# Patient Record
Sex: FEMALE | Race: BLACK OR AFRICAN AMERICAN | NOT HISPANIC OR LATINO | Employment: OTHER | URBAN - METROPOLITAN AREA
[De-identification: names, ages, dates, MRNs, and addresses within clinical notes are randomized per-mention and may not be internally consistent; named-entity substitution may affect disease eponyms.]

---

## 2017-01-25 ENCOUNTER — COMMUNICATION - HEALTHEAST (OUTPATIENT)
Dept: FAMILY MEDICINE | Facility: CLINIC | Age: 67
End: 2017-01-25

## 2017-01-25 DIAGNOSIS — I10 ESSENTIAL HYPERTENSION, MALIGNANT: ICD-10-CM

## 2017-02-03 ENCOUNTER — AMBULATORY - HEALTHEAST (OUTPATIENT)
Dept: LAB | Facility: CLINIC | Age: 67
End: 2017-02-03

## 2017-02-03 DIAGNOSIS — R79.0 LOW MAGNESIUM LEVELS: ICD-10-CM

## 2017-02-03 DIAGNOSIS — B19.20 HCV (HEPATITIS C VIRUS): ICD-10-CM

## 2017-02-06 ENCOUNTER — COMMUNICATION - HEALTHEAST (OUTPATIENT)
Dept: FAMILY MEDICINE | Facility: CLINIC | Age: 67
End: 2017-02-06

## 2017-03-15 ENCOUNTER — COMMUNICATION - HEALTHEAST (OUTPATIENT)
Dept: FAMILY MEDICINE | Facility: CLINIC | Age: 67
End: 2017-03-15

## 2017-04-06 ENCOUNTER — OFFICE VISIT - HEALTHEAST (OUTPATIENT)
Dept: FAMILY MEDICINE | Facility: CLINIC | Age: 67
End: 2017-04-06

## 2017-04-06 DIAGNOSIS — E11.9 DIABETES (H): ICD-10-CM

## 2017-04-06 DIAGNOSIS — M85.80 OSTEOPENIA: ICD-10-CM

## 2017-04-06 DIAGNOSIS — Z00.00 ENCOUNTER FOR WELLNESS EXAMINATION: ICD-10-CM

## 2017-04-06 DIAGNOSIS — E55.9 VITAMIN D DEFICIENCY: ICD-10-CM

## 2017-04-06 ASSESSMENT — MIFFLIN-ST. JEOR: SCORE: 1182.24

## 2017-04-17 ENCOUNTER — COMMUNICATION - HEALTHEAST (OUTPATIENT)
Dept: FAMILY MEDICINE | Facility: CLINIC | Age: 67
End: 2017-04-17

## 2017-04-20 ENCOUNTER — HOSPITAL ENCOUNTER (OUTPATIENT)
Dept: MAMMOGRAPHY | Facility: HOSPITAL | Age: 67
Discharge: HOME | End: 2017-04-20
Attending: FAMILY MEDICINE

## 2017-04-20 DIAGNOSIS — Z12.31 VISIT FOR SCREENING MAMMOGRAM: ICD-10-CM

## 2017-04-25 ENCOUNTER — RECORDS - HEALTHEAST (OUTPATIENT)
Dept: ADMINISTRATIVE | Facility: OTHER | Age: 67
End: 2017-04-25

## 2017-04-25 ENCOUNTER — RECORDS - HEALTHEAST (OUTPATIENT)
Dept: BONE DENSITY | Facility: CLINIC | Age: 67
End: 2017-04-25

## 2017-04-25 DIAGNOSIS — Z00.00 ENCOUNTER FOR GENERAL ADULT MEDICAL EXAMINATION WITHOUT ABNORMAL FINDINGS: ICD-10-CM

## 2017-04-25 DIAGNOSIS — M85.80 OTHER SPECIFIED DISORDERS OF BONE DENSITY AND STRUCTURE, UNSPECIFIED SITE: ICD-10-CM

## 2017-05-19 ENCOUNTER — COMMUNICATION - HEALTHEAST (OUTPATIENT)
Dept: FAMILY MEDICINE | Facility: CLINIC | Age: 67
End: 2017-05-19

## 2017-06-05 ENCOUNTER — OFFICE VISIT - HEALTHEAST (OUTPATIENT)
Dept: FAMILY MEDICINE | Facility: CLINIC | Age: 67
End: 2017-06-05

## 2017-06-05 DIAGNOSIS — I10 HTN (HYPERTENSION): ICD-10-CM

## 2017-06-05 DIAGNOSIS — I10 ESSENTIAL HYPERTENSION: ICD-10-CM

## 2017-06-05 DIAGNOSIS — K74.60 CIRRHOSIS OF LIVER WITHOUT MENTION OF ALCOHOL: ICD-10-CM

## 2017-06-05 DIAGNOSIS — E11.9 DIABETES (H): ICD-10-CM

## 2017-06-05 DIAGNOSIS — E78.5 HYPERLIPEMIA: ICD-10-CM

## 2017-06-05 DIAGNOSIS — Z01.818 PREOP EXAMINATION: ICD-10-CM

## 2017-06-05 ASSESSMENT — MIFFLIN-ST. JEOR: SCORE: 1139.16

## 2017-06-06 LAB
ATRIAL RATE - MUSE: 65 BPM
DIASTOLIC BLOOD PRESSURE - MUSE: NORMAL MMHG
INTERPRETATION ECG - MUSE: NORMAL
P AXIS - MUSE: 71 DEGREES
PR INTERVAL - MUSE: 230 MS
QRS DURATION - MUSE: 68 MS
QT - MUSE: 406 MS
QTC - MUSE: 422 MS
R AXIS - MUSE: 34 DEGREES
SYSTOLIC BLOOD PRESSURE - MUSE: NORMAL MMHG
T AXIS - MUSE: 58 DEGREES
VENTRICULAR RATE- MUSE: 65 BPM

## 2017-08-03 ENCOUNTER — COMMUNICATION - HEALTHEAST (OUTPATIENT)
Dept: FAMILY MEDICINE | Facility: CLINIC | Age: 67
End: 2017-08-03

## 2017-08-22 ENCOUNTER — COMMUNICATION - HEALTHEAST (OUTPATIENT)
Dept: FAMILY MEDICINE | Facility: CLINIC | Age: 67
End: 2017-08-22

## 2017-08-22 DIAGNOSIS — I10 ESSENTIAL HYPERTENSION: ICD-10-CM

## 2017-08-25 ENCOUNTER — RECORDS - HEALTHEAST (OUTPATIENT)
Dept: ADMINISTRATIVE | Facility: OTHER | Age: 67
End: 2017-08-25

## 2017-09-02 ENCOUNTER — COMMUNICATION - HEALTHEAST (OUTPATIENT)
Dept: FAMILY MEDICINE | Facility: CLINIC | Age: 67
End: 2017-09-02

## 2017-09-02 DIAGNOSIS — I10 ESSENTIAL HYPERTENSION: ICD-10-CM

## 2017-09-08 ENCOUNTER — COMMUNICATION - HEALTHEAST (OUTPATIENT)
Dept: FAMILY MEDICINE | Facility: CLINIC | Age: 67
End: 2017-09-08

## 2017-09-08 DIAGNOSIS — E03.9 HYPOTHYROIDISM: ICD-10-CM

## 2017-09-21 ENCOUNTER — OFFICE VISIT - HEALTHEAST (OUTPATIENT)
Dept: FAMILY MEDICINE | Facility: CLINIC | Age: 67
End: 2017-09-21

## 2017-09-21 DIAGNOSIS — E78.5 HYPERLIPEMIA: ICD-10-CM

## 2017-09-21 DIAGNOSIS — R79.89 ELEVATED SERUM CREATININE: ICD-10-CM

## 2017-09-21 DIAGNOSIS — E11.9 DIABETES MELLITUS (H): ICD-10-CM

## 2017-09-21 DIAGNOSIS — E03.9 HYPOTHYROIDISM: ICD-10-CM

## 2017-09-21 DIAGNOSIS — M81.0 OSTEOPOROSIS: ICD-10-CM

## 2017-09-21 DIAGNOSIS — R93.7 LIPID ACCUMULATION IN MUSCLE DETERMINED BY MAGNETIC RESONANCE IMAGING: ICD-10-CM

## 2017-09-21 DIAGNOSIS — I10 HTN (HYPERTENSION): ICD-10-CM

## 2017-09-21 DIAGNOSIS — E55.9 VITAMIN D DEFICIENCY DISEASE: ICD-10-CM

## 2017-09-21 LAB — HBA1C MFR BLD: 5.2 % (ref 3.5–6)

## 2017-09-21 ASSESSMENT — MIFFLIN-ST. JEOR: SCORE: 1107.41

## 2017-09-22 LAB
CHOLEST SERPL-MCNC: 186 MG/DL
FASTING STATUS PATIENT QL REPORTED: NO
HDLC SERPL-MCNC: 37 MG/DL
LDLC SERPL CALC-MCNC: 128 MG/DL
TRIGL SERPL-MCNC: 107 MG/DL

## 2017-10-04 ENCOUNTER — COMMUNICATION - HEALTHEAST (OUTPATIENT)
Dept: FAMILY MEDICINE | Facility: CLINIC | Age: 67
End: 2017-10-04

## 2017-10-04 DIAGNOSIS — I10 HTN (HYPERTENSION): ICD-10-CM

## 2017-10-10 ENCOUNTER — AMBULATORY - HEALTHEAST (OUTPATIENT)
Dept: FAMILY MEDICINE | Facility: CLINIC | Age: 67
End: 2017-10-10

## 2017-10-10 ENCOUNTER — COMMUNICATION - HEALTHEAST (OUTPATIENT)
Dept: FAMILY MEDICINE | Facility: CLINIC | Age: 67
End: 2017-10-10

## 2017-10-10 DIAGNOSIS — E03.9 HYPOTHYROIDISM: ICD-10-CM

## 2017-10-11 ENCOUNTER — COMMUNICATION - HEALTHEAST (OUTPATIENT)
Dept: FAMILY MEDICINE | Facility: CLINIC | Age: 67
End: 2017-10-11

## 2017-10-23 ENCOUNTER — COMMUNICATION - HEALTHEAST (OUTPATIENT)
Dept: FAMILY MEDICINE | Facility: CLINIC | Age: 67
End: 2017-10-23

## 2017-10-23 DIAGNOSIS — E11.9 DIABETES (H): ICD-10-CM

## 2017-10-23 DIAGNOSIS — I10 HTN (HYPERTENSION): ICD-10-CM

## 2017-10-30 ENCOUNTER — RECORDS - HEALTHEAST (OUTPATIENT)
Dept: ADMINISTRATIVE | Facility: OTHER | Age: 67
End: 2017-10-30

## 2017-10-30 ENCOUNTER — COMMUNICATION - HEALTHEAST (OUTPATIENT)
Dept: FAMILY MEDICINE | Facility: CLINIC | Age: 67
End: 2017-10-30

## 2017-12-07 ENCOUNTER — RECORDS - HEALTHEAST (OUTPATIENT)
Dept: ADMINISTRATIVE | Facility: OTHER | Age: 67
End: 2017-12-07

## 2018-01-30 ENCOUNTER — COMMUNICATION - HEALTHEAST (OUTPATIENT)
Dept: FAMILY MEDICINE | Facility: CLINIC | Age: 68
End: 2018-01-30

## 2018-01-30 DIAGNOSIS — I10 HTN (HYPERTENSION): ICD-10-CM

## 2018-02-11 ENCOUNTER — COMMUNICATION - HEALTHEAST (OUTPATIENT)
Dept: FAMILY MEDICINE | Facility: CLINIC | Age: 68
End: 2018-02-11

## 2018-04-11 ENCOUNTER — COMMUNICATION - HEALTHEAST (OUTPATIENT)
Dept: FAMILY MEDICINE | Facility: CLINIC | Age: 68
End: 2018-04-11

## 2018-04-11 DIAGNOSIS — E03.9 HYPOTHYROIDISM: ICD-10-CM

## 2018-04-29 ENCOUNTER — COMMUNICATION - HEALTHEAST (OUTPATIENT)
Dept: FAMILY MEDICINE | Facility: CLINIC | Age: 68
End: 2018-04-29

## 2018-04-29 DIAGNOSIS — I10 HTN (HYPERTENSION): ICD-10-CM

## 2018-05-08 ENCOUNTER — COMMUNICATION - HEALTHEAST (OUTPATIENT)
Dept: FAMILY MEDICINE | Facility: CLINIC | Age: 68
End: 2018-05-08

## 2018-05-10 ENCOUNTER — HOSPITAL ENCOUNTER (OUTPATIENT)
Dept: MAMMOGRAPHY | Facility: CLINIC | Age: 68
Discharge: HOME | End: 2018-05-10
Attending: FAMILY MEDICINE

## 2018-05-10 DIAGNOSIS — Z12.31 VISIT FOR SCREENING MAMMOGRAM: ICD-10-CM

## 2018-05-12 ENCOUNTER — COMMUNICATION - HEALTHEAST (OUTPATIENT)
Dept: FAMILY MEDICINE | Facility: CLINIC | Age: 68
End: 2018-05-12

## 2018-05-12 DIAGNOSIS — E11.9 DIABETES (H): ICD-10-CM

## 2018-05-12 DIAGNOSIS — I10 ESSENTIAL HYPERTENSION: ICD-10-CM

## 2018-06-19 ENCOUNTER — OFFICE VISIT - HEALTHEAST (OUTPATIENT)
Dept: FAMILY MEDICINE | Facility: CLINIC | Age: 68
End: 2018-06-19

## 2018-06-19 DIAGNOSIS — Z00.00 ROUTINE GENERAL MEDICAL EXAMINATION AT A HEALTH CARE FACILITY: ICD-10-CM

## 2018-06-19 ASSESSMENT — MIFFLIN-ST. JEOR: SCORE: 1143.29

## 2018-07-02 ENCOUNTER — OFFICE VISIT - HEALTHEAST (OUTPATIENT)
Dept: FAMILY MEDICINE | Facility: CLINIC | Age: 68
End: 2018-07-02

## 2018-07-02 DIAGNOSIS — N18.30 CKD (CHRONIC KIDNEY DISEASE) STAGE 3, GFR 30-59 ML/MIN (H): ICD-10-CM

## 2018-07-02 DIAGNOSIS — B19.20 HCV (HEPATITIS C VIRUS): ICD-10-CM

## 2018-07-02 DIAGNOSIS — E03.9 HYPOTHYROIDISM: ICD-10-CM

## 2018-07-02 DIAGNOSIS — E11.21 TYPE 2 DIABETES MELLITUS WITH DIABETIC NEPHROPATHY, WITHOUT LONG-TERM CURRENT USE OF INSULIN (H): ICD-10-CM

## 2018-07-02 DIAGNOSIS — E55.9 VITAMIN D DEFICIENCY: ICD-10-CM

## 2018-07-02 DIAGNOSIS — M81.0 OSTEOPOROSIS: ICD-10-CM

## 2018-07-02 DIAGNOSIS — I10 HTN (HYPERTENSION): ICD-10-CM

## 2018-07-02 LAB
ANION GAP SERPL CALCULATED.3IONS-SCNC: 12 MMOL/L (ref 5–18)
BUN SERPL-MCNC: 35 MG/DL (ref 8–22)
CALCIUM SERPL-MCNC: 9.7 MG/DL (ref 8.5–10.5)
CHLORIDE BLD-SCNC: 107 MMOL/L (ref 98–107)
CO2 SERPL-SCNC: 22 MMOL/L (ref 22–31)
CREAT SERPL-MCNC: 1.39 MG/DL (ref 0.6–1.1)
GFR SERPL CREATININE-BSD FRML MDRD: 38 ML/MIN/1.73M2
GLUCOSE BLD-MCNC: 80 MG/DL (ref 70–125)
HBA1C MFR BLD: 5.5 % (ref 3.5–6)
POTASSIUM BLD-SCNC: 4.5 MMOL/L (ref 3.5–5)
SODIUM SERPL-SCNC: 141 MMOL/L (ref 136–145)
TSH SERPL DL<=0.005 MIU/L-ACNC: 2.19 UIU/ML (ref 0.3–5)

## 2018-07-02 ASSESSMENT — MIFFLIN-ST. JEOR: SCORE: 1121.98

## 2018-07-03 LAB — 25(OH)D3 SERPL-MCNC: 54.7 NG/ML (ref 30–80)

## 2018-07-05 ENCOUNTER — COMMUNICATION - HEALTHEAST (OUTPATIENT)
Dept: FAMILY MEDICINE | Facility: CLINIC | Age: 68
End: 2018-07-05

## 2018-07-09 ENCOUNTER — COMMUNICATION - HEALTHEAST (OUTPATIENT)
Dept: FAMILY MEDICINE | Facility: CLINIC | Age: 68
End: 2018-07-09

## 2018-07-09 DIAGNOSIS — E03.9 HYPOTHYROIDISM: ICD-10-CM

## 2018-07-20 ENCOUNTER — RECORDS - HEALTHEAST (OUTPATIENT)
Dept: ADMINISTRATIVE | Facility: OTHER | Age: 68
End: 2018-07-20

## 2018-07-28 ENCOUNTER — COMMUNICATION - HEALTHEAST (OUTPATIENT)
Dept: FAMILY MEDICINE | Facility: CLINIC | Age: 68
End: 2018-07-28

## 2018-08-06 ENCOUNTER — RECORDS - HEALTHEAST (OUTPATIENT)
Dept: ADMINISTRATIVE | Facility: OTHER | Age: 68
End: 2018-08-06

## 2018-08-27 ENCOUNTER — RECORDS - HEALTHEAST (OUTPATIENT)
Dept: ADMINISTRATIVE | Facility: OTHER | Age: 68
End: 2018-08-27

## 2018-08-30 ENCOUNTER — COMMUNICATION - HEALTHEAST (OUTPATIENT)
Dept: FAMILY MEDICINE | Facility: CLINIC | Age: 68
End: 2018-08-30

## 2018-08-30 DIAGNOSIS — I10 ESSENTIAL HYPERTENSION: ICD-10-CM

## 2018-09-17 ENCOUNTER — OFFICE VISIT - HEALTHEAST (OUTPATIENT)
Dept: FAMILY MEDICINE | Facility: CLINIC | Age: 68
End: 2018-09-17

## 2018-09-17 DIAGNOSIS — Z23 NEED FOR IMMUNIZATION AGAINST INFLUENZA: ICD-10-CM

## 2018-09-17 DIAGNOSIS — Z23 NEED FOR VACCINATION: ICD-10-CM

## 2018-09-17 DIAGNOSIS — B19.20 HCV (HEPATITIS C VIRUS): ICD-10-CM

## 2018-09-17 DIAGNOSIS — R10.11 RUQ ABDOMINAL PAIN: ICD-10-CM

## 2018-09-17 DIAGNOSIS — R10.9 RIGHT FLANK PAIN: ICD-10-CM

## 2018-09-17 DIAGNOSIS — N18.30 CKD (CHRONIC KIDNEY DISEASE) STAGE 3, GFR 30-59 ML/MIN (H): ICD-10-CM

## 2018-09-17 LAB
ALBUMIN SERPL-MCNC: 4.1 G/DL (ref 3.5–5)
ALBUMIN UR-MCNC: NEGATIVE MG/DL
ALP SERPL-CCNC: 55 U/L (ref 45–120)
ALT SERPL W P-5'-P-CCNC: 33 U/L (ref 0–45)
ANION GAP SERPL CALCULATED.3IONS-SCNC: 9 MMOL/L (ref 5–18)
APPEARANCE UR: ABNORMAL
AST SERPL W P-5'-P-CCNC: 33 U/L (ref 0–40)
BACTERIA #/AREA URNS HPF: ABNORMAL HPF
BILIRUB SERPL-MCNC: 0.4 MG/DL (ref 0–1)
BILIRUB UR QL STRIP: NEGATIVE
BUN SERPL-MCNC: 44 MG/DL (ref 8–22)
CALCIUM SERPL-MCNC: 9.5 MG/DL (ref 8.5–10.5)
CHLORIDE BLD-SCNC: 108 MMOL/L (ref 98–107)
CO2 SERPL-SCNC: 24 MMOL/L (ref 22–31)
COLOR UR AUTO: YELLOW
CREAT SERPL-MCNC: 1.39 MG/DL (ref 0.6–1.1)
GFR SERPL CREATININE-BSD FRML MDRD: 38 ML/MIN/1.73M2
GLUCOSE BLD-MCNC: 67 MG/DL (ref 70–125)
GLUCOSE UR STRIP-MCNC: NEGATIVE MG/DL
HBA1C MFR BLD: 5.3 % (ref 3.5–6)
HGB UR QL STRIP: ABNORMAL
KETONES UR STRIP-MCNC: NEGATIVE MG/DL
LEUKOCYTE ESTERASE UR QL STRIP: ABNORMAL
NITRATE UR QL: POSITIVE
PH UR STRIP: 5.5 [PH] (ref 5–8)
POTASSIUM BLD-SCNC: 4.1 MMOL/L (ref 3.5–5)
PROT SERPL-MCNC: 7.5 G/DL (ref 6–8)
RBC #/AREA URNS AUTO: ABNORMAL HPF
SODIUM SERPL-SCNC: 141 MMOL/L (ref 136–145)
SP GR UR STRIP: 1.02 (ref 1–1.03)
SQUAMOUS #/AREA URNS AUTO: ABNORMAL LPF
UROBILINOGEN UR STRIP-ACNC: ABNORMAL
WBC #/AREA URNS AUTO: ABNORMAL HPF

## 2018-09-17 ASSESSMENT — MIFFLIN-ST. JEOR: SCORE: 1137.34

## 2018-09-18 ENCOUNTER — AMBULATORY - HEALTHEAST (OUTPATIENT)
Dept: FAMILY MEDICINE | Facility: CLINIC | Age: 68
End: 2018-09-18

## 2018-09-18 DIAGNOSIS — N39.0 URINARY TRACT INFECTION: ICD-10-CM

## 2018-09-19 LAB — BACTERIA SPEC CULT: ABNORMAL

## 2018-09-21 ENCOUNTER — COMMUNICATION - HEALTHEAST (OUTPATIENT)
Dept: TELEHEALTH | Facility: CLINIC | Age: 68
End: 2018-09-21

## 2018-09-21 ENCOUNTER — HOSPITAL ENCOUNTER (OUTPATIENT)
Dept: CT IMAGING | Facility: HOSPITAL | Age: 68
Discharge: HOME | End: 2018-09-21
Attending: FAMILY MEDICINE

## 2018-09-21 DIAGNOSIS — R10.9 RIGHT FLANK PAIN: ICD-10-CM

## 2018-09-21 DIAGNOSIS — R10.11 RUQ ABDOMINAL PAIN: ICD-10-CM

## 2018-09-21 DIAGNOSIS — B19.20 HCV (HEPATITIS C VIRUS): ICD-10-CM

## 2018-09-21 DIAGNOSIS — N18.30 CKD (CHRONIC KIDNEY DISEASE) STAGE 3, GFR 30-59 ML/MIN (H): ICD-10-CM

## 2018-09-24 ENCOUNTER — COMMUNICATION - HEALTHEAST (OUTPATIENT)
Dept: FAMILY MEDICINE | Facility: CLINIC | Age: 68
End: 2018-09-24

## 2018-09-24 DIAGNOSIS — I10 ESSENTIAL HYPERTENSION: ICD-10-CM

## 2018-09-26 ENCOUNTER — AMBULATORY - HEALTHEAST (OUTPATIENT)
Dept: FAMILY MEDICINE | Facility: CLINIC | Age: 68
End: 2018-09-26

## 2018-09-26 ENCOUNTER — COMMUNICATION - HEALTHEAST (OUTPATIENT)
Dept: FAMILY MEDICINE | Facility: CLINIC | Age: 68
End: 2018-09-26

## 2018-09-27 ENCOUNTER — RECORDS - HEALTHEAST (OUTPATIENT)
Dept: ADMINISTRATIVE | Facility: OTHER | Age: 68
End: 2018-09-27

## 2018-10-01 ENCOUNTER — OFFICE VISIT - HEALTHEAST (OUTPATIENT)
Dept: FAMILY MEDICINE | Facility: CLINIC | Age: 68
End: 2018-10-01

## 2018-10-01 DIAGNOSIS — R10.9 RIGHT FLANK PAIN: ICD-10-CM

## 2018-10-01 DIAGNOSIS — N39.0 URINARY TRACT INFECTION: ICD-10-CM

## 2018-10-01 DIAGNOSIS — K44.9 DIAPHRAGMATIC HERNIA: ICD-10-CM

## 2018-10-01 DIAGNOSIS — Z80.0 FAMILY HISTORY- STOMACH CANCER: ICD-10-CM

## 2018-10-01 DIAGNOSIS — R10.11 RUQ ABDOMINAL PAIN: ICD-10-CM

## 2018-10-01 LAB
ALBUMIN UR-MCNC: NEGATIVE MG/DL
APPEARANCE UR: CLEAR
BACTERIA #/AREA URNS HPF: ABNORMAL HPF
BILIRUB UR QL STRIP: NEGATIVE
COLOR UR AUTO: YELLOW
GLUCOSE UR STRIP-MCNC: NEGATIVE MG/DL
HGB UR QL STRIP: ABNORMAL
KETONES UR STRIP-MCNC: NEGATIVE MG/DL
LEUKOCYTE ESTERASE UR QL STRIP: ABNORMAL
NITRATE UR QL: NEGATIVE
PH UR STRIP: 5.5 [PH] (ref 5–8)
RBC #/AREA URNS AUTO: ABNORMAL HPF
SP GR UR STRIP: 1.02 (ref 1–1.03)
SQUAMOUS #/AREA URNS AUTO: ABNORMAL LPF
UROBILINOGEN UR STRIP-ACNC: ABNORMAL
WBC #/AREA URNS AUTO: ABNORMAL HPF

## 2018-10-02 LAB — BACTERIA SPEC CULT: NO GROWTH

## 2018-10-06 ENCOUNTER — COMMUNICATION - HEALTHEAST (OUTPATIENT)
Dept: FAMILY MEDICINE | Facility: CLINIC | Age: 68
End: 2018-10-06

## 2018-10-06 DIAGNOSIS — I10 HTN (HYPERTENSION): ICD-10-CM

## 2018-10-22 ENCOUNTER — RECORDS - HEALTHEAST (OUTPATIENT)
Dept: ADMINISTRATIVE | Facility: OTHER | Age: 68
End: 2018-10-22

## 2018-10-23 ENCOUNTER — RECORDS - HEALTHEAST (OUTPATIENT)
Dept: ADMINISTRATIVE | Facility: OTHER | Age: 68
End: 2018-10-23

## 2018-11-03 ENCOUNTER — COMMUNICATION - HEALTHEAST (OUTPATIENT)
Dept: FAMILY MEDICINE | Facility: CLINIC | Age: 68
End: 2018-11-03

## 2018-11-03 DIAGNOSIS — I10 HTN (HYPERTENSION): ICD-10-CM

## 2019-01-03 ENCOUNTER — OFFICE VISIT - HEALTHEAST (OUTPATIENT)
Dept: FAMILY MEDICINE | Facility: CLINIC | Age: 69
End: 2019-01-03

## 2019-01-03 DIAGNOSIS — D69.6 THROMBOCYTOPENIA, UNSPECIFIED (H): ICD-10-CM

## 2019-01-03 DIAGNOSIS — E11.21 TYPE 2 DIABETES MELLITUS WITH DIABETIC NEPHROPATHY, WITHOUT LONG-TERM CURRENT USE OF INSULIN (H): ICD-10-CM

## 2019-01-03 DIAGNOSIS — I10 ESSENTIAL HYPERTENSION: ICD-10-CM

## 2019-01-03 DIAGNOSIS — E11.9 CONTROLLED TYPE 2 DIABETES MELLITUS WITHOUT COMPLICATION, WITHOUT LONG-TERM CURRENT USE OF INSULIN (H): ICD-10-CM

## 2019-01-03 DIAGNOSIS — K74.60 CIRRHOSIS OF LIVER WITHOUT ASCITES, UNSPECIFIED HEPATIC CIRRHOSIS TYPE (H): ICD-10-CM

## 2019-01-03 DIAGNOSIS — R10.11 RUQ ABDOMINAL PAIN: ICD-10-CM

## 2019-01-03 DIAGNOSIS — M41.25 OTHER IDIOPATHIC SCOLIOSIS, THORACOLUMBAR REGION: ICD-10-CM

## 2019-01-03 DIAGNOSIS — B18.2 CHRONIC HEPATITIS C WITHOUT HEPATIC COMA (H): ICD-10-CM

## 2019-01-03 LAB
CHOLEST SERPL-MCNC: 213 MG/DL
CREAT UR-MCNC: 78 MG/DL
FASTING STATUS PATIENT QL REPORTED: YES
HBA1C MFR BLD: 5.3 % (ref 3.5–6)
HDLC SERPL-MCNC: 48 MG/DL
LDLC SERPL CALC-MCNC: 139 MG/DL
MICROALBUMIN UR-MCNC: <0.5 MG/DL (ref 0–1.99)
MICROALBUMIN/CREAT UR: NORMAL MG/G
TRIGL SERPL-MCNC: 129 MG/DL

## 2019-01-03 ASSESSMENT — MIFFLIN-ST. JEOR: SCORE: 1165.47

## 2019-01-07 ENCOUNTER — COMMUNICATION - HEALTHEAST (OUTPATIENT)
Dept: FAMILY MEDICINE | Facility: CLINIC | Age: 69
End: 2019-01-07

## 2019-04-04 ENCOUNTER — OFFICE VISIT - HEALTHEAST (OUTPATIENT)
Dept: FAMILY MEDICINE | Facility: CLINIC | Age: 69
End: 2019-04-04

## 2019-04-04 DIAGNOSIS — G89.29 CHRONIC RIGHT FLANK PAIN: ICD-10-CM

## 2019-04-04 DIAGNOSIS — E11.21 TYPE 2 DIABETES MELLITUS WITH DIABETIC NEPHROPATHY, WITHOUT LONG-TERM CURRENT USE OF INSULIN (H): ICD-10-CM

## 2019-04-04 DIAGNOSIS — R10.9 CHRONIC RIGHT FLANK PAIN: ICD-10-CM

## 2019-04-04 DIAGNOSIS — G89.29 CHRONIC RIGHT-SIDED THORACIC BACK PAIN: ICD-10-CM

## 2019-04-04 DIAGNOSIS — E66.3 OVERWEIGHT (BMI 25.0-29.9): ICD-10-CM

## 2019-04-04 DIAGNOSIS — R10.11 RUQ ABDOMINAL PAIN: ICD-10-CM

## 2019-04-04 DIAGNOSIS — B18.2 CHRONIC HEPATITIS C WITHOUT HEPATIC COMA (H): ICD-10-CM

## 2019-04-04 DIAGNOSIS — M54.6 CHRONIC RIGHT-SIDED THORACIC BACK PAIN: ICD-10-CM

## 2019-04-04 DIAGNOSIS — I10 ESSENTIAL HYPERTENSION: ICD-10-CM

## 2019-04-04 ASSESSMENT — MIFFLIN-ST. JEOR: SCORE: 1167.73

## 2019-04-09 ENCOUNTER — COMMUNICATION - HEALTHEAST (OUTPATIENT)
Dept: FAMILY MEDICINE | Facility: CLINIC | Age: 69
End: 2019-04-09

## 2019-04-17 ENCOUNTER — RECORDS - HEALTHEAST (OUTPATIENT)
Dept: ADMINISTRATIVE | Facility: OTHER | Age: 69
End: 2019-04-17

## 2019-04-24 ENCOUNTER — RECORDS - HEALTHEAST (OUTPATIENT)
Dept: ADMINISTRATIVE | Facility: OTHER | Age: 69
End: 2019-04-24

## 2019-04-25 ENCOUNTER — RECORDS - HEALTHEAST (OUTPATIENT)
Dept: HEALTH INFORMATION MANAGEMENT | Facility: CLINIC | Age: 69
End: 2019-04-25

## 2019-05-21 ENCOUNTER — COMMUNICATION - HEALTHEAST (OUTPATIENT)
Dept: FAMILY MEDICINE | Facility: CLINIC | Age: 69
End: 2019-05-21

## 2019-05-21 DIAGNOSIS — I10 ESSENTIAL HYPERTENSION: ICD-10-CM

## 2019-07-08 ENCOUNTER — AMBULATORY - HEALTHEAST (OUTPATIENT)
Dept: FAMILY MEDICINE | Facility: CLINIC | Age: 69
End: 2019-07-08

## 2019-07-08 DIAGNOSIS — E11.9 CONTROLLED TYPE 2 DIABETES MELLITUS WITHOUT COMPLICATION, WITHOUT LONG-TERM CURRENT USE OF INSULIN (H): ICD-10-CM

## 2019-07-11 ENCOUNTER — OFFICE VISIT - HEALTHEAST (OUTPATIENT)
Dept: FAMILY MEDICINE | Facility: CLINIC | Age: 69
End: 2019-07-11

## 2019-07-11 DIAGNOSIS — Z23 NEED FOR VACCINATION: ICD-10-CM

## 2019-07-11 DIAGNOSIS — E11.9 CONTROLLED TYPE 2 DIABETES MELLITUS WITHOUT COMPLICATION, WITHOUT LONG-TERM CURRENT USE OF INSULIN (H): ICD-10-CM

## 2019-07-11 DIAGNOSIS — I10 ESSENTIAL HYPERTENSION: ICD-10-CM

## 2019-07-11 DIAGNOSIS — E03.9 ACQUIRED HYPOTHYROIDISM: ICD-10-CM

## 2019-07-11 DIAGNOSIS — E78.2 MIXED HYPERLIPIDEMIA: ICD-10-CM

## 2019-07-11 DIAGNOSIS — N18.30 CKD (CHRONIC KIDNEY DISEASE) STAGE 3, GFR 30-59 ML/MIN (H): ICD-10-CM

## 2019-07-11 LAB
HBA1C MFR BLD: 5.4 % (ref 3.5–6)
TSH SERPL DL<=0.005 MIU/L-ACNC: 0.21 UIU/ML (ref 0.3–5)

## 2019-07-11 ASSESSMENT — MIFFLIN-ST. JEOR: SCORE: 1161.24

## 2019-07-15 ENCOUNTER — COMMUNICATION - HEALTHEAST (OUTPATIENT)
Dept: FAMILY MEDICINE | Facility: CLINIC | Age: 69
End: 2019-07-15

## 2019-07-15 ENCOUNTER — AMBULATORY - HEALTHEAST (OUTPATIENT)
Dept: FAMILY MEDICINE | Facility: CLINIC | Age: 69
End: 2019-07-15

## 2019-07-15 DIAGNOSIS — E03.9 ACQUIRED HYPOTHYROIDISM: ICD-10-CM

## 2019-07-25 ENCOUNTER — COMMUNICATION - HEALTHEAST (OUTPATIENT)
Dept: FAMILY MEDICINE | Facility: CLINIC | Age: 69
End: 2019-07-25

## 2019-07-25 DIAGNOSIS — E03.9 HYPOTHYROIDISM: ICD-10-CM

## 2019-08-06 ENCOUNTER — RECORDS - HEALTHEAST (OUTPATIENT)
Dept: MAMMOGRAPHY | Facility: CLINIC | Age: 69
End: 2019-08-06

## 2019-08-06 DIAGNOSIS — Z12.31 ENCOUNTER FOR SCREENING MAMMOGRAM FOR MALIGNANT NEOPLASM OF BREAST: ICD-10-CM

## 2019-08-15 ENCOUNTER — COMMUNICATION - HEALTHEAST (OUTPATIENT)
Dept: FAMILY MEDICINE | Facility: CLINIC | Age: 69
End: 2019-08-15

## 2019-08-15 ENCOUNTER — AMBULATORY - HEALTHEAST (OUTPATIENT)
Dept: FAMILY MEDICINE | Facility: CLINIC | Age: 69
End: 2019-08-15

## 2019-08-15 DIAGNOSIS — I10 ESSENTIAL HYPERTENSION: ICD-10-CM

## 2019-08-15 DIAGNOSIS — Z51.81 ENCOUNTER FOR THERAPEUTIC DRUG MONITORING: ICD-10-CM

## 2019-09-09 ENCOUNTER — RECORDS - HEALTHEAST (OUTPATIENT)
Dept: ADMINISTRATIVE | Facility: OTHER | Age: 69
End: 2019-09-09

## 2019-09-11 ENCOUNTER — RECORDS - HEALTHEAST (OUTPATIENT)
Dept: HEALTH INFORMATION MANAGEMENT | Facility: CLINIC | Age: 69
End: 2019-09-11

## 2019-09-28 ENCOUNTER — COMMUNICATION - HEALTHEAST (OUTPATIENT)
Dept: FAMILY MEDICINE | Facility: CLINIC | Age: 69
End: 2019-09-28

## 2019-09-28 DIAGNOSIS — I10 ESSENTIAL HYPERTENSION: ICD-10-CM

## 2019-09-28 DIAGNOSIS — I10 HTN (HYPERTENSION): ICD-10-CM

## 2019-10-21 ENCOUNTER — COMMUNICATION - HEALTHEAST (OUTPATIENT)
Dept: FAMILY MEDICINE | Facility: CLINIC | Age: 69
End: 2019-10-21

## 2019-10-21 DIAGNOSIS — I10 HTN (HYPERTENSION): ICD-10-CM

## 2019-12-13 ENCOUNTER — COMMUNICATION - HEALTHEAST (OUTPATIENT)
Dept: FAMILY MEDICINE | Facility: CLINIC | Age: 69
End: 2019-12-13

## 2019-12-13 DIAGNOSIS — E11.9 DIABETES MELLITUS WITHOUT COMPLICATION (H): ICD-10-CM

## 2019-12-13 DIAGNOSIS — E11.9 CONTROLLED TYPE 2 DIABETES MELLITUS WITHOUT COMPLICATION, WITHOUT LONG-TERM CURRENT USE OF INSULIN (H): ICD-10-CM

## 2019-12-15 ENCOUNTER — COMMUNICATION - HEALTHEAST (OUTPATIENT)
Dept: FAMILY MEDICINE | Facility: CLINIC | Age: 69
End: 2019-12-15

## 2019-12-15 DIAGNOSIS — E11.9 CONTROLLED TYPE 2 DIABETES MELLITUS WITHOUT COMPLICATION, WITHOUT LONG-TERM CURRENT USE OF INSULIN (H): ICD-10-CM

## 2020-01-07 ENCOUNTER — COMMUNICATION - HEALTHEAST (OUTPATIENT)
Dept: FAMILY MEDICINE | Facility: CLINIC | Age: 70
End: 2020-01-07

## 2020-01-07 DIAGNOSIS — I10 HTN (HYPERTENSION): ICD-10-CM

## 2020-01-14 ENCOUNTER — AMBULATORY - HEALTHEAST (OUTPATIENT)
Dept: FAMILY MEDICINE | Facility: CLINIC | Age: 70
End: 2020-01-14

## 2020-01-14 DIAGNOSIS — I10 ESSENTIAL HYPERTENSION: ICD-10-CM

## 2020-01-15 ENCOUNTER — COMMUNICATION - HEALTHEAST (OUTPATIENT)
Dept: FAMILY MEDICINE | Facility: CLINIC | Age: 70
End: 2020-01-15

## 2020-01-21 ENCOUNTER — OFFICE VISIT - HEALTHEAST (OUTPATIENT)
Dept: FAMILY MEDICINE | Facility: CLINIC | Age: 70
End: 2020-01-21

## 2020-01-21 DIAGNOSIS — M81.0 SENILE OSTEOPOROSIS: ICD-10-CM

## 2020-01-21 DIAGNOSIS — E11.21 TYPE 2 DIABETES MELLITUS WITH DIABETIC NEPHROPATHY, WITHOUT LONG-TERM CURRENT USE OF INSULIN (H): ICD-10-CM

## 2020-01-21 DIAGNOSIS — K74.60 CIRRHOSIS OF LIVER WITHOUT ASCITES, UNSPECIFIED HEPATIC CIRRHOSIS TYPE (H): ICD-10-CM

## 2020-01-21 DIAGNOSIS — D69.6 THROMBOCYTOPENIA, UNSPECIFIED (H): ICD-10-CM

## 2020-01-21 DIAGNOSIS — E11.9 CONTROLLED TYPE 2 DIABETES MELLITUS WITHOUT COMPLICATION, WITHOUT LONG-TERM CURRENT USE OF INSULIN (H): ICD-10-CM

## 2020-01-21 DIAGNOSIS — E78.2 MIXED HYPERLIPIDEMIA: ICD-10-CM

## 2020-01-21 DIAGNOSIS — Z51.81 ENCOUNTER FOR THERAPEUTIC DRUG MONITORING: ICD-10-CM

## 2020-01-21 DIAGNOSIS — N18.30 CKD (CHRONIC KIDNEY DISEASE) STAGE 3, GFR 30-59 ML/MIN (H): ICD-10-CM

## 2020-01-21 DIAGNOSIS — E03.9 ACQUIRED HYPOTHYROIDISM: ICD-10-CM

## 2020-01-21 LAB
ALBUMIN SERPL-MCNC: 4.2 G/DL (ref 3.5–5)
ALP SERPL-CCNC: 59 U/L (ref 45–120)
ALT SERPL W P-5'-P-CCNC: 21 U/L (ref 0–45)
ANION GAP SERPL CALCULATED.3IONS-SCNC: 8 MMOL/L (ref 5–18)
AST SERPL W P-5'-P-CCNC: 27 U/L (ref 0–40)
BILIRUB SERPL-MCNC: 0.5 MG/DL (ref 0–1)
BUN SERPL-MCNC: 26 MG/DL (ref 8–22)
CALCIUM SERPL-MCNC: 9.4 MG/DL (ref 8.5–10.5)
CHLORIDE BLD-SCNC: 106 MMOL/L (ref 98–107)
CHOLEST SERPL-MCNC: 211 MG/DL
CO2 SERPL-SCNC: 27 MMOL/L (ref 22–31)
CREAT SERPL-MCNC: 1.37 MG/DL (ref 0.6–1.1)
FASTING STATUS PATIENT QL REPORTED: YES
GFR SERPL CREATININE-BSD FRML MDRD: 38 ML/MIN/1.73M2
GLUCOSE BLD-MCNC: 85 MG/DL (ref 70–125)
HBA1C MFR BLD: 5.3 % (ref 3.5–6)
HDLC SERPL-MCNC: 48 MG/DL
LDLC SERPL CALC-MCNC: 149 MG/DL
POTASSIUM BLD-SCNC: 4.5 MMOL/L (ref 3.5–5)
PROT SERPL-MCNC: 7.6 G/DL (ref 6–8)
SODIUM SERPL-SCNC: 141 MMOL/L (ref 136–145)
TRIGL SERPL-MCNC: 71 MG/DL
TSH SERPL DL<=0.005 MIU/L-ACNC: 2.37 UIU/ML (ref 0.3–5)

## 2020-01-21 ASSESSMENT — MIFFLIN-ST. JEOR: SCORE: 1184.58

## 2020-01-27 ENCOUNTER — AMBULATORY - HEALTHEAST (OUTPATIENT)
Dept: FAMILY MEDICINE | Facility: CLINIC | Age: 70
End: 2020-01-27

## 2020-01-27 ENCOUNTER — COMMUNICATION - HEALTHEAST (OUTPATIENT)
Dept: FAMILY MEDICINE | Facility: CLINIC | Age: 70
End: 2020-01-27

## 2020-01-27 DIAGNOSIS — E03.9 ACQUIRED HYPOTHYROIDISM: ICD-10-CM

## 2020-01-27 DIAGNOSIS — E78.2 MIXED HYPERLIPIDEMIA: ICD-10-CM

## 2020-01-27 DIAGNOSIS — E11.9 CONTROLLED TYPE 2 DIABETES MELLITUS WITHOUT COMPLICATION, WITHOUT LONG-TERM CURRENT USE OF INSULIN (H): ICD-10-CM

## 2020-01-30 ENCOUNTER — COMMUNICATION - HEALTHEAST (OUTPATIENT)
Dept: FAMILY MEDICINE | Facility: CLINIC | Age: 70
End: 2020-01-30

## 2020-01-30 ENCOUNTER — AMBULATORY - HEALTHEAST (OUTPATIENT)
Dept: FAMILY MEDICINE | Facility: CLINIC | Age: 70
End: 2020-01-30

## 2020-02-05 ENCOUNTER — AMBULATORY - HEALTHEAST (OUTPATIENT)
Dept: EDUCATION SERVICES | Facility: CLINIC | Age: 70
End: 2020-02-05

## 2020-02-05 DIAGNOSIS — E11.9 CONTROLLED TYPE 2 DIABETES MELLITUS WITHOUT COMPLICATION, WITHOUT LONG-TERM CURRENT USE OF INSULIN (H): ICD-10-CM

## 2020-02-06 ENCOUNTER — AMBULATORY - HEALTHEAST (OUTPATIENT)
Dept: SCHEDULING | Facility: CLINIC | Age: 70
End: 2020-02-06

## 2020-02-06 DIAGNOSIS — M81.0 SENILE OSTEOPOROSIS: ICD-10-CM

## 2020-02-20 ENCOUNTER — OFFICE VISIT - HEALTHEAST (OUTPATIENT)
Dept: FAMILY MEDICINE | Facility: CLINIC | Age: 70
End: 2020-02-20

## 2020-02-20 DIAGNOSIS — M81.0 SENILE OSTEOPOROSIS: ICD-10-CM

## 2020-02-20 DIAGNOSIS — E55.9 VITAMIN D DEFICIENCY: ICD-10-CM

## 2020-02-20 ASSESSMENT — MIFFLIN-ST. JEOR: SCORE: 1184.58

## 2020-04-05 ENCOUNTER — COMMUNICATION - HEALTHEAST (OUTPATIENT)
Dept: FAMILY MEDICINE | Facility: CLINIC | Age: 70
End: 2020-04-05

## 2020-04-05 DIAGNOSIS — E11.9 DIABETES MELLITUS WITHOUT COMPLICATION (H): ICD-10-CM

## 2020-05-18 ENCOUNTER — COMMUNICATION - HEALTHEAST (OUTPATIENT)
Dept: FAMILY MEDICINE | Facility: CLINIC | Age: 70
End: 2020-05-18

## 2020-05-18 DIAGNOSIS — E55.9 VITAMIN D DEFICIENCY: ICD-10-CM

## 2020-05-18 DIAGNOSIS — M81.0 SENILE OSTEOPOROSIS: ICD-10-CM

## 2020-06-10 ENCOUNTER — COMMUNICATION - HEALTHEAST (OUTPATIENT)
Dept: EDUCATION SERVICES | Facility: CLINIC | Age: 70
End: 2020-06-10

## 2020-06-10 ENCOUNTER — AMBULATORY - HEALTHEAST (OUTPATIENT)
Dept: EDUCATION SERVICES | Facility: CLINIC | Age: 70
End: 2020-06-10

## 2020-06-10 ENCOUNTER — COMMUNICATION - HEALTHEAST (OUTPATIENT)
Dept: FAMILY MEDICINE | Facility: CLINIC | Age: 70
End: 2020-06-10

## 2020-06-10 DIAGNOSIS — E11.9 CONTROLLED TYPE 2 DIABETES MELLITUS WITHOUT COMPLICATION, WITHOUT LONG-TERM CURRENT USE OF INSULIN (H): ICD-10-CM

## 2020-07-15 ENCOUNTER — COMMUNICATION - HEALTHEAST (OUTPATIENT)
Dept: FAMILY MEDICINE | Facility: CLINIC | Age: 70
End: 2020-07-15

## 2020-07-15 DIAGNOSIS — I10 ESSENTIAL HYPERTENSION: ICD-10-CM

## 2020-08-10 ENCOUNTER — HOSPITAL ENCOUNTER (OUTPATIENT)
Dept: MAMMOGRAPHY | Facility: CLINIC | Age: 70
Discharge: HOME | End: 2020-08-10
Attending: FAMILY MEDICINE

## 2020-08-10 ENCOUNTER — COMMUNICATION - HEALTHEAST (OUTPATIENT)
Dept: FAMILY MEDICINE | Facility: CLINIC | Age: 70
End: 2020-08-10

## 2020-08-10 DIAGNOSIS — M81.0 SENILE OSTEOPOROSIS: ICD-10-CM

## 2020-08-10 DIAGNOSIS — Z12.31 VISIT FOR SCREENING MAMMOGRAM: ICD-10-CM

## 2020-08-10 DIAGNOSIS — E55.9 VITAMIN D DEFICIENCY: ICD-10-CM

## 2020-09-09 ENCOUNTER — AMBULATORY - HEALTHEAST (OUTPATIENT)
Dept: EDUCATION SERVICES | Facility: CLINIC | Age: 70
End: 2020-09-09

## 2020-09-09 ENCOUNTER — RECORDS - HEALTHEAST (OUTPATIENT)
Dept: ADMINISTRATIVE | Facility: OTHER | Age: 70
End: 2020-09-09

## 2020-09-09 DIAGNOSIS — E11.9 CONTROLLED TYPE 2 DIABETES MELLITUS WITHOUT COMPLICATION, WITHOUT LONG-TERM CURRENT USE OF INSULIN (H): ICD-10-CM

## 2020-09-17 ENCOUNTER — COMMUNICATION - HEALTHEAST (OUTPATIENT)
Dept: FAMILY MEDICINE | Facility: CLINIC | Age: 70
End: 2020-09-17

## 2020-09-29 ENCOUNTER — COMMUNICATION - HEALTHEAST (OUTPATIENT)
Dept: TELEHEALTH | Facility: CLINIC | Age: 70
End: 2020-09-29

## 2020-09-29 ENCOUNTER — OFFICE VISIT - HEALTHEAST (OUTPATIENT)
Dept: FAMILY MEDICINE | Facility: CLINIC | Age: 70
End: 2020-09-29

## 2020-09-29 DIAGNOSIS — M81.0 AGE-RELATED OSTEOPOROSIS WITHOUT CURRENT PATHOLOGICAL FRACTURE: ICD-10-CM

## 2020-09-29 DIAGNOSIS — Z23 NEED FOR 23-POLYVALENT PNEUMOCOCCAL POLYSACCHARIDE VACCINE: ICD-10-CM

## 2020-09-29 DIAGNOSIS — E78.5 HYPERLIPIDEMIA WITH TARGET LDL LESS THAN 70: ICD-10-CM

## 2020-09-29 DIAGNOSIS — E55.9 VITAMIN D DEFICIENCY: ICD-10-CM

## 2020-09-29 DIAGNOSIS — E03.9 ACQUIRED HYPOTHYROIDISM: ICD-10-CM

## 2020-09-29 DIAGNOSIS — K74.60 CIRRHOSIS OF LIVER WITHOUT ASCITES, UNSPECIFIED HEPATIC CIRRHOSIS TYPE (H): ICD-10-CM

## 2020-09-29 DIAGNOSIS — N18.30 CKD (CHRONIC KIDNEY DISEASE) STAGE 3, GFR 30-59 ML/MIN (H): ICD-10-CM

## 2020-09-29 DIAGNOSIS — E11.9 CONTROLLED TYPE 2 DIABETES MELLITUS WITHOUT COMPLICATION, WITHOUT LONG-TERM CURRENT USE OF INSULIN (H): ICD-10-CM

## 2020-09-29 DIAGNOSIS — Z23 NEED FOR INFLUENZA VACCINATION: ICD-10-CM

## 2020-09-29 DIAGNOSIS — Z00.00 ANNUAL PHYSICAL EXAM: ICD-10-CM

## 2020-09-29 ASSESSMENT — MIFFLIN-ST. JEOR: SCORE: 1142.94

## 2020-09-30 ENCOUNTER — AMBULATORY - HEALTHEAST (OUTPATIENT)
Dept: LAB | Facility: CLINIC | Age: 70
End: 2020-09-30

## 2020-09-30 ENCOUNTER — HOSPITAL ENCOUNTER (OUTPATIENT)
Dept: ULTRASOUND IMAGING | Facility: HOSPITAL | Age: 70
Discharge: HOME | End: 2020-09-30
Attending: FAMILY MEDICINE

## 2020-09-30 DIAGNOSIS — E78.5 HYPERLIPIDEMIA WITH TARGET LDL LESS THAN 70: ICD-10-CM

## 2020-09-30 DIAGNOSIS — E03.9 ACQUIRED HYPOTHYROIDISM: ICD-10-CM

## 2020-09-30 DIAGNOSIS — K74.60 CIRRHOSIS OF LIVER WITHOUT ASCITES, UNSPECIFIED HEPATIC CIRRHOSIS TYPE (H): ICD-10-CM

## 2020-09-30 DIAGNOSIS — E55.9 VITAMIN D DEFICIENCY: ICD-10-CM

## 2020-09-30 DIAGNOSIS — N18.30 CKD (CHRONIC KIDNEY DISEASE) STAGE 3, GFR 30-59 ML/MIN (H): ICD-10-CM

## 2020-09-30 DIAGNOSIS — E11.9 CONTROLLED TYPE 2 DIABETES MELLITUS WITHOUT COMPLICATION, WITHOUT LONG-TERM CURRENT USE OF INSULIN (H): ICD-10-CM

## 2020-09-30 LAB
ALBUMIN SERPL-MCNC: 4.5 G/DL (ref 3.5–5)
ALP SERPL-CCNC: 64 U/L (ref 45–120)
ALT SERPL W P-5'-P-CCNC: 30 U/L (ref 0–45)
ANION GAP SERPL CALCULATED.3IONS-SCNC: 9 MMOL/L (ref 5–18)
AST SERPL W P-5'-P-CCNC: 28 U/L (ref 0–40)
BILIRUB SERPL-MCNC: 0.5 MG/DL (ref 0–1)
BUN SERPL-MCNC: 30 MG/DL (ref 8–22)
CALCIUM SERPL-MCNC: 9.3 MG/DL (ref 8.5–10.5)
CHLORIDE BLD-SCNC: 109 MMOL/L (ref 98–107)
CHOLEST SERPL-MCNC: 110 MG/DL
CO2 SERPL-SCNC: 25 MMOL/L (ref 22–31)
CREAT SERPL-MCNC: 1.37 MG/DL (ref 0.6–1.1)
CREAT UR-MCNC: 192.4 MG/DL
FASTING STATUS PATIENT QL REPORTED: YES
GFR SERPL CREATININE-BSD FRML MDRD: 38 ML/MIN/1.73M2
GLUCOSE BLD-MCNC: 96 MG/DL (ref 70–125)
HBA1C MFR BLD: 5.3 %
HDLC SERPL-MCNC: 38 MG/DL
LDLC SERPL CALC-MCNC: 62 MG/DL
MICROALBUMIN UR-MCNC: 1.1 MG/DL (ref 0–1.99)
MICROALBUMIN/CREAT UR: 5.7 MG/G
POTASSIUM BLD-SCNC: 4.2 MMOL/L (ref 3.5–5)
PROT SERPL-MCNC: 7.7 G/DL (ref 6–8)
SODIUM SERPL-SCNC: 143 MMOL/L (ref 136–145)
TRIGL SERPL-MCNC: 51 MG/DL
TSH SERPL DL<=0.005 MIU/L-ACNC: 3.13 UIU/ML (ref 0.3–5)

## 2020-10-01 ENCOUNTER — COMMUNICATION - HEALTHEAST (OUTPATIENT)
Dept: FAMILY MEDICINE | Facility: CLINIC | Age: 70
End: 2020-10-01

## 2020-10-01 ENCOUNTER — AMBULATORY - HEALTHEAST (OUTPATIENT)
Dept: FAMILY MEDICINE | Facility: CLINIC | Age: 70
End: 2020-10-01

## 2020-10-01 DIAGNOSIS — E55.9 VITAMIN D DEFICIENCY: ICD-10-CM

## 2020-10-01 LAB — 25(OH)D3 SERPL-MCNC: 89.4 NG/ML (ref 30–80)

## 2020-10-21 ENCOUNTER — RECORDS - HEALTHEAST (OUTPATIENT)
Dept: ADMINISTRATIVE | Facility: OTHER | Age: 70
End: 2020-10-21

## 2020-10-21 LAB — RETINOPATHY: NEGATIVE

## 2020-10-26 ENCOUNTER — RECORDS - HEALTHEAST (OUTPATIENT)
Dept: HEALTH INFORMATION MANAGEMENT | Facility: CLINIC | Age: 70
End: 2020-10-26

## 2020-11-07 ENCOUNTER — COMMUNICATION - HEALTHEAST (OUTPATIENT)
Dept: FAMILY MEDICINE | Facility: CLINIC | Age: 70
End: 2020-11-07

## 2020-11-07 DIAGNOSIS — E55.9 VITAMIN D DEFICIENCY: ICD-10-CM

## 2020-11-07 DIAGNOSIS — M81.0 SENILE OSTEOPOROSIS: ICD-10-CM

## 2020-11-14 ENCOUNTER — COMMUNICATION - HEALTHEAST (OUTPATIENT)
Dept: FAMILY MEDICINE | Facility: CLINIC | Age: 70
End: 2020-11-14

## 2020-11-14 DIAGNOSIS — E78.2 MIXED HYPERLIPIDEMIA: ICD-10-CM

## 2020-11-14 DIAGNOSIS — E11.9 CONTROLLED TYPE 2 DIABETES MELLITUS WITHOUT COMPLICATION, WITHOUT LONG-TERM CURRENT USE OF INSULIN (H): ICD-10-CM

## 2020-11-16 RX ORDER — ATORVASTATIN CALCIUM 20 MG/1
TABLET, FILM COATED ORAL
Qty: 90 TABLET | Refills: 3 | Status: SHIPPED | OUTPATIENT
Start: 2020-11-16 | End: 2021-11-01

## 2020-12-05 ENCOUNTER — COMMUNICATION - HEALTHEAST (OUTPATIENT)
Dept: FAMILY MEDICINE | Facility: CLINIC | Age: 70
End: 2020-12-05

## 2020-12-05 DIAGNOSIS — I10 HTN (HYPERTENSION): ICD-10-CM

## 2020-12-07 RX ORDER — LISINOPRIL 40 MG/1
TABLET ORAL
Qty: 90 TABLET | Refills: 2 | Status: SHIPPED | OUTPATIENT
Start: 2020-12-07 | End: 2021-08-09

## 2020-12-17 ENCOUNTER — COMMUNICATION - HEALTHEAST (OUTPATIENT)
Dept: FAMILY MEDICINE | Facility: CLINIC | Age: 70
End: 2020-12-17

## 2020-12-17 DIAGNOSIS — I10 HTN (HYPERTENSION): ICD-10-CM

## 2020-12-18 ENCOUNTER — COMMUNICATION - HEALTHEAST (OUTPATIENT)
Dept: FAMILY MEDICINE | Facility: CLINIC | Age: 70
End: 2020-12-18

## 2020-12-18 DIAGNOSIS — I10 ESSENTIAL HYPERTENSION: ICD-10-CM

## 2020-12-18 RX ORDER — HYDROCHLOROTHIAZIDE 25 MG/1
TABLET ORAL
Qty: 90 TABLET | Refills: 2 | Status: SHIPPED | OUTPATIENT
Start: 2020-12-18 | End: 2021-11-01

## 2020-12-23 ENCOUNTER — COMMUNICATION - HEALTHEAST (OUTPATIENT)
Dept: FAMILY MEDICINE | Facility: CLINIC | Age: 70
End: 2020-12-23

## 2020-12-23 DIAGNOSIS — I10 ESSENTIAL HYPERTENSION: ICD-10-CM

## 2020-12-26 RX ORDER — SPIRONOLACTONE 25 MG/1
25 TABLET ORAL DAILY
Qty: 90 TABLET | Refills: 2 | Status: SHIPPED | OUTPATIENT
Start: 2020-12-26 | End: 2022-01-04

## 2020-12-31 ENCOUNTER — COMMUNICATION - HEALTHEAST (OUTPATIENT)
Dept: FAMILY MEDICINE | Facility: CLINIC | Age: 70
End: 2020-12-31

## 2020-12-31 DIAGNOSIS — I10 ESSENTIAL HYPERTENSION: ICD-10-CM

## 2021-01-25 ENCOUNTER — COMMUNICATION - HEALTHEAST (OUTPATIENT)
Dept: FAMILY MEDICINE | Facility: CLINIC | Age: 71
End: 2021-01-25

## 2021-01-25 ENCOUNTER — COMMUNICATION - HEALTHEAST (OUTPATIENT)
Dept: SCHEDULING | Facility: CLINIC | Age: 71
End: 2021-01-25

## 2021-01-25 DIAGNOSIS — I10 ESSENTIAL HYPERTENSION: ICD-10-CM

## 2021-01-25 RX ORDER — METOPROLOL SUCCINATE 100 MG/1
TABLET, EXTENDED RELEASE ORAL
Qty: 135 TABLET | Refills: 3 | Status: SHIPPED | OUTPATIENT
Start: 2021-01-25 | End: 2022-01-04

## 2021-01-25 RX ORDER — METOPROLOL TARTRATE 100 MG
100 TABLET ORAL 2 TIMES DAILY
Qty: 60 TABLET | Refills: 11 | Status: SHIPPED | OUTPATIENT
Start: 2021-01-25 | End: 2021-08-31

## 2021-01-31 ENCOUNTER — COMMUNICATION - HEALTHEAST (OUTPATIENT)
Dept: FAMILY MEDICINE | Facility: CLINIC | Age: 71
End: 2021-01-31

## 2021-01-31 DIAGNOSIS — M81.0 SENILE OSTEOPOROSIS: ICD-10-CM

## 2021-01-31 DIAGNOSIS — E55.9 VITAMIN D DEFICIENCY: ICD-10-CM

## 2021-02-10 ENCOUNTER — COMMUNICATION - HEALTHEAST (OUTPATIENT)
Dept: FAMILY MEDICINE | Facility: CLINIC | Age: 71
End: 2021-02-10

## 2021-02-10 DIAGNOSIS — E03.9 ACQUIRED HYPOTHYROIDISM: ICD-10-CM

## 2021-02-10 RX ORDER — LEVOTHYROXINE SODIUM 75 UG/1
TABLET ORAL
Qty: 90 TABLET | Refills: 2 | Status: SHIPPED | OUTPATIENT
Start: 2021-02-10 | End: 2021-11-15

## 2021-02-11 ENCOUNTER — COMMUNICATION - HEALTHEAST (OUTPATIENT)
Dept: FAMILY MEDICINE | Facility: CLINIC | Age: 71
End: 2021-02-11

## 2021-02-11 DIAGNOSIS — E11.9 DIABETES MELLITUS WITHOUT COMPLICATION (H): ICD-10-CM

## 2021-03-08 ENCOUNTER — COMMUNICATION - HEALTHEAST (OUTPATIENT)
Dept: EDUCATION SERVICES | Facility: CLINIC | Age: 71
End: 2021-03-08

## 2021-03-16 ENCOUNTER — AMBULATORY - HEALTHEAST (OUTPATIENT)
Dept: FAMILY MEDICINE | Facility: CLINIC | Age: 71
End: 2021-03-16

## 2021-03-16 ENCOUNTER — OFFICE VISIT - HEALTHEAST (OUTPATIENT)
Dept: FAMILY MEDICINE | Facility: CLINIC | Age: 71
End: 2021-03-16

## 2021-03-16 DIAGNOSIS — E11.9 CONTROLLED TYPE 2 DIABETES MELLITUS WITHOUT COMPLICATION, WITHOUT LONG-TERM CURRENT USE OF INSULIN (H): ICD-10-CM

## 2021-03-16 DIAGNOSIS — I10 ESSENTIAL HYPERTENSION: ICD-10-CM

## 2021-03-16 DIAGNOSIS — N18.30 STAGE 3 CHRONIC KIDNEY DISEASE, UNSPECIFIED WHETHER STAGE 3A OR 3B CKD (H): ICD-10-CM

## 2021-03-16 DIAGNOSIS — E78.5 HYPERLIPIDEMIA WITH TARGET LDL LESS THAN 70: ICD-10-CM

## 2021-03-16 DIAGNOSIS — Z71.6 ENCOUNTER FOR SMOKING CESSATION COUNSELING: ICD-10-CM

## 2021-03-16 DIAGNOSIS — E11.21 TYPE 2 DIABETES MELLITUS WITH DIABETIC NEPHROPATHY, WITHOUT LONG-TERM CURRENT USE OF INSULIN (H): ICD-10-CM

## 2021-03-16 DIAGNOSIS — I83.93 ASYMPTOMATIC VARICOSE VEINS OF BOTH LOWER EXTREMITIES: ICD-10-CM

## 2021-03-16 DIAGNOSIS — K74.60 CIRRHOSIS OF LIVER WITHOUT ASCITES, UNSPECIFIED HEPATIC CIRRHOSIS TYPE (H): ICD-10-CM

## 2021-03-16 DIAGNOSIS — E55.9 VITAMIN D DEFICIENCY: ICD-10-CM

## 2021-03-16 DIAGNOSIS — Z63.79 STRESS DUE TO ILLNESS OF FAMILY MEMBER: ICD-10-CM

## 2021-03-16 LAB — HBA1C MFR BLD: 5.3 %

## 2021-03-16 ASSESSMENT — MIFFLIN-ST. JEOR: SCORE: 1138.46

## 2021-03-18 ENCOUNTER — COMMUNICATION - HEALTHEAST (OUTPATIENT)
Dept: FAMILY MEDICINE | Facility: CLINIC | Age: 71
End: 2021-03-18

## 2021-03-18 DIAGNOSIS — E11.9 DIABETES MELLITUS WITHOUT COMPLICATION (H): ICD-10-CM

## 2021-04-30 ENCOUNTER — COMMUNICATION - HEALTHEAST (OUTPATIENT)
Dept: FAMILY MEDICINE | Facility: CLINIC | Age: 71
End: 2021-04-30

## 2021-04-30 DIAGNOSIS — E55.9 VITAMIN D DEFICIENCY: ICD-10-CM

## 2021-04-30 DIAGNOSIS — M81.0 SENILE OSTEOPOROSIS: ICD-10-CM

## 2021-05-03 RX ORDER — ERGOCALCIFEROL 1.25 MG/1
50000 CAPSULE ORAL WEEKLY
Qty: 12 CAPSULE | Refills: 0 | Status: SHIPPED | OUTPATIENT
Start: 2021-05-03 | End: 2021-07-20

## 2021-05-05 ENCOUNTER — COMMUNICATION - HEALTHEAST (OUTPATIENT)
Dept: ADMINISTRATIVE | Facility: CLINIC | Age: 71
End: 2021-05-05

## 2021-05-05 ENCOUNTER — COMMUNICATION - HEALTHEAST (OUTPATIENT)
Dept: SCHEDULING | Facility: CLINIC | Age: 71
End: 2021-05-05

## 2021-05-11 ENCOUNTER — OFFICE VISIT - HEALTHEAST (OUTPATIENT)
Dept: VASCULAR SURGERY | Facility: CLINIC | Age: 71
End: 2021-05-11

## 2021-05-11 ENCOUNTER — RECORDS - HEALTHEAST (OUTPATIENT)
Dept: VASCULAR ULTRASOUND | Facility: CLINIC | Age: 71
End: 2021-05-11

## 2021-05-11 DIAGNOSIS — I83.893 VARICOSE VEINS OF BILATERAL LOWER EXTREMITIES WITH OTHER COMPLICATIONS: ICD-10-CM

## 2021-05-26 ENCOUNTER — RECORDS - HEALTHEAST (OUTPATIENT)
Dept: ADMINISTRATIVE | Facility: CLINIC | Age: 71
End: 2021-05-26

## 2021-05-27 ENCOUNTER — RECORDS - HEALTHEAST (OUTPATIENT)
Dept: ADMINISTRATIVE | Facility: CLINIC | Age: 71
End: 2021-05-27

## 2021-05-27 VITALS — TEMPERATURE: 98.8 F | HEART RATE: 56 BPM | SYSTOLIC BLOOD PRESSURE: 152 MMHG | DIASTOLIC BLOOD PRESSURE: 70 MMHG

## 2021-05-27 NOTE — TELEPHONE ENCOUNTER
reports indicate that the patient is due for diabetic follow and/or diabetic quality measures are delinquent. Chart review indicates that the patient needs a diabetic foot exam and recheck in 3 months (in July). Writer intends to contact the patient to assist in scheduling and appointing for this purpose. Per clinic policy, writer will call three times prior to closing encounter.

## 2021-05-27 NOTE — PROGRESS NOTES
HPI - 67 yo female here to f/u    Right flank/RUQ pain  - more persistent and can be sharp  Tried tylenol  The pain is worse with exercising, walking, lifting  --Given h/o HCV - s/p tx. Last seen for HCV 10/2018  --Colonoscopy normal with 1 benign polyp 7/20/18  H/o hiatal hernia   --9/21/18  chest/abdo/pelvic CT =   1.  Benign hepatic cyst.  2.  No abnormalities to explain the patient's hematuria.  --EGD 10/23/18  Gastritis w/o bleeding, normal gastric mucosa and neg H pylori  ---> scheduled for acupressure (already tried chiropractor)  She wants to see ortho      HTN - BP wnl on HCTZ and lisinopril and trorol XL      CKD stage 3 with Chronic proteinuria seen by renal doc 12/2017  Elevated CR - 1.08 on 11/25/16 -> 1.45 on 6/5/17 -> 1.44 on 9/21/17-> 1.39 on 9/17/18  Advised to avoid advil/ibuprofen      vitamin D deficiency  -   Last  43.9 on 9/17/18      Hypothyroidism -   Last TSH 0.27 on 9/21/17 and levothyroxine decreased from 100mcg -> 88mcg in Oct 2017 -> 2.19 on 7/2/18     DM T2  A1c 5.3 on 9/17/18 and 1/3/19 - on metformin  BP wnl  microalbumin wnl 9/2017 - on lisinopril  ASA   on 9/21/17  Smokes - 3  Per week when she drinks wine     Overweight -   BMI 29.1 -> 28.5  Last visit we discussed BMI 20-25 is healthiest  Discussed weight watchers and exercise    Social -   Retiring in a few months so she can not work 10hrs per day  She wants to work part time.    Current Outpatient Medications   Medication Sig Note     ferrous sulfate 325 (65 FE) MG tablet Take 325 mg by mouth 2 times a day at 6:00 am and 4:00 pm.      hydroCHLOROthiazide (HYDRODIURIL) 25 MG tablet TAKE 1 TABLET BY MOUTH EVERY DAY      levothyroxine (SYNTHROID, LEVOTHROID) 88 MCG tablet TAKE 1 TABLET (88 MCG TOTAL) BY MOUTH DAILY.      lisinopril (PRINIVIL,ZESTRIL) 40 MG tablet Take 1 tablet (40 mg total) by mouth daily.      metFORMIN (GLUCOPHAGE) 500 MG tablet TAKE HALF OF A TABLET BY MOUTH EVERY MORNING AND TAKE HALF OF A TABLET EVERY  "EVENING      metoprolol succinate (TOPROL-XL) 100 MG 24 hr tablet TAKE 1.5 TABLETS BY MOUTH DAILY      ONETOUCH ULTRA TEST strips TEST AS DIRECTED 2 TO 3 TIMES DAILY      spironolactone (ALDACTONE) 25 MG tablet TAKE 1 TABLET BY MOUTH EVERY DAY      aspirin 81 MG EC tablet Take 81 mg by mouth as needed for pain.      ergocalciferol (ERGOCALCIFEROL) 50,000 unit capsule TAKE 1 CAPSULE (50,000 UNITS TOTAL) BY MOUTH ONCE A WEEK.      nicotine polacrilex (NICORETTE) 4 MG gum APPLY 1 PIECE (4 MG TOTAL) TO THE MOUTH OR THROAT AS NEEDED FOR SMOKING CESSATION AS DIRECTED 9/21/2017: Received from: External Pharmacy     Vitals:    04/04/19 1645   BP: 134/56   Pulse: 60   Resp: 24   Temp: 97.5  F (36.4  C)   TempSrc: Oral   SpO2: 99%   Weight: 153 lb (69.4 kg)   Height: 5' 1.4\" (1.56 m)     OBJECTIVE:  Vitals listed above within normal limits  General appearance: well groomed, pleasant, well hydrated, nontoxic appearing  ENT: PERRL, throat clear  Neck: neck supple, no lymphadenopathy, no thyromegaly  Lungs: lungs clear to auscultation bilaterally, no wheezes or rhonchi  Heart: regular rate and rhythm, no murmurs, rubs or gallops  Abdomen: soft, nontender  Neuro: no focal deficits, CN II-XII grossly intact, alert and oriented  Psych:  mood stable, appears to have good insight and judgment    A/P  RUQ/right flank/right back pain -   Unclear etiology with negative work up  Possibly related to her scoliosis  Referral [facility]     HTN - BP wnl on HCTZ and lisinopril and trorol XL      CKD stage 3 with Chronic proteinuria seen by renal doc 12/2017  Elevated CR - 1.08 on 11/25/16 -> 1.45 on 6/5/17 -> 1.44 on 9/21/17-> 1.39 on 9/17/18  Advised to avoid advil/ibuprofen      vitamin D deficiency  -   Last  43.9 on 9/17/18      Hypothyroidism -   Last TSH 0.27 on 9/21/17 and levothyroxine decreased from 100mcg -> 88mcg in Oct 2017 -> 2.19 on 7/2/18     DM T2 - well controlled   A1c 5.3 on 9/17/18 and 1/3/19 - on metformin  BP " wnl  microalbumin wnl 9/2017 - on lisinopril  ASA   on 9/21/17  Smokes - 3  Per week when she drinks wine     Overweight -   BMI 29.1 -> 28.5

## 2021-05-28 ENCOUNTER — RECORDS - HEALTHEAST (OUTPATIENT)
Dept: ADMINISTRATIVE | Facility: CLINIC | Age: 71
End: 2021-05-28

## 2021-05-29 ENCOUNTER — RECORDS - HEALTHEAST (OUTPATIENT)
Dept: ADMINISTRATIVE | Facility: CLINIC | Age: 71
End: 2021-05-29

## 2021-05-29 NOTE — TELEPHONE ENCOUNTER
Refill Approved    Rx renewed per Medication Renewal Policy. Medication was last renewed on 8/30/18.    Eloina Kumar, [facility] Triage/Med Refill 5/22/2019     Requested Prescriptions   Pending Prescriptions Disp Refills     spironolactone (ALDACTONE) 25 MG tablet [Pharmacy Med Name: SPIRONOLACTONE 25 MG TABLET] 90 tablet 2     Sig: TAKE 1 TABLET BY MOUTH EVERY DAY       Diuretics/Combination Diuretics Refill Protocol  Passed - 5/21/2019  1:33 AM        Passed - Visit with PCP or prescribing provider visit in past 12 months     Last office visit with prescriber/PCP: 4/4/2019 Yamila Paniagua MD OR same dept: 4/4/2019 Yamila Paniagua MD OR same specialty: 4/4/2019 Yamila Paniagua MD  Last physical: 4/6/2017 Last MTM visit: Visit date not found   Next visit within 3 mo: Visit date not found  Next physical within 3 mo: Visit date not found  Prescriber OR PCP: Yamila Paniagua MD  Last diagnosis associated with med order: 1. Essential hypertension  - spironolactone (ALDACTONE) 25 MG tablet [Pharmacy Med Name: SPIRONOLACTONE 25 MG TABLET]; TAKE 1 TABLET BY MOUTH EVERY DAY  Dispense: 90 tablet; Refill: 2    If protocol passes may refill for 12 months if within 3 months of last provider visit (or a total of 15 months).             Passed - Serum Potassium in past 12 months      Lab Results   Component Value Date    Potassium 4.1 09/17/2018             Passed - Serum Sodium in past 12 months      Lab Results   Component Value Date    Sodium 141 09/17/2018             Passed - Blood pressure on file in past 12 months     BP Readings from Last 1 Encounters:   04/04/19 134/56             Passed - Serum Creatinine in past 12 months      Creatinine   Date Value Ref Range Status   09/17/2018 1.39 (H) 0.60 - 1.10 mg/dL Final

## 2021-05-30 ENCOUNTER — RECORDS - HEALTHEAST (OUTPATIENT)
Dept: ADMINISTRATIVE | Facility: CLINIC | Age: 71
End: 2021-05-30

## 2021-05-30 VITALS — HEIGHT: 62 IN | WEIGHT: 154.1 LBS | BODY MASS INDEX: 28.36 KG/M2

## 2021-05-30 NOTE — TELEPHONE ENCOUNTER
----- Message from Yamila Paniagua MD sent at 7/15/2019  9:32 AM CDT -----  Please let her know that her TSH - thyroid test - was abnornal. I adjusted the dose of her levothyroxine and sent a new rx to her pharmacy. This lab should be rechecked after being on the new dose for 3 months. A order for repeat future lab was put into epic.     Dr. Yamila Paniagua  7/15/2019

## 2021-05-30 NOTE — TELEPHONE ENCOUNTER
Patient Returning Call  Reason for call:  Return call.  Information relayed to patient:  Patient was informed of lab results below from Yamila Paniagua MD.  Patient has additional questions:  No  If YES, what are your questions/concerns:  n/a  Okay to leave a detailed message?: No call back needed

## 2021-05-30 NOTE — PROGRESS NOTES
"HPI - 69 yo female here for DM check.       DM T2  A1c 5.3 on 9/17/18 and 1/3/19 - and 5.4 today   on metformin  BP wnl  microalbumin wnl 1/3/19 - on lisinopril  ASA  LDL  139 on 1/3/19 - not on statin  Smokes - 3  Per week when she drinks wine  Eye 8/27/19  Foot exam due    HTN - on lisinopril 40mg, HCTZ 26yo, metoprolol XL 100mg, spironolactone 25mg  BP wnl today    Hypothyroidism -   Last TSH 0.27 on 9/21/17 and levothyroxine decreased from 100mcg -> 88mcg in Oct 2017 -> 2.19 on 7/2/18      Current Outpatient Medications   Medication Sig     aspirin 81 MG EC tablet Take 81 mg by mouth as needed for pain.     hydroCHLOROthiazide (HYDRODIURIL) 25 MG tablet TAKE 1 TABLET BY MOUTH EVERY DAY     levothyroxine (SYNTHROID, LEVOTHROID) 88 MCG tablet TAKE 1 TABLET (88 MCG TOTAL) BY MOUTH DAILY.     lisinopril (PRINIVIL,ZESTRIL) 40 MG tablet Take 1 tablet (40 mg total) by mouth daily.     metFORMIN (GLUCOPHAGE) 500 MG tablet TAKE HALF OF A TABLET BY MOUTH EVERY MORNING AND TAKE HALF OF A TABLET EVERY EVENING     metoprolol succinate (TOPROL-XL) 100 MG 24 hr tablet TAKE 1.5 TABLETS BY MOUTH DAILY     ONETOUCH ULTRA TEST strips TEST AS DIRECTED 2 TO 3 TIMES DAILY     spironolactone (ALDACTONE) 25 MG tablet TAKE 1 TABLET BY MOUTH EVERY DAY     ferrous sulfate 325 (65 FE) MG tablet Take 325 mg by mouth 2 times a day at 6:00 am and 4:00 pm.     Vitals:    07/11/19 1609   BP: 110/50   Pulse: 78   Resp: 14   Temp: 98.4  F (36.9  C)   TempSrc: Oral   SpO2: 98%   Weight: 152 lb 3.2 oz (69 kg)   Height: 5' 1.22\" (1.555 m)     PHYSICAL EXAM   General Appearance: Awake and alert, in no acute distress  HEENT: neck is supple  CV: regular rate  Resp: No respiratory distress. Breathing comfortably  Musculoskeletal: moving limbs comfortably with not deficits or deformities  Skin: no rashes noted  DM foot exam: normal pedal pulses, no deformities, good sensation to microfilament, no callouses      Reviewed with patient:   31.0%  10-year " risk of heart disease or stroke  On the basis of your age and calculated risk for heart disease or stroke over 10%, the USPSTF guidelines suggest you discuss starting aspirin with your doctor.  On the basis of your age, your calculated risk for heart disease or stroke over 7.5%, and diabetes, the ACC/AHA guidelines suggest you should be on a high intensity statin.  Based on your age, your blood pressure is well-controlled.  Demography Cholesterol Blood pressure Risk factors   Age: 68 Total: 213 Systolic: 110 Diabetes: yes   Gender: female HDL: 48 Diastolic: 50 Smoking: yes   Race: -American  On medication: yes      A/P  DM T2 well controlled  A1c  5.4 today   on metformin  BP wnl  microalbumin wnl 1/3/19 - on lisinopril  ASA  LDL  139 on 1/3/19 - discussed starting statin given AHA risk calculator and discussed diet, exercise and stress management  Smokes - 3  Per week when she drinks wine  Eye 8/27/19  Foot exam normal today    HTN - well controlled with current regimen  on lisinopril 40mg, HCTZ 24yo, metoprolol XL 100mg, spironolactone 25mg    Hypothyroidism -   Last TSH  2.19 on 7/2/18 and [id number] in Oct 2017  Recheck TSH today    Vaccines - Td given today

## 2021-05-31 ENCOUNTER — RECORDS - HEALTHEAST (OUTPATIENT)
Dept: ADMINISTRATIVE | Facility: CLINIC | Age: 71
End: 2021-05-31

## 2021-05-31 VITALS — HEIGHT: 61 IN | BODY MASS INDEX: 27.96 KG/M2 | WEIGHT: 148.1 LBS

## 2021-05-31 VITALS — BODY MASS INDEX: 26.64 KG/M2 | WEIGHT: 141.1 LBS | HEIGHT: 61 IN

## 2021-05-31 NOTE — TELEPHONE ENCOUNTER
Due for labs    Rx renewed per Medication Renewal Policy. Medication was last renewed on 5/22/19.    Eloina Kumar, [facility] Triage/Med Refill 8/15/2019     Requested Prescriptions   Pending Prescriptions Disp Refills     spironolactone (ALDACTONE) 25 MG tablet [Pharmacy Med Name: SPIRONOLACTONE 25 MG TABLET] 90 tablet 0     Sig: TAKE 1 TABLET BY MOUTH EVERY DAY       Diuretics/Combination Diuretics Refill Protocol  Passed - 8/15/2019  1:34 PM        Passed - Visit with PCP or prescribing provider visit in past 12 months     Last office visit with prescriber/PCP: 7/11/2019 Yamila Panaigua MD OR same dept: 7/11/2019 Yamila Paniagua MD OR same specialty: 7/11/2019 Yamila Paniagua MD  Last physical: 4/6/2017 Last MTM visit: Visit date not found   Next visit within 3 mo: Visit date not found  Next physical within 3 mo: Visit date not found  Prescriber OR PCP: Yamila Paniagua MD  Last diagnosis associated with med order: 1. Essential hypertension  - spironolactone (ALDACTONE) 25 MG tablet [Pharmacy Med Name: SPIRONOLACTONE 25 MG TABLET]; TAKE 1 TABLET BY MOUTH EVERY DAY  Dispense: 90 tablet; Refill: 0    If protocol passes may refill for 12 months if within 3 months of last provider visit (or a total of 15 months).             Passed - Serum Potassium in past 12 months      Lab Results   Component Value Date    Potassium 4.1 09/17/2018             Passed - Serum Sodium in past 12 months      Lab Results   Component Value Date    Sodium 141 09/17/2018             Passed - Blood pressure on file in past 12 months     BP Readings from Last 1 Encounters:   07/11/19 110/50             Passed - Serum Creatinine in past 12 months      Creatinine   Date Value Ref Range Status   09/17/2018 1.39 (H) 0.60 - 1.10 mg/dL Final

## 2021-06-01 VITALS — BODY MASS INDEX: 27.05 KG/M2 | WEIGHT: 147 LBS | HEIGHT: 62 IN

## 2021-06-01 VITALS — WEIGHT: 144.31 LBS | HEIGHT: 61 IN | BODY MASS INDEX: 27.25 KG/M2

## 2021-06-01 NOTE — TELEPHONE ENCOUNTER
RN cannot approve Refill Request: Hydrochlorothiazide    RN can NOT refill this medication PCP messaged that patient is overdue for Labs. Last office visit: 7/11/2019 Yamila Paniagua MD Last Physical: 4/6/2017 Last MTM visit: Visit date not found Last visit same specialty: 7/11/2019 Yamila Paniagua MD.  Next visit within 3 mo: Visit date not found  Next physical within 3 mo: Visit date not found      Sofía Carnes, [facility] Triage/Med Refill 9/28/2019    Requested Prescriptions   Pending Prescriptions Disp Refills     metoprolol succinate (TOPROL-XL) 100 MG 24 hr tablet [Pharmacy Med Name: METOPROLOL SUCC  MG TAB] 135 tablet 3     Sig: TAKE 1.5 TABLETS BY MOUTH DAILY       Beta-Blockers Refill Protocol Passed - 9/28/2019  8:53 AM        Passed - PCP or prescribing provider visit in past 12 months or next 3 months     Last office visit with prescriber/PCP: 7/11/2019 Yamila Paniagua MD OR same dept: 7/11/2019 Yamila Paniagua MD OR same specialty: 7/11/2019 Yamila Paniagua MD  Last physical: 4/6/2017 Last MTM visit: Visit date not found   Next visit within 3 mo: Visit date not found  Next physical within 3 mo: Visit date not found  Prescriber OR PCP: Yamila Paniagua MD  Last diagnosis associated with med order: 1. Essential hypertension  - metoprolol succinate (TOPROL-XL) 100 MG 24 hr tablet [Pharmacy Med Name: METOPROLOL SUCC  MG TAB]; TAKE 1.5 TABLETS BY MOUTH DAILY  Dispense: 135 tablet; Refill: 3    2. HTN (hypertension)  - hydroCHLOROthiazide (HYDRODIURIL) 25 MG tablet [Pharmacy Med Name: HYDROCHLOROTHIAZIDE 25 MG TAB]; TAKE 1 TABLET BY MOUTH EVERY DAY  Dispense: 90 tablet; Refill: 3    If protocol passes may refill for 12 months if within 3 months of last provider visit (or a total of 15 months).             Passed - Blood pressure filed in past 12 months     BP Readings from Last 1 Encounters:   07/11/19 110/50             hydroCHLOROthiazide (HYDRODIURIL) 25 MG  tablet [Pharmacy Med Name: HYDROCHLOROTHIAZIDE 25 MG TAB] 90 tablet 3     Sig: TAKE 1 TABLET BY MOUTH EVERY DAY       Diuretics/Combination Diuretics Refill Protocol  Failed - 9/28/2019  8:53 AM        Failed - Serum Potassium in past 12 months      No results found for: LN-POTASSIUM          Failed - Serum Sodium in past 12 months      No results found for: LN-SODIUM          Failed - Serum Creatinine in past 12 months      Creatinine   Date Value Ref Range Status   09/17/2018 1.39 (H) 0.60 - 1.10 mg/dL Final             Passed - Visit with PCP or prescribing provider visit in past 12 months     Last office visit with prescriber/PCP: 7/11/2019 Yamila Paniagua MD OR same dept: 7/11/2019 Yamila Paniagua MD OR same specialty: 7/11/2019 Yamila Paniagua MD  Last physical: 4/6/2017 Last MTM visit: Visit date not found   Next visit within 3 mo: Visit date not found  Next physical within 3 mo: Visit date not found  Prescriber OR PCP: Yamila Paniagua MD  Last diagnosis associated with med order: 1. Essential hypertension  - metoprolol succinate (TOPROL-XL) 100 MG 24 hr tablet [Pharmacy Med Name: METOPROLOL SUCC  MG TAB]; TAKE 1.5 TABLETS BY MOUTH DAILY  Dispense: 135 tablet; Refill: 3    2. HTN (hypertension)  - hydroCHLOROthiazide (HYDRODIURIL) 25 MG tablet [Pharmacy Med Name: HYDROCHLOROTHIAZIDE 25 MG TAB]; TAKE 1 TABLET BY MOUTH EVERY DAY  Dispense: 90 tablet; Refill: 3    If protocol passes may refill for 12 months if within 3 months of last provider visit (or a total of 15 months).             Passed - Blood pressure on file in past 12 months     BP Readings from Last 1 Encounters:   07/11/19 110/50             Refill Approved    Rx renewed per Medication Renewal Policy. Medication was last renewed on 9/24/2018 with 3 refills  Last office visit: 7/11/2019 with PCP Dr MARY Carnes, [facility] Triage/Med Refill 9/28/2019     Requested Prescriptions   Pending Prescriptions  Disp Refills     metoprolol succinate (TOPROL-XL) 100 MG 24 hr tablet [Pharmacy Med Name: METOPROLOL SUCC  MG TAB] 135 tablet 3     Sig: TAKE 1.5 TABLETS BY MOUTH DAILY       Beta-Blockers Refill Protocol Passed - 9/28/2019  8:53 AM        Passed - PCP or prescribing provider visit in past 12 months or next 3 months     Last office visit with prescriber/PCP: 7/11/2019 Yamila Paniagua MD OR same dept: 7/11/2019 Yamila Paniagua MD OR same specialty: 7/11/2019 Yamila Paniagua MD  Last physical: 4/6/2017 Last MTM visit: Visit date not found   Next visit within 3 mo: Visit date not found  Next physical within 3 mo: Visit date not found  Prescriber OR PCP: Yamila Paniagua MD  Last diagnosis associated with med order: 1. Essential hypertension  - metoprolol succinate (TOPROL-XL) 100 MG 24 hr tablet [Pharmacy Med Name: METOPROLOL SUCC  MG TAB]; TAKE 1.5 TABLETS BY MOUTH DAILY  Dispense: 135 tablet; Refill: 3    2. HTN (hypertension)  - hydroCHLOROthiazide (HYDRODIURIL) 25 MG tablet [Pharmacy Med Name: HYDROCHLOROTHIAZIDE 25 MG TAB]; TAKE 1 TABLET BY MOUTH EVERY DAY  Dispense: 90 tablet; Refill: 3    If protocol passes may refill for 12 months if within 3 months of last provider visit (or a total of 15 months).             Passed - Blood pressure filed in past 12 months     BP Readings from Last 1 Encounters:   07/11/19 110/50             hydroCHLOROthiazide (HYDRODIURIL) 25 MG tablet [Pharmacy Med Name: HYDROCHLOROTHIAZIDE 25 MG TAB] 90 tablet 3     Sig: TAKE 1 TABLET BY MOUTH EVERY DAY       Diuretics/Combination Diuretics Refill Protocol  Failed - 9/28/2019  8:53 AM        Failed - Serum Potassium in past 12 months      No results found for: LN-POTASSIUM          Failed - Serum Sodium in past 12 months      No results found for: LN-SODIUM          Failed - Serum Creatinine in past 12 months      Creatinine   Date Value Ref Range Status   09/17/2018 1.39 (H) 0.60 - 1.10 mg/dL Final              Passed - Visit with PCP or prescribing provider visit in past 12 months     Last office visit with prescriber/PCP: 7/11/2019 Yamila Paniagua MD OR same dept: 7/11/2019 Yamila Paniagua MD OR same specialty: 7/11/2019 Yamila Paniagua MD  Last physical: 4/6/2017 Last MTM visit: Visit date not found   Next visit within 3 mo: Visit date not found  Next physical within 3 mo: Visit date not found  Prescriber OR PCP: Yamila Paniagua MD  Last diagnosis associated with med order: 1. Essential hypertension  - metoprolol succinate (TOPROL-XL) 100 MG 24 hr tablet [Pharmacy Med Name: METOPROLOL SUCC  MG TAB]; TAKE 1.5 TABLETS BY MOUTH DAILY  Dispense: 135 tablet; Refill: 3    2. HTN (hypertension)  - hydroCHLOROthiazide (HYDRODIURIL) 25 MG tablet [Pharmacy Med Name: HYDROCHLOROTHIAZIDE 25 MG TAB]; TAKE 1 TABLET BY MOUTH EVERY DAY  Dispense: 90 tablet; Refill: 3    If protocol passes may refill for 12 months if within 3 months of last provider visit (or a total of 15 months).             Passed - Blood pressure on file in past 12 months     BP Readings from Last 1 Encounters:   07/11/19 110/50

## 2021-06-02 ENCOUNTER — RECORDS - HEALTHEAST (OUTPATIENT)
Dept: ADMINISTRATIVE | Facility: CLINIC | Age: 71
End: 2021-06-02

## 2021-06-02 VITALS — BODY MASS INDEX: 27.91 KG/M2 | HEIGHT: 61 IN

## 2021-06-02 VITALS — BODY MASS INDEX: 27.89 KG/M2 | HEIGHT: 61 IN | WEIGHT: 147.7 LBS

## 2021-06-02 VITALS — HEIGHT: 61 IN | BODY MASS INDEX: 28.89 KG/M2 | WEIGHT: 153 LBS

## 2021-06-02 VITALS — BODY MASS INDEX: 29.06 KG/M2 | WEIGHT: 153.9 LBS | HEIGHT: 61 IN

## 2021-06-02 NOTE — TELEPHONE ENCOUNTER
Patient Returning Call  Reason for call:  Appointment   Information relayed to patient:  Writer informed of the below message.  Patient has additional questions:  No. Patient transferred to scheduling.  If YES, what are your questions/concerns:  N/A  Okay to leave a detailed message?: No call back needed

## 2021-06-02 NOTE — TELEPHONE ENCOUNTER
RN cannot approve Refill Request    RN can NOT refill this medication Protocol failed and NO refill given.       Eloina Kumar, [facility] Triage/Med Refill 10/21/2019    Requested Prescriptions   Pending Prescriptions Disp Refills     lisinopril (PRINIVIL,ZESTRIL) 40 MG tablet [Pharmacy Med Name: LISINOPRIL 40 MG TABLET] 90 tablet 3     Sig: TAKE 1 TABLET BY MOUTH EVERY DAY       Ace Inhibitors Refill Protocol Failed - 10/21/2019  1:41 AM        Failed - Serum Potassium in past 12 months     No results found for: LN-POTASSIUM          Failed - Serum Creatinine in past 12 months     Creatinine   Date Value Ref Range Status   09/17/2018 1.39 (H) 0.60 - 1.10 mg/dL Final             Passed - PCP or prescribing provider visit in past 12 months       Last office visit with prescriber/PCP: 7/11/2019 Yamila Paniagua MD OR same dept: 7/11/2019 Yamila Paniagua MD OR same specialty: 7/11/2019 Yamila Paniagua MD  Last physical: 4/6/2017 Last MTM visit: Visit date not found   Next visit within 3 mo: Visit date not found  Next physical within 3 mo: Visit date not found  Prescriber OR PCP: Yamila Paniagua MD  Last diagnosis associated with med order: 1. HTN (hypertension)  - lisinopril (PRINIVIL,ZESTRIL) 40 MG tablet [Pharmacy Med Name: LISINOPRIL 40 MG TABLET]; TAKE 1 TABLET BY MOUTH EVERY DAY  Dispense: 90 tablet; Refill: 3    If protocol passes may refill for 12 months if within 3 months of last provider visit (or a total of 15 months).             Passed - Blood pressure filed in past 12 months     BP Readings from Last 1 Encounters:   07/11/19 110/50

## 2021-06-02 NOTE — TELEPHONE ENCOUNTER
Left message to call  Patient is due for a Diabetes check and Physical  with Dr Paniagua after 1/11/2020  Please help patient schedule when she calls back.

## 2021-06-03 VITALS — BODY MASS INDEX: 28.74 KG/M2 | WEIGHT: 152.2 LBS | HEIGHT: 61 IN

## 2021-06-04 VITALS
TEMPERATURE: 98.3 F | HEIGHT: 62 IN | HEART RATE: 76 BPM | OXYGEN SATURATION: 99 % | DIASTOLIC BLOOD PRESSURE: 56 MMHG | RESPIRATION RATE: 16 BRPM | WEIGHT: 155 LBS | SYSTOLIC BLOOD PRESSURE: 114 MMHG | BODY MASS INDEX: 28.52 KG/M2

## 2021-06-04 VITALS
BODY MASS INDEX: 28.52 KG/M2 | RESPIRATION RATE: 16 BRPM | HEART RATE: 78 BPM | WEIGHT: 155 LBS | OXYGEN SATURATION: 97 % | SYSTOLIC BLOOD PRESSURE: 110 MMHG | HEIGHT: 62 IN | TEMPERATURE: 98.3 F | DIASTOLIC BLOOD PRESSURE: 60 MMHG

## 2021-06-04 VITALS — WEIGHT: 155 LBS | BODY MASS INDEX: 28.45 KG/M2

## 2021-06-04 NOTE — TELEPHONE ENCOUNTER
Refill Approved    Rx renewed per Medication Renewal Policy. Medication was last renewed on   ONETOUCH ULTRA TEST strips 300 strip 0 6/10/2016     .    Jayleen Ny, [facility] Triage/Med Refill 12/13/2019     Requested Prescriptions   Pending Prescriptions Disp Refills     blood glucose test (ONETOUCH ULTRA TEST) strips 300 strip 0     Sig: TEST AS DIRECTED 2 TO 3 TIMES DAILY       There is no refill protocol information for this order          OV 7/11/19

## 2021-06-04 NOTE — TELEPHONE ENCOUNTER
RN cannot approve Refill Request    RN can NOT refill this medication Protocol failed and NO refill given.       Eloina Kumar, [facility] Triage/Med Refill 12/16/2019    Requested Prescriptions   Pending Prescriptions Disp Refills     metFORMIN (GLUCOPHAGE) 500 MG tablet [Pharmacy Med Name: METFORMIN  MG TABLET] 90 tablet 3     Sig: TAKE HALF OF A TABLET BY MOUTH EVERY MORNING AND TAKE HALF OF A TABLET EVERY EVENING       Metformin Refill Protocol Failed - 12/15/2019 10:15 AM        Failed - LFT or AST or ALT in last 12 months     Albumin   Date Value Ref Range Status   09/17/2018 4.1 3.5 - 5.0 g/dL Final     Bilirubin, Total   Date Value Ref Range Status   09/17/2018 0.4 0.0 - 1.0 mg/dL Final     Bilirubin, Direct   Date Value Ref Range Status   02/03/2017 0.1 <=0.5 mg/dL Final     Alkaline Phosphatase   Date Value Ref Range Status   09/17/2018 55 45 - 120 U/L Final     AST   Date Value Ref Range Status   09/17/2018 33 0 - 40 U/L Final     ALT   Date Value Ref Range Status   09/17/2018 33 0 - 45 U/L Final     Protein, Total   Date Value Ref Range Status   09/17/2018 7.5 6.0 - 8.0 g/dL Final                Failed - GFR or Serum Creatinine in last 6 months     GFR MDRD Non Af Amer   Date Value Ref Range Status   09/17/2018 38 (L) >60 mL/min/1.73m2 Final     GFR MDRD Af Amer   Date Value Ref Range Status   09/17/2018 46 (L) >60 mL/min/1.73m2 Final             Passed - Blood pressure in last 12 months     BP Readings from Last 1 Encounters:   07/11/19 110/50             Passed - Visit with PCP or prescribing provider visit in last 6 months or next 3 months     Last office visit with prescriber/PCP: 7/11/2019 OR same dept: 7/11/2019 Yamila Paniagua MD OR same specialty: 7/11/2019 Yamila Paniagua MD Last physical: Visit date not found Last MTM visit: Visit date not found         Next appt within 3 mo: Visit date not found  Next physical within 3 mo: Visit date not found  Prescriber OR PCP: Yamila  Dominga Paniagua MD  Last diagnosis associated with med order: There are no diagnoses linked to this encounter.   If protocol passes may refill for 12 months if within 3 months of last provider visit (or a total of 15 months).           Passed - A1C in last 6 months     Hemoglobin A1c   Date Value Ref Range Status   07/11/2019 5.4 3.5 - 6.0 % Final               Passed - Microalbumin in last year      Microalbumin, Random Urine   Date Value Ref Range Status   01/03/2019 <0.50 0.00 - 1.99 mg/dL Final

## 2021-06-04 NOTE — TELEPHONE ENCOUNTER
FYI - Status Update  Who is Calling: Patient  Update: Patient stated she is out of her One Touch ultra test strips. Writer is not able to pend the test strips. Please send a refill to [facility] in [place].  Okay to leave a detailed message?:  No

## 2021-06-05 VITALS
SYSTOLIC BLOOD PRESSURE: 130 MMHG | HEIGHT: 62 IN | TEMPERATURE: 98.1 F | WEIGHT: 145.8 LBS | BODY MASS INDEX: 26.83 KG/M2 | HEART RATE: 56 BPM | OXYGEN SATURATION: 99 % | DIASTOLIC BLOOD PRESSURE: 60 MMHG

## 2021-06-05 VITALS
HEIGHT: 62 IN | HEART RATE: 64 BPM | RESPIRATION RATE: 20 BRPM | DIASTOLIC BLOOD PRESSURE: 58 MMHG | OXYGEN SATURATION: 99 % | WEIGHT: 147.06 LBS | TEMPERATURE: 98.4 F | BODY MASS INDEX: 27.06 KG/M2 | SYSTOLIC BLOOD PRESSURE: 108 MMHG

## 2021-06-05 NOTE — PROGRESS NOTES
Assessment: pt here for diabetes f/u. Initial visit. Fam hx:mom, brother. Retired. Lives with her  who is also retired. Likes to read to keep upto date and is very well informed.      - No 1:1 prior education, but has attended group classes before.    - smokes occasionally but is ready to quit now. Has bought some patches to help with that. Doesn't like the smell of the smoke anymore and wouldn't want to subject her loved ones to it either. Congratulated pt on taking this first step and encouraged her to seek further support as needed. We discussed smoke less tips and also some helpful resources/support groups/websites available to her. Has used Chantix in the past but did not like it.     has been quit x 5 yrs now and keeps encouraging her to follow suite .     - Diabetes well controlled with metformin, 500 mg in the am, with BF. Has been taking the whole 500 mg tablet in the am Vs splitting it into two.  Reports no SE    - SMBG:  Got a new rx for a meter by Dr. Paniagua on 1/23 and has been testing 1x/d daily, BS have been at goal and range: 80s-90s.  No lows: we reviewed sx and tx of hypo and hyperglycemia: pt verbalized understanding    - Belongs to [employer] and has been doing it for 4 years now and has had success with losing wt in the past; working on losing some more. Has set a goal of: 140 lbs (+ 1-2 lbs)    - very mindful of her intake and watches her CHO intake. Likes to use protein drinks, glucerna etc  and is always trying out new things.   Knows high CHO foods. Has also been watching the high fat foods now; just bought an air fryer to still enjoy her favorites.   We discussed a high fiber, low fat diet and how it would help with high cholesterol, wt management and also with her glycemic control.     - likes to walk but has had some issues with hip pain/arthritis for some time now, uses the TM at home. Keeps active by also going up and down the stairs at her house ~ 20 times daily. Enjoys SSE[employer]  "hooping quite often. Is also part of \"[employer] sne[employer]\".    Education/Discussion: Reviewed diabetes basics, AIC and BS goals, sx and tx of hypo and hyperglycemia, general diet guidelines and CHO foods, concept of budgeting and balancing, complications of uncontrolled diabetes, planning ahead for healthy meals, examples of quick and healthy meals/snacks, smoke less tips: pt verbalized understanding.      Plan:   - continue with current therapy  - test 3-5x/wk, at different times   - aim for at least 30 mins of exercise/physical activity, as able. You can spread it out throughout the day  - monitor PO intake and limit CHO   - eat 3 small meals, do not skip meals  - adequate hydration  - take meds as prescribed  - call with any questions/concerns: phone number provided   - bring meter/BS log to all f/u appts  - f/u in ~ 3 months (or sooner if concerns): phone number provided      Subjective and Objective:      Liz Pollard is referred by Dr. Paniagua  for Diabetes Education.     Lab Results   Component Value Date    HGBA1C 5.3 01/21/2020     Goals:   Test 2-3x/wk  Continue to watch high CHO, high fat  foods  Regular exercise        Education:     Monitoring   Meter (per above goals): Assessed and Discussed  Monitoring: Assessed and Discussed  BG goals: Assessed and Discussed    Nutrition Management  Nutrition Management: Assessed and Discussed  Weight: Assessed and Discussed  Portions/Balance: Assessed and Discussed  Carb ID/Count: Assessed and Discussed  Label Reading: Assessed and Discussed  Heart Healthy Fats: Assessed and Discussed  Menu Planning: Assessed and Discussed  Dining Out: Assessed and Discussed  Physical Activity: Assessed and Discussed  Medications: Assessed and Discussed  Orals: Assessed and Discussed  Injected Medications: Assessed     Diabetes Disease Process: Assessed and Discussed    Acute Complications: Prevent, Detect, Treat:  Hypoglycemia: Assessed and Discussed  Hyperglycemia: Assessed and " Discussed  Sick Days: Assessed  Driving: Assessed    Chronic Complications  Foot Care:Assessed  Skin Care: Assessed  Eye: Assessed   ABC: Assessed and Discussed  Teeth:Assessed  Goal Setting and Problem Solving: Assessed and Discussed  Barriers: Assessed and Discussed  Psychosocial Adjustments: Assessed      Time spent with the patient: 60 minutes for diabetes education and counseling.   Previous Education: no  Visit Type:KAYLEE Kyle  2/5/2020

## 2021-06-05 NOTE — PROGRESS NOTES
HPI - 70 yo female here for DM check       DM T2  A1c 5.3 on 9/17/18 and 1/3/19 - and 5.4 on 7/11/19    on metformin  BP wnl  microalbumin wnl 1/3/19 - on lisinopril  ASA  LDL  139 on 1/3/19 - not on statin and discussed on 7/11/19  per AHA 31.0% and advised high intensity statin  Smokes - 3  Per week when she drinks wine  Eye 8/27/19  Foot exam done 7/11/19 - wnl      HTN - on lisinopril 40mg, HCTZ 26yo, metoprolol XL 100mg, spironolactone 25mg  BP wnl today     Hypothyroidism -   Last TSH 0.27 on 9/21/17 and levothyroxine decreased from 100mcg -> 88mcg in Oct 2017 -> 2.19 on 7/2/18 -> 0.21 on 7/11/19 and TSH decreased in July 2019 and levothyroxine decreased from 88mcg -> 75mcg    OSTEOPOROSIS and elevated fracture risk.  DEXA 4/2017  Previously treated with fosamax years ago    H/o cirrhosis due to hep C   S/p tx  Needs Q6-12 liver ultrasounds to screen for HCC  abdo CT 9/2018 showed benign hepatic cyst    Current Outpatient Medications   Medication Sig     aspirin 81 MG EC tablet Take 81 mg by mouth as needed for pain.     blood glucose test (ONETOUCH ULTRA TEST) strips TEST AS DIRECTED 2 TO 3 TIMES DAILY     ferrous sulfate 325 (65 FE) MG tablet Take 325 mg by mouth 2 times a day at 6:00 am and 4:00 pm.     hydroCHLOROthiazide (HYDRODIURIL) 25 MG tablet TAKE 1 TABLET BY MOUTH EVERY DAY     levothyroxine (SYNTHROID) 75 MCG tablet Take 1 tablet (75 mcg total) by mouth daily.     lisinopril (PRINIVIL,ZESTRIL) 40 MG tablet TAKE 1 TABLET BY MOUTH EVERY DAY     metFORMIN (GLUCOPHAGE) 500 MG tablet TAKE HALF OF A TABLET BY MOUTH EVERY MORNING AND TAKE HALF OF A TABLET EVERY EVENING     metoprolol tartrate (LOPRESSOR) 100 MG tablet Take 1 tablet (100 mg total) by mouth 2 (two) times a day.     spironolactone (ALDACTONE) 25 MG tablet TAKE 1 TABLET BY MOUTH EVERY DAY     tiZANidine (ZANAFLEX) 4 MG tablet Take 4 mg by mouth every 8 (eight) hours as needed.     Vitals:    01/21/20 1101   BP: 110/60   Pulse: 78   Resp: 16  "  Temp: 98.3  F (36.8  C)   TempSrc: Oral   SpO2: 97%   Weight: 155 lb (70.3 kg)   Height: 5' 1.89\" (1.572 m)     PHYSICAL EXAM   General Appearance: Awake and alert, in no acute distress  HEENT: neck is supple  CV: regular rate  Resp: No respiratory distress. Breathing comfortably  Musculoskeletal: moving limbs comfortably with not deficits or deformities  Skin: no rashes noted    A/P   Controlled type 2 diabetes mellitus without complication, without long-term current use of insulin (H)  - Glycosylated Hemoglobin A1c  - Lipid Cascade  - atorvastatin (LIPITOR) 20 MG tablet; Take 1 tablet (20 mg total) by mouth daily.  Dispense: 30 tablet; Refill: 11  - Ambulatory referral to Diabetes Education Program (CDE)  - blood-glucose meter Misc; 1 glucometer device  - any brand covered by insurance (contour covered by insurance? )  Dispense: 1 each; Refill: 0    Acquired hypothyroidism  Continue levothyroxine and adjust if indicated by TSH   - Thyroid Stimulating Hormone (TSH)     Encounter for therapeutic drug monitoring  - Comprehensive Metabolic Panel     Mixed hyperlipidemia  - Lipid Cascade  - atorvastatin (LIPITOR) 20 MG tablet; Take 1 tablet (20 mg total) by mouth daily.  Dispense: 30 tablet; Refill: 11    Cirrhosis of liver without ascites, unspecified hepatic cirrhosis type (H)  H/o cirrhosis due to hep C   Followed by [facility]?  Needs Q6-12 liver ultrasounds to screen for HCC  Check CMP    CKD (chronic kidney disease) stage 3, GFR 30-59 ml/min (H)  Check CMP    Senile osteoporosis  - DXA Bone Density Scan; Future    Spent 25 min face to face with patient with more the 50% spent in counseling, reviewing chart with patient and coordination of care, medication reconciliation and discussing problems listed above. '    "

## 2021-06-05 NOTE — TELEPHONE ENCOUNTER
Medication Question or Clarification  Who is calling: Patient  What medication are you calling about (include dose and sig)?:   atorvastatin (LIPITOR) 40 MG tablet 30 tablet 11 1/27/2020     Sig - Route: Take 1 tablet (40 mg total) by mouth daily. - Oral    Sent to pharmacy as: atorvastatin 40 mg tablet (Lipitor)    Notes to Pharmacy: New dose b/c high risk per AHA and lipids increased        Who prescribed the medication?: Yamila Paniagua MD    What is your question/concern?: I was just put on Lipitor 20 mg and then I got a call from my pharmacy stating this was increased. Why the increase when I just stated this?  I would like to discuss this.  Please return my call.  Requested Pharmacy: [facility]  Okay to leave a detailed message?: Yes

## 2021-06-05 NOTE — TELEPHONE ENCOUNTER
Patient called the insurance company regarding the prescriptions and she said we don't have to send any paper work.

## 2021-06-05 NOTE — TELEPHONE ENCOUNTER
Chart reviewed. No PA in chart. It looks like two dosages here. Chart shows patient using 75 mcg. Additionally, PA's are sent by pharmacy.

## 2021-06-05 NOTE — TELEPHONE ENCOUNTER
Is it a prior auth if it is a medical exception/exemption on her medicare part D?.   The paperwork was sent to be scanned but I have the fax confirmation for the 75 mcg.

## 2021-06-05 NOTE — TELEPHONE ENCOUNTER
Just spoke with patient. She  just started lipitor 20mg and working on diet with weight watchers.   I told her to ignore the 40mg Rx and take the 20mg.   We will recheck lipids in 6 months.     CMT -  Please notify pharmacy to fill the lipitor 20mg not the 40mg.       Thanks,   Dr. Yamila Paniagua  1/30/2020

## 2021-06-05 NOTE — TELEPHONE ENCOUNTER
Who is calling:  Erma from [facility]  Reason for Call:  Caller stated she received a PA request from lakshmi SPARKS with Dr. Paniagua's office for levothyroxine 88 mcg on 12/29/19 and then another PA appeal request on 1/14/20 for levothyroxine 75 mcg. Caller stated she needs to know which dose is the PA for. Please call them back to verify this. There is no documentation that a PA has been done yet in the chart.  Date of last appointment with primary care: 7/11/19  Okay to leave a detailed message: No  [phone], option #3, reference [id number]

## 2021-06-05 NOTE — TELEPHONE ENCOUNTER
CMT reviewed chart and within the last year, pt Lipids are basically unchanged.  Pt per office notes, refused statin in 2019.  Is pt on higher dose of statin because of labs being unchanged?  Thanks.

## 2021-06-05 NOTE — TELEPHONE ENCOUNTER
RN cannot approve Refill Request    RN can NOT refill this medication Protocol failed and NO refill given.       Eloina Kumar, [facility] Triage/Med Refill 1/8/2020    Requested Prescriptions   Pending Prescriptions Disp Refills     hydroCHLOROthiazide (HYDRODIURIL) 25 MG tablet [Pharmacy Med Name: HYDROCHLOROTHIAZIDE 25 MG TAB] 90 tablet 0     Sig: TAKE 1 TABLET BY MOUTH EVERY DAY       Diuretics/Combination Diuretics Refill Protocol  Failed - 1/7/2020  5:01 PM        Failed - Serum Potassium in past 12 months      No results found for: LN-POTASSIUM          Failed - Serum Sodium in past 12 months      No results found for: LN-SODIUM          Failed - Serum Creatinine in past 12 months      Creatinine   Date Value Ref Range Status   09/17/2018 1.39 (H) 0.60 - 1.10 mg/dL Final             Passed - Visit with PCP or prescribing provider visit in past 12 months     Last office visit with prescriber/PCP: 7/11/2019 Yamila Paniagua MD OR same dept: 7/11/2019 Yamila Paniagua MD OR same specialty: 7/11/2019 Yamila Paniagua MD  Last physical: 4/6/2017 Last MTM visit: Visit date not found   Next visit within 3 mo: Visit date not found  Next physical within 3 mo: Visit date not found  Prescriber OR PCP: Yamila Paniagua MD  Last diagnosis associated with med order: 1. HTN (hypertension)  - hydroCHLOROthiazide (HYDRODIURIL) 25 MG tablet [Pharmacy Med Name: HYDROCHLOROTHIAZIDE 25 MG TAB]; TAKE 1 TABLET BY MOUTH EVERY DAY  Dispense: 90 tablet; Refill: 0    If protocol passes may refill for 12 months if within 3 months of last provider visit (or a total of 15 months).             Passed - Blood pressure on file in past 12 months     BP Readings from Last 1 Encounters:   07/11/19 110/50

## 2021-06-06 NOTE — PROGRESS NOTES
"Liz Pollard is a 69 y.o. female here for DXA results    ASSESSMENT/PLAN:   Liz was seen today for lab result follow up.    Diagnoses and all orders for this visit:    Senile osteoporosis  Vitamin D deficiency  Discussed patient's DEXA scan results, high risk of fracture and diagnosis of osteoporosis.  No change in her T score in her spine but slightly worse T score left and right femoral head.  Negative reaction to alendronate in the past, does not want to try that.  No recent fractures in the past 5 years.  Wants to try weightbearing exercises, just got her [employer] membership and plans to see a .  Wants to start walking longer distances regularly again.  -     ergocalciferol (ERGOCALCIFEROL) 1,250 mcg (50,000 unit) capsule; Take 1 capsule (50,000 Units total) by mouth once a week for 12 doses.  -     ergocalciferol (ERGOCALCIFEROL) 1,250 mcg (50,000 unit) capsule; Take 1 capsule (50,000 Units total) by mouth once a week for 12 doses.          Return in about 3 months (around 5/20/2020).   If no improvement in hip pain with increased exercise and , return to clinic for further evaluation      ======================================================    SUBJECTIVE  Liz Pollard is a 69 y.o. female here for DXA results.     Patient last DEXA was in 2017.  T score spine -1.3 standard deviations, unchanged in 2020.  T score of left and right femoral head slightly worse than before.  History of fractures, no recent fractures.  Muna history of osteoporosis.  She is a former smoker.    Allergic reaction, jaw pain, with alendronate.  She was to try things that are \"more natural\".  Open to taking calcium supplements and vitamin D supplements.  Will try probiotics and wants to encourage her  to take them because he does not have regular bowel movements.    She just got her membership to [employer] and plans to get a , start working out again. " " She has some hip pain and misses walking around [place].     ROS  Complete 10 point review of systems negative except as noted above in HPI    Reviewed Past Medical History, Medications, Family History and Social History in Epic and up to date with no new changes.    OBJECTIVE  /56   Pulse 76   Temp 98.3  F (36.8  C) (Oral)   Resp 16   Ht 5' 1.89\" (1.572 m)   Wt 155 lb (70.3 kg)   SpO2 99%   BMI 28.45 kg/m       General: Cooperative, pleasant, in no acute distress  HEENT: PERRL, EOMI.   Neck: no lymphadenopathy, no masses  CV: RRR, normal S1/S2, no murmur, rubs, gallops  Resp: No respiratory distress. Clear to auscultation bilaterally. No wheezes, rales, rhonchi      LABS & IMAGES   Results for orders placed or performed in visit on 01/21/20   Thyroid Stimulating Hormone (TSH)   Result Value Ref Range    TSH 2.37 0.30 - 5.00 uIU/mL   Comprehensive Metabolic Panel   Result Value Ref Range    Sodium 141 136 - 145 mmol/L    Potassium 4.5 3.5 - 5.0 mmol/L    Chloride 106 98 - 107 mmol/L    CO2 27 22 - 31 mmol/L    Anion Gap, Calculation 8 5 - 18 mmol/L    Glucose 85 70 - 125 mg/dL    BUN 26 (H) 8 - 22 mg/dL    Creatinine 1.37 (H) 0.60 - 1.10 mg/dL    GFR MDRD Af Amer 47 (L) >60 mL/min/1.73m2    GFR MDRD Non Af Amer 38 (L) >60 mL/min/1.73m2    Bilirubin, Total 0.5 0.0 - 1.0 mg/dL    Calcium 9.4 8.5 - 10.5 mg/dL    Protein, Total 7.6 6.0 - 8.0 g/dL    Albumin 4.2 3.5 - 5.0 g/dL    Alkaline Phosphatase 59 45 - 120 U/L    AST 27 0 - 40 U/L    ALT 21 0 - 45 U/L   Glycosylated Hemoglobin A1c   Result Value Ref Range    Hemoglobin A1c 5.3 3.5 - 6.0 %   Lipid Cascade   Result Value Ref Range    Cholesterol 211 (H) <=199 mg/dL    Triglycerides 71 <=149 mg/dL    HDL Cholesterol 48 (L) >=50 mg/dL    LDL Calculated 149 (H) <=129 mg/dL    Patient Fasting > 8hrs? Yes          ======================================================    Options for treatment and follow-up care were reviewed with the patient. Liz GALLAGHER" Pollard and/or guardian was engaged and actively involved in the decision making process. Liz Pollard and/or guardian verbalized understanding of the options discussed and was satisfied with the final plan.      Alejandro Jones MD

## 2021-06-08 NOTE — TELEPHONE ENCOUNTER
I called the pt to update her on her metformin rx and informed her that it had been d/cd : pt verbalized understanding.     Altagracia Kyle RDN, LD  Diabetes Care and Education

## 2021-06-08 NOTE — TELEPHONE ENCOUNTER
----- Message from Altagracia Kyle, Diabetes Ed sent at 6/10/2020 11:14 AM CDT -----  Dr. Paniagua,    I had a visit with this pt. Would recommend a trial of weaning pt off of metformin. She has maintained a very healthy lifestyle despite the current situation/COVID-19 restrictions and has also lost a significant amount of wt. Although we don't have a more recent A1C, all her daily BS have been at goal. She seldom has BS readings above 100, with most readings ranging 80s-90s.   She is scheduled to f/u with you on 7/21. Glycemic control and the need for med adjustment can be reassessed then.     Thanks!  Altagracia Kyle RDN, LD  Diabetes Care and Education

## 2021-06-08 NOTE — TELEPHONE ENCOUNTER
RN cannot approve Refill Request    RN can NOT refill this medication med is not covered by policy/route to provider. Last office visit: 2/20/2020 Alejandro Jones MD Last Physical: Visit date not found Last MTM visit: Visit date not found Last visit same specialty: 2/20/2020 Alejandro Jones MD.  Next visit within 3 mo: Visit date not found  Next physical within 3 mo: Visit date not found      Sofía Carnes, [facility] Triage/Med Refill 5/19/2020    Requested Prescriptions   Pending Prescriptions Disp Refills     ergocalciferol (ERGOCALCIFEROL) 1,250 mcg (50,000 unit) capsule [Pharmacy Med Name: VITAMIN D2 1.25MG(50,000 UNIT)] 12 capsule 0     Sig: TAKE 1 CAPSULE (50,000 UNITS TOTAL) BY MOUTH ONCE A WEEK FOR 12 DOSES.       There is no refill protocol information for this order

## 2021-06-09 NOTE — TELEPHONE ENCOUNTER
Refill Approved    Rx renewed per Medication Renewal Policy. Medication was last renewed on 8/15/19.    Eloina Kumar, [facility] Triage/Med Refill 7/15/2020     Requested Prescriptions   Pending Prescriptions Disp Refills     spironolactone (ALDACTONE) 25 MG tablet [Pharmacy Med Name: SPIRONOLACTONE 25 MG TABLET] 90 tablet 3     Sig: TAKE 1 TABLET BY MOUTH EVERY DAY       Diuretics/Combination Diuretics Refill Protocol  Passed - 7/15/2020 12:27 AM        Passed - Visit with PCP or prescribing provider visit in past 12 months     Last office visit with prescriber/PCP: 1/21/2020 Yamila Paniagua MD OR same dept: 2/20/2020 Alejandro Jones MD OR same specialty: 2/20/2020 Alejandro Jones MD  Last physical: 4/6/2017 Last MTM visit: Visit date not found   Next visit within 3 mo: Visit date not found  Next physical within 3 mo: Visit date not found  Prescriber OR PCP: Yamila Paniagua MD  Last diagnosis associated with med order: 1. Essential hypertension  - spironolactone (ALDACTONE) 25 MG tablet [Pharmacy Med Name: SPIRONOLACTONE 25 MG TABLET]; TAKE 1 TABLET BY MOUTH EVERY DAY  Dispense: 90 tablet; Refill: 3    If protocol passes may refill for 12 months if within 3 months of last provider visit (or a total of 15 months).             Passed - Serum Potassium in past 12 months      Lab Results   Component Value Date    Potassium 4.5 01/21/2020             Passed - Serum Sodium in past 12 months      Lab Results   Component Value Date    Sodium 141 01/21/2020             Passed - Blood pressure on file in past 12 months     BP Readings from Last 1 Encounters:   02/20/20 114/56             Passed - Serum Creatinine in past 12 months      Creatinine   Date Value Ref Range Status   01/21/2020 1.37 (H) 0.60 - 1.10 mg/dL Final

## 2021-06-09 NOTE — PROGRESS NOTES
Assessment/Plan:      Healthy female exam.    Contraception: post menopausal status  Last Pap: n/a. Results were: n/a  Last mammogram: 16 normal  Colonoscopy 3/16/12 - due to repeat b/c of polyps and FMH of colon cancer    H/o osteopenia - thoracic back pain  DEXA scan ordered    Subjective:      Liz Pollard is a 66 y.o. female who presents for an annual exam. The patient is sexually active. The patient participates in regular exercise: yes. The patient reports that there is not domestic violence in her life.     Wellness check for work    Healthy Habits:   Regular Exercise: Yes  Sunscreen Use: Yes  Healthy Diet: Yes  Dental Visits Regularly: Yes  Seat Belt: Yes  Sexually active: Yes  Self Breast Exam Monthly:Yes  Hemoccults: N/A  Flex Sig: N/A  Colonoscopy: Yes  Lipid Profile: Yes  Glucose Screen: Yes  Prevention of Osteoporosis: No  Last Dexa: No  Guns at Home:  No      Immunization History   Administered Date(s) Administered     Influenza, inj, historic 2008, 10/15/2010, 2015, 09/15/2016     Influenza, seasonal,quad inj 6-35 mos 10/09/2012     Influenza,seasonal quad, PF 2013     Pneumo Polysac 23-V 2012     Td, historic 1997, 10/24/2008     Tdap 10/24/2008     Immunization status: up to date and documented.    Gynecologic History  No LMP recorded. Patient has had a hysterectomy.  Contraception: post menopausal status  Last Pap: n/a. Results were: n/a  Last mammogram: 16 normal      OB History    Para Term  AB SAB TAB Ectopic Multiple Living     0                    Current Outpatient Prescriptions   Medication Sig Dispense Refill     aspirin 81 MG EC tablet Take 81 mg by mouth as needed for pain.       ergocalciferol (ERGOCALCIFEROL) 50,000 unit capsule TAKE ONE CAPSULE BY MOUTH ONCE A WEEK 4 capsule 0     ferrous sulfate 325 (65 FE) MG tablet Take 325 mg by mouth 2 times a day at 6:00 am and 4:00 pm.       hydrochlorothiazide (HYDRODIURIL) 25 MG tablet  Take 1 tablet (25 mg total) by mouth daily. 90 tablet 3     levothyroxine (SYNTHROID, LEVOTHROID) 100 MCG tablet TAKE 1 TABLET BY MOUTH EVERY DAY 90 tablet 3     lisinopril (PRINIVIL,ZESTRIL) 40 MG tablet TAKE 1 TABLET BY MOUTH EVERY DAY 90 tablet 1     metFORMIN (GLUCOPHAGE) 500 MG tablet take 1/2 a pill am and pm  pt states can't cut in half so takes 1 pill daily 30 tablet 11     metoprolol succinate (TOPROL-XL) 100 MG 24 hr tablet TAKE 1 AND 1/2 TABLET BY MOUTH EVERY DAY 90 tablet 3     ONETOUCH ULTRA TEST strips TEST AS DIRECTED 2 TO 3 TIMES DAILY 300 strip 0     spironolactone (ALDACTONE) 25 MG tablet Take 1 tablet (25 mg total) by mouth daily. 30 tablet 0     No current facility-administered medications for this visit.        Past Surgical History:   Procedure Laterality Date     HYSTERECTOMY  1987     OOPHORECTOMY       [place] REMOVAL GALLBLADDER      Description: Cholecystectomy;  Recorded: 05/10/2010;     [place] TOTAL ABDOM HYSTERECTOMY      Description: Hysterectomy;  Recorded: 06/16/2009;     Amlodipine and Hydralazine  Family History   Problem Relation Age of Onset     Breast cancer Maternal Aunt      Breast cancer Sister      Social History     Social History     Marital status:      Spouse name: N/A     Number of children: N/A     Years of education: N/A     Occupational History     Not on file.     Social History Main Topics     Smoking status: Current Some Day Smoker     Smokeless tobacco: Never Used     Alcohol use No     Drug use: No     Sexual activity: Yes     Partners: Male     Other Topics Concern     Not on file     Social History Narrative       Review of Systems  Review of Systems      REVIEW OF SYSTEMS  General: no weight changes, fatigue  HEENT:  no HA,  no vision changes, URI sx  Respiratory:  no cough, dyspnea  Cardiovascular: no chest pain, palpitations  Gastrointestinal: no nausea/vomiting, diarrhea/constipation, melena  : no dysuria, no vaginal d/c  Neurologic: no seizures, focal  "weakness, tremors  Skin: no rashes        Objective:         Vitals:    04/06/17 1647   BP: 130/60   Pulse: (!) 56   Temp: 97.6  F (36.4  C)   TempSrc: Oral   SpO2: 97%   Weight: 154 lb 1.6 oz (69.9 kg)   Height: 5' 2\" (1.575 m)     Body mass index is 28.19 kg/(m^2).    Physical  Physical Exam       "

## 2021-06-10 NOTE — TELEPHONE ENCOUNTER
RN cannot approve Refill Request    RN can NOT refill this medication med is not covered by policy/route to provider. Last office visit: 1/21/2020 Yamila Paniagua MD Last Physical: 4/6/2017 Last MTM visit: Visit date not found Last visit same specialty: 2/20/2020 Alejandro Jones MD.  Next visit within 3 mo: Visit date not found  Next physical within 3 mo: Visit date not found      Yandy Agarwal, [facility] Triage/Med Refill 8/10/2020    Requested Prescriptions   Pending Prescriptions Disp Refills     ergocalciferol (ERGOCALCIFEROL) 1,250 mcg (50,000 unit) capsule [Pharmacy Med Name: VITAMIN D2 1.25MG(50,000 UNIT)] 12 capsule 0     Sig: TAKE 1 CAPSULE (50,000 UNITS TOTAL) BY MOUTH ONCE A WEEK FOR 12 DOSES.       There is no refill protocol information for this order

## 2021-06-11 NOTE — PROGRESS NOTES
Assessment/Plan:      Visit for Preoperative Exam.   EKG - NSR, no ST changes  Check CBC, CMP (given DM, electrolytes, HTN meds), INR (given h/o Hep C)    No Contraindications for surgery. Ok to proceed as planned pending CMP is ok.   Discussed no NSAIDs x 7 days, NPO after midnight  Discussed to take BP meds with sips of water     1. Preop examination  - Electrocardiogram Perform - Clinic  - HM1(CBC and Differential)  - Comprehensive Metabolic Panel  - INR  - HM1 (CBC with Diff)    2. HTN (hypertension)  - HM1(CBC and Differential)  - HM1 (CBC with Diff)    3. Hyperlipemia    4. Essential hypertension  - Comprehensive Metabolic Panel    5. 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 Diabetes  - Comprehensive Metabolic Panel      Subjective:     Scheduled Procedure: right carpal tunnel with rt trigger finger   Surgery Date:  6/12/2017  Surgery Location:  [facility]   Fax  [phone]  Surgeon:  Dr daisha VAZQUEZ -   She has been having hand pain and seen by [facility].     Current Outpatient Prescriptions   Medication Sig Dispense Refill     aspirin 81 MG EC tablet Take 81 mg by mouth as needed for pain.       ergocalciferol (ERGOCALCIFEROL) 50,000 unit capsule TAKE ONE CAPSULE BY MOUTH ONCE A WEEK 4 capsule 0     ergocalciferol (ERGOCALCIFEROL) 50,000 unit capsule TAKE ONE CAPSULE BY MOUTH ONCE A WEEK 4 capsule 3     ferrous sulfate 325 (65 FE) MG tablet Take 325 mg by mouth 2 times a day at 6:00 am and 4:00 pm.       hydrochlorothiazide (HYDRODIURIL) 25 MG tablet Take 1 tablet (25 mg total) by mouth daily. 90 tablet 3     levothyroxine (SYNTHROID, LEVOTHROID) 100 MCG tablet TAKE 1 TABLET BY MOUTH EVERY DAY 90 tablet 3     lisinopril (PRINIVIL,ZESTRIL) 40 MG tablet TAKE 1 TABLET BY MOUTH EVERY DAY 90 tablet 1     metFORMIN (GLUCOPHAGE) 500 MG tablet take 1/2 a pill am and pm  pt states can't cut in half so takes 1 pill daily 30 tablet 11     metoprolol succinate (TOPROL-XL) 100 MG 24  Head, normocephalic, atraumatic, Face, Face within normal limits, Ears, External ears within normal limits, Nose/Nasopharynx, External nose  normal appearance, nares patent, no nasal discharge, Mouth and Throat, Oral cavity appearance normal, Breath odor normal, Lips, Appearance normal hr tablet TAKE 1 AND 1/2 TABLET BY MOUTH EVERY DAY 90 tablet 3     ONETOUCH ULTRA TEST strips TEST AS DIRECTED 2 TO 3 TIMES DAILY 300 strip 0     spironolactone (ALDACTONE) 25 MG tablet Take 1 tablet (25 mg total) by mouth daily. 30 tablet 0     No current facility-administered medications for this visit.        Allergies   Allergen Reactions     Amlodipine      Gums swelled     Hydralazine Itching     Pt states that she is not allergic to anything       Immunization History   Administered Date(s) Administered     Influenza, inj, historic 01/04/2008, 10/15/2010, 09/01/2015, 09/15/2016     Influenza, seasonal,quad inj 6-35 mos 10/09/2012     Influenza,seasonal quad, PF 11/18/2013     Pneumo Polysac 23-V 05/25/2012     Td, historic 01/01/1997, 10/24/2008     Tdap 10/24/2008       Patient Active Problem List   Diagnosis     Trigger Finger Of The Right Ring Finger     Midback Pain     Hypomagnesemia     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     Hematuria     Type 2 Diabetes Mellitus With Complication     Hyperlipidemia     Thrombocytopenia     Limb Swelling     Hepatitis, C Virus     Hypothyroidism     Diabetes Mellitus     Obesity     Hypertension     Tenosynovitis     De Quervain's Tenosynovitis     Proteinuria     Hiatal Hernia     Hepatic Cyst     Osteopenia     Vitamin D deficiency       Past Medical History:   Diagnosis Date     Disease of thyroid gland      Hypertension      Infectious viral hepatitis        Social History     Social History     Marital status:      Spouse name: N/A     Number of children: N/A     Years of education: N/A     Occupational History     Not on file.     Social History Main Topics     Smoking status: Current Some Day Smoker     Smokeless tobacco: Never Used     Alcohol use No     Drug use: No     Sexual activity: Yes     Partners: Male     Birth control/ protection: Post-menopausal     Other Topics Concern     Not on file     Social History Narrative       Past Surgical History:  "  Procedure Laterality Date     HYSTERECTOMY  1987     OOPHORECTOMY       [place] REMOVAL GALLBLADDER      Description: Cholecystectomy;  Recorded: 05/10/2010;     [place] TOTAL ABDOM HYSTERECTOMY      Description: Hysterectomy;  Recorded: 06/16/2009;       History of Present Illness  Recent Health  Fever: no  Chills: no  Fatigue: no  Chest Pain: no  Cough: no  Dyspnea: no  Urinary Frequency: no  Nausea: no  Vomiting: no  Diarrhea: no  Abdominal Pain: no  Easy Bruising: no  Lower Extremity Swelling: no  Poor Exercise Tolerance: no    Most recent Health Maintenance Visit:6 months month(s) ago    Pertinent History  Prior Anesthesia: no  Previous Anesthesia Reaction:  no  Diabetes: yes  Cardiovascular Disease: no  Pulmonary Disease: no  Renal Disease: no  GI Disease: no  Sleep Apnea: no  Thromboembolic Problems: no  Clotting Disorder: no  Bleeding Disorder: no  Transfusion Reaction: no  Impaired Immunity: yes  Steroid use in the last 6 months: no  Frequent Aspirin use: yes, asa 81 mg daily    Family history of     Social history of patient does not wear denture or partial plates    After surgery, the patient plans to recover at home with family.    Review of Systems  Review of Systems      REVIEW OF SYSTEMS  General: no weight changes, fatigue  HEENT:  no HA,  no vision changes, URI sx  Respiratory:  no cough, dyspnea  Cardiovascular: no chest pain, palpitations  Gastrointestinal: no nausea/vomiting, diarrhea/constipation, melena  : no dysuria, no vaginal d/c  Neurologic: no seizures, focal weakness, tremors  Skin: no rashes        Objective:         Vitals:    06/05/17 2000   BP: 100/40   Pulse: 65   Resp: 14   Temp: 98  F (36.7  C)   TempSrc: Oral   SpO2: 96%   Weight: 148 lb 1.6 oz (67.2 kg)   Height: 5' 1\" (1.549 m)       Physical Exam:  Physical Exam     OBJECTIVE:  Vitals listed above within normal limits  General appearance: well groomed, pleasant, well hydrated, nontoxic appearing  ENT: PERRL, throat clear  Neck: neck " supple, no lymphadenopathy, no thyromegaly  Lungs: lungs clear to auscultation bilaterally, no wheezes or rhonchi  Heart: regular rate and rhythm, no murmurs, rubs or gallops  Abdomen: soft, nontender  Neuro: no focal deficits, CN II-XII grossly intact, alert and oriented  Psych:  mood stable, appears to have good insight and judgment    Recent Results (from the past 1008 hour(s))   INR    Collection Time: 06/05/17  8:06 PM   Result Value Ref Range    INR 1.00 0.90 - 1.10   HM1 (CBC with Diff)    Collection Time: 06/05/17  8:06 PM   Result Value Ref Range    WBC 7.3 4.0 - 11.0 thou/uL    RBC 4.25 3.80 - 5.40 mill/uL    Hemoglobin 12.7 12.0 - 16.0 g/dL    Hematocrit 37.6 35.0 - 47.0 %    MCV 88 80 - 100 fL    MCH 29.9 27.0 - 34.0 pg    MCHC 33.9 32.0 - 36.0 g/dL    RDW 12.9 11.0 - 14.5 %    Platelets 176 140 - 440 thou/uL    MPV 7.8 7.0 - 10.0 fL    Neutrophils % 54 50 - 70 %    Lymphocytes % 36 20 - 40 %    Monocytes % 7 2 - 10 %    Eosinophils % 2 0 - 6 %    Basophils % 1 0 - 2 %    Neutrophils Absolute 4.0 2.0 - 7.7 thou/uL    Lymphocytes Absolute 2.6 0.8 - 4.4 thou/uL    Monocytes Absolute 0.5 0.0 - 0.9 thou/uL    Eosinophils Absolute 0.2 0.0 - 0.4 thou/uL    Basophils Absolute 0.0 0.0 - 0.2 thou/uL   Electrocardiogram Perform - Clinic    Collection Time: 06/05/17  8:20 PM   Result Value Ref Range    SYSTOLIC BLOOD PRESSURE  mmHg    DIASTOLIC BLOOD PRESSURE  mmHg    VENTRICULAR RATE 65 BPM    ATRIAL RATE 65 BPM    P-R INTERVAL 230 ms    QRS DURATION 68 ms    Q-T INTERVAL 406 ms    QTC CALCULATION (BEZET) 422 ms    P Axis 71 degrees    R AXIS 34 degrees    T AXIS 58 degrees    MUSE DIAGNOSIS       Sinus rhythm with 1st degree A-V block  Otherwise normal ECG  When compared with ECG of 21-APR-2006 10:02,  No significant change was found

## 2021-06-11 NOTE — PROGRESS NOTES
Assessment and Plan:   1. Annual physical exam  Colonoscopy and hbdyt-[id number]-date  Pap not indicated because she is status post hysterectomy  We will draw labs today  Influenza and pneumococcal vaccine given    2. Cirrhosis of liver without ascites, unspecified hepatic cirrhosis type (H)  Completed treatment for hepatitis C several years ago  We will check liver function and a liver ultrasound as screening for HCC  - Comprehensive Metabolic Panel; Future  - US Abdomen Limited; Future    3. Controlled type 2 diabetes mellitus without complication, without long-term current use of insulin (H)  Diabetes is well controlled with the metformin  Will recheck lipids after being on the new dose of Lipitor for 9 months  Eye exam is scheduled for next week  Currently on aspirin  Discussed smoking cessation  Will check microalbumin and continue lisinopril  - Glycosylated Hemoglobin A1c; Future  - Microalbumin, Random Urine; Future    4. Acquired hypothyroidism  We will check TSH and adjust levothyroxine if indicated  - Thyroid Stimulating Hormone (TSH); Future    5. CKD (chronic kidney disease) stage 3, GFR 30-59 ml/min (H)  We will check renal function and proteinurea  - Comprehensive Metabolic Panel; Future  - Microalbumin, Random Urine; Future    6. Vitamin D deficiency  She has been on ergocalciferol weekly for about 6 months.  Will recheck vitamin D level and if normal will switch to daily maintenance dose  - Vitamin D, Total (25-Hydroxy); Future    7. Age-related osteoporosis without current pathological fracture  Continue calcium and vitamin D and weightbearing exercises  Recheck bone scan next year    8. Need for influenza vaccination  - Influenza,Quad,High Dose,PF 65 YR+    9. Need for 23-polyvalent pneumococcal polysaccharide vaccine  - Pneumococcal polysaccharide vaccine 23-valent 3 yo or older, subq/IM    10. Hyperlipidemia with target LDL less than 70  - Lipid [place] FASTING; Future        Patient has been  advised of split billing requirements and indicates understanding: Yes    The patient's current medical problems were reviewed.    I have had an Advance Directives discussion with the patient.   Old and given new form to update    The following health maintenance schedule was reviewed with the patient and provided in printed form in the after visit summary:   Health Maintenance   Topic Date Due     HEPATITIS C SCREENING  1950     DIABETIC FOOT EXAM  1950     ADVANCE CARE PLANNING  12/12/1970     HEPATITIS B VACCINES (1 of 3 - Risk 3-dose series) 12/12/1971     ZOSTER VACCINES (1 of 2) 12/12/2002     PNEUMOCOCCAL IMMUNIZATION 65+ HIGH/HIGHEST RISK (2 of 2 - PPSV23) 11/12/2018     MEDICARE ANNUAL WELLNESS VISIT  06/19/2019     FALL RISK ASSESSMENT  07/11/2020     A1C  07/21/2020     INFLUENZA VACCINE RULE BASED (1) 08/01/2020     DIABETIC EYE EXAM  09/09/2020     BMP  01/21/2021     LIPID  01/21/2021     DXA SCAN  02/11/2022     MAMMOGRAM  08/10/2022     COLORECTAL CANCER SCREENING  07/20/2023     TD 18+ HE  07/11/2029     MICROALBUMIN  Discontinued        Subjective:   Chief Complaint: Liz Pollard is an 69 y.o. female here for an Annual Wellness visit.   HPI:    Prevention  Shingles vaccines questions -   colon 2018  mammo 8/10/20  Pap s/p hysterectomy    H/o cirrhosis due to hep C   S/p tx  Needs Q6-12 liver ultrasounds to screen for HCC  abdo CT 9/2018 showed benign hepatic cyst     DM T2  A1c 5.3 on 9/17/18 and 1/3/19 - and 5.4 on 7/11/19 - > 5.3% 1/21//20   on metformin  BP wnl  microalbumin wnl 1/3/19 - on lisinopril  ASA  LDL  139 on 1/3/19 -  discussed on 7/11/19  per AHA 31.0% and advised high intensity statin, on 1/21/20 started lipitor     Smokes - 3  Per week when she drinks wine  Eye 9/2019 =f/u scheduled next week  Foot exam done 7/11/19 - wnl      HTN - on lisinopril 40mg, HCTZ 26yo, metoprolol XL 100mg, spironolactone 25mg  BP wnl today     Hypothyroidism -   Last TSH 0.27 on  17 and levothyroxine decreased from 100mcg -> 88mcg in Oct 2017 -> 2.19 on 18 -> 0.21 on 19 and TSH decreased in 2019 and levothyroxine decreased from 88mcg -> 75mcg       DEXA- 20  OSTEOPOROSIS and HIGH fracture risk.   Previously treated with fosamax years ago  Appropriate evaluation and treatment recommended with follow up bone density scan in 1 to 2 years.  Started calcium and Vit D and weight bearing exercise    Review of Systems:      Please see above.  The rest of the review of systems are negative for all systems.    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     Hematuria     Diabetes mellitus type 2, controlled, without complications (H)     Hyperlipidemia     Thrombocytopenia     Hepatitis, C Virus     Hypothyroidism     Type 2 diabetes mellitus with diabetic nephropathy, without long-term current use of insulin (H)     Obesity     Hypertension     De Quervain's Tenosynovitis     Proteinuria     Hiatal Hernia     Hepatic Cyst     Vitamin D deficiency     Osteoporosis     CKD (chronic kidney disease) stage 3, GFR 30-59 ml/min (H)     Scoliosis     Past Medical History:   Diagnosis Date     Disease of thyroid gland      Hypertension      Infectious viral hepatitis       Past Surgical History:   Procedure Laterality Date     HYSTERECTOMY       OOPHORECTOMY       [place] REMOVAL GALLBLADDER      Description: Cholecystectomy;  Recorded: 05/10/2010;     [place] TOTAL ABDOM HYSTERECTOMY      Description: Hysterectomy;  Recorded: 2009;      Family History   Problem Relation Age of Onset     Breast cancer Maternal Aunt         in her 90 s     Cancer Mother 83         of stomach cancer     Colon cancer Father 51         of colon cancer     Colon cancer Brother 36          from colon cancer     Sickle cell trait Niece      Prostate cancer Brother       Social History     Socioeconomic History     Marital status:      Spouse name: Not on file     Number of children: Not on file     Years of education: Not on file     Highest education level: Not on file   Occupational History     Not on file   Social Needs     Financial resource strain: Not on file     Food insecurity     Worry: Not on file     Inability: Not on file     Transportation needs     Medical: Not on file     Non-medical: Not on file   Tobacco Use     Smoking status: Light Tobacco Smoker     Smokeless tobacco: Never Used   Substance and Sexual Activity     Alcohol use: No     Drug use: No     Sexual activity: Yes     Partners: Male     Birth control/protection: Post-menopausal   Lifestyle     Physical activity     Days per week: Not on file     Minutes per session: Not on file     Stress: Not on file   Relationships     Social connections     Talks on phone: Not on file     Gets together: Not on file     Attends L[employer] service: Not on file     Active member of club or organization: Not on file     Attends meetings of clubs or organizations: Not on file     Relationship status: Not on file     Intimate partner violence     Fear of current or ex partner: Not on file     Emotionally abused: Not on file     Physically abused: Not on file     Forced sexual activity: Not on file   Other Topics Concern     Not on file   Social History Narrative     Not on file      Current Outpatient Medications   Medication Sig Dispense Refill     aspirin 81 MG EC tablet Take 81 mg by mouth as needed for pain.       atorvastatin (LIPITOR) 20 MG tablet Take 1 tablet (20 mg total) by mouth daily. 30 tablet 11     blood glucose test (CONTOUR NEXT TEST STRIPS) strips TEST AS DIRECTED 2 TO 3 TIMES DAILY 300 strip 3     blood-glucose meter Misc 1 glucometer device  - any brand covered by insurance (contour covered by insurance? ) 1 each 0  "    ergocalciferol (ERGOCALCIFEROL) 1,250 mcg (50,000 unit) capsule TAKE 1 CAPSULE (50,000 UNITS TOTAL) BY MOUTH ONCE A WEEK FOR 12 DOSES. 12 capsule 0     ferrous sulfate 325 (65 FE) MG tablet Take 325 mg by mouth 2 times a day at 6:00 am and 4:00 pm.       hydroCHLOROthiazide (HYDRODIURIL) 25 MG tablet TAKE 1 TABLET BY MOUTH EVERY DAY 90 tablet 3     levothyroxine (SYNTHROID) 75 MCG tablet Take 1 tablet (75 mcg total) by mouth daily. 30 tablet 11     lisinopril (PRINIVIL,ZESTRIL) 40 MG tablet TAKE 1 TABLET BY MOUTH EVERY DAY 90 tablet 3     metoprolol tartrate (LOPRESSOR) 100 MG tablet Take 1 tablet (100 mg total) by mouth 2 (two) times a day. 60 tablet 11     spironolactone (ALDACTONE) 25 MG tablet TAKE 1 TABLET BY MOUTH EVERY DAY 90 tablet 1     tiZANidine (ZANAFLEX) 4 MG tablet Take 4 mg by mouth every 8 (eight) hours as needed.       No current facility-administered medications for this visit.       Objective:   Vital Signs:   Visit Vitals  /58 (Patient Site: Left Arm, Patient Position: Sitting, Cuff Size: Adult Regular)   Pulse 64   Temp 98.4  F (36.9  C)   Resp 20   Ht 5' 1.54\" (1.563 m)   Wt 147 lb 1 oz (66.7 kg)   SpO2 99%   BMI 27.31 kg/m           PHYSICAL EXAM  OBJECTIVE:  Vitals listed above within normal limits  General appearance: well groomed, pleasant, well hydrated, nontoxic appearing  ENT: PERRL, throat clear  Neck: neck supple, no lymphadenopathy, no thyromegaly  Lungs: lungs clear to auscultation bilaterally, no wheezes or rhonchi  Heart: regular rate and rhythm, no murmurs, rubs or gallops  Abdomen: soft, nontender  Neuro: no focal deficits, CN II-XII grossly intact, alert and oriented  Psych:  mood stable, appears to have good insight and judgment        Assessment Results 9/29/2020   Activities of Daily Living No help needed   Instrumental Activities of Daily Living No help needed   Mini Cog Total Score 5   Some recent data might be hidden     A Mini-Cog score of 0-2 suggests the " possibility of dementia, score of 3-5 suggests no dementia      Identified Health Risks:     The patient was provided with written information regarding signs of hearing loss.

## 2021-06-12 NOTE — TELEPHONE ENCOUNTER
RN cannot approve Refill Request    RN can NOT refill this medication med is not covered by policy/route to provider. Last office visit: 1/21/2020 Yamila Paniagua MD Last Physical: 9/29/2020 Last MTM visit: Visit date not found Last visit same specialty: 2/20/2020 Alejandro Jones MD.  Next visit within 3 mo: Visit date not found  Next physical within 3 mo: Visit date not found      Sofía Carnes, [facility] Triage/Med Refill 11/7/2020    Requested Prescriptions   Pending Prescriptions Disp Refills     ergocalciferol (ERGOCALCIFEROL) 1,250 mcg (50,000 unit) capsule [Pharmacy Med Name: VITAMIN D2 1.25MG(50,000 UNIT)] 12 capsule 0     Sig: TAKE 1 CAPSULE (50,000 UNITS TOTAL) BY MOUTH ONCE A WEEK FOR 12 DOSES.       There is no refill protocol information for this order

## 2021-06-13 NOTE — PROGRESS NOTES
"HPI - 67 yo female here for DM and med check.     Carpal tunnel surgery 6/12/17    DM  A1c 5.5 on 9/2016 - on metformin 250mg    Microalbuminuria improved but better on lisinopril   BP wnl  On ASA  Smoker intermittetly - trying nicorette gum  Eye exam 8/25/17    HTN - well controlled - on HCTZ and lisinopril and trorol XL    HCV - s/p tx. Last seen by HCV 10/2015    Chronic proteinuria seen by renal doc 12/2015    Elevated CR - 1.08 on 11/25/16 -> 1.45 on 6/5/17    vitamin D deficiency  -   Last 19.7 on 8/2016 on ergo     Hypothyroidism - on levothyroxine     Osteoporosis -   OSTEOPOROSIS and elevated fracture risk  Medication recommended   Alendronate in the past caused jaw pain      Current Outpatient Prescriptions   Medication Sig     aspirin 81 MG EC tablet Take 81 mg by mouth as needed for pain.     ergocalciferol (ERGOCALCIFEROL) 50,000 unit capsule TAKE ONE CAPSULE BY MOUTH ONCE A WEEK     ergocalciferol (ERGOCALCIFEROL) 50,000 unit capsule TAKE ONE CAPSULE BY MOUTH ONCE A WEEK     ferrous sulfate 325 (65 FE) MG tablet Take 325 mg by mouth 2 times a day at 6:00 am and 4:00 pm.     hydrochlorothiazide (HYDRODIURIL) 25 MG tablet Take 1 tablet (25 mg total) by mouth daily.     levothyroxine (SYNTHROID, LEVOTHROID) 100 MCG tablet TAKE 1 TABLET BY MOUTH EVERY DAY     lisinopril (PRINIVIL,ZESTRIL) 40 MG tablet TAKE 1 TABLET BY MOUTH EVERY DAY     metFORMIN (GLUCOPHAGE) 500 MG tablet take 1/2 a pill am and pm  pt states can't cut in half so takes 1 pill daily     metoprolol succinate (TOPROL-XL) 100 MG 24 hr tablet TAKE 1 1/2 TABLETS BY MOUTH EVERY DAY     ONETOUCH ULTRA TEST strips TEST AS DIRECTED 2 TO 3 TIMES DAILY     spironolactone (ALDACTONE) 25 MG tablet Take 1 tablet (25 mg total) by mouth daily.       Vitals:    09/21/17 1708   BP: 100/48   Pulse: 64   Resp: 14   Temp: 98.1  F (36.7  C)   TempSrc: Oral   SpO2: 97%   Weight: 141 lb 1.6 oz (64 kg)   Height: 5' 1\" (1.549 m)       PHYSICAL EXAM   General " Appearance: Awake and alert, in no acute distress  HEENT: neck is supple, no LAD  CV: regular rate  Resp: No respiratory distress. Breathing comfortably  Musculoskeletal: moving limbs comfortably with not deficits or deformities  Skin: no rashes noted    A/P  1. HTN (hypertension)  BP wnl with current regimen  Check CMP  HTN - well controlled - on HCTZ and lisinopril and trorol XL    2. Hyperlipemia  - Lipid Cascade    3. Hypothyroidism  On levothyroxine  - Thyroid Stimulating Hormone (TSH)    4. Vitamin D deficiency disease  Previously on Ergo  - Vitamin D, Total (25-Hydroxy)    5. Diabetes mellitus  - Comprehensive Metabolic Panel  - Microalbumin, Random Urine  - Glycosylated Hemoglobin A1c  A1c 5.5 on 9/2016 - on metformin 250mg    Microalbuminuria improved but better on lisinopril   BP wnl  On ASA  Smoker intermittetly - trying nicorette gum  Eye exam 8/25/17    6. Osteoporosis  DEXA on 4/25/17   OSTEOPOROSIS and elevated fracture risk.  Medication recommended   Alendronate in the past caused jaw pain  Encouraged calcium, Vit D and weight bearing exercise      8. Elevated serum creatinine  - Comprehensive Metabolic Panel  Chronic proteinuria seen by renal doc 12/2015    9. HCV - s/p tx. Last seen by HCV 10/2015    Spent 25 min face to face with patient with more the 50% spent in counseling, reviewing chart, and coordination of care and discussing problems listed above.

## 2021-06-13 NOTE — TELEPHONE ENCOUNTER
Refill Approved    Rx renewed per Medication Renewal Policy. Medication was last renewed on 1/8/20.    Eloina Kumar, [facility] Connection Triage/Med Refill 12/18/2020     Requested Prescriptions   Pending Prescriptions Disp Refills     hydroCHLOROthiazide (HYDRODIURIL) 25 MG tablet [Pharmacy Med Name: HYDROCHLOROTHIAZIDE 25 MG TAB] 90 tablet 3     Sig: TAKE 1 TABLET BY MOUTH EVERY DAY       Diuretics/Combination Diuretics Refill Protocol  Passed - 12/17/2020  3:54 AM        Passed - Visit with PCP or prescribing provider visit in past 12 months     Last office visit with prescriber/PCP: 1/21/2020 Yamila Paniagua MD OR same dept: 2/20/2020 Alejandro Jones MD OR same specialty: 2/20/2020 Alejandro Jones MD  Last physical: 9/29/2020 Last MTM visit: Visit date not found   Next visit within 3 mo: Visit date not found  Next physical within 3 mo: Visit date not found  Prescriber OR PCP: Yamila Paniagua MD  Last diagnosis associated with med order: 1. HTN (hypertension)  - hydroCHLOROthiazide (HYDRODIURIL) 25 MG tablet [Pharmacy Med Name: HYDROCHLOROTHIAZIDE 25 MG TAB]; TAKE 1 TABLET BY MOUTH EVERY DAY  Dispense: 90 tablet; Refill: 3    If protocol passes may refill for 12 months if within 3 months of last provider visit (or a total of 15 months).             Passed - Serum Potassium in past 12 months      Lab Results   Component Value Date    Potassium 4.2 09/30/2020             Passed - Serum Sodium in past 12 months      Lab Results   Component Value Date    Sodium 143 09/30/2020             Passed - Blood pressure on file in past 12 months     BP Readings from Last 1 Encounters:   09/29/20 108/58             Passed - Serum Creatinine in past 12 months      Creatinine   Date Value Ref Range Status   09/30/2020 1.37 (H) 0.60 - 1.10 mg/dL Final

## 2021-06-13 NOTE — TELEPHONE ENCOUNTER
Refill Approved    Rx renewed per Medication Renewal Policy. Medication was last renewed on 1/21/20.    Eloina Kumar, [facility] Connection Triage/Med Refill 11/16/2020     Requested Prescriptions   Pending Prescriptions Disp Refills     atorvastatin (LIPITOR) 20 MG tablet [Pharmacy Med Name: ATORVASTATIN 20 MG TABLET] 90 tablet 3     Sig: TAKE 1 TABLET BY MOUTH EVERY DAY       Statins Refill Protocol (Hmg CoA Reductase Inhibitors) Passed - 11/14/2020  9:31 AM        Passed - PCP or prescribing provider visit in past 12 months      Last office visit with prescriber/PCP: 1/21/2020 Yamila Paniagua MD OR same dept: 2/20/2020 Alejandro Jones MD OR same specialty: 2/20/2020 Alejandro Jones MD  Last physical: 9/29/2020 Last MTM visit: Visit date not found   Next visit within 3 mo: Visit date not found  Next physical within 3 mo: Visit date not found  Prescriber OR PCP: Yamila Paniagua MD  Last diagnosis associated with med order: 1. Controlled type 2 diabetes mellitus without complication, without long-term current use of insulin (H)  - atorvastatin (LIPITOR) 20 MG tablet [Pharmacy Med Name: ATORVASTATIN 20 MG TABLET]; TAKE 1 TABLET BY MOUTH EVERY DAY  Dispense: 90 tablet; Refill: 3    2. Mixed hyperlipidemia  - atorvastatin (LIPITOR) 20 MG tablet [Pharmacy Med Name: ATORVASTATIN 20 MG TABLET]; TAKE 1 TABLET BY MOUTH EVERY DAY  Dispense: 90 tablet; Refill: 3    If protocol passes may refill for 12 months if within 3 months of last provider visit (or a total of 15 months).

## 2021-06-13 NOTE — TELEPHONE ENCOUNTER
Refill Approved    Rx renewed per Medication Renewal Policy. Medication was last renewed on 10/24/19.    Eloina Kumar, [facility] Connection Triage/Med Refill 12/7/2020     Requested Prescriptions   Pending Prescriptions Disp Refills     lisinopriL (PRINIVIL,ZESTRIL) 40 MG tablet [Pharmacy Med Name: LISINOPRIL 40 MG TABLET] 90 tablet 3     Sig: TAKE 1 TABLET BY MOUTH EVERY DAY       Ace Inhibitors Refill Protocol Passed - 12/5/2020 12:29 PM        Passed - PCP or prescribing provider visit in past 12 months       Last office visit with prescriber/PCP: 1/21/2020 Yamila Paniagua MD OR same dept: 2/20/2020 Alejandro Jones MD OR same specialty: 2/20/2020 Alejandro Jones MD  Last physical: 9/29/2020 Last MTM visit: Visit date not found   Next visit within 3 mo: Visit date not found  Next physical within 3 mo: Visit date not found  Prescriber OR PCP: Yamila Paniagua MD  Last diagnosis associated with med order: 1. HTN (hypertension)  - lisinopriL (PRINIVIL,ZESTRIL) 40 MG tablet [Pharmacy Med Name: LISINOPRIL 40 MG TABLET]; TAKE 1 TABLET BY MOUTH EVERY DAY  Dispense: 90 tablet; Refill: 3    If protocol passes may refill for 12 months if within 3 months of last provider visit (or a total of 15 months).             Passed - Serum Potassium in past 12 months     Lab Results   Component Value Date    Potassium 4.2 09/30/2020             Passed - Blood pressure filed in past 12 months     BP Readings from Last 1 Encounters:   09/29/20 108/58             Passed - Serum Creatinine in past 12 months     Creatinine   Date Value Ref Range Status   09/30/2020 1.37 (H) 0.60 - 1.10 mg/dL Final

## 2021-06-13 NOTE — TELEPHONE ENCOUNTER
metoprolol succinate (TOPROL-XL) 100 MG 24 hr tablet [[id number]]    Electronically signed by: Sofía Carnes RN on 09/28/19 2132 Status: Discontinued   Ordering user: Sofía Carnes RN 09/28/19 2132 Ordering provider: Yamila Paniagua MD   Authorized by: Yamila Paniagua MD   Frequency: DAILY 09/28/19 - 01/14/20  Discontinued by: Yamila Paniagua MD 01/14/20 1307 [Formulary change]   Diagnoses

## 2021-06-14 NOTE — TELEPHONE ENCOUNTER
Last office visit: 07/11/2019  Last ordered: 01/14/2020 #60, R-11  Last lab check: CMP 09/30/2020  Next appointment: 03/08/2021 [facility]    Chart reviewed. Please review findings below.     HTN - well controlled with current regimen  on lisinopril 40mg, HCTZ 24yo, metoprolol XL 100mg, spironolactone 25mg

## 2021-06-14 NOTE — TELEPHONE ENCOUNTER
RN cannot approve Refill Request    RN can NOT refill this medication med is not covered by policy/route to provider. Last office visit: 1/21/2020 Yamila Paniagua MD Last Physical: 9/29/2020 Last MTM visit: Visit date not found Last visit same specialty: 2/20/2020 Alejandro Jones MD.  Next visit within 3 mo: Visit date not found  Next physical within 3 mo: Visit date not found      Yandy Agarwal, [facility] Connection Triage/Med Refill 1/31/2021    Requested Prescriptions   Pending Prescriptions Disp Refills     ergocalciferol (ERGOCALCIFEROL) 1,250 mcg (50,000 unit) capsule [Pharmacy Med Name: VITAMIN D2 1.25MG(50,000 UNIT)] 12 capsule 0     Sig: TAKE 1 CAPSULE (50,000 UNITS TOTAL) BY MOUTH ONCE A WEEK FOR 12 DOSES.       There is no refill protocol information for this order

## 2021-06-14 NOTE — TELEPHONE ENCOUNTER
Refill Approved    Rx renewed per Medication Renewal Policy. Medication was last renewed on 7/15/20, last OV 9/29/20.    Yandy Agarwal, [facility] Connection Triage/Med Refill 12/26/2020     Requested Prescriptions   Pending Prescriptions Disp Refills     spironolactone (ALDACTONE) 25 MG tablet [Pharmacy Med Name: SPIRONOLACTONE 25 MG TABLET] 90 tablet 1     Sig: TAKE 1 TABLET BY MOUTH EVERY DAY       Diuretics/Combination Diuretics Refill Protocol  Passed - 12/23/2020 12:41 PM        Passed - Visit with PCP or prescribing provider visit in past 12 months     Last office visit with prescriber/PCP: 1/21/2020 Yamila Paniagua MD OR same dept: 2/20/2020 Alejandro Jones MD OR same specialty: 2/20/2020 Alejandro Jones MD  Last physical: 9/29/2020 Last MTM visit: Visit date not found   Next visit within 3 mo: Visit date not found  Next physical within 3 mo: Visit date not found  Prescriber OR PCP: Yamila Paniagua MD  Last diagnosis associated with med order: 1. Essential hypertension  - spironolactone (ALDACTONE) 25 MG tablet [Pharmacy Med Name: SPIRONOLACTONE 25 MG TABLET]; TAKE 1 TABLET BY MOUTH EVERY DAY  Dispense: 90 tablet; Refill: 1    If protocol passes may refill for 12 months if within 3 months of last provider visit (or a total of 15 months).             Passed - Serum Potassium in past 12 months      Lab Results   Component Value Date    Potassium 4.2 09/30/2020             Passed - Serum Sodium in past 12 months      Lab Results   Component Value Date    Sodium 143 09/30/2020             Passed - Blood pressure on file in past 12 months     BP Readings from Last 1 Encounters:   09/29/20 108/58             Passed - Serum Creatinine in past 12 months      Creatinine   Date Value Ref Range Status   09/30/2020 1.37 (H) 0.60 - 1.10 mg/dL Final

## 2021-06-14 NOTE — TELEPHONE ENCOUNTER
Dirk has been out of medication Metoprolol 100mg and phoned pharmacy and pharmacy needs an approval for her medication.  Dirk has no blood pressure medication and has been out for one week.  Dirk is requesting clinic to phone pharmacy for approval.  Please phone patient if medication has been discontinued or she needs to be seen.     COVID 19 Nurse Triage Plan/Patient Instructions    Please be aware that novel coronavirus (COVID-19) may be circulating in the community. If you develop symptoms such as fever, cough, or SOB or if you have concerns about the presence of another infection including coronavirus (COVID-19), please contact your health care provider or visit [email or web address].     Disposition/Instructions    Home care recommended. Follow home care protocol based instructions.    Thank you for taking steps to prevent the spread of this virus.  o Limit your contact with others.  o Wear a simple mask to cover your cough.  o Wash your hands well and often.    Resources    [facility]: About COVID-19: [email or web address]    CDC: What to Do If You're Sick: www.cdc.gov/coronavirus/2019-ncov/about/steps-when-sick.html    CDC: Ending Home Isolation: www.cdc.gov/coronavirus/2019-ncov/hcp/disposition-in-home-patients.html     CDC: Caring for Someone: www.cdc.gov/coronavirus/2019-ncov/if-you-are-sick/care-for-someone.html     [facility]: Interim Guidance for Hospital Discharge to Home: www.[email or web address]/diseases/coronavirus/hcp/hospdischarge.pdf    [facility] clinical trials (COVID-19 research studies): [email or web address]-clinical-trials     Below are the COVID-19 hotlines at the [facility] ([facility]). Interpreters are available.   o For health questions: Call [phone] or 1-826.379.9424 (7 a.m. to 7 p.m.)  o For questions about schools and childcare: Call [phone] or 1-232.694.6765 (7 a.m. to 7 p.m.)     Reason for Disposition    Request for URGENT new  prescription or refill of 'essential' medication (i.e., likelihood of harm to patient if not taken) and triager unable to fill per department policy    Additional Information    Negative: DOUBLE DOSE (an extra dose or lesser amount) of prescription drug and NO symptoms (Exception: a double dose of antibiotics)    Negative: Diabetes drug error or overdose (e.g., took wrong type of insulin or took extra dose)    Negative: MORE THAN A DOUBLE DOSE of a prescription or over-the-counter (OTC) drug    Negative: DOUBLE DOSE (an extra dose or lesser amount) of over-the-counter (OTC) drug and any symptoms (e.g., dizziness, nausea, pain, sleepiness)    Negative: DOUBLE DOSE (an extra dose or lesser amount) of prescription drug and any symptoms (e.g., dizziness, nausea, pain, sleepiness)    Negative: Took another person's prescription drug    Negative: Caller has medication question about med not prescribed by PCP and triager unable to answer question (e.g., compatibility with other med, storage)    Protocols used: MEDICATION QUESTION CALL-A-OH

## 2021-06-14 NOTE — TELEPHONE ENCOUNTER
RN cannot approve Refill Request    RN can NOT refill this medication medication not on med list. Last office visit: 1/21/2020 Yamila Paniagua MD Last Physical: 9/29/2020 Last MTM visit: Visit date not found Last visit same specialty: 2/20/2020 Alejandro Jones MD.  Next visit within 3 mo: Visit date not found  Next physical within 3 mo: Visit date not found      Akanksha Ponce, [facility] Connection Triage/Med Refill 1/3/2021    Requested Prescriptions   Pending Prescriptions Disp Refills     metoprolol succinate (TOPROL-XL) 100 MG 24 hr tablet [Pharmacy Med Name: METOPROLOL SUCC  MG TAB] 135 tablet 3     Sig: TAKE 1.5 TABLETS BY MOUTH DAILY       Beta-Blockers Refill Protocol Passed - 12/31/2020 12:04 PM        Passed - PCP or prescribing provider visit in past 12 months or next 3 months     Last office visit with prescriber/PCP: 1/21/2020 Yamila Paniagua MD OR same dept: 2/20/2020 Alejandro Jones MD OR same specialty: 2/20/2020 Alejandro Jones MD  Last physical: 9/29/2020 Last MTM visit: Visit date not found   Next visit within 3 mo: Visit date not found  Next physical within 3 mo: Visit date not found  Prescriber OR PCP: Yamila Paniagua MD  Last diagnosis associated with med order: 1. Essential hypertension  - metoprolol succinate (TOPROL-XL) 100 MG 24 hr tablet [Pharmacy Med Name: METOPROLOL SUCC  MG TAB]; TAKE 1.5 TABLETS BY MOUTH DAILY  Dispense: 135 tablet; Refill: 3    If protocol passes may refill for 12 months if within 3 months of last provider visit (or a total of 15 months).             Passed - Blood pressure filed in past 12 months     BP Readings from Last 1 Encounters:   09/29/20 108/58

## 2021-06-14 NOTE — TELEPHONE ENCOUNTER
Please call her pharmacy and find out what the problem is.  It looks like all 3 of her blood pressure medicines have been refilled within the last month.    Thank you  Dr. Yamila Paniagua  1/25/2021

## 2021-06-14 NOTE — TELEPHONE ENCOUNTER
Called pharmacy.  Pt can  Spiractone.  Both Lisinopril and Hydrochlorathiazide were filled in December for a 90 day supply which will need NEW scripts in March, and the Lopressor needs a new script -as last written in 1/14/20.  Will queue up Lopressor.  Thanks.

## 2021-06-15 NOTE — TELEPHONE ENCOUNTER
Tried calling the patient. Left them a voicemail and also the call back number. Please contact patient to reschedule.    Thanks  Altagracia Kyle RDN, EJ  Diabetes Care and Education

## 2021-06-15 NOTE — PROGRESS NOTES
"    Assessment & Plan     Controlled type 2 diabetes mellitus without complication, without long-term current use of insulin (H)  Diet controlled and doing very well  Weak pedal pulses on diabetic foot exam.  Discussed this is likely related to her smoking.  She does not have any signs of claudication.  Since she wants to be referred to the vascular clinic for varicose veins we will also have them assess her pedal pulses.  - Glycosylated Hemoglobin A1c      Cirrhosis of liver without ascites, unspecified hepatic cirrhosis type (H)  Managed by GI    Stage 3 chronic kidney disease, unspecified whether stage 3a or 3b CKD  Stable    Hyperlipidemia with target LDL less than 70  LDL is excellent    Hypertension  Blood pressure well controlled with current regimen    Vitamin D deficiency  Vitamin D level improved and okay to stop weekly ergocalciferol and transition to daily supplement    Asymptomatic varicose veins of both lower extremities  She has varicose veins in her calves and weak pedal pulses so will refer to vascular clinic for assessment  - Ambulatory referral to Vascular Medicine    Encounter for smoking cessation counseling  She is not ready to quit    Stress due to her 's health and memory issues    [id number]}     Tobacco Cessation:   reports that she has been smoking. She has never used smokeless tobacco.  I have counseled the patient for tobacco cessation and the follow up will occur  at the next visit.  BMI:   Estimated body mass index is 27 kg/m  as calculated from the following:    Height as of this encounter: 5' 1.61\" (1.565 m).    Weight as of this encounter: 145 lb 12.8 oz (66.1 kg).   The following high BMI interventions were performed this visit: encouragement to exercise and lifestyle education regarding diet    Return in about 3 months (around 6/16/2021) for DM f/u.    Yamila Paniagua MD  [facility]IE[facility] SystemsKLOE Pollard is 70 y.o. and presents today for " the following health issues   HPI   Here for DM f/u.     DM T2 - well controlled - diet controlled  A1c 5.3 on 9/17/18 and 1/3/19 - and 5.4 on 7/11/19 - > 5.3% 1/21//20 - 5.3% today   on metformin - stopped last year  BP wnl  microalbumin wnl 9/30/29 and 9/30/20  - on lisinopril  ASA  LDL  139 on 1/3/19 -  discussed on 7/11/19  per AHA 31.0% and advised high intensity statin, on 1/21/20 started lipitor  -> 62 on 9/30/20   Eye 10/21/20  CKD stage 3 - last GFR 38  Smokes - 1 pack very 3 days (not ready to quit b/c feeling stressed)  Foot exam done 7/11/19 - wnl -      HTN - on lisinopril 40mg, HCTZ 24yo, metoprolol XL 100mg, spironolactone 25mg  BP wnl today     Hypothyroidism -   Last TSH 0.27 on 9/21/17 and levothyroxine decreased from 100mcg -> 88mcg in Oct 2017 -> 2.19 on 7/2/18 -> 0.21 on 7/11/19 and TSH decreased in July 2019 and levothyroxine decreased from 88mcg -> 75mcg -> 3.13 on 9/30/20    Vaccines  Covid #1 2/25/ 2021  And #2 on 3/25/21  Shingles - plans to get at pharmacy     H/o cirrhosis due to hep C   S/p tx  Needs Q6-12 liver ultrasounds to screen for HCC  abdo CT 9/2018 showed benign hepatic cyst  LFT wnl 9/30/30  Ultrasound - 9/30/20 -   1.  Coarsened hepatic parenchymal echotexture likely due to underlying fibrosis.  2.  No significant focal liver lesion.    Vit D deficiency -   89.4 on 9/30/21 - on ergo and can transition to low dose daily     Social    having health issues and  Memory issues.       Review of Systems   Please see above.  The rest of the review of systems are negative for all systems.     Current Outpatient Medications   Medication Sig     atorvastatin (LIPITOR) 20 MG tablet TAKE 1 TABLET BY MOUTH EVERY DAY     blood glucose test (CONTOUR NEXT TEST STRIPS) strips TEST AS DIRECTED 2 TO 3 TIMES DAILY     blood-glucose meter Misc 1 glucometer device  - any brand covered by insurance (contour covered by insurance? )     ergocalciferol (ERGOCALCIFEROL) 1,250 mcg (50,000  "unit) capsule TAKE 1 CAPSULE (50,000 UNITS TOTAL) BY MOUTH ONCE A WEEK FOR 12 DOSES.     hydroCHLOROthiazide (HYDRODIURIL) 25 MG tablet TAKE 1 TABLET BY MOUTH EVERY DAY     levothyroxine (SYNTHROID, LEVOTHROID) 75 MCG tablet TAKE 1 TABLET BY MOUTH EVERY DAY     lisinopriL (PRINIVIL,ZESTRIL) 40 MG tablet TAKE 1 TABLET BY MOUTH EVERY DAY     metoprolol succinate (TOPROL-XL) 100 MG 24 hr tablet TAKE 1.5 TABLETS BY MOUTH DAILY     spironolactone (ALDACTONE) 25 MG tablet Take 1 tablet (25 mg total) by mouth daily.     aspirin 81 MG EC tablet Take 81 mg by mouth as needed for pain.     cholecalciferol, vitamin D3, 25 mcg (1,000 unit) capsule Take 1 capsule (1,000 Units total) by mouth daily.     ferrous sulfate 325 (65 FE) MG tablet Take 325 mg by mouth 2 times a day at 6:00 am and 4:00 pm.     metoprolol tartrate (LOPRESSOR) 100 MG tablet Take 1 tablet (100 mg total) by mouth 2 (two) times a day.     tiZANidine (ZANAFLEX) 4 MG tablet Take 4 mg by mouth every 8 (eight) hours as needed.           Objective    /60   Pulse (!) 56   Temp 98.1  F (36.7  C) (Oral)   Ht 5' 1.61\" (1.565 m)   Wt 145 lb 12.8 oz (66.1 kg)   SpO2 99%   BMI 27.00 kg/m    Body mass index is 27 kg/m .  Physical Exam    Vitals:    03/16/21 0907   BP: 130/60   Pulse: (!) 56   Temp: 98.1  F (36.7  C)   TempSrc: Oral   SpO2: 99%   Weight: 145 lb 12.8 oz (66.1 kg)   Height: 5' 1.61\" (1.565 m)     PHYSICAL EXAM   General Appearance: Awake and alert, in no acute distress  HEENT: neck is supple  CV: regular rate  Resp: No respiratory distress. Breathing comfortably  Musculoskeletal: moving limbs comfortably with not deficits or deformities  Skin: no rashes noted  DM foot exam: weak pedal pulses, no deformities, good sensation to microfilament, no callouses        "

## 2021-06-15 NOTE — TELEPHONE ENCOUNTER
Refill Approved    Rx renewed per Medication Renewal Policy. Medication was last renewed on 1/27/20.    Michael Snyder, [facility] Triage/Med Refill 2/10/2021     Requested Prescriptions   Pending Prescriptions Disp Refills     levothyroxine (SYNTHROID, LEVOTHROID) 75 MCG tablet [Pharmacy Med Name: LEVOTHYROXINE 75 MCG TABLET] 90 tablet 3     Sig: TAKE 1 TABLET BY MOUTH EVERY DAY       Thyroid Hormones Protocol Passed - 2/10/2021  2:26 AM        Passed - Provider visit in past 12 months or next 3 months     Last office visit with prescriber/PCP: 1/21/2020 Yamila Paniagua MD OR same dept: 2/20/2020 Alejandro Jones MD OR same specialty: 2/20/2020 Alejandro Jones MD  Last physical: 9/29/2020 Last MTM visit: Visit date not found   Next visit within 3 mo: Visit date not found  Next physical within 3 mo: Visit date not found  Prescriber OR PCP: Yamila Paniagua MD  Last diagnosis associated with med order: 1. Acquired hypothyroidism  - levothyroxine (SYNTHROID, LEVOTHROID) 75 MCG tablet [Pharmacy Med Name: LEVOTHYROXINE 75 MCG TABLET]; TAKE 1 TABLET BY MOUTH EVERY DAY  Dispense: 90 tablet; Refill: 3    If protocol passes may refill for 12 months if within 3 months of last provider visit (or a total of 15 months).             Passed - TSH on file in past 12 months for patient age 12 & older     TSH   Date Value Ref Range Status   09/30/2020 3.13 0.30 - 5.00 uIU/mL Final

## 2021-06-16 PROBLEM — N18.30 CKD (CHRONIC KIDNEY DISEASE) STAGE 3, GFR 30-59 ML/MIN (H): Status: ACTIVE | Noted: 2018-07-02

## 2021-06-16 PROBLEM — M41.9 SCOLIOSIS: Status: ACTIVE | Noted: 2019-01-03

## 2021-06-16 PROBLEM — M81.0 OSTEOPOROSIS: Status: ACTIVE | Noted: 2018-07-02

## 2021-06-16 NOTE — TELEPHONE ENCOUNTER
Medication Question or Clarification    Who is calling: [facility]     What medication are you calling about (include dose and sig)?: for pt contour test strips states to test 2-3 times daily.  Because she is a Medical part B pt - pharmacy needs exact times a day.  Since she is NOT on insulin, Medicare only covers once a day.       Who prescribed the medication?: Dr. Paniagua    What is your question/concern?: see above    Requested Pharmacy: [facility]  Okay to leave a detailed message?: No    Call taken at 3/18/21 at 1200 by Marlen Hutchison CMA, CMT

## 2021-06-17 NOTE — PATIENT INSTRUCTIONS - HE
"Patient Instructions by Yamila Paniagua MD at 7/11/2019  4:00 PM     Author: Yamila Paniagua MD Service: -- Author Type: Physician    Filed: 7/11/2019  4:52 PM Encounter Date: 7/11/2019 Status: Signed    : Yamila Paniagua MD (Physician)       Patient Education   Patient Education       Controlling Your Cholesterol  Cholesterol is a waxy substance. It travels in your blood through the blood vessels. When you have high cholesterol, it can build up along the walls of the blood vessels. This makes the vessels narrower and decreases blood flow. You are then at greater risk of having a heart attack or a stroke.  Good and bad cholesterol  Lipids are fats, and blood is mostly water. Fat and water don't mix. So our bodies need lipoproteins (lipids inside a protein shell) to carry the lipids. The protein shell carries its lipids through the bloodstream. There are two main kinds of lipoproteins:    LDL (low-density lipoprotein) is known as \"bad cholesterol.\" It mainly carries cholesterol. It delivers this cholesterol to body cells. Excess LDL cholesterol will build up in artery walls. This increases your risk for heart disease and stroke.    HDL (high-density lipoprotein) is known as \"good cholesterol.\" This protein shell collects excess cholesterol that LDLs have left behind on blood vessel walls. That's why high levels of HDL cholesterol can decrease your risk of heart disease and stroke.  Controlling cholesterol levels  Total cholesterol includes LDL and HDL cholesterol, as well as other fats in the bloodstream. If your total cholesterol is high, follow the steps below to help lower your total cholesterol level:  Eat less unhealthy fat    Cut back on saturated fats and trans fats (also called hydrogenated) by selecting lean cuts of meat, low-fat dairy, and using oils instead of solid fats. Limit baked goods, processed meats, and fried foods. A diet thats high in these fats increases your bad " cholesterol. It's not enough to just cut back on foods containing cholesterol.    Eat about 2 servings of fish per week. Most fish contain omega-3 fatty acids. These help lower blood cholesterol.    Eat more whole grains and soluble fiber (such as oat bran). These lower overall cholesterol.  Be active    Choose an activity you enjoy. Walking, swimming, and riding a bike are some good ways to be active.    Start at a level where you feel comfortable. Increase your time and pace a little each week.    Work up to 30 to 40 minutes of moderate to high intensity physical activity at least 3 to 4 days per week.    Remember, some activity is better than none.    If you haven't been exercising regularly, start slowly. Check with your healthcare provider to make sure the exercise plan is right for you.  Quit smoking  Quitting smoking can improve your lipid levels. It also lowers your risk for heart disease and stroke.  Manage your weight  If you are overweight or obese, your healthcare provider will work with you to lose weight and lower your BMI (body mass index) to a normal or near-normal level. Making diet changes and increasing physical activity can help.  Take medicine as directed  Many people need medicine to get their LDL levels to a safe level. Medicine to lower cholesterol levels is effective and safe. Taking medicine is not a substitute for exercise or watching your diet! Your healthcare provider can tell you whether you might benefit from a cholesterol-lowering medicine.  Date Last Reviewed: 6/1/2017 2000-2017 [employer]. [address] [place] [address], [place] [address]. All rights reserved. This information is not intended as a substitute for professional medical care. Always follow your healthcare professional's instructions.  Medicine for Cholesterol Control  Cholesterol is a waxy substance in your bloodstream. If there is too much of it in your blood, it can build up in the walls of your arteries.  Over time, this buildup can lead to coronary disease. Coronary disease can put you at risk for a heart attack or stroke. It can also put you at risk for disease of the arteries in your legs and other places in your body. Medicine can give you the extra help you need to control your cholesterol.    How medicine helps  Different kinds of medicines help with cholesterol levels. Some help lower your LDL (bad cholesterol). Some help raise your HDL (good cholesterol). Other medicines lower your triglyceride levels. And some do all three. It may take some time to find the right medicine for you. Taking medicine will be only one part of your cholesterol control plan. You will still need to eat right and get regular exercise.  Talk with your healthcare provider to find out your risks for having a heart attack. Your provider can tell you what goals to use to see if your treatment is working. These goals may vary based on your health issues or family history. Also ask your provider how often your cholesterol should be checked as part of your treatment plan. You may need to fast before getting your cholesterol checked.  Taking your medicine  It is important to:    Tell your healthcare provider about any other medicines you take. This includes over-the-counter medicines. It also includes vitamins and herbs.    Take your medicine exactly as directed. This helps make sure that it works as it should.    Don't skip a dose.    Don't stop taking it if you feel better.    Don't stop taking it when your cholesterol numbers improve.    Order your refill before your medicine runs out.  Side effects  Medicines can cause side effects. These often occur at the start of taking a new medicine. Side effects can include headache and upset stomach. Rarely you can have muscle aches. Tell your healthcare provider about any side effects you have.  When to call your healthcare provider  When taking your medicine, let your healthcare provider know if  you have:    Yellowing of the whites of eyes    Blurred vision    Muscle aches    Trouble breathing   High-risk groups  Some people may need to take medicines called statins to control their cholesterol. This is in addition to eating a healthy diet and getting regular exercise.  Statins can help you stay healthy. They can also help prevent a heart attack or stroke. You may need to take a statin if you are in one of these groups:    Adults who have had a heart attack or stroke. Or adults who have had peripheral vascular disease, a ministroke (transient ischemic attack), or stable or unstable angina. This group also includes people who have had a procedure to restore blood flow through a blocked artery. These procedures include percutaneous coronary intervention, angioplasty, stent, and open-heart bypass surgery.    Adults who have diabetes. Or adults who are at higher risk of having a heart attack or stroke and have an LDL cholesterol level of 70 to 189 mg/dL    Adults who are 21 years old or older and have an LDL cholesterol level of 190 mg/dL or higher.  If you are in a high-risk group, talk with your healthcare provider about your treatment goals. Make sure you understand why these goals are important, based on your own health history and your family history of heart disease or high cholesterol.  Make a plan to have regular cholesterol checks. You may need to fast before getting this test. Also ask your provider about any side effects your medicines may cause. Let your provider know about any side effects you have. You may need to take more than one medicine to reach the cholesterol goals that you and your provider decide on.  Date Last Reviewed: 10/1/2016    7415-1182 [employer] [address] [place] [address], [place] [address]. All rights reserved. This information is not intended as a substitute for professional medical care. Always follow your healthcare professional's instructions.                     "      Patient Education     Risk Factors for Heart Disease  A risk factor is something that increases your chance of having heart disease. Heart disease (also called coronary artery disease) involves damage to the heart arteries. These blood vessels need to work well to provide the oxygen your heart needs to pump blood to the rest of your body. Things like smoking or high cholesterol levels can damage arteries. You cant control some risk factors, such as age and a family history of heart disease. But there are many things you can control to reduce your risk for heart disease.    Unhealthy cholesterol levels  Cholesterol is a fat-like substance in your blood. It can build up along the artery walls. This is called plaque. Over time, plaque narrows the arteries and reduces blood flow to your heart or brain. If a blood clot forms or a piece of the plaque breaks off, it can completely block the artery and cause a heart attack or stroke. Your risk of heart disease goes up if you have high levels of LDL (\"bad\") cholesterol or triglycerides (another fatty substance that can build up). Youre also at risk if you have low HDL cholesterol (\"good\") cholesterol. HDL helps clear the bad cholesterol away. You're at risk if you have:  HDL cholesterol 50 mg/dL or lower; LDL cholesterol 100 mg/dL; or triglycerides of 150 mg/dL or higher.  Smoking  This is the most important risk factor you can change. Smoking causes inflammation and damages the smooth muscle that lines the arteries making them less flexible. It also raises your blood pressure, causing further damage to the artery lining. Smoking also increases your risk that your blood may clot, block an artery, and cause a heart attack or stroke. Smoking also damages your lungs, which affects the delivery of oxygen to the body. If you smoke, you are 2 to 4 times more likely to develop coronary artery disease. If you smoke, it's never too late to help your heart. Ask your doctor about " nicotine replacement products and smoking cessation support.  High blood pressure  High blood pressure occurs when blood pushes too hard against artery walls. This causes damage to the artery walls and the formation of scar tissue as it heals. This makes the arteries stiff and weak. Plaque sticks to the scarred tissue narrowing and hardening the arteries. High blood pressure also causes your heart to work harder to get blood out to the body. High blood pressure raises your risk of heart attack, also known as acute myocardial infarction, or AMI, and especially stroke. The brain tissue is especially sensitive to high blood pressure damage. You're at risk if your blood pressure is 120/80 or higher.  Negative emotions  Chronic stress, pent-up anger, and other negative emotions have been linked to heart disease. This occurs because stress increases the levels of a hormone that increase the demand on your heart. Over time, these emotions could raise your heart disease risk.  Metabolic syndrome  This is caused by a combination of certain risk factors. It puts you at extra high risk of heart disease, stroke, and diabetes. You have metabolic syndrome if you have 3 or more of the following: low HDL cholesterol; high triglycerides; high blood pressure; high blood sugar; extra weight around the waist.  Diabetes  Diabetes occurs when you have high levels of sugar (glucose) in your blood. This can damage arteries if not kept under control. Having diabetes also makes you more likely to have a silent heart attack--one without any symptoms.  Excess weight  Excess weight makes other risk factors, such as diabetes, more likely. Excess weight around the waist or stomach increases your heart disease risk the most.  Lack of physical activity  When youre not active, youre more likely to develop diabetes, high blood pressure, high cholesterol levels, and excess weight.     Most people with heart disease have more than one risk factor.    Date Last Reviewed: 3/28/2016    8189-7697 [employer]. [address] [place] [address], [place] [address]. All rights reserved. This information is not intended as a substitute for professional medical care. Always follow your healthcare professional's instructions.

## 2021-06-17 NOTE — TELEPHONE ENCOUNTER
Reviewed notes.  Patient is having specific stroke symptoms and needs to be seen at the emergency room immediately.  Please explain to the patient that there is no other care setting where the appropriate testing and treatments can be given.

## 2021-06-17 NOTE — PROGRESS NOTES
This is a new consult for Varicose Veins. The patient has varicose veins that are problematic in bilateral legs. Symptoms patient has been experiencing are visible varicosities. Patient has not been wearing compression stockings.     Discoloration is not present.  Pt has not been using pain medication or antiinflammatory's.

## 2021-06-17 NOTE — TELEPHONE ENCOUNTER
Reason for call:  Patient reporting a symptom    Symptom or request: vertigo    Duration (how long have symptoms been present): on and off for a long time but this time symptoms last 1 week    Have you been treated for this before? No    Additional comments: Wondering if Dr. Paniagua recommend any OTC medication? Please advise.    Phone Number patient can be reached at:  Home number on file [phone] (home)    Best Time:  anytime    Can we leave a detailed message on this number: Yes    Call taken on 5/5/2021 at 11:21 AM by Aquiles Morin

## 2021-06-17 NOTE — TELEPHONE ENCOUNTER
"Called pt and relayed covering provider's message.  Pt said , \"well, I guess I will go then\".  Then pt thanked CMT for the info and hung up.  Thanks   "

## 2021-06-17 NOTE — TELEPHONE ENCOUNTER
RN cannot approve Refill Request    RN can NOT refill this medication med is not covered by policy/route to provider. Last office visit: 3/16/2021 Yamila Paniagua MD Last Physical: 9/29/2020 Last MTM visit: Visit date not found Last visit same specialty: 3/16/2021 Yamila Paniagua MD.  Next visit within 3 mo: Visit date not found  Next physical within 3 mo: Visit date not found      Meghan Pyle, [facility] Triage/Med Refill 4/30/2021    Requested Prescriptions   Pending Prescriptions Disp Refills     ergocalciferol (ERGOCALCIFEROL) 1,250 mcg (50,000 unit) capsule [Pharmacy Med Name: VITAMIN D2 1.25MG(50,000 UNIT)] 12 capsule 0     Sig: TAKE 1 CAPSULE (50,000 UNITS TOTAL) BY MOUTH ONCE A WEEK FOR 12 DOSES.       There is no refill protocol information for this order

## 2021-06-17 NOTE — TELEPHONE ENCOUNTER
RN triage   Call from pt   Pt states she has hx of vertigo -- dizzy and room spinning episodes -- usually come and go for less than a week at a time   Has been occurring for a few years -- has never had Dr ray - has never taken any medications for this   Now having multiple episodes per day for over a week  No falls or fainting   Now also L hand weak today -- states she would not  a cup = worried she would drop it   Denies any pain or numbness or tingling   Per protocol = advised to  call 911  -- offer to call 911 for pt   Pt refusing to call 911 - or have nurse call 911 - she states she is not that bad - pt wants recommendation of what she can take over the counter for her vertigo     Please advise   Lacey Boudreaux RN [facility] RN triage      Reason for Disposition    New neurologic deficit that is present now: * Weakness of the face, arm, or leg on one side of the body * Numbness of the face, arm, or leg on one side of the body * Loss of speech or garbled speech    Additional Information    Negative: Fainted, and still feels dizzy afterwards    Negative: SEVERE difficulty breathing (e.g., struggling for each breath, speaks in single words)    Negative: Chest pain    Protocols used: DIZZINESS-A-OH

## 2021-06-18 NOTE — PROGRESS NOTES
Assessment:      Healthy female exam.      1. Routine general medical examination at a health care facility            Plan:       Has colonoscopy scheduled 18.      Pt declines labs today.    Appointment with Dr. gunn re: diabetes, pneumonia vaccine.      Subjective:      Liz Pollard is a 67 y.o. female who presents for an annual exam. The patient is sexually active. The patient participates in regular exercise: yes. The patient reports that there is not domestic violence in her life.     She quit smoking in March.    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    Weight watchers.      Healthy Habits:   Regular Exercise: Yes  Sunscreen Use: Yes  Healthy Diet: Yes  Dental Visits Regularly: Yes  Seat Belt: Yes  Sexually active: Yes  Self Breast Exam Monthly:Yes  Hemoccults: No  Flex Sig: No  Colonoscopy: Yes  Lipid Profile: Yes  Glucose Screen: Yes  Prevention of Osteoporosis: No  Last Dexa: No  Guns at Home:  No      Immunization History   Administered Date(s) Administered     Influenza, inj, historic,unspecified 2008, 10/15/2010, 2015, 09/15/2016     Influenza, seasonal,quad inj 6-35 mos 10/09/2012     Influenza,seasonal quad, PF 2013     Pneumo Polysac 23-V 2012     Td,adult,historic,unspecified 1997, 10/24/2008     Tdap 10/24/2008     Immunization status: up to date and documented.    No exam data present    Gynecologic History  No LMP recorded. Patient has had a hysterectomy.  Contraception: status post hysterectomy  Last Pap: . Results were:   Last mammogram: 5/10/18. Results were: normal      OB History    Para Term  AB Living     0      SAB TAB Ectopic Multiple Live Births                    Current Outpatient Prescriptions   Medication Sig Dispense Refill     aspirin 81 MG EC tablet Take 81 mg by mouth as needed for pain.       ergocalciferol (ERGOCALCIFEROL) 50,000 unit capsule TAKE 1 CAPSULE (50,000 UNITS TOTAL) BY MOUTH ONCE A WEEK. 4 capsule 2      ferrous sulfate 325 (65 FE) MG tablet Take 325 mg by mouth 2 times a day at 6:00 am and 4:00 pm.       hydroCHLOROthiazide (HYDRODIURIL) 25 MG tablet TAKE 1 TABLET(25 MG) BY MOUTH DAILY 90 tablet 3     levothyroxine (SYNTHROID, LEVOTHROID) 88 MCG tablet TAKE 1 TABLET (88 MCG TOTAL) BY MOUTH DAILY. 30 tablet 4     metFORMIN (GLUCOPHAGE) 500 MG tablet TAKE HALF OF A TABLET BY MOUTH EVERY MORNING AND TAKE HALF OF A TABLET EVERY EVENING 60 tablet 0     metoprolol succinate (TOPROL-XL) 100 MG 24 hr tablet TAKE 1 1/2 TABLETS BY MOUTH EVERY  tablet 3     nicotine polacrilex (NICORETTE) 4 MG gum APPLY 1 PIECE (4 MG TOTAL) TO THE MOUTH OR THROAT AS NEEDED FOR SMOKING CESSATION AS DIRECTED  0     ONETOUCH ULTRA TEST strips TEST AS DIRECTED 2 TO 3 TIMES DAILY 300 strip 0     spironolactone (ALDACTONE) 25 MG tablet Take 1 tablet (25 mg total) by mouth daily. 90 tablet 0     lisinopril (PRINIVIL,ZESTRIL) 40 MG tablet Take 1 tablet (40 mg total) by mouth daily. 90 tablet 1     No current facility-administered medications for this visit.      Past Medical History:   Diagnosis Date     Disease of thyroid gland      Hypertension      Infectious viral hepatitis      Past Surgical History:   Procedure Laterality Date     HYSTERECTOMY       OOPHORECTOMY       AL REMOVAL GALLBLADDER      Description: Cholecystectomy;  Recorded: 05/10/2010;     AL TOTAL ABDOM HYSTERECTOMY      Description: Hysterectomy;  Recorded: 2009;     Amlodipine and Hydralazine  Family History   Problem Relation Age of Onset     Breast cancer Maternal Aunt      in her 90 s     Cancer Mother 83      of stomach cancer     Colon cancer Father 51      of colon cancer     Colon cancer Brother 36      from colon cancer     Sickle cell trait Niece      Social History     Social History     Marital status:      Spouse name: N/A     Number of children: N/A     Years of education: N/A     Occupational History     Not on file.     Social  "History Main Topics     Smoking status: Former Smoker     Quit date: 3/19/2018     Smokeless tobacco: Never Used     Alcohol use No     Drug use: No     Sexual activity: Yes     Partners: Male     Birth control/ protection: Post-menopausal     Other Topics Concern     Not on file     Social History Narrative       Review of Systems  Review of Systems   Constitutional: Negative.    HENT: Negative.    Respiratory: Negative.    Cardiovascular: Negative.    Gastrointestinal: Negative.    Genitourinary: Negative.              Objective:         Vitals:    06/19/18 1632   BP: 124/60   Pulse: 61   Resp: 17   Temp: 98  F (36.7  C)   TempSrc: Oral   SpO2: 99%   Weight: 147 lb (66.7 kg)   Height: 5' 1.58\" (1.564 m)     Body mass index is 27.26 kg/(m^2).    Physical  Physical Exam     Eyes: EOM full, pupils normal, conjunctivae normal  Ears: TM's and canals normal  Oropharynx: normal  Neck: supple without adenopathy or thyromegaly  Lungs: normal  Heart: regular rhythm, normal rate, no murmur  Abdomen: no HSM, mass or tenderness  Breasts: pt declined  Extremities: FROM, normal strength and sensation    "

## 2021-06-18 NOTE — LETTER
"Letter by Yamila Paniagua MD at      Author: Yamila Paniagua MD Service: -- Author Type: --    Filed:  Encounter Date: 1/7/2019 Status: (Other)       Liz Pollard  [address]  [place] [address]       January 7, 2019     Dear MsFifi Pollard,    Below are the results from your recent visit:    Resulted Orders   Glycosylated Hemoglobin A1c   Result Value Ref Range    Hemoglobin A1c 5.3 3.5 - 6.0 %   Microalbumin, Random Urine   Result Value Ref Range    Microalbumin, Random Urine <0.50 0.00 - 1.99 mg/dL    Creatinine, Urine 78.0 mg/dL    Microalbumin/Creatinine Ratio Random Urine  <=19.9 mg/g      Comment:      \"Unable to calculate: Creatinine and/or Microalbumin value below detectable level\"    Narrative    Microalbumin, Random Urine  <2.0 mg/dL . . . . . . . . Normal  3.0-30.0 mg/dL . . . . . . Microalbuminuria  >30.0 mg/dL . . . . . .  . Clinical Proteinuria    Microalbumin/Creatinine Ratio, Random Urine  <20 mg/g . . . . .. . . . Normal   mg/g . . . . . . . Microalbuminuria  >300 mg/g . . . . . . . . Clinical Proteinuria     Lipid Cascade   Result Value Ref Range    Cholesterol 213 (H) <=199 mg/dL    Triglycerides 129 <=149 mg/dL    HDL Cholesterol 48 (L) >=50 mg/dL    LDL Calculated 139 (H) <=129 mg/dL    Patient Fasting > 8hrs? Yes        Please call with questions or contact us using TxtFeedback.    Sincerely,    Electronically signed by Yamila Paniagua MD       "

## 2021-06-18 NOTE — PATIENT INSTRUCTIONS - HE
"Patient Instructions by Trisha Irene LPN at 5/11/2021  8:20 AM     Author: Trisha Irene LPN Service: -- Author Type: Licensed Nurse    Filed: 5/11/2021  9:38 AM Encounter Date: 5/11/2021 Status: Addendum    : Trisha Irene LPN (Licensed Nurse)    Related Notes: Original Note by Trisha Irene LPN (Licensed Nurse) filed at 5/11/2021  8:58 AM       We are prescribing some compression stockings for you. I have included different suppliers that should help you get measured and fitting to ensure proper fitting socks. You should wear these socks as much as you can. It is especially important to wear them with long periods of sitting/standing, long car rides or if you will be flying. Compression socks should get refilled every 4-6 months. They do not need to be worn at night while in bed.    If you do a lot of standing it is good to do calf raises to help keep the blood pumping. If you sit a lot at work it is good to get up periodically to walk around. Elevation of the foot of your bed 4-6\" helps the blood return back to where it is needed.    We would like you to follow up in 3 months after wearing socks to see how you are doing.    Varicose Veins  Varicose veins are swollen, enlarged veins most often found in the legs. They are usually blue or purple in color and may bulge, twist, and stand out under the skin.  Normally, veins return blood from the body to the heart. The leg veins have one-way valves that prevent blood from flowing backward in the vein. When the valves are weak or damaged, blood backs up in the veins. This may cause some of the veins to swell and bulge and become varicose veins.  Symptoms  Varicose veins may or may not cause symptoms. If symptoms do occur, they can include:    Legs that feel tired, achy, heavy, or itchy    Leg muscle cramps    Skin changes, such as discoloration, dryness, redness, or rash (in more severe cases, you may also have sores on the skin called " venous leg ulcers)  Risk Factors  There are a number of factors that increase the risk for varicose veins. These can include:    Being a woman    Being older    Sitting or standing for long periods    Being overweight    Being pregnant    Having a family history of varicose veins  Treatment begins with simple self-help measures (see below). If these dont help, there are many procedures that can be done to shrink or remove varicose veins. Your healthcare provider can tell you more about these options, if needed.  Home care    Support or compression stockings will likely be prescribed. If so, be sure to wear them as directed. They may help improve blood flow.    Exercising helps strengthen your leg muscles and improve blood flow. To get the most benefit, choose exercises such as walking, swimming, or cycling. Also try to exercise for at least 30 minutes on most days.    Raising (elevating) your legs lets gravity help blood flow back to the heart. Sit or lie with your feet above heart level a few times throughout the day, or as directed.    Avoid long periods of sitting or standing. Change positions often. Also, move your ankles, toes and knees often. This may also help improve blood flow.    If you are overweight, talk with your healthcare provider about setting up a weight-loss plan. Maintaining a healthy weight can help reduce the strain on your veins. It may also improve symptoms, such as swelling and aching.    If you have dryness and itching, ask your provider about special lotions that can be applied to the skin to help improve symptoms.  Follow-up care  Follow up with your healthcare provider, or as directed. If imaging tests were done, youll be told the results and if there are any new findings that affect your care.  When to seek medical advice  Call your healthcare provider right away if any of these occur:    Sudden, severe leg swelling, pain, or redness    Symptoms worsen, or they dont improve with  self-care    Bleeding from any affected veins    Ulcers form on the legs, ankles, or feet    Fever of 100.4 F (38 C) or higher, or as advised by your provider  Understanding Spider and Varicose Veins  Do you often hide your legs because of the way they look? You may have noticed tiny red or blue bursts (spider veins). Or maybe you have veins that bulge or look twisted (varicose veins). If so, there are treatments that can help  What are the symptoms?  Spider veins or varicose veins may never be a problem. But sometimes they can cause legs to ache or swell. Your legs may also feel heavy and tired, or like theyre burning. These symptoms may be more severe at the end of the day. Prolonged sitting or standing can also make your symptoms worse.  Who gets spider and varicose veins?  Anyone can get spider or varicose veins. But vein problems tend to be hereditary (run in families). Other factors that can affect veins include:    Pregnancy, hormones, and birth control pills    A job where you stand or sit a lot    Extra weight or lack of exercise    Age         Spider veins look like tiny webs on the ankles, legs, and upper thighs.       Ropy, dark blue, red, or flesh-colored varicose veins are most common on the thighs, calves, and feet.    What can be done?  Spider and varicose veins can affect the way you feel about yourself. Talk to your healthcare provider about your concerns. There are treatments that can ease symptoms and make your legs look better.  Your treatment choices  Treatment may include self-care, sclerotherapy (injecting veins with a chemical), surgery, or newer nonsurgical minimally invasive therapies. Spider veins and some varicose veins can be treated with sclerotherapy. Large varicose veins can often be treated with newer minimally invasive procedures and, in rare cases, surgery may be needed.     Please call [facility] to schedule an appointment. If you received a prescription  please bring it with you to your appointment. You may call one of the locations below, although some locations are limited to what they carry.    Office Locations  New Locations  [facility]  Home Medical Equipment  [address], [address], [place] [address]  [facility] ([facility])  [address], [place] [address]  Phone [phone] /Fax [phone]    [place]   [address]   Medical Equipment [address]/Orthotics and Prosthetics [address]  [place] [address]   Phone: [phone]  Fax [phone]    [facility]  [address]  [address]  [place] [address]  Phone: [phone]  Fax: [phone]    [facility]   [address]  [place] [address]  Phone: [phone]  Fax: [phone]    [facility]  [address]  [place] [address]  Phone: [phone]  Fax: [phone]    [facility]  [address]  [address]  [place] [address]  Phone: [phone]  Fax: [phone]    [facility]  [address]   [place] [address]   Phone: [phone]  Fax: [phone]    [place]   [address] [place] [address]   Phone: [phone]  Fax: [phone]    [facility]   [address]             [address]                 [address]     [place] [address]      [place] [address]         [place] [address]  [phone]  [phone]     [phone]               Fax: [phone]  [email or web address]                                                [facility]  [address] [place]  [address]  [phone]  [phone]  [email or web address]    Marina Walker  [phone]  [email or web address]   [phone]    [facility]  [address] [place] [address]  [phone]    Home Care Instructions Following Enovenous Closure (venoseal)     Dressing    Wear your compression for 48 hours continuously until your ultrasound after the procedure.  You can remove your stocking to shower in 24 hours but then reapply after.    Wear the stocking for two weeks after the procedure while you are up.     Activity    The day of the procedure make sure to walk around the house for a few minutes each hour until bedtime.    The day after the procedure you should advance activity as tolerated.  NO strenuous activities though for 2 weeks.  In general avoid prolonged standing in place and sitting with your legs down.  Both of these activities will cause blood to pool in your legs resulting in more swelling and discomfort.    Do not drive or operate heavy machinery for 24 hours after the procedure.    Do not take baths or use hot tubs or swimming pools until your incision site is healed.    Do not fly for the Next 3 weeks.     Post Procedure Ultrasound    You will need an ultrasound within 2-3 days following the closure procedure.  Please schedule an ultrasound if you have not already done so.     Post Procedure Signs and Symptoms    There can be a mild amount of bruising and numbness after this procedure.  This will typically take a few weeks to fade.    You may feel pain or tenderness in your inner thigh.  Mild pain medication such as Tylenol or Ibuprofen maybe helpful.    It is normal to have fluid leaking from the injection areas the day of the procedure and it may be blood tinged.     Call if you have    Fever greater than 100.8 degrees F.    Uncontrolled bleeding from the incision site.    Pain that is not relieved by rest or pain medication.    Shortness of breath      Follow -up    Please plan to see your surgeon in the office two weeks after your procedure    Call [phone] to schedule this appointment if one has not already been made     You will receive a phone call from our staff within 48 hours of your procedure.  Please have these instruction near by so that any questions can be addressed at that time.  If you have any concerns before that time, please do not hesitate to call us al [phone] and ask to speak to a nurse.  We want you to have a smooth recovery.     For emergencies after 4:30pm Monday - Friday, holidays and weekends:  For Dr Parag Valdez call [facility] at [phone]  [facility] [phone]                  UNDERSTAND  Varicose veins may be a sign of something more severe-venous reflux disease.  Healthy leg veins have valves that keep blood flowing to the heart. Venous reflux disease develops when the valves stop working properly and allow blood to flow backward (i.e., reflux) and pool in the lower leg veins.   If venous reflux disease is left untreated, symptoms can worsen over time. Your doctor can help you understand if you have this condition.   Superficial venous reflux disease may cause the following signs and symptoms in your legs:  Varicose veins  Aching  Swelling  Cramping  Heaviness or tiredness  Itching  Restlessness  Open skin sores    Treat  Superficial venous reflux disease treatment aims to reduce or stop the backward flow of blood. The following may be prescribed to treat your superficial venous reflux disease. Your doctor can help you decide which treatment is best for you:   Compression stockings   Removing diseased vein   Closing diseased vein (through thermal or non-thermal treatment)       VenaSeal? Closure System  One non-thermal treatment option is the VenaSeal? Closure System, which improves blood flow and relieves symptoms by sealing-or closing-the diseased vein. The system delivers a small amount  of a specially formulated medical adhesive to the diseased vein. The adhesive permanently seals the vein and blood is rerouted through nearby healthy veins.      Demonstrated Outcomes  The VenaSeal? closure system is a safe and effective treatment, providing significant improvements in quality of life.1,2,3  In a [place] study , the VenaSeal? system and thermal radiofrequency ablation treatments had similar clinical results at 3 years; 94.4% closure for the VenaSeal? system and 91.9% for thermal energy.   Side effects were minor and infrequent.   The most common side effect was phlebitis (i.e., inflammation of a vein), and it typically occurred within the first 30 days after the procedure. Phlebitis is a commonly reported side effect in all vein treatments. Phlebitis occurred more frequently in VenaSeal? system-treated subjects than in RFA-treated subjects, though the difference was not statistically significant.   Please see the Potential risks section for more information.    FAQ    What can I expect of the VenaSeal? procedure?  Before the Procedure:  You will have an ultrasound imaging exam of the leg that is to be treated. This exam is important for assessing the diseased superficial vein and planning the procedure.  During the Procedure:  Your doctor can discuss the procedure with you. A brief summary of what to expect is below:  You may feel some minor pain or stinging with a needle stick to numb the site where the doctor will access your vein.  Once the area is numb, your doctor will insert the catheter (i.e., a small hollow tube) into your leg. You may feel some pressure from the placement of the catheter.  The catheter will be placed in specific areas along the diseased vein to deliver small amounts of the medical adhesive. You may feel some mild sensation of pulling. Ultrasound will be used during the procedure to guide and position the catheter.  After treatment, the catheter is removed and a small adhesive  bandage placed over the puncture site.    After the Procedure:  You will be taken to the recovery area to rest.  Your doctor will recommend follow-up care as needed.    Commonly Asked Questions  When will my symptoms improve?  Symptoms are caused by the diseased superficial vein. Thus, symptoms may improve as soon as the diseased vein is closed.  When can I return to normal activity?  The VenaSeal procedure is designed to reduce recovery time. Many patients return to normal activity immediately after the procedure. Your doctor can help you determine when you can return to normal activity.  Is the VenaSeal procedure painful?  Most patients feel little, if any, pain during the outpatient procedure.1  Is there bruising after the VenaSeal? procedure?  Most patients report [id number] bruising after the VenaSeal procedure.1  What happens to the VenaSeal? adhesive?  Only a very small amount of VenaSeal? adhesive is used to close the vein. Your body will naturally create scar tissue around the adhesive over time to keep the vessel permanently closed.  How does the VenaSeal? procedure differ from thermal energy procedures?  The VenaSeal? procedure uses an adhesive to close the superficial vein. Thermal energy procedures use heat to close the vein. The intense heat requires a large volume of numbing medicine, which is injected through many needle sticks. The injections may cause pain and bruising after the procedure.  Is the VenaSeal procedure covered by insurance?  As with any procedure, insurance coverage may vary. For more information, please contact your insurance provider.    The VenaSeal? procedure may not be right for everyone    Your doctor can help you decide if the VenaSeal? procedure is right for you. The VenaSeal? procedure is contraindicated for individuals with any of the following conditions:  Thrombophlebitis migrans (i.e., inflammation of a vein caused by a slow moving blood clot)  Acute superficial  thrombophlebitis (i.e., inflammation of a vein caused by a blood clot)  Previous hypersensitivity reactions to the VenaSeal? adhesive or cyanoacrylates  Acute sepsis (i.e., whole-body inflammation caused by an immune response to an infection)    Potential risks  The VenaSeal? procedure is minimally invasive and catheter-based. As such, it may involve the following risks. Your doctor can help you understand these risks.  Allergic reaction to the VenaSeal? adhesive  Arteriovenous fistula (i.e., an abnormal connection between an artery and a vein)  Bleeding from the access site  Deep vein thrombosis (i.e., blood clot in the deep vein system)  Edema (i.e., swelling) in the treated leg  Embolization (i.e., blockage of a vein or artery), including pulmonary embolism (i.e., blockage of an artery in the lungs)  Hematoma (i.e., the collection of blood outside of a vessel)  Hyperpigmentation (i.e., darkening of the skin)  Infection at the access site  Non-specific mild inflammation of the cutaneous and subcutaneous tissue  Pain  Paresthesia (i.e., a feeling of tingling, pricking, numbness or burning)  Phlebitis (i.e., inflammation of a vein)  Superficial thrombophlebitis (i.e., inflammation of a vein caused by a blood clot)  Urticaria (i.e., hives) or ulceration may occur at the site of injection  Vascular rupture and perforation  Visible scarring

## 2021-06-18 NOTE — PATIENT INSTRUCTIONS - HE
Patient Instructions by Yamila Paniagua MD at 9/29/2020  1:40 PM     Author: Yamila Paniagua MD Service: -- Author Type: Physician    Filed: 9/29/2020  1:57 PM Encounter Date: 9/29/2020 Status: Signed    : Yamila Paniagua MD (Physician)         Patient Education   Signs of Hearing Loss  Hearing loss is a problem shared by many people. In fact, it is one of the most common health conditions, particularly as people age. Most people over age 65 have some hearing loss, and by age 80, almost everyone does. Because hearing loss usually occurs slowly over the years, you may not realize your hearing ability has gotten worse.       Have your hearing checked  Contact your [facility] care provider if you:    Have to strain to hear normal conversation.    Have to watch other peoples faces very carefully to follow what theyre saying.    Need to ask people to repeat what theyve said.    Often misunderstand what people are saying.    Turn the volume of the television or radio up so high that others complain.    Feel that people are mumbling when theyre talking to you.    Find that the effort to hear leaves you feeling tired and irritated.    Notice, when using the phone, that you hear better with 1 ear than the other.    2671-0111 [employer]. [address] [place] [address], [place] [address]. All rights reserved. This information is not intended as a substitute for professional medical care. Always follow your healthcare professional's instructions.         Patient Education   Personalized Prevention Plan  You are due for the preventive services outlined below.  Your care team is available to assist you in scheduling these services.  If you have already completed any of these items, please share that information with your care team to update in your medical record.  Health Maintenance   Topic Date Due   ? HEPATITIS C SCREENING  1950   ? DIABETIC FOOT EXAM  1950   ? ADVANCE CARE PLANNING   12/12/1970   ? HEPATITIS B VACCINES (1 of 3 - Risk 3-dose series) 12/12/1971   ? ZOSTER VACCINES (1 of 2) 12/12/2002   ? PNEUMOCOCCAL IMMUNIZATION 65+ HIGH/HIGHEST RISK (2 of 2 - PPSV23) 11/12/2018   ? MEDICARE ANNUAL WELLNESS VISIT  06/19/2019   ? FALL RISK ASSESSMENT  07/11/2020   ? A1C  07/21/2020   ? INFLUENZA VACCINE RULE BASED (1) 08/01/2020   ? DIABETIC EYE EXAM  09/09/2020   ? BMP  01/21/2021   ? LIPID  01/21/2021   ? DXA SCAN  02/11/2022   ? MAMMOGRAM  08/10/2022   ? COLORECTAL CANCER SCREENING  07/20/2023   ? TD 18+ HE  07/11/2029   ? MICROALBUMIN  Discontinued

## 2021-06-19 NOTE — PROGRESS NOTES
HPI - 68 yo female here for f/u.     DM  A1c 5.5 on 9/2016 - on metformin 500mg q am -> 5.2 on 9/21/17  Microalbuminuria improved but better on lisinopril  And wnl 9/21/17 and followed renal doctor  BP wnl  On ASA  Smoker intermittetly - trying nicorette gum  Eye exam 8/25/17 - scheduled for 8/2018     HTN - on HCTZ and lisinopril and trorol XL     HCV - s/p tx. Last seen by HCV 10/2015     CKD stage 3 with Chronic proteinuria seen by renal doc 12/2017  Elevated CR - 1.08 on 11/25/16 -> 1.45 on 6/5/17 -> 1.44 on 9/21/17  Advised to avoid advil/ibuprofen     vitamin D deficiency  -   Last 19.7 on 8/2016 on ergo -> 58.7 on 9/21/17     Hypothyroidism -   Last TSH 0.27 on 9/21/17 and levothyroxine decreased from 100mcg -> 88mcg in Oct 2017    Osteoporosis -   DEXA 4/2017 =OSTEOPOROSIS and elevated fracture risk  Medication recommended   Alendronate in the past caused jaw pain  Walking in 10min intervals 3-5 x per day - trying to get 12382 steps    Obesity - losing weight with with weight watchers  Weight 176# on 92016 -> 144# today (down 32#) - BMI 27.3   Walking in 10min intervals 3-5 x per day - trying to get 86611 steps    Recent ear canal injury - 5/16/18  Seen at urgent care given rx dicloxacillin     Colonoscopy 7/20/18  mammo 5/2018     Social -  working 6 days per week    Current Outpatient Prescriptions   Medication Sig Note     aspirin 81 MG EC tablet Take 81 mg by mouth as needed for pain.      ergocalciferol (ERGOCALCIFEROL) 50,000 unit capsule TAKE 1 CAPSULE (50,000 UNITS TOTAL) BY MOUTH ONCE A WEEK.      hydroCHLOROthiazide (HYDRODIURIL) 25 MG tablet TAKE 1 TABLET(25 MG) BY MOUTH DAILY      levothyroxine (SYNTHROID, LEVOTHROID) 88 MCG tablet TAKE 1 TABLET (88 MCG TOTAL) BY MOUTH DAILY.      lisinopril (PRINIVIL,ZESTRIL) 40 MG tablet Take 1 tablet (40 mg total) by mouth daily.      metFORMIN (GLUCOPHAGE) 500 MG tablet TAKE HALF OF A TABLET BY MOUTH EVERY MORNING AND TAKE HALF OF A TABLET EVERY EVENING       "metoprolol succinate (TOPROL-XL) 100 MG 24 hr tablet TAKE 1 1/2 TABLETS BY MOUTH EVERY DAY      spironolactone (ALDACTONE) 25 MG tablet Take 1 tablet (25 mg total) by mouth daily.      ferrous sulfate 325 (65 FE) MG tablet Take 325 mg by mouth 2 times a day at 6:00 am and 4:00 pm.      nicotine polacrilex (NICORETTE) 4 MG gum APPLY 1 PIECE (4 MG TOTAL) TO THE MOUTH OR THROAT AS NEEDED FOR SMOKING CESSATION AS DIRECTED 9/21/2017: Received from: External Pharmacy     [employer] TEST strips TEST AS DIRECTED 2 TO 3 TIMES DAILY      Vitals:    07/02/18 1347 07/02/18 1430   BP: (!) 86/48 128/60   Patient Site: Right Arm    Patient Position: Sitting    Cuff Size: Adult Regular    Pulse: 62    Resp: 20    Temp: 98.2  F (36.8  C)    TempSrc: Oral    SpO2: 97%    Weight: 144 lb 5 oz (65.5 kg)    Height: 5' 1\" (1.549 m)        PHYSICAL EXAM   General Appearance: Awake and alert, in no acute distress  HEENT: neck is supple  CV: regular rate  Resp: No respiratory distress. Breathing comfortably  Musculoskeletal: moving limbs comfortably with not deficits or deformities  Skin: no rashes noted    A/P  DM  A1c 5.5 on 9/2016 - on metformin 500mg once daily  -> 5.2 on 9/21/17 - recheck   Microalbuminuria improved but better on lisinopril  And wnl 9/21/17 and followed renal doctor  BP wnl  On ASA  Smoker intermittetly - trying nicorette gum  Eye exam 8/25/17     HTN - on HCTZ and lisinopril and trorol XL  Check BMP     HCV - s/p tx. Last seen by HCV 10/2015     CKD stage 3 with Chronic proteinuria seen by renal doc 12/2017  Elevated CR - 1.08 on 11/25/16 -> 1.45 on 6/5/17 -> 1.44 on 9/21/17  Advised to avoid advil/ibuprofen  Check CMP     vitamin D deficiency  -   Last 19.7 on 8/2016 on ergo -> 58.7 on 9/21/17  Check today, if normal then will stop ergo     Hypothyroidism -   Last TSH 0.27 on 9/21/17 and levothyroxine decreased from 100mcg -> 88mcg in Oct 2017  Check TSH    Osteoporosis -   DEXA 4/2017 =OSTEOPOROSIS and elevated " fracture risk  Medication recommended   Alendronate in the past caused jaw pain  Walking in 10min intervals 3-5 x per day - trying to get 22217 steps    Obesity - losing weight with with weight watchers  Weight 176# on 92016 -> 144# today (down 32#) - BMI 27.3   Walking in 10min intervals 3-5 x per day - trying to get 67228 steps    Recent ear canal injury - 5/16/18  Seen at urgent care given rx dicloxacillin   Care Everywhere authorization done

## 2021-06-20 NOTE — LETTER
Letter by Yamila Paniagua MD at      Author: Yamila Paniagua MD Service: -- Author Type: --    Filed:  Encounter Date: 10/1/2020 Status: (Other)         Liz Pollard  [address]  [place] [address]             October 1, 2020         Dear Ms. Pollard,    Below are the results from your recent visit:    Resulted Orders   Thyroid Stimulating Hormone (TSH)   Result Value Ref Range    TSH 3.13 0.30 - 5.00 uIU/mL   Comprehensive Metabolic Panel   Result Value Ref Range    Sodium 143 136 - 145 mmol/L    Potassium 4.2 3.5 - 5.0 mmol/L    Chloride 109 (H) 98 - 107 mmol/L    CO2 25 22 - 31 mmol/L    Anion Gap, Calculation 9 5 - 18 mmol/L    Glucose 96 70 - 125 mg/dL    BUN 30 (H) 8 - 22 mg/dL    Creatinine 1.37 (H) 0.60 - 1.10 mg/dL    GFR MDRD Af Amer 47 (L) >60 mL/min/1.73m2    GFR MDRD Non Af Amer 38 (L) >60 mL/min/1.73m2    Bilirubin, Total 0.5 0.0 - 1.0 mg/dL    Calcium 9.3 8.5 - 10.5 mg/dL    Protein, Total 7.7 6.0 - 8.0 g/dL    Albumin 4.5 3.5 - 5.0 g/dL    Alkaline Phosphatase 64 45 - 120 U/L    AST 28 0 - 40 U/L    ALT 30 0 - 45 U/L    Narrative    Fasting Glucose reference range is 70-99 mg/dL per  American Diabetes Association (ADA) guidelines.   Glycosylated Hemoglobin A1c   Result Value Ref Range    Hemoglobin A1c 5.3 <=5.6 %      Comment:      Normal <5.7% Prediabete 5.7-6.4% Diabletes 6.5% or higher - adopted from ADA consensus guidelines   Vitamin D, Total (25-Hydroxy)   Result Value Ref Range    Vitamin D, Total (25-Hydroxy) 89.4 (H) 30.0 - 80.0 ng/mL    Narrative    Deficiency <10.0 ng/mL  Insufficiency 10.0-29.9 ng/mL  Sufficiency 30.0-80.0 ng/mL  Toxicity (possible) >100.0 ng/mL   Lipid [place] FASTING   Result Value Ref Range    Cholesterol 110 <=199 mg/dL    Triglycerides 51 <=149 mg/dL    HDL Cholesterol 38 (L) >=50 mg/dL    LDL Calculated 62 <=129 mg/dL    Patient Fasting > 8hrs? Yes    Microalbumin, Random Urine   Result Value Ref Range    Microalbumin, Random Urine 1.10  0.00 - 1.99 mg/dL    Creatinine, Urine 192.4 mg/dL    Microalbumin/Creatinine Ratio Random Urine 5.7 <=19.9 mg/g    Narrative    Microalbumin, Random Urine  <2.0 mg/dL . . . . . . . . Normal  3.0-30.0 mg/dL . . . . . . Microalbuminuria  >30.0 mg/dL . . . . . .  . Clinical Proteinuria    Microalbumin/Creatinine Ratio, Random Urine  <20 mg/g . . . . .. . . . Normal   mg/g . . . . . . . Microalbuminuria  >300 mg/g . . . . . . . . Clinical Proteinuria           Your vitamin D is high.  You can stop your weekly vitamin D.  I ordered a low-dose daily maintenance vitamin D for your winter.    Please call with questions or contact us using [employer].    Sincerely,        Electronically signed by Yamila Paniagua MD

## 2021-06-20 NOTE — PROGRESS NOTES
HPI - 68 yo female with right flank/RUQ pain.     Pain is constant and at times worse at night.   Taking tylenol and advil daily.   Pain worse with running/jarring and twisting.  She has periodic hematuria.      HTN - on HCTZ and lisinopril and trorol XL      HCV - s/p tx. Last seen by HCV 10/2015      CKD stage 3 with Chronic proteinuria seen by renal doc 12/2017  Elevated CR - 1.08 on 11/25/16 -> 1.45 on 6/5/17 -> 1.44 on 9/21/17-> 1.39  Advised to avoid advil/ibuprofen      vitamin D deficiency  -   Last 19.7 on 8/2016 on ergo -> 58.7 on 9/21/17-> 54.7 on 7/2/18      Hypothyroidism -   Last TSH 0.27 on 9/21/17 and levothyroxine decreased from 100mcg -> 88mcg in Oct 2017 -> 2.19 on 7/2/18    Current Outpatient Prescriptions   Medication Sig Note     aspirin 81 MG EC tablet Take 81 mg by mouth as needed for pain.      ergocalciferol (ERGOCALCIFEROL) 50,000 unit capsule TAKE 1 CAPSULE (50,000 UNITS TOTAL) BY MOUTH ONCE A WEEK.      ferrous sulfate 325 (65 FE) MG tablet Take 325 mg by mouth 2 times a day at 6:00 am and 4:00 pm.      hydroCHLOROthiazide (HYDRODIURIL) 25 MG tablet TAKE 1 TABLET(25 MG) BY MOUTH DAILY      levothyroxine (SYNTHROID, LEVOTHROID) 88 MCG tablet TAKE 1 TABLET (88 MCG TOTAL) BY MOUTH DAILY.      lisinopril (PRINIVIL,ZESTRIL) 40 MG tablet Take 1 tablet (40 mg total) by mouth daily.      metFORMIN (GLUCOPHAGE) 500 MG tablet TAKE HALF OF A TABLET BY MOUTH EVERY MORNING AND TAKE HALF OF A TABLET EVERY EVENING      metoprolol succinate (TOPROL-XL) 100 MG 24 hr tablet TAKE 1 1/2 TABLETS BY MOUTH EVERY DAY      nicotine polacrilex (NICORETTE) 4 MG gum APPLY 1 PIECE (4 MG TOTAL) TO THE MOUTH OR THROAT AS NEEDED FOR SMOKING CESSATION AS DIRECTED 9/21/2017: Received from: External Pharmacy     [employer] TEST strips TEST AS DIRECTED 2 TO 3 TIMES DAILY      spironolactone (ALDACTONE) 25 MG tablet TAKE 1 TABLET BY MOUTH EVERY DAY      Vitals:    09/17/18 1549   BP: 100/50   Pulse: 64   Resp: 12  "  Temp: 97.9  F (36.6  C)   TempSrc: Oral   SpO2: 98%   Weight: 147 lb 11.2 oz (67 kg)   Height: 5' 1\" (1.549 m)     OBJECTIVE:  Vitals listed above within normal limits  General appearance: well groomed, pleasant, well hydrated, nontoxic appearing  ENT: PERRL, throat clear  Neck: neck supple, no lymphadenopathy, no thyromegaly  Lungs: lungs clear to auscultation bilaterally, no wheezes or rhonchi  Heart: regular rate and rhythm, no murmurs, rubs or gallops  Abdomen: soft, nontender  Neuro: no focal deficits, CN II-XII grossly intact, alert and oriented  Psych:  mood stable, appears to have good insight and judgment    A/P  Right flank/RUQ pain   Given h/o HCV - s/p tx. Last seen by HCV 10/2015  Will check CMP, UA to r/o hematuria, chest/abdo/pelvic CT    Vaccine - needs flu and PCV13          CKD stage 3 with Chronic proteinuria seen by renal doc 12/2017  Elevated CR - 1.08 on 11/25/16 -> 1.45 on 6/5/17 -> 1.44 on 9/21/17-> 1.39  Advised to avoid advil/ibuprofen  Check CMP      vitamin D deficiency  -   Last 19.7 on 8/2016 on ergo -> 58.7 on 9/21/17-> 54.7 on 7/2/18      Hypothyroidism -   Last TSH  2.19 on 7/2/18    Orders Placed This Encounter   Procedures     CT Abdomen Without Oral With and Without IV Contrast     Influenza High Dose, Seasonal 65+ yrs     Pneumococcal conjugate vaccine 13-valent 6wks-17yrs; >50yrs     Comprehensive Metabolic Panel     Urinalysis-UC if Indicated       "

## 2021-06-20 NOTE — LETTER
Letter by Yamila Paniagua MD at      Author: Yamila Paniagua MD Service: -- Author Type: --    Filed:  Encounter Date: 10/1/2020 Status: (Other)         Liz Pollard  [address]  [place] [address]             October 1, 2020         Dear MsFifi Pollard,    Below are the results from your recent visit:    Resulted Orders   US Abdomen Limited    Narrative    EXAM: US ABDOMEN LIMITED  LOCATION: [facility]  DATE/TIME: 9/30/2020 9:04 AM    INDICATION: h/o cirrhosis from hep C, HCC screening  COMPARISON: CT 09/21/2018. Ultrasound 07/28/2015.  TECHNIQUE: Limited abdominal ultrasound.    FINDINGS:    GALLBLADDER: Surgically removed    BILE DUCTS: No biliary dilatation. The common duct measures 3 mm.    LIVER: Coarsened echotexture, suggesting underlying fibrosis. Smooth contour. No significant focal hepatic lesion. 2 cm simple cyst segment 2. Slightly ill-defined 4 mm hyperechoic region segment 3 likely slight nodularity from fibrosis.    RIGHT KIDNEY: No hydronephrosis.    PANCREAS: The visualized portions are normal.    No ascites.      Impression    1.  Coarsened hepatic parenchymal echotexture likely due to underlying fibrosis.    2.  No significant focal liver lesion.         Please call with questions or contact us using [employer].    Sincerely,        Electronically signed by Yamila Paniagua MD

## 2021-06-20 NOTE — LETTER
Letter by Yamila Paniagua MD at      Author: Yamila Paniagua MD Service: -- Author Type: --    Filed:  Encounter Date: 10/1/2020 Status: (Other)         Liz Pollard  [address]  [place] [address]             October 1, 2020         Dear Ms. Pollard,    Below are the results from your recent visit:    Resulted Orders   Thyroid Stimulating Hormone (TSH)   Result Value Ref Range    TSH 3.13 0.30 - 5.00 uIU/mL   Comprehensive Metabolic Panel   Result Value Ref Range    Sodium 143 136 - 145 mmol/L    Potassium 4.2 3.5 - 5.0 mmol/L    Chloride 109 (H) 98 - 107 mmol/L    CO2 25 22 - 31 mmol/L    Anion Gap, Calculation 9 5 - 18 mmol/L    Glucose 96 70 - 125 mg/dL    BUN 30 (H) 8 - 22 mg/dL    Creatinine 1.37 (H) 0.60 - 1.10 mg/dL    GFR MDRD Af Amer 47 (L) >60 mL/min/1.73m2    GFR MDRD Non Af Amer 38 (L) >60 mL/min/1.73m2    Bilirubin, Total 0.5 0.0 - 1.0 mg/dL    Calcium 9.3 8.5 - 10.5 mg/dL    Protein, Total 7.7 6.0 - 8.0 g/dL    Albumin 4.5 3.5 - 5.0 g/dL    Alkaline Phosphatase 64 45 - 120 U/L    AST 28 0 - 40 U/L    ALT 30 0 - 45 U/L    Narrative    Fasting Glucose reference range is 70-99 mg/dL per  American Diabetes Association (ADA) guidelines.   Glycosylated Hemoglobin A1c   Result Value Ref Range    Hemoglobin A1c 5.3 <=5.6 %      Comment:      Normal <5.7% Prediabete 5.7-6.4% Diabletes 6.5% or higher - adopted from ADA consensus guidelines   Lipid [place] FASTING   Result Value Ref Range    Cholesterol 110 <=199 mg/dL    Triglycerides 51 <=149 mg/dL    HDL Cholesterol 38 (L) >=50 mg/dL    LDL Calculated 62 <=129 mg/dL    Patient Fasting > 8hrs? Yes    Microalbumin, Random Urine   Result Value Ref Range    Microalbumin, Random Urine 1.10 0.00 - 1.99 mg/dL    Creatinine, Urine 192.4 mg/dL    Microalbumin/Creatinine Ratio Random Urine 5.7 <=19.9 mg/g    Narrative    Microalbumin, Random Urine  <2.0 mg/dL . . . . . . . . Normal  3.0-30.0 mg/dL . . . . . . Microalbuminuria  >30.0 mg/dL . .  . . . .  . Clinical Proteinuria    Microalbumin/Creatinine Ratio, Random Urine  <20 mg/g . . . . .. . . . Normal   mg/g . . . . . . . Microalbuminuria  >300 mg/g . . . . . . . . Clinical Proteinuria               Please call with questions or contact us using [employer].    Sincerely,        Electronically signed by Yamila Paniagua MD

## 2021-06-20 NOTE — PROGRESS NOTES
HPI - 66yo female here to f/u on right flank/side pain.    Last apt on 9/17/18  Right flank/RUQ pain    Given h/o HCV - s/p tx. Last seen for HCV 10/2015  UA positve and Ucx positive and tx'd with Keflex which she completed a few days ago    9/21/18  chest/abdo/pelvic CT =   1.  Benign hepatic cyst.  2.  No abnormalities to explain the patient's hematuria.     Today reports her side/flank pain has improved but still present. She has not needed pain meds since she started the antibiotics.   It's an annoying constant pain that impact sleep and unable to sleep on right side but pain is not severe.  She has a worries about cancer b/c her mom had stomach cancer that was rapid and brother had colon cancer and she she is burping what tastes like bile.   Colonoscopy normal with 1 benign polyp 7/20/18  H/o hiatal hernia    Current Outpatient Prescriptions   Medication Sig Note     aspirin 81 MG EC tablet Take 81 mg by mouth as needed for pain.      ergocalciferol (ERGOCALCIFEROL) 50,000 unit capsule TAKE 1 CAPSULE (50,000 UNITS TOTAL) BY MOUTH ONCE A WEEK.      ferrous sulfate 325 (65 FE) MG tablet Take 325 mg by mouth 2 times a day at 6:00 am and 4:00 pm.      hydroCHLOROthiazide (HYDRODIURIL) 25 MG tablet TAKE 1 TABLET(25 MG) BY MOUTH DAILY      levothyroxine (SYNTHROID, LEVOTHROID) 88 MCG tablet TAKE 1 TABLET (88 MCG TOTAL) BY MOUTH DAILY.      lisinopril (PRINIVIL,ZESTRIL) 40 MG tablet Take 1 tablet (40 mg total) by mouth daily.      metFORMIN (GLUCOPHAGE) 500 MG tablet TAKE HALF OF A TABLET BY MOUTH EVERY MORNING AND TAKE HALF OF A TABLET EVERY EVENING      metoprolol succinate (TOPROL-XL) 100 MG 24 hr tablet TAKE 1.5 TABLETS BY MOUTH DAILY      nicotine polacrilex (NICORETTE) 4 MG gum APPLY 1 PIECE (4 MG TOTAL) TO THE MOUTH OR THROAT AS NEEDED FOR SMOKING CESSATION AS DIRECTED 9/21/2017: Received from: External Pharmacy     [employer] TEST strips TEST AS DIRECTED 2 TO 3 TIMES DAILY      spironolactone (ALDACTONE)  "25 MG tablet TAKE 1 TABLET BY MOUTH EVERY DAY      Vitals:    10/01/18 1343   BP: 120/58   Pulse: 64   Resp: 16   Temp: 97.6  F (36.4  C)   TempSrc: Oral   Height: 5' 1\" (1.549 m)     PHYSICAL EXAM   General Appearance: Awake and alert, in no acute distress  HEENT: neck is supple  CV: regular rate  Resp: No respiratory distress. Breathing comfortably  Musculoskeletal: moving limbs comfortably with not deficits or deformities  Skin: no rashes noted    A/P  Persistent RUQ/right flank pain   Just completed antibiotics for UTI and will recheck UA since pain improved but not resolved  abdo CT scan relatively normal except hepatic cyst  Given h/o HCV and FMH of stomach cancer will check EGD       "

## 2021-06-20 NOTE — LETTER
Letter by Yamila Paniagua MD at      Author: Yamila Paniagua MD Service: -- Author Type: --    Filed:  Encounter Date: 1/27/2020 Status: (Other)         Liz Pollard  [address]  [place] [address]             January 27, 2020         Dear Ms. Pollard,    Below are the results from your recent visit:    Resulted Orders   Thyroid Stimulating Hormone (TSH)   Result Value Ref Range    TSH 2.37 0.30 - 5.00 uIU/mL   Comprehensive Metabolic Panel   Result Value Ref Range    Sodium 141 136 - 145 mmol/L    Potassium 4.5 3.5 - 5.0 mmol/L    Chloride 106 98 - 107 mmol/L    CO2 27 22 - 31 mmol/L    Anion Gap, Calculation 8 5 - 18 mmol/L    Glucose 85 70 - 125 mg/dL    BUN 26 (H) 8 - 22 mg/dL    Creatinine 1.37 (H) 0.60 - 1.10 mg/dL    GFR MDRD Af Amer 47 (L) >60 mL/min/1.73m2    GFR MDRD Non Af Amer 38 (L) >60 mL/min/1.73m2    Bilirubin, Total 0.5 0.0 - 1.0 mg/dL    Calcium 9.4 8.5 - 10.5 mg/dL    Protein, Total 7.6 6.0 - 8.0 g/dL    Albumin 4.2 3.5 - 5.0 g/dL    Alkaline Phosphatase 59 45 - 120 U/L    AST 27 0 - 40 U/L    ALT 21 0 - 45 U/L    Narrative    Fasting Glucose reference range is 70-99 mg/dL per  American Diabetes Association (ADA) guidelines.   Glycosylated Hemoglobin A1c   Result Value Ref Range    Hemoglobin A1c 5.3 3.5 - 6.0 %   Lipid Cascade   Result Value Ref Range    Cholesterol 211 (H) <=199 mg/dL    Triglycerides 71 <=149 mg/dL    HDL Cholesterol 48 (L) >=50 mg/dL    LDL Calculated 149 (H) <=129 mg/dL    Patient Fasting > 8hrs? Yes        Continue current dose of levothyroxine 75mcg.   Increase your lipitor/atorvastatin  to 40 mg - new prescription was sent to your pharmacy.     Please call with questions or contact us using [employer].    Sincerely,        Electronically signed by Yamila Paniagua MD

## 2021-06-22 NOTE — PROGRESS NOTES
HPI - 67 yo female here for DM check.     Right flank/RUQ pain    The pain is worse with exercising, walking, lifting  --Given h/o HCV - s/p tx. Last seen for HCV 10/2018  --Colonoscopy normal with 1 benign polyp 7/20/18  H/o hiatal hernia   --9/21/18  chest/abdo/pelvic CT =   1.  Benign hepatic cyst.  2.  No abnormalities to explain the patient's hematuria.  --EGD 10/23/18  Gastritis w/o bleeding, normal gastric mucosa and neg H pylori  ---> scheduled for acupressure (already tried chiropractor)    HTN - on HCTZ and lisinopril and trorol XL      CKD stage 3 with Chronic proteinuria seen by renal doc 12/2017  Elevated CR - 1.08 on 11/25/16 -> 1.45 on 6/5/17 -> 1.44 on 9/21/17-> 1.39 on 9/17/18  Advised to avoid advil/ibuprofen      vitamin D deficiency  -   Last 19.7 on 8/2016 on ergo -> 58.7 on 9/21/17-> 54.7 on 7/2/18 -> 43.9 on 9/17/18      Hypothyroidism -   Last TSH 0.27 on 9/21/17 and levothyroxine decreased from 100mcg -> 88mcg in Oct 2017 -> 2.19 on 7/2/18    DM T2  A1c 5.3 on 9/17/18 - on metformin  BP wnl  microalbumin wnl 9/2017 - on lisinopril  ASA   on 9/21/17  Smokes - 3  Per week when she drinks wine    Overweight -   BMI 29.1 (discussed BMI 20-25 is healthiest)  Discussed weight watchers and exercise      Current Outpatient Medications   Medication Sig Note     ferrous sulfate 325 (65 FE) MG tablet Take 325 mg by mouth 2 times a day at 6:00 am and 4:00 pm.      hydroCHLOROthiazide (HYDRODIURIL) 25 MG tablet TAKE 1 TABLET BY MOUTH EVERY DAY      levothyroxine (SYNTHROID, LEVOTHROID) 88 MCG tablet TAKE 1 TABLET (88 MCG TOTAL) BY MOUTH DAILY.      lisinopril (PRINIVIL,ZESTRIL) 40 MG tablet Take 1 tablet (40 mg total) by mouth daily.      metFORMIN (GLUCOPHAGE) 500 MG tablet TAKE HALF OF A TABLET BY MOUTH EVERY MORNING AND TAKE HALF OF A TABLET EVERY EVENING      metoprolol succinate (TOPROL-XL) 100 MG 24 hr tablet TAKE 1.5 TABLETS BY MOUTH DAILY      spironolactone (ALDACTONE) 25 MG tablet TAKE 1  "TABLET BY MOUTH EVERY DAY      aspirin 81 MG EC tablet Take 81 mg by mouth as needed for pain.      ergocalciferol (ERGOCALCIFEROL) 50,000 unit capsule TAKE 1 CAPSULE (50,000 UNITS TOTAL) BY MOUTH ONCE A WEEK.      nicotine polacrilex (NICORETTE) 4 MG gum APPLY 1 PIECE (4 MG TOTAL) TO THE MOUTH OR THROAT AS NEEDED FOR SMOKING CESSATION AS DIRECTED 9/21/2017: Received from: External Pharmacy     [employer] TEST strips TEST AS DIRECTED 2 TO 3 TIMES DAILY      Vitals:    01/03/19 1252   BP: 114/50   Pulse: 64   Resp: 12   Temp: 98  F (36.7  C)   TempSrc: Oral   SpO2: 98%   Weight: 153 lb 14.4 oz (69.8 kg)   Height: 5' 1\" (1.549 m)     PHYSICAL EXAM   General Appearance: Awake and alert, in no acute distress  HEENT: neck is supple  CV: regular rate  Resp: No respiratory distress. Breathing comfortably  Musculoskeletal: moving limbs comfortably with not deficits or deformities  Skin: no rashes noted    A/P  Right flank/RUQ pain    The pain is worse with exercising, walking, lifting = possibly musculoskeletal pain given how she sits and twists at work and h/o scoliosis  ---> scheduled for acupressure   Discussed PT     HTN - on HCTZ and lisinopril and trorol XL      CKD stage 3 with Chronic proteinuria seen by renal doc 12/2017  Elevated CR - 1.08 on 11/25/16 -> 1.45 on 6/5/17 -> 1.44 on 9/21/17-> 1.39 on 9/17/18  Advised to avoid advil/ibuprofen      vitamin D deficiency  -   Last 19.7 on 8/2016 on ergo -> 58.7 on 9/21/17-> 54.7 on 7/2/18 -> 43.9 on 9/17/18      Hypothyroidism -   Last TSH 2.19 on 7/2/18    DM T2  A1c 5.3 on 9/17/18 - on metformin  BP wnl  microalbumin wnl 9/2017 - on lisinopril  ASA   on 9/21/17  Smokes - 3  Per week when she drinks wine  Check A1c, NICOLÁS, lipids    Overweight -   BMI 29.1 (discussed BMI 20-25 is healthiest)  Discussed weight watchers and exercise      Spent 25 min face to face with patient with more the 50% spent in counseling, reviewing chart, and coordination of care and " discussing problems listed above.

## 2021-06-26 ENCOUNTER — HEALTH MAINTENANCE LETTER (OUTPATIENT)
Age: 71
End: 2021-06-26

## 2021-06-29 NOTE — PROGRESS NOTES
Progress Notes by Altagracia Kyle, Diabetes Ed at 6/10/2020  9:00 AM     Author: Altagracia Kyle, Diabetes Ed Service: -- Author Type: Diabetes Ed    Filed: 6/10/2020  2:08 PM Encounter Date: 6/10/2020 Status: Attested    : Altagracia Kyle, Diabetes Ed (Diabetes Ed) Cosigner: Yamila Paniagua MD at 6/11/2020  8:33 AM    Attestation signed by Yamila Paniagua MD at 6/11/2020  8:33 AM    Reviewed and agree with plan.   Dr. Yamila Paniagua  6/11/2020                  Convert to telephone  Diabetes Educator has received verbal consent for a telephone visit for the patient.      Assessment: virtual visit via telephone  Diabetes management  f/u.   Very well controlled diabetes with lifestyle and 500 mg of metformin  A1C:5.3 (1/21)     Has been trying to quit smoking x sometime now. States, it has been challenging due to being home bound due to the current situation. Will contact the quit smoking program.  is an ex smoker and wants her to quit. We discussed some other resources available to her as well.       Taking metformin, 500 mg in the am with BF. Has been taking the whole 500 mg tablet in the am Vs splitting it into two with no SE     - SMBG:  Has a new (Contour Next) Meter  Tests 1x/d  FBS range 80s-90s  States her BS hardly ever go above 100  Lows: none, reports no s/s since our last visit in Feb 2020  Always carries glucose tablets with her   We reviewed sx and tx of hypo and hyperglycemia: pt verbalized understanding     - has had great success with losing wt (has lost ~ 15-20 lbs) but has hit a plateau now. Current wt:147 (home scale)   Is a lifetime member of [employer] - watches her intake very closely     Food recall:  BF: toast, apple  L: frozen dinner (roast turkey with green beans, sweet potatoes)  D: roast chicken, salad, watermelon      - has recently bought an exercise bike and uses that almost daily. Also does regular stretches/jogs in place/goes up and down the stairs to keep active.     Plan:    Would recommend a trial of weaning pt off of metformin.  She has maintained a very healthy lifestyle despite the current situation/COVID-19 restrictions and has also lost a significant amount of wt. Although we don't have a more recent A1C, all her daily BS have been at goal. She seldom has BS readings above 100, with most readings ranging 80s-90s.   She is scheduled to f/u with Dr. Paniagua on 7/21. Glycemic control and the need for med adjustment can be reassessed then.   In the mean time, to continue with the current therapy and DM management plan - regular monitoring of BG, daily physical activity, eating healthy & watching portions of carbs  Also recommend A1C draw at the next in - person visit.  F/u  in ~ 3 months (or sooner if concerns): phone number provided  +Dr. Paniagua on board with the above plan. Pt was called and notified and the med list was updated.    Subjective and Objective:      Liz Pollard is referred by Dr. Paniagua for Diabetes Education.     Lab Results   Component Value Date    HGBA1C 5.3 01/21/2020         Education:     Monitoring   Meter (per above goals): Assessed and Discussed  Monitoring: Assessed and Discussed  BG goals: Assessed and Discussed    Nutrition Management  Nutrition Management: Assessed and Discussed  Weight: Assessed and Discussed  Portions/Balance: Assessed  Carb ID/Count: Assessed and Discussed  Label Reading: Assessed and Discussed  Heart Healthy Fats: Not addressed  Menu Planning: Assessed  Dining Out: Assessed  Physical Activity: Assessed and Discussed  Medications: Assessed and Discussed  Orals: Assessed    Diabetes Disease Process: Assessed    Acute Complications: Prevent, Detect, Treat:  Hypoglycemia: Assessed and Discussed  Hyperglycemia: Assessed  Sick Days: Assessed  Driving: Assessed    Chronic Complications  Foot Care:Assessed  Skin Care: Assessed  Eye: Assessed  ABC: Assessed  Teeth:Assessed  Goal Setting and Problem Solving: Assessed  Barriers:  Assessed and Discussed  Psychosocial Adjustments: Assessed      Time spent with the patient: 60 minutes for diabetes education and counseling.   Previous Education: yes  Visit Type:KAYLEE Kyle  6/10/2020

## 2021-06-29 NOTE — PROGRESS NOTES
Progress Notes by Altagracia Kyle, Diabetes Ed at 9/9/2020 10:00 AM     Author: Altagracia Kyle, Diabetes Ed Service: -- Author Type: Diabetes Ed    Filed: 9/9/2020  6:14 PM Encounter Date: 9/9/2020 Status: Attested    : Altagracia Kyle, Diabetes Ed (Diabetes Ed) Cosigner: Yamila Paniagua MD at 9/10/2020 10:15 AM    Attestation signed by Yamila Paniagua MD at 9/10/2020 10:15 AM    Reviewed and agree with plan.   Dr. Yamila Paniagua  9/10/2020                  Diabetes Educator has received verbal consent for a telephone visit for the patient.  Declined video visit      Assessment:   F/u visit to assess diabetes management   Very well controlled with just lifestyle modification; she was on 500 mg metformin daily - was d/cd on June 10th   A1C:5.3 (1/21) - due for a draw      Has been trying to quit smoking - states, she is still struggling with that. Had quit before with the help of a program through her insurance,will look into that again.Reviewed smoke-less tips and also some available resources. She will contact the quit smoking program. States, she has support from .       SMBG:  Tests 1x/d, per recall:   FBS range 88 - 100  Had one reading of 140 (french fries/fast foods)  Lows: none, always carries glucose tablets with her     Has had great success with losing wt (has lost ~ 15-20 lbs) but has hit a plateau now. Current wt:148 (home scale)   Lifetime member at    Keeps active by going up and down the stairs. Also does regular stretches     Plan:   Patient is doing well and has no concerns at this time. Continue with the current DM management plan - regular monitoring of BG, daily physical activity as able/safe, eating healthy & watching portions of carbs  She is due for A1C draw at the next in-person visit - glycemic control and the need for med adjustment can be reassessed then.   Instructed patient to call at [phone] if the BS pattern starts to change   F/u  in ~ 6 months (or sooner if  concerns): phone number provided      Subjective and Objective:      Liz Pollard is referred by Dr. Paniagua  for Diabetes Education.     Lab Results   Component Value Date    HGBA1C 5.3 01/21/2020           Education:     Monitoring   Meter (per above goals): Assessed and Discussed  Monitoring: Assessed and Discussed  BG goals: Assessed    Nutrition Management  Nutrition Management: Assessed  Weight: Assessed and Discussed  Portions/Balance: Assessed and Discussed  Carb ID/Count: Assessed  Label Reading: Assessed  Heart Healthy Fats: Not addressed  Menu Planning: Assessed  Dining Out: Assessed  Physical Activity: Assessed and Discussed  Medications: Assessed  Orals: Assessed    Diabetes Disease Process: Assessed    Acute Complications: Prevent, Detect, Treat:  Hypoglycemia: Assessed and Discussed  Hyperglycemia: Assessed and Discussed  Sick Days: Assessed  Driving: Assessed    Chronic Complications  Foot Care:Assessed  Skin Care: Assessed  Eye: Assessed  ABC: Assessed  Teeth:Assessed  Goal Setting and Problem Solving: Assessed and Discussed  Barriers: Assessed and Discussed  Psychosocial Adjustments: Assessed      Time spent with the patient: 50 minutes for diabetes education and counseling.   Previous Education: yes  Visit Type:KAYLEE Kyle  9/9/2020

## 2021-06-30 NOTE — PROGRESS NOTES
Progress Notes by Parag Valdez MD at 5/11/2021  8:20 AM     Author: Parag Valdez MD Service: -- Author Type: Physician    Filed: 5/11/2021 10:11 AM Encounter Date: 5/11/2021 Status: Signed    : Parag Valdez MD (Physician)           [facility] Vein Consult      Assessment:     1. varicose veins, right   2. spider veins, bilateral   3. Insuffiencey of right short saphenous vein    Plan:     1. Treatment options of conservative therapy of stockings use, exercise, weight loss, elevating legs when possible.    2. Script for compression stockings 20-30 mm hg  3. Ultrasound to evaluate legs for incompetency of both deep and superficial system .   4. Surgical treatment, discussed today some options in the future.   5. Follow up: 3 months.   6. Call for any questions concerns or issues    Subjective:      Liz Pollard is a 70 y.o. female  who was referred by Yamila Paniagua MD  for evaluation of varicose veins. Symptoms include pain, aching, fatigue, burning, edema and dermatitis. Patient has history of leg selling, pain and vein issues that have progressed. Pain and symptoms have affected daily living and work activities needing medications. Here for evaluation today. no stocking or compression devic use    Allergies:Amlodipine and Hydralazine    Past Medical History:   Diagnosis Date   ? Diabetes mellitus (H)    ? Disease of thyroid gland    ? Hypertension    ? Infectious viral hepatitis        Past Surgical History:   Procedure Laterality Date   ? HYSTERECTOMY  1987   ? OOPHORECTOMY     ? CT REMOVAL GALLBLADDER      Description: Cholecystectomy;  Recorded: 05/10/2010;   ? CT TOTAL ABDOM HYSTERECTOMY      Description: Hysterectomy;  Recorded: 06/16/2009;       Current Outpatient Medications   Medication Sig   ? aspirin 81 MG EC tablet Take 81 mg by mouth as needed for pain.   ? atorvastatin (LIPITOR) 20 MG tablet TAKE 1 TABLET BY MOUTH EVERY DAY   ? blood glucose test (CONTOUR NEXT  TEST STRIPS) strips Use 1 each As Directed Daily at 8:00 am..   ? blood-glucose meter Misc 1 glucometer device  - any brand covered by insurance (contour covered by insurance? )   ? cholecalciferol, vitamin D3, 25 mcg (1,000 unit) capsule Take 1 capsule (1,000 Units total) by mouth daily.   ? ergocalciferol (ERGOCALCIFEROL) 1,250 mcg (50,000 unit) capsule TAKE 1 CAPSULE (50,000 UNITS TOTAL) BY MOUTH ONCE A WEEK FOR 12 DOSES.   ? ferrous sulfate 325 (65 FE) MG tablet Take 325 mg by mouth 2 times a day at 6:00 am and 4:00 pm.   ? hydroCHLOROthiazide (HYDRODIURIL) 25 MG tablet TAKE 1 TABLET BY MOUTH EVERY DAY   ? levothyroxine (SYNTHROID, LEVOTHROID) 75 MCG tablet TAKE 1 TABLET BY MOUTH EVERY DAY   ? lisinopriL (PRINIVIL,ZESTRIL) 40 MG tablet TAKE 1 TABLET BY MOUTH EVERY DAY   ? meclizine (ANTIVERT) 25 mg tablet Take 1 tablet (25 mg total) by mouth 3 (three) times a day as needed.   ? metoprolol succinate (TOPROL-XL) 100 MG 24 hr tablet TAKE 1.5 TABLETS BY MOUTH DAILY   ? metoprolol tartrate (LOPRESSOR) 100 MG tablet Take 1 tablet (100 mg total) by mouth 2 (two) times a day.   ? spironolactone (ALDACTONE) 25 MG tablet Take 1 tablet (25 mg total) by mouth daily.   ? tiZANidine (ZANAFLEX) 4 MG tablet Take 4 mg by mouth every 8 (eight) hours as needed.       Family History   Problem Relation Age of Onset   ? Breast cancer Maternal Aunt         in her 90 s   ? Cancer Mother 83         of stomach cancer   ? Colon cancer Father 51         of colon cancer   ? Colon cancer Brother 36         from colon cancer   ? Sickle cell trait Niece    ? Prostate cancer Brother         reports that she quit smoking about 4 weeks ago. She has never used smokeless tobacco. She reports that she does not drink alcohol or use drugs.      Review of Systems:  Pertinent items are noted in HPI.  A 12 point comprehensive review of systems was negative except as noted.   Patient has symptomatic veins and changes of bilateral legs. These  have progressed to the point of causing symptoms on a daily basis. This causes issues with daily activities and chores such as washing dishes, vacuuming, outdoor upkeep and standing for long lengths of time       Objective:     Vitals:    05/11/21 0816   BP: 152/70   Pulse: (!) 56   Temp: 98.8  F (37.1  C)     There is no height or weight on file to calculate BMI.    EXAM:  GENERAL: This is a well-developed 70 y.o. female who appears her stated age  HEAD: normocephalic  HEENT: Pupils equal and reactive bilaterally  MOUTH: mucus membranes intact. Normal dentation  CARDIAC: RRR without murmur  CHEST/LUNG:  Clear to auscultation bilaterally  ABDOMEN: Soft, nontender, nondistended, no masses noted   NEUROLOGIC: Focally intact, nonfocal, alert and oriented x 3  INTEGUMENT: No open lesions or ulcers  VASCULAR: Pulses intact, symmetrical upper and lower extremities. There areskin changes consistent with chronic venous insufficiency. Varicose veins present in bilateral greater saphenous distribution. Spider veins present bilateral.                Imaging:    US Venous Insufficiency Legs Bilateral (Order [id number])  Imaging  Date: 5/11/2021 Department: [facility] [place] Released By: Charli Berumen, JERRICAMS, RVT Authorizing: Parag Valdez MD   Study Result    BILATERAL Venous Insufficiency Ultrasound (05/11/21)    BILATERAL Lower Extremity          Indication:  SURVEILLANCE BILATERAL LEGS VARICOSE VEINS, PAIN AND SWELLING. MORE ON LEFT.      Previous: NONE     Patient History: Swelling     Presenting Symptoms:  Swelling, Varicose Veins and Pain     Technique:   Supine and Upright Ultrasound of the Deep and Superficial Veins with Valsalva and Compression Augmentation Maneuvers. Duplex Imaging is performed utilizing gray-scale, Two-dimensional images, color-flow imaging, Doppler waveform analysis, and Spectral doppler imaging done with provacative maneuvers.      Incompetency  Criteria:  Deep vein reflux reported when greater than 1000 msec flow reversal. Superficial vein reflux reported when equal to or greater than 600 msec flow reversal.  vein reflux reported as greater than 350 msec flow reversal.      Right  Leg Deep Veins    CFV SFJ PFV PROX FV   PROX FV MID FV DIST POP V. PERON.   V. PTV'S   Compressibility  (FC,PC,NC) FC FC FC FC FC FC FC FC FC   Reflux -   - - - - -   -         Right Leg Saphenous Veins  Location SFJ PROX THIGH KNEE MID CALF SPJ PROX CALF MID CALF   RT GSV (mm) 3.5 3.2 2.8 1.6         Reflux - - - -         RT SSV (mm)         5.7 3.7 1.9   Reflux         + + +         Left Leg Deep Veins    CFV SFJ PFV PROX SFV PROX SFV MID SFV DIST POP V. PERON. V. PTV'S   Compressibility  (FC,PC,NC) FC FC FC FC FC FC FC FC FC   Reflux -   - - - - -   -         Lt Leg Saphenous Veins   Location SFJ PROX THIGH KNEE MID CALF SPJ PROX CALF MID CALF   LT GSV (mm) 3.9 3.3 2.7 2.0         Reflux - - - -         LT SSV (mm)         3.6 1.8 1.8   Reflux         - - -         Comments:      Impression:        Reference:     Compressibility: FC= Fully compressible, PC= Partially compressible, NC= Non-compressible, NV= Not Visualized     Reflux: (+) Incompetent  (-) Competent, (NV) = Not Visualized     Interpretation criteria:          Duration of Retrograde flow (milliseconds)  Category Deep Veins Superficial Veins  veins   Competent < 1000ms < 600ms < 350ms   Incompetent > 1000ms > 600ms > 350ms             Parag Valdez MD  General Surgery [phone]  Vascular Surgery [phone]

## 2021-07-13 ENCOUNTER — RECORDS - HEALTHEAST (OUTPATIENT)
Dept: ADMINISTRATIVE | Facility: CLINIC | Age: 71
End: 2021-07-13

## 2021-07-21 ENCOUNTER — RECORDS - HEALTHEAST (OUTPATIENT)
Dept: ADMINISTRATIVE | Facility: CLINIC | Age: 71
End: 2021-07-21

## 2021-07-22 ENCOUNTER — RECORDS - HEALTHEAST (OUTPATIENT)
Dept: FAMILY MEDICINE | Facility: CLINIC | Age: 71
End: 2021-07-22

## 2021-07-22 DIAGNOSIS — Z12.31 OTHER SCREENING MAMMOGRAM: ICD-10-CM

## 2021-08-17 ENCOUNTER — OFFICE VISIT (OUTPATIENT)
Dept: VASCULAR SURGERY | Facility: CLINIC | Age: 71
End: 2021-08-17
Attending: SPECIALIST
Payer: COMMERCIAL

## 2021-08-17 VITALS — HEART RATE: 60 BPM | SYSTOLIC BLOOD PRESSURE: 118 MMHG | TEMPERATURE: 98.2 F | DIASTOLIC BLOOD PRESSURE: 64 MMHG

## 2021-08-17 DIAGNOSIS — I83.891 SYMPTOMATIC VARICOSE VEINS OF RIGHT LOWER EXTREMITY: Primary | ICD-10-CM

## 2021-08-17 DIAGNOSIS — I87.2 VENOUS INSUFFICIENCY OF RIGHT LEG: ICD-10-CM

## 2021-08-17 PROCEDURE — G0463 HOSPITAL OUTPT CLINIC VISIT: HCPCS

## 2021-08-17 PROCEDURE — 99214 OFFICE O/P EST MOD 30 MIN: CPT | Performed by: SPECIALIST

## 2021-08-17 NOTE — PROGRESS NOTES
Patient reports >3 months compression socks and elevation. Right symp VV continues. Reviewed US and Venaseal option of right SSV . Will preauth through  [facility] medicare advantage.  Spoke with patient to review AVS and venseal.

## 2021-08-17 NOTE — PATIENT INSTRUCTIONS
Varicose Vein Pre-Procedure Instructions/Vein Ablation    You are scheduled for a varicose vein treatment on your legs. The following is some helpful information for you, in regards to your treatment.    **Important:  A  will be needed post procedure.  (Unable to use Taxi or Uber).     We will supply a thigh high compression stocking for you. Please bring your compression sock as we only have sizes medium to X-large.    Please be aware, it is not advised to fly within 3 weeks post procedure    Please wear comfortable clothing.  We recommend that you bring a change of under clothes; they may get stained by the cleansing solution.    Feel free to bring a personal music player or a CD to listen to during your procedure.    Take your routine medications as you normally would with exception to blood thinners. Aspirin is ok to continue.    If you take Warfarin, Xarelto, or Eliquis this will need to be HELD prior to procedure according to primary care provider/doctor or cardiology who prescribes this medication.    Please notify us if you take this medication.    It is ok to eat prior to this procedure.    Please allow 1- 2 hours for your appointment.    For any questions regarding your procedure please call   [phone] to speak with the nurse.    If you would like a Good Teresa Estimate for your upcoming service/procedure contact Cost of Care Estimates at [phone], advocates are available Monday through Friday 8am - 5pm.    VenaSeal Ablation Codes  85614 for first vein in either leg  89783 for the second vein in the same leg    Please have the following information available:  1. Patient name and date of birth  2. Insurance company, plan name, ID and group numbers  3. Description of the service/procedure and the associated procedure code numbers, if available. If more than one (1) procedure code, indicate which will be the primary procedure code.   4. The facility where the service/procedure will be  performed.  5. The name of the physician involved with the service/procedure.  6. Appointment date of service.  7. Telephone number to call with the information.  Varicose Veins    UNDERSTAND  Varicose veins may be a sign of something more severe-venous reflux disease.  Healthy leg veins have valves that keep blood flowing to the heart. Venous reflux disease develops when the valves stop working properly and allow blood to flow backward (i.e., reflux) and pool in the lower leg veins.     If venous reflux disease is left untreated, symptoms can worsen over time. Your doctor can help you understand if you have this condition.     Superficial venous reflux disease may cause the following signs and symptoms in your legs:  Varicose veins  Aching  Swelling  Cramping  Heaviness or tiredness  Itching  Restlessness  Open skin sores    Treat  Superficial venous reflux disease treatment aims to reduce or stop the backward flow of blood. The following may be prescribed to treat your superficial venous reflux disease. Your doctor can help you decide which treatment is best for you:       Compression stockings     Removing diseased vein     Closing diseased vein (through thermal or non-thermal treatment)     VenaSeal  Closure System  One non-thermal treatment option is the VenaSeal  Closure System, which improves blood flow and relieves symptoms by sealing-or closing-the diseased vein. The system delivers a small amount of a specially formulated medical adhesive to the diseased vein. The adhesive permanently seals the vein and blood is rerouted through nearby healthy veins.          Demonstrated Outcomes  The VenaSeal  closure system is a safe and effective treatment, providing significant improvements in quality of life.1,2,3    In a [place] study , the VenaSeal  system and thermal radiofrequency ablation treatments had similar clinical results at 3 years; 94.4% closure for the VenaSeal  system and 91.9% for thermal energy.     Side  effects were minor and infrequent.     The most common side effect was phlebitis (i.e., inflammation of a vein), and it typically occurred within the first 30 days after the procedure. Phlebitis is a commonly reported side effect in all vein treatments. Phlebitis occurred more frequently in VenaSeal  system-treated subjects than in RFA-treated subjects, though the difference was not statistically significant.     Please see the Potential risks section for more information.    FAQ  What can I expect of the VenaSeal  procedure?    Before the Procedure:  You will have an ultrasound imaging exam of the leg that is to be treated. This exam is important for assessing the diseased superficial vein and planning the procedure.    During the Procedure:  Your doctor can discuss the procedure with you. A brief summary of what to expect is below:  You may feel some minor pain or stinging with a needle stick to numb the site where the doctor will access your vein.  Once the area is numb, your doctor will insert the catheter (i.e., a small hollow tube) into your leg. You may feel some pressure from the placement of the catheter.  The catheter will be placed in specific areas along the diseased vein to deliver small amounts of the medical adhesive. You may feel some mild sensation of pulling. Ultrasound will be used during the procedure to guide and position the catheter.  After treatment, the catheter is removed and a small adhesive bandage placed over the puncture site.         After the Procedure:  You will be taken to the recovery area to rest.  Your doctor will recommend follow-up care as needed.    Commonly Asked Questions  When will my symptoms improve?  Symptoms are caused by the diseased superficial vein. Thus, symptoms may improve as soon as the diseased vein is closed.  When can I return to normal activity?  The VenaSeal procedure is designed to reduce recovery time. Many patients return to normal activity immediately  after the procedure. Your doctor can help you determine when you can return to normal activity.  Is the VenaSeal procedure painful?  Most patients feel little, if any, pain during the outpatient procedure.1  Is there bruising after the VenaSeal  procedure?  Most patients report [id number] bruising after the VenaSeal procedure.1  What happens to the VenaSeal  adhesive?  Only a very small amount of VenaSeal  adhesive is used to close the vein. Your body will naturally create scar tissue around the adhesive over time to keep the vessel permanently closed.  How does the VenaSeal  procedure differ from thermal energy procedures?  The VenaSeal  procedure uses an adhesive to close the superficial vein. Thermal energy procedures use heat to close the vein. The intense heat requires a large volume of numbing medicine, which is injected through many needle sticks. The injections may cause pain and bruising after the procedure.  Is the VenaSeal procedure covered by insurance?  As with any procedure, insurance coverage may vary. For more information, please contact your insurance provider.    The VenaSeal  procedure may not be right for everyone  Your doctor can help you decide if the VenaSeal  procedure is right for you. The VenaSeal  procedure is contraindicated for individuals with any of the following conditions:  Thrombophlebitis migraines (i.e., inflammation of a vein caused by a slow moving blood clot)  Acute superficial thrombophlebitis (i.e., inflammation of a vein caused by a blood clot)  Previous hypersensitivity reactions to the VenaSeal  adhesive or cyanoacrylates  Acute sepsis (i.e., whole-body inflammation caused by an immune response to an infection)    Potential risks  The VenaSeal  procedure is minimally invasive and catheter-based. As such, it may involve the following risks. Your doctor can help you understand these risks.  Allergic reaction to the VenaSeal  adhesive  Arteriovenous fistula (i.e., an  abnormal connection between an artery and a vein)  Bleeding from the access site  Deep vein thrombosis (i.e., blood clot in the deep vein system)  Edema (i.e., swelling) in the treated leg  Embolization (i.e., blockage of a vein or artery), including pulmonary embolism (i.e., blockage of an artery in the lungs)  Hematoma (i.e., the collection of blood outside of a vessel)  Hyperpigmentation (i.e., darkening of the skin)  Infection at the access site  Non-specific mild inflammation of the cutaneous and subcutaneous tissue  Pain  Paresthesia (i.e., a feeling of tingling, pricking, numbness or burning)  Phlebitis (i.e., inflammation of a vein)  Superficial thrombophlebitis (i.e., inflammation of a vein caused by a blood clot)  Urticaria (i.e., hives) or ulceration may occur at the site of injection  Vascular rupture and perforation  Visible scarring

## 2021-08-21 NOTE — PROGRESS NOTES
Follow Up: Varicose Veins/ Venous Insufficiency    Liz Pollard is a 70 year old  female here for followup. She has worn stockings now for 3         months. I saw them previously in May / 11 / 2021.  Continued progression of disease and symptoms and issues; reviewed ultrasound results. Patient has ongoing symptoms with pain and swelling needing intervention with pain meds secondary to interfering with daily activities and work. This now inhibits daily chores and activities.     Allergies:Amlodipine and Hydralazine    Past Medical History:   Diagnosis Date     Diabetes mellitus (H)      Disease of thyroid gland      Hypertension      Infectious viral hepatitis        Past Surgical History:   Procedure Laterality Date     C TOTAL ABDOM HYSTERECTOMY      Description: Hysterectomy;  Recorded: 2009;     HC REMOVAL GALLBLADDER      Description: Cholecystectomy;  Recorded: 05/10/2010;     HYSTERECTOMY  1987     OOPHORECTOMY         CURRENT MEDS:  Current Outpatient Medications   Medication     aspirin 81 MG EC tablet     atorvastatin (LIPITOR) 20 MG tablet     blood glucose test (CONTOUR NEXT TEST STRIPS) strips     blood-glucose meter Misc     cholecalciferol, vitamin D3, 25 mcg (1,000 unit) capsule     ferrous sulfate 325 (65 FE) MG tablet     hydroCHLOROthiazide (HYDRODIURIL) 25 MG tablet     levothyroxine (SYNTHROID, LEVOTHROID) 75 MCG tablet     lisinopril (ZESTRIL) 40 MG tablet     meclizine (ANTIVERT) 25 mg tablet     metoprolol succinate (TOPROL-XL) 100 MG 24 hr tablet     metoprolol tartrate (LOPRESSOR) 100 MG tablet     spironolactone (ALDACTONE) 25 MG tablet     tiZANidine (ZANAFLEX) 4 MG tablet     No current facility-administered medications for this visit.       Family History   Problem Relation Age of Onset     Breast Cancer Maternal Aunt         in her 90 s     Cancer Mother 83.00         of stomach cancer     Colon Cancer Father 51.00         of colon cancer     Colon Cancer Brother  36.00         from colon cancer     Sickle Cell Trait Niece      Prostate Cancer Brother         reports that she quit smoking about 4 months ago. She has never used smokeless tobacco. She reports that she does not drink alcohol and does not use drugs.    Review of Systems:  Negative except progression of symptoms while conservative therapy.  Patient has symptomatic veins and changes of bilateral legs. These have progressed to the point of causing symptoms on a daily basis. This causes issues with daily activities and chores such as washing dishes, vacuuming, outdoor upkeep and standing for long lengths of time. She has worn compression now for greater than 3 months, worked on an exercise and weight loss program and continues to have symptoms.     OBJECTIVE:  Vitals:    21 0807   BP: 118/64   Pulse: 60   Temp: 98.2  F (36.8  C)     There is no height or weight on file to calculate BMI.    EXAM:  GENERAL: This is a well-developed 70 year old female who appears her stated age  HEAD: normocephalic  HEENT: Pupils equal and reactive bilaterally  CARDIAC: RRR without murmur  CHEST/LUNG:  Clear to auscultation  ABDOMEN: Soft, nontender, nondistended, no masses    NEUROLOGIC: Focally intact, nonfocal  VASCULAR: Pulses intact, symmetrical upper and lower extremities. Varicose veins, Spider veins and Chronic venous stasis changes, bilateral        Imaging:    Study Result    Narrative & Impression   BILATERAL Venous Insufficiency Ultrasound (21)  BILATERAL Lower Extremity        Indication:  SURVEILLANCE BILATERAL LEGS VARICOSE VEINS, PAIN AND SWELLING. MORE ON LEFT.      Previous: NONE     Patient History: Swelling     Presenting Symptoms:  Swelling, Varicose Veins and Pain     Technique:   Supine and Upright Ultrasound of the Deep and Superficial Veins with Valsalva and Compression Augmentation Maneuvers. Duplex Imaging is performed utilizing gray-scale, Two-dimensional images, color-flow imaging, Doppler  waveform analysis, and Spectral   doppler imaging done with provacative maneuvers.      Incompetency Criteria:  Deep vein reflux reported when greater than 1000 msec flow reversal. Superficial vein reflux reported when equal to or greater than 600 msec flow reversal.  vein reflux reported as greater than 350 msec flow reversal.      Right  Leg Deep Veins   CFV SFJ PFV PROX FV   PROX FV MID FV DIST POP V. PERON.   V. PTV'S   Compressibility  (FC,PC,NC) FC FC FC FC FC FC FC FC FC   Reflux -  - - - - -  -         Right Leg Saphenous Veins  Location SFJ PROX THIGH KNEE MID CALF SPJ PROX CALF MID CALF   RT GSV (mm) 3.5 3.2 2.8 1.6      Reflux - - - -      RT SSV (mm)     5.7 3.7 1.9   Reflux     + + +         Left Leg Deep Veins   CFV SFJ PFV PROX SFV PROX SFV MID SFV DIST POP V. PERON. V. PTV'S   Compressibility  (FC,PC,NC) FC FC FC FC FC FC FC FC FC   Reflux -  - - - - -  -         Lt Leg Saphenous Veins   Location SFJ PROX THIGH KNEE MID CALF SPJ PROX CALF MID CALF   LT GSV (mm) 3.9 3.3 2.7 2.0      Reflux - - - -      LT SSV (mm)     3.6 1.8 1.8   Reflux     - - -         Impression:       Right Deep Vein Findings: Patent deep venous system with no reflux.     Left Deep Vein Findings: Patent deep venous system with no reflux.     Superficial Vein Findings:      Right Greater Saphenous vein: Patent Greater Saphenous Vein without evidence of reflux.     Right Small Saphenous Vein: Patent Small Saphenous vein with Reflux noted At the saphenous popliteal junction, proximal calf and mid calf with a Maximum diameter of 6 mm.     Left Greater Saphenous Vein: Patent Greater Saphenous Vein without evidence of reflux.     Left Small Saphenous Vein: Patent Small Saphenous Vein without evidence of reflux.     Perforating and Accessory Veins: N/A     Reference:     Compressibility: FC= Fully compressible, PC= Partially compressible, NC= Non-compressible, NV= Not Visualized     Reflux: (+) Incompetent  (-) Competent,  (NV) = Not Visualized     Interpretation criteria:  Duration of Retrograde flow (milliseconds)  Category Deep Veins Superficial Veins  veins   Competent < 1000ms < 600ms < 350ms   Incompetent > 1000ms > 600ms > 350ms          Assessment/Plan:    She has  incompetency and insuffiencey of the Rignt  Short  saphenous vein.  Right measures 5.7 mm at the junction. Deep systems are intact. No DVTs. Great candidate for endovenous  closure. We spent 20 minutes today and discussed the procedure. The risks of anesthesia, infection, bleeding, clotting, DVTs, numbess at the insertion site dermatome and  the process and procedure were discussed. Answered all questions today. Will submit this to [employer] if needed for pre approval.Will set this up when approved. Discussed need to have a  and procedure woul take around 30 minutes with total time 2-3 hours. Also understands a small screening ultrasound 2-3 days out to rule out clot formation at the closed vessel.    DIAGNOSIS: Venous insufficiency of the  right short saphenous vein      PROCEDURE: Endovenous closure of the right short saphenous vein    Parag Valdez MD  [facility] Surgery Dept.

## 2021-08-31 ENCOUNTER — OFFICE VISIT (OUTPATIENT)
Dept: FAMILY MEDICINE | Facility: CLINIC | Age: 71
End: 2021-08-31
Payer: COMMERCIAL

## 2021-08-31 VITALS
HEART RATE: 55 BPM | WEIGHT: 163.6 LBS | SYSTOLIC BLOOD PRESSURE: 100 MMHG | HEIGHT: 62 IN | OXYGEN SATURATION: 97 % | RESPIRATION RATE: 14 BRPM | DIASTOLIC BLOOD PRESSURE: 52 MMHG | TEMPERATURE: 98.1 F | BODY MASS INDEX: 30.11 KG/M2

## 2021-08-31 DIAGNOSIS — E11.21 TYPE 2 DIABETES MELLITUS WITH DIABETIC NEPHROPATHY, WITHOUT LONG-TERM CURRENT USE OF INSULIN (H): Primary | ICD-10-CM

## 2021-08-31 DIAGNOSIS — N18.31 STAGE 3A CHRONIC KIDNEY DISEASE (H): ICD-10-CM

## 2021-08-31 DIAGNOSIS — Z12.2 ENCOUNTER FOR SCREENING FOR LUNG CANCER: ICD-10-CM

## 2021-08-31 DIAGNOSIS — K74.00 LIVER FIBROSIS: ICD-10-CM

## 2021-08-31 DIAGNOSIS — E66.09 CLASS 1 OBESITY DUE TO EXCESS CALORIES WITHOUT SERIOUS COMORBIDITY WITH BODY MASS INDEX (BMI) OF 30.0 TO 30.9 IN ADULT: ICD-10-CM

## 2021-08-31 DIAGNOSIS — E03.9 ACQUIRED HYPOTHYROIDISM: ICD-10-CM

## 2021-08-31 DIAGNOSIS — E66.811 CLASS 1 OBESITY DUE TO EXCESS CALORIES WITHOUT SERIOUS COMORBIDITY WITH BODY MASS INDEX (BMI) OF 30.0 TO 30.9 IN ADULT: ICD-10-CM

## 2021-08-31 DIAGNOSIS — B18.2 CHRONIC HEPATITIS C WITHOUT HEPATIC COMA (H): ICD-10-CM

## 2021-08-31 DIAGNOSIS — I83.811 VARICOSE VEINS OF RIGHT LOWER EXTREMITY WITH PAIN: ICD-10-CM

## 2021-08-31 DIAGNOSIS — E55.9 VITAMIN D DEFICIENCY: ICD-10-CM

## 2021-08-31 DIAGNOSIS — G47.09 OTHER INSOMNIA: ICD-10-CM

## 2021-08-31 DIAGNOSIS — Z87.891 PERSONAL HISTORY OF TOBACCO USE: ICD-10-CM

## 2021-08-31 LAB
CHOLEST SERPL-MCNC: 116 MG/DL
FASTING STATUS PATIENT QL REPORTED: ABNORMAL
HBA1C MFR BLD: 5.2 % (ref 0–5.6)
HDLC SERPL-MCNC: 42 MG/DL
LDLC SERPL CALC-MCNC: 59 MG/DL
TRIGL SERPL-MCNC: 74 MG/DL
TSH SERPL DL<=0.005 MIU/L-ACNC: 1.37 UIU/ML (ref 0.3–5)

## 2021-08-31 PROCEDURE — G0296 VISIT TO DETERM LDCT ELIG: HCPCS | Performed by: FAMILY MEDICINE

## 2021-08-31 PROCEDURE — 36415 COLL VENOUS BLD VENIPUNCTURE: CPT | Performed by: FAMILY MEDICINE

## 2021-08-31 PROCEDURE — 82306 VITAMIN D 25 HYDROXY: CPT | Performed by: FAMILY MEDICINE

## 2021-08-31 PROCEDURE — 80061 LIPID PANEL: CPT | Performed by: FAMILY MEDICINE

## 2021-08-31 PROCEDURE — 84443 ASSAY THYROID STIM HORMONE: CPT | Performed by: FAMILY MEDICINE

## 2021-08-31 PROCEDURE — 83036 HEMOGLOBIN GLYCOSYLATED A1C: CPT | Performed by: FAMILY MEDICINE

## 2021-08-31 PROCEDURE — 99215 OFFICE O/P EST HI 40 MIN: CPT | Performed by: FAMILY MEDICINE

## 2021-08-31 RX ORDER — BIOTIN 10000 MCG
CAPSULE ORAL
COMMUNITY
End: 2023-04-13

## 2021-08-31 ASSESSMENT — MIFFLIN-ST. JEOR: SCORE: 1219.2

## 2021-08-31 NOTE — PROGRESS NOTES
"Assessment & Plan     Type 2 diabetes mellitus with diabetic nephropathy, without long-term current use of insulin (H)  Well controlled with diet  Eye exam due in Oct 2021  - Hemoglobin A1c  - Lipid Profile    Obesity  - Lipid Profile    Stage 3a chronic kidney disease  Continue to monitor    Personal history of tobacco use  Encounter for screening for lung cancer  Long discussion of lung cancer screening protocol, eligibility, and risks/benefits  She quit smoking 5 months ago - 3/3021 and wants screening  - Prof fee: Shared Decisionmaking for Lung Cancer Screening  - CT Chest Lung Cancer Scrn Low Dose wo    Acquired hypothyroidism  Continue levothyroxine   Recheck TSH  - TSH    Vitamin D deficiency  Recheck Vit D level since she stopped taking supplements  - Vitamin D Deficiency    Chronic hepatitis C without hepatic coma (H)  Liver fibrosis  - US Abdomen Limited  S/P treatment for Hep C   Recheck liver ultrasound Q6-12 months         Varicose veins of right lower extremity with pain  Awaiting insurance approval for possible procedure per vascular consult    Insomnia   Discussed sleep hygiene, especially having the TV on at night            Review of external notes as documented elsewhere in note  Ordering of each unique test  Prescription drug management  50 minutes spent on the date of the encounter doing chart review, history and exam, documentation and further activities per the note    BMI:   Estimated body mass index is 30.3 kg/m  as calculated from the following:    Height as of this encounter: 1.565 m (5' 1.61\").    Weight as of this encounter: 74.2 kg (163 lb 9.6 oz).   Weight management plan: Discussed healthy diet and exercise guidelines    Return in about 6 months (around 2/28/2022).    Yamila Paniagua MD  [facility]     Liz Pollard is a 70 year old female who presents today for the following   health issues: DM check     DM T2 - well controlled - diet " controlled  A1c 5.3 on 9/17/18 and 1/3/19 - and 5.4 on 7/11/19 - > 5.3% 1/21//20 - 5.3% on 3/16/21 -> 5.2%   on metformin - stopped last year  BP wnl  microalbumin wnl 9/30/29 and 9/30/20  - on lisinopril  ASA  LDL  139 on 1/3/19 -  discussed on 7/11/19  per AHA 31.0% and advised high intensity statin, on 1/21/20 started lipitor  -> 62 on 9/30/20   Eye 10/21/20  CKD stage 3 - last GFR 38  Smokes - 1 pack very 3 days (not ready to quit b/c feeling stressed)  Foot exam done     HTN - on lisinopril 40mg, HCTZ 26yo, metoprolol XL 100mg, spironolactone 25mg  BP wnl today  Lytes and LFT wnl 5/5/21     Hypothyroidism -   Last TSH 0.27 on 9/21/17 and levothyroxine decreased from 100mcg -> 88mcg in Oct 2017 -> 2.19 on 7/2/18 -> 0.21 on 7/11/19 and TSH decreased in July 2019 and levothyroxine decreased from 88mcg -> 75mcg -> 3.13 on 9/30/20     Vaccines  Covid #1 2/25/ 2021  And #2 on 3/25/21  Shingles - plans to get at pharmacy and check insurance      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  Coarsened hepatic parenchymal echotexture likely due to underlying fibrosis.  2.  No significant focal liver lesion.     Vit D deficiency -   89.4 on 9/30/21 - on ergo and can transition to low dose daily     Sleep disturbance   Waking in the middle of the night  Helping  with CPAP machine middle of the night  Unable to sleep full 8 hours       Varicose veins  Vascular vein consult 8/17/21 -reviewed   DX - incompetency and insuffiencey of the Rignt  Short  saphenous vein  PROCEDURE: Endovenous closure of the right short saphenous vein.  Awaiting to see if  insurance needs for pre approval    mammo scheduled for 9/9/21    Social    having health issues and  Memory issues.     Review of Systems     Please see above.  The rest of the review of systems are negative for all  "systems.    Current Outpatient Medications   Medication Instructions     aspirin (ASA) 81 mg, PRN     atorvastatin (LIPITOR) 20 MG tablet [ATORVASTATIN (LIPITOR) 20 MG TABLET] TAKE 1 TABLET BY MOUTH EVERY DAY     Biotin 10 MG CAPS Oral     blood glucose test (CONTOUR NEXT TEST STRIPS) strips 1 each, Does not apply, DAILY     blood-glucose meter Misc [BLOOD-GLUCOSE METER MISC] 1 glucometer device  - any brand covered by insurance (contour covered by insurance? )     hydroCHLOROthiazide (HYDRODIURIL) 25 MG tablet [HYDROCHLOROTHIAZIDE (HYDRODIURIL) 25 MG TABLET] TAKE 1 TABLET BY MOUTH EVERY DAY     levothyroxine (SYNTHROID, LEVOTHROID) 75 MCG tablet [LEVOTHYROXINE (SYNTHROID, LEVOTHROID) 75 MCG TABLET] TAKE 1 TABLET BY MOUTH EVERY DAY     lisinopril (ZESTRIL) 40 MG tablet TAKE 1 TABLET BY MOUTH EVERY DAY     metoprolol succinate (TOPROL-XL) 100 MG 24 hr tablet [METOPROLOL SUCCINATE (TOPROL-XL) 100 MG 24 HR TABLET] TAKE 1.5 TABLETS BY MOUTH DAILY     spironolactone (ALDACTONE) 25 mg, Oral, DAILY           Objective   Vitals:    08/31/21 0850   BP: 100/52   Pulse: 55   Resp: 14   Temp: 98.1  F (36.7  C)   TempSrc: Oral   SpO2: 97%   Weight: 74.2 kg (163 lb 9.6 oz)   Height: 1.565 m (5' 1.61\")       Body mass index is 30.3 kg/m .    Physical Exam    OBJECTIVE:  Vitals listed above within normal limits  General appearance: well groomed, pleasant, well hydrated, nontoxic appearing  ENT: PERRL, throat clear  Neck: neck supple, no lymphadenopathy, no thyromegaly  Lungs: lungs clear to auscultation bilaterally, no wheezes or rhonchi  Heart: regular rate and rhythm, no murmurs, rubs or gallops  Abdomen: soft, nontender  Neuro: no focal deficits, CN II-XII grossly intact, alert and oriented  Psych:  mood stable, appears to have good insight and judgment    Recent Results (from the past 1008 hour(s))   Hemoglobin A1c    Collection Time: 08/31/21  9:00 AM   Result Value Ref Range    Hemoglobin A1C 5.2 0.0 - 5.6 %              Lung " Cancer Screening Shared Decision Making Visit     Liz Pollard is eligible for lung cancer screening on the basis of the information provided in my signed lung cancer screening order.     I have discussed with patient the risks and benefits of screening for lung cancer with low-dose CT.     The risks include:  radiation exposure: one low dose chest CT has as much ionizing radiation as about 15 chest x-rays or 6 months of background radiation living in [place]    false positives: 96% of positive findings/nodules are NOT cancer, but some might still require additional diagnostic evaluation, including biopsy  over-diagnosis: some slow growing cancers that might never have been clinically significant will be detected and treated unnecessarily     The benefit of early detection of lung cancer is contingent upon adherence to annual screening or more frequent follow up if indicated.     Furthermore, reaping the benefits of screening requires Liz Pollard to be willing and physically able to undergo diagnostic procedures, if indicated. Although no specific guide is available for determining severity of comorbidities, it is reasonable to withhold screening in patients who have greater mortality risk from other diseases.     We did discuss that the only way to prevent lung cancer is to not smoke. Smoking cessation counseling was not given.  Quit 5 months ago    I did not offer risk estimation using a calculator such as this one:    ShouldIScreen

## 2021-08-31 NOTE — PATIENT INSTRUCTIONS
Call radiology for liver ultrasound and lung CT  [phone]      Lung Cancer Screening   Frequently Asked Questions  If you are at high-risk for lung cancer, getting screened with low-dose computed tomography (LDCT) every year can help save your life. This handout offers answers to some of the most common questions about lung cancer screening. If you have other questions, please call [phone] ([phone]).     What is it?  Lung cancer screening uses special X-ray technology to create an image of your lung tissue. The exam is quick and easy and takes less than 10 seconds. We don t give you any medicine or use any needles. You can eat before and after the exam. You don t need to change your clothes as long as the clothing on your chest doesn t contain metal. But, you do need to be able to hold your breath for at least 6 seconds during the exam.    What is the goal of lung cancer screening?  The goal of lung cancer screening is to save lives. Many times, lung cancer is not found until a person starts having physical symptoms. Lung cancer screening can help detect lung cancer in the earliest stages when it may be easier to treat.    Who should be screened for lung cancer?  We suggest lung cancer screening for anyone who is at high-risk for lung cancer. You are in the high-risk group if you:      are between the ages of 55 and 79, and    have smoked at least 1 pack of cigarettes a day for 30 or more years, and    still smoke or have quit within the past 15 years.    However, if you have a new cough or shortness of breath, you should talk to your doctor before being screened.    Some national lung health advocacy groups also recommend screening for people ages 50 to 79 who have smoked an average of 1 pack of cigarettes a day for 20 years. They must also have at least 1 other risk factor for lung cancer, not including exposure to secondhand smoke. Other risk factors are having had cancer in the past,  emphysema, pulmonary fibrosis, COPD, a family history of lung cancer, or exposure to certain materials such as arsenic, asbestos, beryllium, cadmium, chromium, diesel fumes, nickel, radon or silica. Your care team can help you know if you have one of these risk factors.     Why does it matter if I have symptoms?  Certain symptoms can be a sign that you have a condition in your lungs that should be checked and treated by your doctor. These symptoms include fever, chest pain, a new or changing cough, shortness of breath that you have never felt before, coughing up blood or unexplained weight loss. Having any of these symptoms can greatly affect the results of lung cancer screening.       Should all smokers get an LDCT lung cancer screening exam?  It depends. Lung cancer screening is for a very specific group of men and women who have a history of heavy smoking over a long period of time (see  Who should be screened for lung cancer  above).  I am in the high-risk group, but have been diagnosed with cancer in the past. Is LDCT lung cancer screening right for me?  In some cases, you should not have LDCT lung screening, such as when your doctor is already following your cancer with CT scan studies. Your doctor will help you decide if LDCT lung screening is right for you.  Do I need to have a screening exam every year?  Yes. If you are in the high-risk group described earlier, you should get an LDCT lung cancer screening exam every year until you are 79, or are no longer willing or able to undergo screening and possible procedures to diagnose and treat lung cancer.  How effective is LDCT at preventing death from lung cancer?  Studies have shown that LDCT lung cancer screening can lower the risk of death from lung cancer by 20 percent in people who are at high-risk.  What are the risks?  There are some risks and limitations of LDCT lung cancer screening. We want to make sure you understand the risks and benefits, so please  let us know if you have any questions. Your doctor may want to talk with you more about these risks.    Radiation exposure: As with any exam that uses radiation, there is a very small increased risk of cancer. The amount of radiation in LDCT is small--about the same amount a person would get from a mammogram. Your doctor orders the exam when he or she feels the potential benefits outweigh the risks.    False negatives: No test is perfect, including LDCT. It is possible that you may have a medical condition, including lung cancer, that is not found during your exam. This is called a false negative result.    False positives and more testing: LDCT very often finds something in the lung that could be cancer, but in fact is not. This is called a false positive result. False positive tests often cause anxiety. To make sure these findings are not cancer, you may need to have more tests. These tests will be done only if you give us permission. Sometimes patients need a treatment that can have side effects, such as a biopsy. For more information on false positives, see  What can I expect from the results?     Findings not related to lung cancer: Your LDCT exam also takes pictures of areas of your body next to your lungs. In a very small number of cases, the CT scan will show an abnormal finding in one of these areas, such as your kidneys, adrenal glands, liver or thyroid. This finding may not be serious, but you may need more tests. Your doctor can help you decide what other tests you may need, if any.  What can I expect from the results?  About 1 out of 4 LDCT exams will find something that may need more tests. Most of the time, these findings are lung nodules. Lung nodules are very small collections of tissue in the lung. These nodules are very common, and the vast majority--more than 97 percent--are not cancer (benign). Most are normal lymph nodes or small areas of scarring from past infections.  But, if a small lung  "nodule is found to be cancer, the cancer can be cured more than 90 percent of the time. To know if the nodule is cancer, we may need to get more images before your next yearly screening exam. If the nodule has suspicious features (for example, it is large, has an odd shape or grows over time), we will refer you to a specialist for further testing.  Will my doctor also get the results?  Yes. Your doctor will get a copy of your results.  Is it okay to keep smoking now that there s a cancer screening exam?  No. Tobacco is one of the strongest cancer-causing agents. It causes not only lung cancer, but other cancers and cardiovascular (heart) diseases as well. The damage caused by smoking builds over time. This means that the longer you smoke, the higher your risk of disease. While it is never too late to quit, the sooner you quit, the better.  Where can I find help to quit smoking?  The best way to prevent lung cancer is to stop smoking. If you have already quit smoking, congratulations and keep it up! For help on quitting smoking, please call [employer] at 7-709-370-QFLV (2028) or the American [employer] Society at 1-734.886.3738 to find local resources near you.  One-on-one health coaching:  If you d prefer to work individually with a health care provider on tobacco cessation, we offer:      Medication Therapy Management:  Our specially trained pharmacists work closely with you and your doctor to help you quit smoking.  Call [phone] or [phone] (toll free).     Can Do: Health coaching offered by [facility].  [email or web address]      Patient Education   Tips for Sleep Hygiene  \"Sleep hygiene\" means having good sleep habits.Follow these tips to sleep better at night:     Get on a schedule. Go to bed and get up at about the same time every day.    Listen to your body. Only try to sleep when you actually feel tired or sleepy.    Be patient. If you haven't been able to get to sleep after " "about 30 minutes or more, get up and do something calming or boring until you feel sleepy. Then return to bed and try again.    Don't have caffeine (coffee, tea, cola drinks, chocolate and some medicines), alcohol or nicotine (cigarettes). These can make it harder for you to fall asleep and stay asleep.    Use your bed for sleeping only. That means no TV, computer or homework in bed, especially during the evening and before bedtime.    Don't nap during the day. If you must nap, make sure it is for less than 20 minutes.    Create sleep rituals that remind your body it is time to sleep. Examples include breathing exercises, stretching or reading a book.    Avoid all electronic media (smart phone, computer, tablet) within 2 hours of bed time. The \"blue light\" in these devices activates the part of the brain that keeps you awake.    Dim the lights at night.    Get early morning sources of light (walk in the sunshine) to help set sleep patterns at night.    Try a bath or shower before bed. Having a warm bath 1 to 2 hours before bedtime can help you feel sleepy. Hot baths can make you alert, so be mindful of the temperature.    Don't watch the clock. Checking the clock during the night can wake you up. It can also lead to negative thoughts such as, \"I will never fall asleep,\" which can increase anxiety and sleeplessness.    Use a sleep diary. Track your sleep schedule to know your sleep patterns and to see where you can improve.    Get regular exercise every day. Try not to do heavy exercise in the 4 hours before bedtime.    Eat a healthy, balanced diet.    Try eating a light, healthy snack before bed, but avoid eating a heavy meal.    Create the right sleeping area. A cool, dark, quiet room is best. If needed, try earplugs, fans and blackout curtains.    Keep your daytime routine the same even if you have a bad night sleep. Avoiding activities the next day can make it harder to sleep.  For informational purposes only. " Not to replace the advice of your health care provider.   Copyright   2013 [employer]. All rights reserved. [employer] [id number] - 01/16.

## 2021-09-01 LAB — DEPRECATED CALCIDIOL+CALCIFEROL SERPL-MC: 47 UG/L (ref 30–80)

## 2021-09-02 ENCOUNTER — HOSPITAL ENCOUNTER (OUTPATIENT)
Dept: ULTRASOUND IMAGING | Facility: HOSPITAL | Age: 71
Discharge: HOME | End: 2021-09-02
Attending: FAMILY MEDICINE | Admitting: FAMILY MEDICINE
Payer: COMMERCIAL

## 2021-09-02 DIAGNOSIS — B18.2 CHRONIC HEPATITIS C WITHOUT HEPATIC COMA (H): ICD-10-CM

## 2021-09-02 PROCEDURE — 76705 ECHO EXAM OF ABDOMEN: CPT

## 2021-09-09 ENCOUNTER — HOSPITAL ENCOUNTER (OUTPATIENT)
Dept: CT IMAGING | Facility: HOSPITAL | Age: 71
End: 2021-09-09
Attending: FAMILY MEDICINE
Payer: COMMERCIAL

## 2021-09-09 ENCOUNTER — ANCILLARY PROCEDURE (OUTPATIENT)
Dept: MAMMOGRAPHY | Facility: CLINIC | Age: 71
End: 2021-09-09
Attending: FAMILY MEDICINE
Payer: COMMERCIAL

## 2021-09-09 DIAGNOSIS — Z12.31 VISIT FOR SCREENING MAMMOGRAM: ICD-10-CM

## 2021-09-09 DIAGNOSIS — N26.1 ATROPHIC KIDNEY: ICD-10-CM

## 2021-09-09 DIAGNOSIS — Z87.891 PERSONAL HISTORY OF TOBACCO USE: ICD-10-CM

## 2021-09-09 DIAGNOSIS — R80.1 PERSISTENT PROTEINURIA: Primary | ICD-10-CM

## 2021-09-09 PROCEDURE — 77067 SCR MAMMO BI INCL CAD: CPT

## 2021-09-09 PROCEDURE — 71271 CT THORAX LUNG CANCER SCR C-: CPT

## 2021-10-14 ENCOUNTER — TELEPHONE (OUTPATIENT)
Dept: VASCULAR SURGERY | Facility: CLINIC | Age: 71
End: 2021-10-14

## 2021-10-14 NOTE — TELEPHONE ENCOUNTER
Called patient to schedule venaseal with Dr. Valdez. Approval 9/16/21-3/14/22 REF# [id number]  Patient declined scheduling at this time as she is waiting to speak with her 's doctor regarding cancer treatment and what that will entail. Patient may need more information about any restrictions she may have after venous closure; she will be her 's primary care giver but is not quite sure what that will involve yet.   Patient states she will call back on Monday to follow up on scheduling or if more information is needed from Dr. Valdez's team before scheduling.

## 2021-10-16 ENCOUNTER — HEALTH MAINTENANCE LETTER (OUTPATIENT)
Age: 71
End: 2021-10-16

## 2021-10-18 NOTE — TELEPHONE ENCOUNTER
Patients is called to schedule the procedure with Dr. Valdez, however she thought is was only for 1 apt.  She wasn't aware of the post US and follow up.  She would like a call back with more information about what the procedure will do for her.

## 2021-10-18 NOTE — TELEPHONE ENCOUNTER
Spoke with patient regarding vein chemical ablation,symp VV are not curable. Treat with compression socks and vein procedures, reviewed need for f/u US and no vigorous activity for 2 weeks or flights for 3 weeks. Patient has her  being treated for prostate cancer. Patient will call when wants to schedule.

## 2021-10-27 ENCOUNTER — TRANSFERRED RECORDS (OUTPATIENT)
Dept: HEALTH INFORMATION MANAGEMENT | Facility: CLINIC | Age: 71
End: 2021-10-27

## 2021-10-27 LAB — RETINOPATHY: NEGATIVE

## 2021-10-31 DIAGNOSIS — E78.2 MIXED HYPERLIPIDEMIA: ICD-10-CM

## 2021-10-31 DIAGNOSIS — E11.9 CONTROLLED TYPE 2 DIABETES MELLITUS WITHOUT COMPLICATION, WITHOUT LONG-TERM CURRENT USE OF INSULIN (H): ICD-10-CM

## 2021-10-31 DIAGNOSIS — I10 HTN (HYPERTENSION): ICD-10-CM

## 2021-11-01 RX ORDER — ATORVASTATIN CALCIUM 20 MG/1
20 TABLET, FILM COATED ORAL DAILY
Qty: 90 TABLET | Refills: 3 | Status: SHIPPED | OUTPATIENT
Start: 2021-11-01 | End: 2022-10-31

## 2021-11-01 RX ORDER — HYDROCHLOROTHIAZIDE 25 MG/1
TABLET ORAL
Qty: 90 TABLET | Refills: 2 | Status: SHIPPED | OUTPATIENT
Start: 2021-11-01 | End: 2022-08-01

## 2021-11-01 NOTE — TELEPHONE ENCOUNTER
"Routing refill request to provider for review/approval because:  Labs out of range:  Creatinine     Last Written Prescription Date:  12/18/20  Last Fill Quantity: 90,  # refills: 2   Last office visit provider:  8/31/21     Requested Prescriptions   Pending Prescriptions Disp Refills     hydrochlorothiazide (HYDRODIURIL) 25 MG tablet [Pharmacy Med Name: HYDROCHLOROTHIAZIDE 25 MG TAB] 90 tablet 2     Sig: TAKE 1 TABLET BY MOUTH EVERY DAY       Diuretics (Including Combos) Protocol Failed - 10/31/2021  9:11 AM        Failed - Normal serum creatinine on file in past 12 months     Recent Labs   Lab Test 05/05/21  1550   CR 1.32*              Passed - Blood pressure under 140/90 in past 12 months     BP Readings from Last 3 Encounters:   08/31/21 100/52   08/17/21 118/64   05/11/21 (!) 152/70                 Passed - Recent (12 mo) or future (30 days) visit within the authorizing provider's specialty     Patient has had an office visit with the authorizing provider or a provider within the authorizing providers department within the previous 12 mos or has a future within next 30 days. See \"Patient Info\" tab in inbasket, or \"Choose Columns\" in Meds & Orders section of the refill encounter.              Passed - Medication is active on med list        Passed - Patient is age 18 or older        Passed - No active pregancy on record        Passed - Normal serum potassium on file in past 12 months     Recent Labs   Lab Test 05/05/21  1550   POTASSIUM 4.0                    Passed - Normal serum sodium on file in past 12 months     Recent Labs   Lab Test 05/05/21  1550                 Passed - No positive pregnancy test in past 12 months         Signed Prescriptions Disp Refills    atorvastatin (LIPITOR) 20 MG tablet 90 tablet 3     Sig: Take 1 tablet (20 mg) by mouth daily       Statins Protocol Passed - 10/31/2021  9:11 AM        Passed - LDL on file in past 12 months     Recent Labs   Lab Test 08/31/21  0900   LDL 59 " "            Passed - No abnormal creatine kinase in past 12 months     No lab results found.             Passed - Recent (12 mo) or future (30 days) visit within the authorizing provider's specialty     Patient has had an office visit with the authorizing provider or a provider within the authorizing providers department within the previous 12 mos or has a future within next 30 days. See \"Patient Info\" tab in inbasket, or \"Choose Columns\" in Meds & Orders section of the refill encounter.              Passed - Medication is active on med list        Passed - Patient is age 18 or older        Passed - No active pregnancy on record        Passed - No positive pregnancy test in past 12 months             Michael Snyder RN 11/01/21 10:51 AM  "

## 2021-11-01 NOTE — TELEPHONE ENCOUNTER
"Last Written Prescription Date:  11/16/20  Last Fill Quantity: 90,  # refills: 3   Last office visit provider:  8/31/21     Requested Prescriptions   Pending Prescriptions Disp Refills     atorvastatin (LIPITOR) 20 MG tablet [Pharmacy Med Name: ATORVASTATIN 20 MG TABLET] 90 tablet 3     Sig: TAKE 1 TABLET BY MOUTH EVERY DAY       Statins Protocol Passed - 10/31/2021  9:11 AM        Passed - LDL on file in past 12 months     Recent Labs   Lab Test 08/31/21  0900   LDL 59             Passed - No abnormal creatine kinase in past 12 months     No lab results found.             Passed - Recent (12 mo) or future (30 days) visit within the authorizing provider's specialty     Patient has had an office visit with the authorizing provider or a provider within the authorizing providers department within the previous 12 mos or has a future within next 30 days. See \"Patient Info\" tab in inbasket, or \"Choose Columns\" in Meds & Orders section of the refill encounter.              Passed - Medication is active on med list        Passed - Patient is age 18 or older        Passed - No active pregnancy on record        Passed - No positive pregnancy test in past 12 months           hydrochlorothiazide (HYDRODIURIL) 25 MG tablet [Pharmacy Med Name: HYDROCHLOROTHIAZIDE 25 MG TAB] 90 tablet 2     Sig: TAKE 1 TABLET BY MOUTH EVERY DAY       Diuretics (Including Combos) Protocol Failed - 10/31/2021  9:11 AM        Failed - Normal serum creatinine on file in past 12 months     Recent Labs   Lab Test 05/05/21  1550   CR 1.32*              Passed - Blood pressure under 140/90 in past 12 months     BP Readings from Last 3 Encounters:   08/31/21 100/52   08/17/21 118/64   05/11/21 (!) 152/70                 Passed - Recent (12 mo) or future (30 days) visit within the authorizing provider's specialty     Patient has had an office visit with the authorizing provider or a provider within the authorizing providers department within the previous 12 " "mos or has a future within next 30 days. See \"Patient Info\" tab in inbasket, or \"Choose Columns\" in Meds & Orders section of the refill encounter.              Passed - Medication is active on med list        Passed - Patient is age 18 or older        Passed - No active pregancy on record        Passed - Normal serum potassium on file in past 12 months     Recent Labs   Lab Test 05/05/21  1550   POTASSIUM 4.0                    Passed - Normal serum sodium on file in past 12 months     Recent Labs   Lab Test 05/05/21  1550                 Passed - No positive pregnancy test in past 12 months             Michael Snyder RN 11/01/21 10:51 AM  "

## 2021-11-13 DIAGNOSIS — E03.9 ACQUIRED HYPOTHYROIDISM: ICD-10-CM

## 2021-11-13 DIAGNOSIS — Z76.0 ENCOUNTER FOR MEDICATION REFILL: Primary | ICD-10-CM

## 2021-11-15 RX ORDER — LEVOTHYROXINE SODIUM 75 UG/1
TABLET ORAL
Qty: 90 TABLET | Refills: 2 | Status: SHIPPED | OUTPATIENT
Start: 2021-11-15 | End: 2022-08-15

## 2021-12-11 ENCOUNTER — HEALTH MAINTENANCE LETTER (OUTPATIENT)
Age: 71
End: 2021-12-11

## 2022-01-04 DIAGNOSIS — I10 ESSENTIAL HYPERTENSION: ICD-10-CM

## 2022-01-04 DIAGNOSIS — Z76.0 ENCOUNTER FOR MEDICATION REFILL: Primary | ICD-10-CM

## 2022-01-04 RX ORDER — METOPROLOL SUCCINATE 100 MG/1
TABLET, EXTENDED RELEASE ORAL
Qty: 135 TABLET | Refills: 3 | Status: SHIPPED | OUTPATIENT
Start: 2022-01-04 | End: 2022-12-26

## 2022-01-04 RX ORDER — SPIRONOLACTONE 25 MG/1
TABLET ORAL
Qty: 90 TABLET | Refills: 3 | Status: SHIPPED | OUTPATIENT
Start: 2022-01-04 | End: 2022-12-19

## 2022-01-31 ENCOUNTER — OFFICE VISIT (OUTPATIENT)
Dept: FAMILY MEDICINE | Facility: CLINIC | Age: 72
End: 2022-01-31
Payer: COMMERCIAL

## 2022-01-31 VITALS
SYSTOLIC BLOOD PRESSURE: 120 MMHG | WEIGHT: 175 LBS | HEART RATE: 55 BPM | DIASTOLIC BLOOD PRESSURE: 50 MMHG | BODY MASS INDEX: 32.2 KG/M2 | OXYGEN SATURATION: 99 % | RESPIRATION RATE: 20 BRPM | TEMPERATURE: 98.2 F | HEIGHT: 62 IN

## 2022-01-31 DIAGNOSIS — G47.9 SLEEP DISTURBANCE: ICD-10-CM

## 2022-01-31 DIAGNOSIS — I83.811 VARICOSE VEINS OF RIGHT LOWER EXTREMITY WITH PAIN: ICD-10-CM

## 2022-01-31 DIAGNOSIS — M17.0 PRIMARY OSTEOARTHRITIS OF BOTH KNEES: ICD-10-CM

## 2022-01-31 DIAGNOSIS — E11.21 TYPE 2 DIABETES MELLITUS WITH DIABETIC NEPHROPATHY, WITHOUT LONG-TERM CURRENT USE OF INSULIN (H): Primary | ICD-10-CM

## 2022-01-31 DIAGNOSIS — E03.9 ACQUIRED HYPOTHYROIDISM: ICD-10-CM

## 2022-01-31 DIAGNOSIS — N26.1 ATROPHIC KIDNEY: ICD-10-CM

## 2022-01-31 DIAGNOSIS — I10 ESSENTIAL HYPERTENSION: ICD-10-CM

## 2022-01-31 DIAGNOSIS — M81.0 AGE-RELATED OSTEOPOROSIS WITHOUT CURRENT PATHOLOGICAL FRACTURE: ICD-10-CM

## 2022-01-31 DIAGNOSIS — N18.31 STAGE 3A CHRONIC KIDNEY DISEASE (H): ICD-10-CM

## 2022-01-31 DIAGNOSIS — E78.2 MIXED HYPERLIPIDEMIA: ICD-10-CM

## 2022-01-31 LAB
HBA1C MFR BLD: 5.2 % (ref 0–5.6)
HOLD SPECIMEN: NORMAL

## 2022-01-31 PROCEDURE — 83036 HEMOGLOBIN GLYCOSYLATED A1C: CPT | Performed by: FAMILY MEDICINE

## 2022-01-31 PROCEDURE — 99214 OFFICE O/P EST MOD 30 MIN: CPT | Performed by: FAMILY MEDICINE

## 2022-01-31 PROCEDURE — 36415 COLL VENOUS BLD VENIPUNCTURE: CPT | Performed by: FAMILY MEDICINE

## 2022-01-31 RX ORDER — TRAZODONE HYDROCHLORIDE 50 MG/1
50 TABLET, FILM COATED ORAL
Qty: 30 TABLET | Refills: 3 | Status: SHIPPED | OUTPATIENT
Start: 2022-01-31 | End: 2022-02-23

## 2022-01-31 ASSESSMENT — MIFFLIN-ST. JEOR: SCORE: 1265.85

## 2022-01-31 ASSESSMENT — PATIENT HEALTH QUESTIONNAIRE - PHQ9: SUM OF ALL RESPONSES TO PHQ QUESTIONS 1-9: 14

## 2022-01-31 NOTE — PROGRESS NOTES
Assessment & Plan     Type 2 diabetes mellitus with diabetic nephropathy, without long-term current use of insulin (H)  DM well controlled with diet  - Hemoglobin A1c    Essential hypertension  BP well controlled with current regimen    Stage 3a chronic kidney disease (H)  Atrophic kidney  appt with renal doc tomorrow  Asked to have consult note and labs sent to me     Mixed hyperlipidemia  Continue lipitor    Acquired hypothyroidism  Continue current dose of levothryoxine    Varicose veins of right lower extremity with pain  Waiting to decide when and if she wants to have a procedure done    Age-related osteoporosis without current pathological fracture  Due for repeat bone scan   - DX Hip/Pelvis/Spine    Primary osteoarthritis of both knees  Discussed glucosamine, anti-inflammatory diet, PT, weight loss,     Sleep disturbance -  related to stress, care giving  with health and memory problems. Will address sleep issues and diet changes.  May consider antidepressant if symptoms persist.  - traZODone (DESYREL) 50 MG tablet  Dispense: 30 tablet; Refill: 3      Review of external notes as documented elsewhere in note  Ordering of each unique test  Prescription drug management  38 minutes spent on the date of the encounter doing chart review, history and exam, documentation and further activities per the note    Depression Screening Follow Up    PHQ 1/31/2022   PHQ-9 Total Score 14   Q9: Thoughts of better off dead/self-harm past 2 weeks Not at all         Return in about 4 months (around 5/31/2022) for Follow up, with me, in person, for DM, for HTN.    Yamila Paniagua MD  [facility]     Liz GALLAGHER Atul is a 71 year old female who presents today for the following   health issues: DM     DM T2 - well controlled - diet controlled  A1c  5.2%   on metformin - stopped last year  BP wnl  microalbumin wnl 9/30/20  - on lisinopril  ASA  LDL  59 on 8/31/21 oon lipitor   Eye  - seen  at [facility] on 10/28/21 - no retinopathy  Smokes - 1 pack very 3 days (not ready to quit b/c feeling stressed)  Foot exam done    CKD Stage 3  H/o atrophic kidney   GFR 40  Renal appt tomorrow    HTN - on lisinopril 40mg, HCTZ 24yo, metoprolol XL 100mg, spironolactone 25mg  BP wnl today  Lytes and LFT wnl 5/5/21     Hypothyroidism -   Last TSH 0.27 on 9/21/17 and levothyroxine decreased from 100mcg -> 88mcg in Oct 2017 -> 2.19 on 7/2/18 -> 0.21 on 7/11/19 and TSH decreased in July 2019 and levothyroxine decreased from 88mcg -> 75mcg -> 3.13 on 9/30/20 -> 1.37 on 8/31/21     Vaccines  Covid #1 2/25/ 2021  And #2 on 3/25/21  Shingles - plans to get at pharmacy and check insurance      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  Coarsened hepatic parenchymal echotexture likely due to underlying fibrosis.  2.  No significant focal liver lesion.     Vit D deficiency -   89.4 on 9/30/21 - on ergo and can transition to low dose daily      Sleep disturbance   Waking in the middle of the night  Helping  with CPAP machine middle of the night  Unable to sleep full 8 hours     STEOPOROSIS and HIGH fracture risk.   DEXA 2/2020 -   Appropriate evaluation and treatment recommended with follow up bone density scan in 1 to 2 years.      Varicose veins  Vascular vein consult 8/17/21 -reviewed   DX - incompetency and insuffiencey of the Rignt Short saphenous vein  PROCEDURE: Endovenous closure of the right short saphenous vein.  Waiting on procedure       Lung cancer screening - neg CT 9/9/21     Social    having health issues and  Memory issues      Review of Systems     Please see above.  The rest of the review of systems are negative for all systems.    Current Outpatient Medications   Medication Instructions     aspirin (ASA) 81 mg, PRN     atorvastatin (LIPITOR) 20 mg, Oral, DAILY      "Biotin 10 MG CAPS Oral     blood glucose test (CONTOUR NEXT TEST STRIPS) strips 1 each, Does not apply, DAILY     blood-glucose meter Misc [BLOOD-GLUCOSE METER MISC] 1 glucometer device  - any brand covered by insurance (contour covered by insurance? )     hydrochlorothiazide (HYDRODIURIL) 25 MG tablet TAKE 1 TABLET BY MOUTH EVERY DAY     levothyroxine (SYNTHROID/LEVOTHROID) 75 MCG tablet TAKE 1 TABLET BY MOUTH EVERY DAY     lisinopril (ZESTRIL) 40 MG tablet TAKE 1 TABLET BY MOUTH EVERY DAY     metoprolol succinate ER (TOPROL-XL) 100 MG 24 hr tablet TAKE 1 AND 1/2 TABLETS BY MOUTH EVERY DAY     spironolactone (ALDACTONE) 25 MG tablet TAKE 1 TABLET BY MOUTH EVERY DAY         Objective   Vitals:    01/31/22 1222   BP: 120/50   BP Location: Right arm   Patient Position: Sitting   Cuff Size: Adult Regular   Pulse: 55   Resp: 20   Temp: 98.2  F (36.8  C)   TempSrc: Oral   SpO2: 99%   Weight: 79.4 kg (175 lb)   Height: 1.565 m (5' 1.61\")       Body mass index is 32.41 kg/m .    Physical Exam    OBJECTIVE:  Vitals listed above within normal limits  General appearance: well groomed, pleasant, well hydrated, nontoxic appearing  ENT: PERRL, throat clear  Neck: neck supple, no lymphadenopathy, no thyromegaly  Lungs: lungs clear to auscultation bilaterally, no wheezes or rhonchi  Heart: regular rate and rhythm, no murmurs, rubs or gallops  Abdomen: soft, nontender  Neuro: no focal deficits, CN II-XII grossly intact, alert and oriented  Psych:  mood stable, appears to have good insight and judgment    Recent Results (from the past 1008 hour(s))   Hemoglobin A1c    Collection Time: 01/31/22 12:35 PM   Result Value Ref Range    Hemoglobin A1C 5.2 0.0 - 5.6 %              "

## 2022-02-01 ENCOUNTER — TRANSFERRED RECORDS (OUTPATIENT)
Dept: HEALTH INFORMATION MANAGEMENT | Facility: CLINIC | Age: 72
End: 2022-02-01
Payer: COMMERCIAL

## 2022-02-01 LAB
ALBUMIN (URINE) MG/SPEC: 0.4 MG/DL
ALBUMIN/CREATININE RATIO: 3 MCG/MG CREAT
CREATININE (EXTERNAL): 1.45 MG/DL (ref 0.5–0.99)
CREATININE (URINE): 141 MG/DL (ref 20–275)
GFR ESTIMATED (EXTERNAL): 37 ML/MIN/1.73M2
GFR ESTIMATED (IF AFRICAN AMERICAN) (EXTERNAL): 42 ML/MIN/1.73M2
GLUCOSE (EXTERNAL): 83 MG/DL (ref 65–99)
POTASSIUM (EXTERNAL): 4.3 MMOL/L (ref 3.5–5.3)

## 2022-02-09 ENCOUNTER — HOSPITAL ENCOUNTER (OUTPATIENT)
Dept: ULTRASOUND IMAGING | Facility: HOSPITAL | Age: 72
Discharge: HOME | End: 2022-02-09
Payer: COMMERCIAL

## 2022-02-09 DIAGNOSIS — N18.30 CKD (CHRONIC KIDNEY DISEASE) STAGE 3, GFR 30-59 ML/MIN (H): ICD-10-CM

## 2022-02-09 PROCEDURE — 76770 US EXAM ABDO BACK WALL COMP: CPT

## 2022-02-19 DIAGNOSIS — I10 ESSENTIAL HYPERTENSION: ICD-10-CM

## 2022-02-21 RX ORDER — LISINOPRIL 40 MG/1
TABLET ORAL
Qty: 90 TABLET | Refills: 2 | Status: SHIPPED | OUTPATIENT
Start: 2022-02-21 | End: 2022-12-19

## 2022-02-21 NOTE — TELEPHONE ENCOUNTER
"Routing refill request to provider for review/approval because:  Labs out of range:  Creatinine   Early fill requested    Last Written Prescription Date:  8/9/21  Last Fill Quantity: 90,  # refills: 2   Last office visit provider:  1/31/22     Requested Prescriptions   Pending Prescriptions Disp Refills     lisinopril (ZESTRIL) 40 MG tablet [Pharmacy Med Name: LISINOPRIL 40 MG TABLET] 90 tablet 2     Sig: TAKE 1 TABLET BY MOUTH EVERY DAY       ACE Inhibitors (Including Combos) Protocol Failed - 2/19/2022  3:39 PM        Failed - Normal serum creatinine on file in past 12 months     Recent Labs   Lab Test 05/05/21  1550   CR 1.32*       Ok to refill medication if creatinine is low          Passed - Blood pressure under 140/90 in past 12 months     BP Readings from Last 3 Encounters:   01/31/22 120/50   08/31/21 100/52   08/17/21 118/64                 Passed - Recent (12 mo) or future (30 days) visit within the authorizing provider's specialty     Patient has had an office visit with the authorizing provider or a provider within the authorizing providers department within the previous 12 mos or has a future within next 30 days. See \"Patient Info\" tab in inbasket, or \"Choose Columns\" in Meds & Orders section of the refill encounter.              Passed - Medication is active on med list        Passed - Patient is age 18 or older        Passed - No active pregnancy on record        Passed - Normal serum potassium on file in past 12 months     Recent Labs   Lab Test 02/01/22  1200 05/05/21  1550   POTASSIUM  --  4.0   41832 4.3  --              Passed - No positive pregnancy test within past 12 months             Rosario Guaman RN 02/21/22 2:20 PM  "

## 2022-02-23 DIAGNOSIS — Z76.0 ENCOUNTER FOR MEDICATION REFILL: Primary | ICD-10-CM

## 2022-02-23 DIAGNOSIS — G47.9 SLEEP DISTURBANCE: ICD-10-CM

## 2022-02-23 RX ORDER — TRAZODONE HYDROCHLORIDE 50 MG/1
TABLET, FILM COATED ORAL
Qty: 30 TABLET | Refills: 3 | Status: SHIPPED | OUTPATIENT
Start: 2022-02-23 | End: 2022-05-10

## 2022-05-10 ENCOUNTER — OFFICE VISIT (OUTPATIENT)
Dept: FAMILY MEDICINE | Facility: CLINIC | Age: 72
End: 2022-05-10
Payer: COMMERCIAL

## 2022-05-10 VITALS
SYSTOLIC BLOOD PRESSURE: 120 MMHG | OXYGEN SATURATION: 98 % | RESPIRATION RATE: 14 BRPM | BODY MASS INDEX: 31.15 KG/M2 | HEART RATE: 57 BPM | HEIGHT: 61 IN | TEMPERATURE: 98.3 F | DIASTOLIC BLOOD PRESSURE: 50 MMHG | WEIGHT: 165 LBS

## 2022-05-10 DIAGNOSIS — M25.552 GREATER TROCHANTERIC PAIN SYNDROME OF BOTH LOWER EXTREMITIES: ICD-10-CM

## 2022-05-10 DIAGNOSIS — B18.2 CHRONIC HEPATITIS C WITHOUT HEPATIC COMA (H): ICD-10-CM

## 2022-05-10 DIAGNOSIS — E03.8 OTHER SPECIFIED HYPOTHYROIDISM: ICD-10-CM

## 2022-05-10 DIAGNOSIS — E55.9 VITAMIN D DEFICIENCY: ICD-10-CM

## 2022-05-10 DIAGNOSIS — E11.21 TYPE 2 DIABETES MELLITUS WITH DIABETIC NEPHROPATHY, WITHOUT LONG-TERM CURRENT USE OF INSULIN (H): ICD-10-CM

## 2022-05-10 DIAGNOSIS — M54.50 CHRONIC MIDLINE LOW BACK PAIN WITHOUT SCIATICA: ICD-10-CM

## 2022-05-10 DIAGNOSIS — G89.29 CHRONIC MIDLINE LOW BACK PAIN WITHOUT SCIATICA: ICD-10-CM

## 2022-05-10 DIAGNOSIS — N18.31 STAGE 3A CHRONIC KIDNEY DISEASE (H): Primary | ICD-10-CM

## 2022-05-10 DIAGNOSIS — E78.2 MIXED HYPERLIPIDEMIA: ICD-10-CM

## 2022-05-10 DIAGNOSIS — M25.551 GREATER TROCHANTERIC PAIN SYNDROME OF BOTH LOWER EXTREMITIES: ICD-10-CM

## 2022-05-10 DIAGNOSIS — I10 ESSENTIAL HYPERTENSION: ICD-10-CM

## 2022-05-10 LAB
ALBUMIN SERPL-MCNC: 4.4 G/DL (ref 3.5–5)
ALP SERPL-CCNC: 74 U/L (ref 45–120)
ALT SERPL W P-5'-P-CCNC: 32 U/L (ref 0–45)
ANION GAP SERPL CALCULATED.3IONS-SCNC: 14 MMOL/L (ref 5–18)
AST SERPL W P-5'-P-CCNC: 38 U/L (ref 0–40)
BILIRUB SERPL-MCNC: 0.4 MG/DL (ref 0–1)
BUN SERPL-MCNC: 55 MG/DL (ref 8–22)
CALCIUM SERPL-MCNC: 9.8 MG/DL (ref 8.5–10.5)
CHLORIDE BLD-SCNC: 107 MMOL/L (ref 98–107)
CHOLEST SERPL-MCNC: 104 MG/DL
CO2 SERPL-SCNC: 21 MMOL/L (ref 22–31)
CREAT SERPL-MCNC: 1.66 MG/DL (ref 0.6–1.1)
FASTING STATUS PATIENT QL REPORTED: ABNORMAL
GFR SERPL CREATININE-BSD FRML MDRD: 33 ML/MIN/1.73M2
GLUCOSE BLD-MCNC: 86 MG/DL (ref 70–125)
HDLC SERPL-MCNC: 38 MG/DL
LDLC SERPL CALC-MCNC: 57 MG/DL
POTASSIUM BLD-SCNC: 4.5 MMOL/L (ref 3.5–5)
PROT SERPL-MCNC: 8.3 G/DL (ref 6–8)
SODIUM SERPL-SCNC: 142 MMOL/L (ref 136–145)
TRIGL SERPL-MCNC: 44 MG/DL
TSH SERPL DL<=0.005 MIU/L-ACNC: 1.51 UIU/ML (ref 0.3–5)

## 2022-05-10 PROCEDURE — 36415 COLL VENOUS BLD VENIPUNCTURE: CPT | Performed by: FAMILY MEDICINE

## 2022-05-10 PROCEDURE — 99215 OFFICE O/P EST HI 40 MIN: CPT | Performed by: FAMILY MEDICINE

## 2022-05-10 PROCEDURE — 99207 PR FOOT EXAM NO CHARGE: CPT | Performed by: FAMILY MEDICINE

## 2022-05-10 PROCEDURE — 80061 LIPID PANEL: CPT | Performed by: FAMILY MEDICINE

## 2022-05-10 PROCEDURE — 80053 COMPREHEN METABOLIC PANEL: CPT | Performed by: FAMILY MEDICINE

## 2022-05-10 PROCEDURE — 84443 ASSAY THYROID STIM HORMONE: CPT | Performed by: FAMILY MEDICINE

## 2022-05-10 PROCEDURE — 82306 VITAMIN D 25 HYDROXY: CPT | Performed by: FAMILY MEDICINE

## 2022-05-10 NOTE — LETTER
May 17, 2022      Dirk Pollard  [address] [place] [address]        Dear ,    We are writing to inform you of your test results.        Resulted Orders   Comprehensive metabolic panel (BMP + Alb, Alk Phos, ALT, AST, Total. Bili, TP)   Result Value Ref Range    Sodium 142 136 - 145 mmol/L    Potassium 4.5 3.5 - 5.0 mmol/L    Chloride 107 98 - 107 mmol/L    Carbon Dioxide (CO2) 21 (L) 22 - 31 mmol/L    Anion Gap 14 5 - 18 mmol/L    Urea Nitrogen 55 (H) 8 - 22 mg/dL    Creatinine 1.66 (H) 0.60 - 1.10 mg/dL    Calcium 9.8 8.5 - 10.5 mg/dL    Glucose 86 70 - 125 mg/dL    Alkaline Phosphatase 74 45 - 120 U/L    AST 38 0 - 40 U/L    ALT 32 0 - 45 U/L    Protein Total 8.3 (H) 6.0 - 8.0 g/dL    Albumin 4.4 3.5 - 5.0 g/dL    Bilirubin Total 0.4 0.0 - 1.0 mg/dL    GFR Estimate 33 (L) >60 mL/min/1.73m2      Comment:      Effective December 21, 2021 eGFRcr in adults is calculated using the 2021 CKD-EPI creatinine equation which includes age and gender (Sonia et al., NE, DOI: 10.1056/NHTNlm7634676)   TSH   Result Value Ref Range    TSH 1.51 0.30 - 5.00 uIU/mL   Lipid panel reflex to direct LDL Fasting   Result Value Ref Range    Cholesterol 104 <=199 mg/dL    Triglycerides 44 <=149 mg/dL    Direct Measure HDL 38 (L) >=50 mg/dL      Comment:      HDL Cholesterol Reference Range:     0-2 years:   No reference ranges established for patients under 2 years old  at [facility] for lipid analytes.    2-8 years:  Greater than 45 mg/dL     18 years and older:   Female: Greater than or equal to 50 mg/dL   Male:   Greater than or equal to 40 mg/dL    LDL Cholesterol Calculated 57 <=129 mg/dL    Patient Fasting > 8hrs? Unknown    Vitamin D Deficiency   Result Value Ref Range    Vitamin D, Total (25-Hydroxy) 30 20 - 75 ug/L    Narrative    Season, race, dietary intake, and treatment affect the concentration of 25-hydroxy-Vitamin D. Values may decrease during winter months and increase during summer  months. Values 20-29 ug/L may indicate Vitamin D insufficiency and values <20 ug/L may indicate Vitamin D deficiency.    Vitamin D determination is routinely performed by an immunoassay specific for 25 hydroxyvitamin D3.  If an individual is on vitamin D2(ergocalciferol) supplementation, please specify 25 OH vitamin D2 and D3 level determination by LCMSMS test [id number].         If you have any questions or concerns, please call the clinic at the number listed above.       Sincerely,      Yamila Paniagua MD

## 2022-05-10 NOTE — PROGRESS NOTES
"      Assessment & Plan     Stage 3a chronic kidney disease (H)  Reviewed consult note from 2/1/22   I called her kidney doc about Kappa light chain elevation and atrophic kidney on ultrasound in 9/2021 then normall on ultrasound 2/2022.  Awaiting treatment plan   - Comprehensive metabolic panel (BMP + Alb, Alk Phos, ALT, AST, Total. Bili, TP)    Hypertension  BP well controlled with current regimen    Other specified hypothyroidism  Recheck TSH, continue levothyroxine   - TSH    Vitamin D deficiency  Recheck level  - Vitamin D Deficiency    Mixed hyperlipidemia  Recheck and continue statin  - Lipid panel reflex to direct LDL Fasting    Type 2 diabetes mellitus with diabetic nephropathy, without long-term current use of insulin (H)  Blood sugars well controlled with diet with A1c consistently <6  - FOOT EXAM    Chronic hepatitis C without hepatic coma (H)  S/p tx and in remission    Chronic midline low back pain without sciatica  Greater trochanteric pain syndrome of both lower extremities  She has pain over trochanter but also lower back pan and foot pain.   Given info about trochanter bursitis and home exercises recommended  discussed weight loss  Referral to ortho for evaluation for underlying etiology  - Orthopedic  Referral      Review of external notes as documented elsewhere in note  Discussion of management or test interpretation with external physician/other qualified healthcare professional/appropriate source - renal doctor  Ordering of each unique test  Prescription drug management  44 minutes spent on the date of the encounter doing chart review, history and exam, documentation and further activities per the note    BMI:   Estimated body mass index is 30.87 kg/m  as calculated from the following:    Height as of this encounter: 1.557 m (5' 1.3\").    Weight as of this encounter: 74.8 kg (165 lb).   Weight management plan: Discussed healthy diet and exercise guidelines      Return in about 3 " months (around 8/10/2022) for with me, in person, for DM, for HTN.    Yamila Paniagua MD  [facility]     Liz Pollard is a 71 year old female who presents today for the following   health issues: CKD and sleep     CKD - renal consult on 2/1/22 with Dr. Teo Flor reviewed.   H/o atrophic right kidney and proteinuria   Labs and ultrasound ordered  Significant labs: GFR 37, nl microalbumin, elevated Kappa light chain proteins  Renal ultrasound on 2/9/22 -  Normal-appearing kidneys without hydronephrosis  - no clear plan given these results     HTN -   BP wnl   Home blood pressures 117/55    DM T2 - well controlled - diet controlled  A1c  5.2% on 1/31/11   on metformin - stopped last year  BP wnl  microalbumin wnl 2/2022 - on lisinopril  ASA  LDL  59 on 8/31/21 oon lipitor   Eye  - seen at [facility] on 10/28/21 - no retinopathy  Smokes - 1 pack very 3 days (not ready to quit b/c feeling stressed)  Foot exam done    Sleep improving with adjusting sleep schedule and managing stress   Given rx for trazodone but the list of side of effects scared her so she never go this  She instead is using an old muscle relaxer from a few years ago that is helping her sleep - using these old pills but does not remember the name of the med  Tried melatonin that did not sleep   Declines sleep clinic referral     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  Coarsened hepatic echotexture likely due to hepatic parenchymal disease.  2.  No focal hepatic mass.  3.  Atrophic right kidney redemonstrated without hydronephrosis.    colon 2018  mammo 8/10/20  Pap s/p hysterectomy    Review of Systems     Please see above.  The rest of the review of systems are negative for all systems.    Current Outpatient Medications   Medication Instructions     aspirin (ASA) 81 mg, PRN     atorvastatin  "(LIPITOR) 20 mg, Oral, DAILY     Biotin 10 MG CAPS Oral     blood glucose test (CONTOUR NEXT TEST STRIPS) strips 1 each, Does not apply, DAILY     blood-glucose meter Misc [BLOOD-GLUCOSE METER MISC] 1 glucometer device  - any brand covered by insurance (contour covered by insurance? )     hydrochlorothiazide (HYDRODIURIL) 25 MG tablet TAKE 1 TABLET BY MOUTH EVERY DAY     levothyroxine (SYNTHROID/LEVOTHROID) 75 MCG tablet TAKE 1 TABLET BY MOUTH EVERY DAY     lisinopril (ZESTRIL) 40 MG tablet TAKE 1 TABLET BY MOUTH EVERY DAY     metoprolol succinate ER (TOPROL-XL) 100 MG 24 hr tablet TAKE 1 AND 1/2 TABLETS BY MOUTH EVERY DAY     spironolactone (ALDACTONE) 25 MG tablet TAKE 1 TABLET BY MOUTH EVERY DAY     traZODone (DESYREL) 50 MG tablet TAKE 1 TABLET BY MOUTH NIGHTLY AS NEEDED FOR SLEEP         Objective   Vitals:    05/10/22 1147   BP: 120/50   Pulse: 57   Resp: 14   Temp: 98.3  F (36.8  C)   TempSrc: Oral   SpO2: 98%   Weight: 74.8 kg (165 lb)   Height: 1.557 m (5' 1.3\")       Body mass index is 30.87 kg/m .    Physical Exam    OBJECTIVE:  Vitals listed above within normal limits  General appearance: well groomed, pleasant, well hydrated, nontoxic appearing  ENT: PERRL, throat clear  Neck: neck supple, no lymphadenopathy, no thyromegaly  Lungs: lungs clear to auscultation bilaterally, no wheezes or rhonchi  Heart: regular rate and rhythm, no murmurs, rubs or gallops  Abdomen: soft, nontender  Neuro: no focal deficits, CN II-XII grossly intact, alert and oriented  Psych:  mood stable, appears to have good insight and judgment  DM foot exam: normal pedal pulses, no deformities, good sensation to microfilament, no callouses             "

## 2022-05-11 LAB — DEPRECATED CALCIDIOL+CALCIFEROL SERPL-MC: 30 UG/L (ref 20–75)

## 2022-08-01 DIAGNOSIS — I10 HTN (HYPERTENSION): ICD-10-CM

## 2022-08-01 DIAGNOSIS — Z76.0 ENCOUNTER FOR MEDICATION REFILL: Primary | ICD-10-CM

## 2022-08-01 RX ORDER — HYDROCHLOROTHIAZIDE 25 MG/1
TABLET ORAL
Qty: 90 TABLET | Refills: 3 | Status: SHIPPED | OUTPATIENT
Start: 2022-08-01 | End: 2022-08-11

## 2022-08-11 ENCOUNTER — OFFICE VISIT (OUTPATIENT)
Dept: FAMILY MEDICINE | Facility: CLINIC | Age: 72
End: 2022-08-11
Payer: COMMERCIAL

## 2022-08-11 VITALS
TEMPERATURE: 98 F | HEART RATE: 54 BPM | DIASTOLIC BLOOD PRESSURE: 50 MMHG | BODY MASS INDEX: 29.63 KG/M2 | RESPIRATION RATE: 16 BRPM | OXYGEN SATURATION: 99 % | HEIGHT: 62 IN | WEIGHT: 161 LBS | SYSTOLIC BLOOD PRESSURE: 122 MMHG

## 2022-08-11 DIAGNOSIS — Z87.891 PERSONAL HISTORY OF TOBACCO USE: ICD-10-CM

## 2022-08-11 DIAGNOSIS — Z78.0 POST-MENOPAUSAL: ICD-10-CM

## 2022-08-11 DIAGNOSIS — E11.21 TYPE 2 DIABETES MELLITUS WITH DIABETIC NEPHROPATHY, WITHOUT LONG-TERM CURRENT USE OF INSULIN (H): ICD-10-CM

## 2022-08-11 DIAGNOSIS — R42 VERTIGO: ICD-10-CM

## 2022-08-11 DIAGNOSIS — Z76.89 ENCOUNTER TO ESTABLISH CARE: ICD-10-CM

## 2022-08-11 DIAGNOSIS — R31.1 BENIGN ESSENTIAL MICROSCOPIC HEMATURIA: ICD-10-CM

## 2022-08-11 DIAGNOSIS — E78.5 HYPERLIPIDEMIA LDL GOAL <130: ICD-10-CM

## 2022-08-11 DIAGNOSIS — E03.8 OTHER SPECIFIED HYPOTHYROIDISM: ICD-10-CM

## 2022-08-11 DIAGNOSIS — R80.9 PROTEINURIA, UNSPECIFIED TYPE: ICD-10-CM

## 2022-08-11 DIAGNOSIS — M81.0 AGE-RELATED OSTEOPOROSIS WITHOUT CURRENT PATHOLOGICAL FRACTURE: ICD-10-CM

## 2022-08-11 DIAGNOSIS — Z00.00 ENCOUNTER FOR MEDICARE ANNUAL WELLNESS EXAM: Primary | ICD-10-CM

## 2022-08-11 DIAGNOSIS — N18.30 STAGE 3 CHRONIC KIDNEY DISEASE, UNSPECIFIED WHETHER STAGE 3A OR 3B CKD (H): ICD-10-CM

## 2022-08-11 LAB
ALBUMIN SERPL BCG-MCNC: 4.6 G/DL (ref 3.5–5.2)
ALP SERPL-CCNC: 81 U/L (ref 35–104)
ALT SERPL W P-5'-P-CCNC: 24 U/L (ref 10–35)
ANION GAP SERPL CALCULATED.3IONS-SCNC: 9 MMOL/L (ref 7–15)
AST SERPL W P-5'-P-CCNC: 29 U/L (ref 10–35)
BILIRUB SERPL-MCNC: 0.3 MG/DL
BUN SERPL-MCNC: 31.8 MG/DL (ref 8–23)
CALCIUM SERPL-MCNC: 9.6 MG/DL (ref 8.8–10.2)
CHLORIDE SERPL-SCNC: 107 MMOL/L (ref 98–107)
CREAT SERPL-MCNC: 1.52 MG/DL (ref 0.51–0.95)
DEPRECATED HCO3 PLAS-SCNC: 23 MMOL/L (ref 22–29)
ERYTHROCYTE [DISTWIDTH] IN BLOOD BY AUTOMATED COUNT: 13.5 % (ref 10–15)
GFR SERPL CREATININE-BSD FRML MDRD: 37 ML/MIN/1.73M2
GLUCOSE SERPL-MCNC: 91 MG/DL (ref 70–99)
HBA1C MFR BLD: 5.1 % (ref 0–5.6)
HCT VFR BLD AUTO: 34.7 % (ref 35–47)
HGB BLD-MCNC: 11.5 G/DL (ref 11.7–15.7)
MCH RBC QN AUTO: 27.8 PG (ref 26.5–33)
MCHC RBC AUTO-ENTMCNC: 33.1 G/DL (ref 31.5–36.5)
MCV RBC AUTO: 84 FL (ref 78–100)
PLATELET # BLD AUTO: 193 10E3/UL (ref 150–450)
POTASSIUM SERPL-SCNC: 4.7 MMOL/L (ref 3.4–5.3)
PROT SERPL-MCNC: 7.8 G/DL (ref 6.4–8.3)
RBC # BLD AUTO: 4.13 10E6/UL (ref 3.8–5.2)
SODIUM SERPL-SCNC: 139 MMOL/L (ref 136–145)
WBC # BLD AUTO: 5.2 10E3/UL (ref 4–11)

## 2022-08-11 PROCEDURE — G0296 VISIT TO DETERM LDCT ELIG: HCPCS | Performed by: FAMILY MEDICINE

## 2022-08-11 PROCEDURE — 83036 HEMOGLOBIN GLYCOSYLATED A1C: CPT | Performed by: FAMILY MEDICINE

## 2022-08-11 PROCEDURE — 36415 COLL VENOUS BLD VENIPUNCTURE: CPT | Performed by: FAMILY MEDICINE

## 2022-08-11 PROCEDURE — 80053 COMPREHEN METABOLIC PANEL: CPT | Performed by: FAMILY MEDICINE

## 2022-08-11 PROCEDURE — 99213 OFFICE O/P EST LOW 20 MIN: CPT | Mod: 25 | Performed by: FAMILY MEDICINE

## 2022-08-11 PROCEDURE — 85027 COMPLETE CBC AUTOMATED: CPT | Performed by: FAMILY MEDICINE

## 2022-08-11 PROCEDURE — G0439 PPPS, SUBSEQ VISIT: HCPCS | Performed by: FAMILY MEDICINE

## 2022-08-11 RX ORDER — HYDROCHLOROTHIAZIDE 25 MG/1
12.5 TABLET ORAL DAILY
Qty: 90 TABLET | Refills: 3
Start: 2022-08-11 | End: 2023-01-16

## 2022-08-11 ASSESSMENT — ENCOUNTER SYMPTOMS
HEARTBURN: 1
PARESTHESIAS: 0
ARTHRALGIAS: 1
HEMATURIA: 0
HEMATOCHEZIA: 0
FREQUENCY: 1
NAUSEA: 0
PALPITATIONS: 0
BREAST MASS: 0
SORE THROAT: 1
DYSURIA: 0
HEADACHES: 0
COUGH: 0
CHILLS: 0
FEVER: 0
DIZZINESS: 1
DIARRHEA: 0
SHORTNESS OF BREATH: 0
ABDOMINAL PAIN: 0
EYE PAIN: 0
CONSTIPATION: 0
JOINT SWELLING: 0

## 2022-08-11 ASSESSMENT — ACTIVITIES OF DAILY LIVING (ADL): CURRENT_FUNCTION: NO ASSISTANCE NEEDED

## 2022-08-11 ASSESSMENT — PATIENT HEALTH QUESTIONNAIRE - PHQ9
SUM OF ALL RESPONSES TO PHQ QUESTIONS 1-9: 4
SUM OF ALL RESPONSES TO PHQ QUESTIONS 1-9: 4
10. IF YOU CHECKED OFF ANY PROBLEMS, HOW DIFFICULT HAVE THESE PROBLEMS MADE IT FOR YOU TO DO YOUR WORK, TAKE CARE OF THINGS AT HOME, OR GET ALONG WITH OTHER PEOPLE: NOT DIFFICULT AT ALL

## 2022-08-11 NOTE — PATIENT INSTRUCTIONS
Patient Education   Personalized Prevention Plan  You are due for the preventive services outlined below.  Your care team is available to assist you in scheduling these services.  If you have already completed any of these items, please share that information with your care team to update in your medical record.  Health Maintenance Due   Topic Date Due     Hemoglobin  05/05/2022     LUNG CANCER SCREENING  09/09/2022     Your Health Risk Assessment indicates you feel you are not in good health    A healthy lifestyle helps keep the body fit and the mind alert. It helps protect you from disease, helps you fight disease, and helps prevent chronic disease (disease that doesn't go away) from getting worse. This is important as you get older and begin to notice twinges in muscles and joints and a decline in the strength and stamina you once took for granted. A healthy lifestyle includes good healthcare, good nutrition, weight control, recreation, and regular exercise. Avoid harmful substances and do what you can to keep safe. Another part of a healthy lifestyle is stay mentally active and socially involved.    Good healthcare     Have a wellness visit every year.     If you have new symptoms, let us know right away. Don't wait until the next checkup.     Take medicines exactly as prescribed and keep your medicines in a safe place. Tell us if your medicine causes problems.   Healthy diet and weight control     Eat 3 or 4 small, nutritious, low-fat, high-fiber meals a day. Include a variety of fruits, vegetables, and whole-grain foods.     Make sure you get enough calcium in your diet. Calcium, vitamin D, and exercise help prevent osteoporosis (bone thinning).     If you live alone, try eating with others when you can. That way you get a good meal and have company while you eat it.     Try to keep a healthy weight. If you eat more calories than your body uses for energy, it will be stored as fat and you will gain weight.      Recreation   Recreation is not limited to sports and team events. It includes any activity that provides relaxation, interest, enjoyment, and exercise. Recreation provides an outlet for physical, mental, and social energy. It can give a sense of worth and achievement. It can help you stay healthy.    Mental Exercise and Social Involvement  Mental and emotional health is as important as physical health. Keep in touch with friends and family. Stay as active as possible. Continue to learn and challenge yourself.   Things you can do to stay mentally active are:    Learn something new, like a foreign language or musical instrument.     Play SCRABBLE or do crossword puzzles. If you cannot find people to play these games with you at home, you can play them with others on your computer through the Internet.     Join a games club--anything from card games to chess or checkers or lawn bowling.     Start a new hobby.     Go back to school.     Volunteer.     Read.   Keep up with world events.    Understanding USDA MyPlate  The USDA has guidelines to help you make healthy food choices. These are called MyPlate. MyPlate shows the food groups that make up healthy meals using the image of a place setting. Before you eat, think about the healthiest choices for what to put on your plate or in your cup or bowl. To learn more about building a healthy plate, visit www.choosemyplate.gov.    The food groups    Fruits. Any fruit or 100% fruit juice counts as part of the Fruit Group. Fruits may be fresh, canned, frozen, or dried, and may be whole, cut-up, or pureed. Make 1/2 of your plate fruits and vegetables.    Vegetables. Any vegetable or 100% vegetable juice counts as a member of the Vegetable Group. Vegetables may be fresh, frozen, canned, or dried. They can be served raw or cooked and may be whole, cut-up, or mashed. Make 1/2 of your plate fruits and vegetables.    Grains. All foods made from grains are part of the Grains Group.  These include wheat, rice, oats, cornmeal, and barley. Grains are often used to make foods such as bread, pasta, oatmeal, cereal, tortillas, and grits. Grains should be no more than 1/4 of your plate. At least half of your grains should be whole grains.    Protein. This group includes meat, poultry, seafood, beans and peas, eggs, processed soy products (such as tofu), nuts (including nut butters), and seeds. Make protein choices no more than 1/4 of your plate. Meat and poultry choices should be lean or low fat.    Dairy. The Dairy Group includes all fluid milk products and foods made from milk that contain calcium, such as yogurt and cheese. (Foods that have little calcium, such as cream, butter, and cream cheese, are not part of this group.) Most dairy choices should be low-fat or fat-free.    Oils. Oils aren't a food group, but they do contain essential nutrients. However it's important to watch your intake of oils. These are fats that are liquid at room temperature. They include canola, corn, olive, soybean, vegetable, and sunflower oil. Foods that are mainly oil include mayonnaise, certain salad dressings, and soft margarines. You likely already get your daily oil allowance from the foods you eat.  Things to limit  Eating healthy also means limiting these things in your diet:       Salt (sodium). Many processed foods have a lot of sodium. To keep sodium intake down, eat fresh vegetables, meats, poultry, and seafood when possible. Purchase low-sodium, reduced-sodium, or no-salt-added food products at the store. And don't add salt to your meals at home. Instead, season them with herbs and spices such as dill, oregano, cumin, and paprika. Or try adding flavor with lemon or lime zest and juice.    Saturated fat. Saturated fats are most often found in animal products such as beef, pork, and chicken. They are often solid at room temperature, such as butter. To reduce your saturated fat intake, choose leaner cuts of  meat and poultry. And try healthier cooking methods such as grilling, broiling, roasting, or baking. For a simple lower-fat swap, use plain nonfat yogurt instead of mayonnaise when making potato salad or macaroni salad.    Added sugars. These are sugars added to foods. They are in foods such as ice cream, candy, soda, fruit drinks, sports drinks, energy drinks, cookies, pastries, jams, and syrups. Cut down on added sugars by sharing sweet treats with a family member or friend. You can also choose fruit for dessert, and drink water or other unsweetened beverages.     [employer]Well last reviewed this educational content on 6/1/2020 2000-2021 [employer]. All rights reserved. This information is not intended as a substitute for professional medical care. Always follow your healthcare professional's instructions.          Signs of Hearing Loss      Hearing much better with one ear can be a sign of hearing loss.   Hearing loss is a problem shared by many people. In fact, it is one of the most common health problems, particularly as people age. Most people age 65 and older have some hearing loss. By age 80, almost everyone does. Hearing loss often occurs slowly over the years. So you may not realize your hearing has gotten worse.  Have your hearing checked  Call your healthcare provider if you:    Have to strain to hear normal conversation    Have to watch other people s faces very carefully to follow what they re saying    Need to ask people to repeat what they ve said    Often misunderstand what people are saying    Turn the volume of the television or radio up so high that others complain    Feel that people are mumbling when they re talking to you    Find that the effort to hear leaves you feeling tired and irritated    Notice, when using the phone, that you hear better with one ear than the other  [employer] last reviewed this educational content on 1/1/2020 2000-2021 [employer]. All  rights reserved. This information is not intended as a substitute for professional medical care. Always follow your healthcare professional's instructions.        Your Health Risk Assessment indicates you feel you are not in good emotional health.    Recreation   Recreation is not limited to sports and team events. It includes any activity that provides relaxation, interest, enjoyment, and exercise. Recreation provides an outlet for physical, mental, and social energy. It can give a sense of worth and achievement. It can help you stay healthy.    Mental Exercise and Social Involvement  Mental and emotional health is as important as physical health. Keep in touch with friends and family. Stay as active as possible. Continue to learn and challenge yourself.   Things you can do to stay mentally active are:    Learn something new, like a foreign language or musical instrument.     Play SCRABBLE or do crossword puzzles. If you cannot find people to play these games with you at home, you can play them with others on your computer through the Internet.     Join a games club--anything from card games to chess or checkers or lawn bowling.     Start a new hobby.     Go back to school.     Volunteer.     Read.   Keep up with world events.     Lung Cancer Screening   Frequently Asked Questions  If you are at high-risk for lung cancer, getting screened with low-dose computed tomography (LDCT) every year can help save your life. This handout offers answers to some of the most common questions about lung cancer screening. If you have other questions, please call [phone] ([phone]).     What is it?  Lung cancer screening uses special X-ray technology to create an image of your lung tissue. The exam is quick and easy and takes less than 10 seconds. We don t give you any medicine or use any needles. You can eat before and after the exam. You don t need to change your clothes as long as the clothing on your chest  doesn t contain metal. But, you do need to be able to hold your breath for at least 6 seconds during the exam.    What is the goal of lung cancer screening?  The goal of lung cancer screening is to save lives. Many times, lung cancer is not found until a person starts having physical symptoms. Lung cancer screening can help detect lung cancer in the earliest stages when it may be easier to treat.    Who should be screened for lung cancer?  We suggest lung cancer screening for anyone who is at high-risk for lung cancer. You are in the high-risk group if you:      are between the ages of 55 and 79, and    have smoked at least 1 pack of cigarettes a day for 20 or more years, and    still smoke or have quit within the past 15 years.    However, if you have a new cough or shortness of breath, you should talk to your doctor before being screened.    Why does it matter if I have symptoms?  Certain symptoms can be a sign that you have a condition in your lungs that should be checked and treated by your doctor. These symptoms include fever, chest pain, a new or changing cough, shortness of breath that you have never felt before, coughing up blood or unexplained weight loss. Having any of these symptoms can greatly affect the results of lung cancer screening.       Should all smokers get an LDCT lung cancer screening exam?  It depends. Lung cancer screening is for a very specific group of men and women who have a history of heavy smoking over a long period of time (see  Who should be screened for lung cancer  above).  I am in the high-risk group, but have been diagnosed with cancer in the past. Is LDCT lung cancer screening right for me?  In some cases, you should not have LDCT lung screening, such as when your doctor is already following your cancer with CT scan studies. Your doctor will help you decide if LDCT lung screening is right for you.  Do I need to have a screening exam every year?  Yes. If you are in the high-risk  group described earlier, you should get an LDCT lung cancer screening exam every year until you are 79, or are no longer willing or able to undergo screening and possible procedures to diagnose and treat lung cancer.  How effective is LDCT at preventing death from lung cancer?  Studies have shown that LDCT lung cancer screening can lower the risk of death from lung cancer by 20 percent in people who are at high-risk.  What are the risks?  There are some risks and limitations of LDCT lung cancer screening. We want to make sure you understand the risks and benefits, so please let us know if you have any questions. Your doctor may want to talk with you more about these risks.    Radiation exposure: As with any exam that uses radiation, there is a very small increased risk of cancer. The amount of radiation in LDCT is small--about the same amount a person would get from a mammogram. Your doctor orders the exam when he or she feels the potential benefits outweigh the risks.    False negatives: No test is perfect, including LDCT. It is possible that you may have a medical condition, including lung cancer, that is not found during your exam. This is called a false negative result.    False positives and more testing: LDCT very often finds something in the lung that could be cancer, but in fact is not. This is called a false positive result. False positive tests often cause anxiety. To make sure these findings are not cancer, you may need to have more tests. These tests will be done only if you give us permission. Sometimes patients need a treatment that can have side effects, such as a biopsy. For more information on false positives, see  What can I expect from the results?     Findings not related to lung cancer: Your LDCT exam also takes pictures of areas of your body next to your lungs. In a very small number of cases, the CT scan will show an abnormal finding in one of these areas, such as your kidneys, adrenal glands,  liver or thyroid. This finding may not be serious, but you may need more tests. Your doctor can help you decide what other tests you may need, if any.  What can I expect from the results?  About 1 out of 4 LDCT exams will find something that may need more tests. Most of the time, these findings are lung nodules. Lung nodules are very small collections of tissue in the lung. These nodules are very common, and the vast majority--more than 97 percent--are not cancer (benign). Most are normal lymph nodes or small areas of scarring from past infections.  But, if a small lung nodule is found to be cancer, the cancer can be cured more than 90 percent of the time. To know if the nodule is cancer, we may need to get more images before your next yearly screening exam. If the nodule has suspicious features (for example, it is large, has an odd shape or grows over time), we will refer you to a specialist for further testing.  Will my doctor also get the results?  Yes. Your doctor will get a copy of your results.  Is it okay to keep smoking now that there s a cancer screening exam?  No. Tobacco is one of the strongest cancer-causing agents. It causes not only lung cancer, but other cancers and cardiovascular (heart) diseases as well. The damage caused by smoking builds over time. This means that the longer you smoke, the higher your risk of disease. While it is never too late to quit, the sooner you quit, the better.  Where can I find help to quit smoking?  The best way to prevent lung cancer is to stop smoking. If you have already quit smoking, congratulations and keep it up! For help on quitting smoking, please call [employer] at 0-079-QUITNOW (1-786.363.2077) or the American [employer] at 1-775.958.5233 to find local resources near you.  One-on-one health coaching:  If you d prefer to work individually with a health care provider on tobacco cessation, we offer:      Medication Therapy Management:  Our specially trained  pharmacists work closely with you and your doctor to help you quit smoking.  Call [phone] or [phone] (toll free).

## 2022-08-11 NOTE — PROGRESS NOTES
"SUBJECTIVE:   Liz Pollard is a 71 year old female who presents for Preventive Visit.      Patient has been advised of split billing requirements and indicates understanding: Yes  Are you in the first 12 months of your Medicare coverage?  No    Healthy Habits:     In general, how would you rate your overall health?  Fair    Frequency of exercise:  2-3 days/week    Duration of exercise:  N/A    Do you usually eat at least 4 servings of fruit and vegetables a day, include whole grains    & fiber and avoid regularly eating high fat or \"junk\" foods?  No    Taking medications regularly:  Yes    Medication side effects:  Not applicable and Muscle aches    Ability to successfully perform activities of daily living:  No assistance needed    Home Safety:  Lighting is poor    Hearing Impairment:  Need to ask people to speak up or repeat themselves    In the past 6 months, have you been bothered by leaking of urine?  No    In general, how would you rate your overall mental or emotional health?  Fair      PHQ-2 Total Score: 1    Additional concerns today:  No     Do you feel safe in your environment? Yes    Have you ever done Advance Care Planning? (For example, a Health Directive, POLST, or a discussion with a medical provider or your loved ones about your wishes): Yes, advance care planning is on file.       Fall risk  Fallen 2 or more times in the past year?: No (Simultaneous filing. User may not have seen previous data.)  Any fall with injury in the past year?: No (Simultaneous filing. User may not have seen previous data.)  click delete button to remove this line now  Cognitive Screening   1) Repeat 3 items (Leader, Season, Table)    2) Clock draw: NORMAL  3) 3 item recall: Recalls 2 objects   Results: NORMAL clock, 1-2 items recalled: COGNITIVE IMPAIRMENT LESS LIKELY    Mini-CogTM Copyright MARY Sin. Licensed by the author for use in [facility] Total Immersion; reprinted with permission (jonsa@.[facility]). All rights " reserved.      Do you have sleep apnea, excessive snoring or daytime drowsiness?: no    Reviewed and updated as needed this visit by clinical staff   Tobacco  Allergies  Meds  Problems  Med Hx  Surg Hx  Fam Hx            Reviewed and updated as needed this visit by Provider   Tobacco  Allergies  Meds  Problems  Med Hx  Surg Hx  Fam Hx           Social History     Tobacco Use     Smoking status: Former Smoker     Quit date: 2021     Years since quittin.3     Smokeless tobacco: Never Used   Substance Use Topics     Alcohol use: No         Alcohol Use 2022   Prescreen: >3 drinks/day or >7 drinks/week? No       Current providers sharing in care for this patient include:   Patient Care Team:  Alejandro Jones MD as PCP - General (Family Medicine)  Yamila Paniagua MD as Assigned PCP  Parag Valdez MD as Assigned Surgical Provider    The following health maintenance items are reviewed in Epic and correct as of today:  Health Maintenance Due   Topic Date Due     LUNG CANCER SCREENING  2022     Lab work is in process  [facility]   Breast CA Risk Assessment ([facility]) 2021   Did any of your first-degree relatives have breast or ovarian cancer? Yes Yes   Did any of your relatives have bilateral breast cancer? No Yes   Did any man in your family have breast cancer? No -   Did any woman in your family have breast and ovarian cancer? No -   Did any woman in your family have breast cancer before age 50 y? No -   Do you have 2 or more relatives with breast and/or ovarian cancer? No -   Do you have 2 or more relatives with breast and/or bowel cancer? No -     Pertinent mammograms are reviewed under the imaging tab.    Review of Systems   Constitutional: Negative for chills and fever.   HENT: Positive for congestion, hearing loss and sore throat. Negative for ear pain.    Eyes: Positive for visual disturbance. Negative for pain.   Respiratory: Negative for  "cough and shortness of breath.    Cardiovascular: Negative for chest pain, palpitations and peripheral edema.   Gastrointestinal: Positive for heartburn. Negative for abdominal pain, constipation, diarrhea, hematochezia and nausea.   Breasts:  Negative for tenderness, breast mass and discharge.   Genitourinary: Positive for frequency. Negative for dysuria, genital sores, hematuria, pelvic pain, urgency, vaginal bleeding and vaginal discharge.   Musculoskeletal: Positive for arthralgias. Negative for joint swelling.   Neurological: Positive for dizziness. Negative for headaches and paresthesias.     Constitutional, HEENT, cardiovascular, pulmonary, gi and gu systems are negative, except as otherwise noted.    OBJECTIVE:   /50 (BP Location: Left arm, Patient Position: Sitting, Cuff Size: Adult Regular)   Pulse 54   Temp 98  F (36.7  C) (Oral)   Resp 16   Ht 1.566 m (5' 1.64\")   Wt 73 kg (161 lb)   SpO2 99%   BMI 29.79 kg/m   Estimated body mass index is 29.79 kg/m  as calculated from the following:    Height as of this encounter: 1.566 m (5' 1.64\").    Weight as of this encounter: 73 kg (161 lb).  Physical Exam  GENERAL APPEARANCE: healthy, alert and no distress  EYES: Eyes grossly normal to inspection, PERRL and conjunctivae and sclerae normal  HENT: ear canals and TM's normal, nose and mouth without ulcers or lesions, oropharynx clear and oral mucous membranes moist  NECK: no adenopathy, no asymmetry, masses, or scars and thyroid normal to palpation  RESP: lungs clear to auscultation - no rales, rhonchi or wheezes  CV: regular rate and rhythm, normal S1 S2, no S3 or S4, no murmur, click or rub, no peripheral edema and peripheral pulses strong  ABDOMEN: soft, nontender, no hepatosplenomegaly, no masses and bowel sounds normal  MS: no musculoskeletal defects are noted and gait is age appropriate without ataxia  SKIN: no suspicious lesions or rashes  NEURO: Normal strength and tone, sensory exam grossly " normal, mentation intact and speech normal  PSYCH: mentation appears normal and affect normal/bright    Diagnostic Test Results:  Labs reviewed in Epic  Results for orders placed or performed in visit on 08/11/22 (from the past 24 hour(s))   CBC with platelets   Result Value Ref Range    WBC Count 5.2 4.0 - 11.0 10e3/uL    RBC Count 4.13 3.80 - 5.20 10e6/uL    Hemoglobin 11.5 (L) 11.7 - 15.7 g/dL    Hematocrit 34.7 (L) 35.0 - 47.0 %    MCV 84 78 - 100 fL    MCH 27.8 26.5 - 33.0 pg    MCHC 33.1 31.5 - 36.5 g/dL    RDW 13.5 10.0 - 15.0 %    Platelet Count 193 150 - 450 10e3/uL   Hemoglobin A1c   Result Value Ref Range    Hemoglobin A1C 5.1 0.0 - 5.6 %   Comprehensive metabolic panel (BMP + Alb, Alk Phos, ALT, AST, Total. Bili, TP)   Result Value Ref Range    Sodium 139 136 - 145 mmol/L    Potassium 4.7 3.4 - 5.3 mmol/L    Creatinine 1.52 (H) 0.51 - 0.95 mg/dL    Urea Nitrogen 31.8 (H) 8.0 - 23.0 mg/dL    Chloride 107 98 - 107 mmol/L    Carbon Dioxide (CO2) 23 22 - 29 mmol/L    Anion Gap 9 7 - 15 mmol/L    Glucose 91 70 - 99 mg/dL    Calcium 9.6 8.8 - 10.2 mg/dL    Protein Total 7.8 6.4 - 8.3 g/dL    Albumin 4.6 3.5 - 5.2 g/dL    Bilirubin Total 0.3 <=1.2 mg/dL    Alkaline Phosphatase 81 35 - 104 U/L    AST 29 10 - 35 U/L    ALT 24 10 - 35 U/L    GFR Estimate 37 (L) >60 mL/min/1.73m2       ASSESSMENT / PLAN:   Liz was seen today for physical.    Diagnoses and all orders for this visit:    Encounter for Medicare annual wellness exam  Encounter to establish care  Patient's pcp no longer at this location.     Type 2 diabetes mellitus with diabetic nephropathy, without long-term current use of insulin (H)  Stage 3 chronic kidney disease, unspecified whether stage 3a or 3b CKD (H)  -     CBC with platelets  -     Hemoglobin A1c  -     Comprehensive metabolic panel (BMP + Alb, Alk Phos, ALT, AST, Total. Bili, TP)    Other specified hypothyroidism  Stable, labs checked recently.     Age-related osteoporosis without  "current pathological fracturePost-menopausal  -     DX Hip/Pelvis/Spine; Future      Benign essential microscopic hematuria  Proteinuria, unspecified type  Decrease hydrochlorothiazide, bp well controlled and even lower at home.   -     hydrochlorothiazide (HYDRODIURIL) 25 MG tablet; Take 0.5 tablets (12.5 mg) by mouth daily      Hyperlipidemia LDL goal <130  Stable.     Vertigo  Intermittent.       Personal history of tobacco use  -     Prof fee: Shared Decision Making for Lung Cancer Screening  -     CT Chest Lung Cancer Scrn Low Dose wo; Future        Other orders  -     REVIEW OF HEALTH MAINTENANCE PROTOCOL ORDERS      Lung cancer screening. History of smoking. High risk patient with greater than 30 pack year smoking history.      Patient has been advised of split billing requirements and indicates understanding: Yes    COUNSELING:  Reviewed preventive health counseling, as reflected in patient instructions       Regular exercise       Healthy diet/nutrition       Vision screening       Hearing screening       Advanced Planning     Estimated body mass index is 29.79 kg/m  as calculated from the following:    Height as of this encounter: 1.566 m (5' 1.64\").    Weight as of this encounter: 73 kg (161 lb).        She reports that she quit smoking about 16 months ago. She has never used smokeless tobacco.      Appropriate preventive services were discussed with this patient, including applicable screening as appropriate for cardiovascular disease, diabetes, osteopenia/osteoporosis, and glaucoma.  As appropriate for age/gender, discussed screening for colorectal cancer, prostate cancer, breast cancer, and cervical cancer. Checklist reviewing preventive services available has been given to the patient.    Reviewed patients plan of care and provided an AVS. The Intermediate Care Plan ( asthma action plan, low back pain action plan, and migraine action plan) for Liz meets the Care Plan requirement. This Care Plan " has been established and reviewed with the Patient.    Counseling Resources:  ATP IV Guidelines  Pooled Cohorts Equation Calculator  Breast Cancer Risk Calculator  Breast Cancer: Medication to Reduce Risk  FRAX Risk Assessment  ICSI Preventive Guidelines  Dietary Guidelines for Americans, 2010  Daily Pic's MyPlate  ASA Prophylaxis  Lung CA Screening    Alejandro Jones MD  [facility]    Identified Health Risks:    The patient was provided with suggestions to help her develop a healthy physical lifestyle.  The patient was counseled and encouraged to consider modifying their diet and eating habits. She was provided with information on recommended healthy diet options.  The patient was provided with written information regarding signs of hearing loss.  The patient was provided with suggestions to help her develop a healthy emotional lifestyle.  Lung Cancer Screening Shared Decision Making Visit     Liz Pollard, a 71 year old female, is eligible for lung cancer screening    History   Smoking Status     Former Smoker     Quit date: 4/11/2021   Smokeless Tobacco     Never Used       I have discussed with patient the risks and benefits of screening for lung cancer with low-dose CT.     The risks include:    radiation exposure: one low dose chest CT has as much ionizing radiation as about 15 chest x-rays, or 6 months of background radiation living in [place]      false positives: most findings/nodules are NOT cancer, but some might still require additional diagnostic evaluation, including biopsy    over-diagnosis: some slow growing cancers that might never have been clinically significant will be detected and treated unnecessarily     The benefit of early detection of lung cancer is contingent upon adherence to annual screening or more frequent follow up if indicated.     Furthermore, to benefit from screening, Liz must be willing and able to undergo diagnostic procedures, if indicated.  Although no specific guide is available for determining severity of comorbidities, it is reasonable to withhold screening in patients who have greater mortality risk from other diseases.     We did discuss that the best way to prevent lung cancer is to not smoke.    Some patients may value a numeric estimation of lung cancer risk when evaluating if lung cancer screening is right for them, here is one calculator:    ShouldIScreen

## 2022-08-14 DIAGNOSIS — Z76.0 ENCOUNTER FOR MEDICATION REFILL: Primary | ICD-10-CM

## 2022-08-15 RX ORDER — LEVOTHYROXINE SODIUM 75 UG/1
TABLET ORAL
Qty: 90 TABLET | Refills: 3 | Status: SHIPPED | OUTPATIENT
Start: 2022-08-15 | End: 2023-08-05

## 2022-08-31 ENCOUNTER — TELEPHONE (OUTPATIENT)
Dept: FAMILY MEDICINE | Facility: CLINIC | Age: 72
End: 2022-08-31

## 2022-08-31 NOTE — TELEPHONE ENCOUNTER
General Call    Contacts       Type Contact Phone/Fax    08/31/2022 01:12 PM CDT Phone (Incoming) Dirk Pollard (Self) [phone] (M)        Reason for Call:  Pt received letter from [facility] to say she is due for colonoscopy.  Last one done 7/2018 and is due 07/23/23.      Pt changed insurances.  Pt wanted confirmation they had moved to [facility].  Done    What are your questions or concerns:  none    Date of last appointment with provider:08/01/22    Okay to leave a detailed message?: No     Call taken on 8/31/22 at 115 pm by LUIS Ramos

## 2022-09-12 ENCOUNTER — HOSPITAL ENCOUNTER (OUTPATIENT)
Dept: CT IMAGING | Facility: HOSPITAL | Age: 72
Discharge: HOME | End: 2022-09-12
Attending: FAMILY MEDICINE | Admitting: FAMILY MEDICINE
Payer: COMMERCIAL

## 2022-09-12 DIAGNOSIS — Z87.891 PERSONAL HISTORY OF TOBACCO USE: ICD-10-CM

## 2022-09-12 PROCEDURE — 71271 CT THORAX LUNG CANCER SCR C-: CPT

## 2022-09-13 ENCOUNTER — ANCILLARY PROCEDURE (OUTPATIENT)
Dept: MAMMOGRAPHY | Facility: CLINIC | Age: 72
End: 2022-09-13
Attending: FAMILY MEDICINE
Payer: COMMERCIAL

## 2022-09-13 DIAGNOSIS — Z12.31 VISIT FOR SCREENING MAMMOGRAM: ICD-10-CM

## 2022-09-13 PROCEDURE — 77067 SCR MAMMO BI INCL CAD: CPT

## 2022-10-30 DIAGNOSIS — Z76.0 ENCOUNTER FOR MEDICATION REFILL: Primary | ICD-10-CM

## 2022-10-30 DIAGNOSIS — E78.2 MIXED HYPERLIPIDEMIA: ICD-10-CM

## 2022-10-30 DIAGNOSIS — E11.9 CONTROLLED TYPE 2 DIABETES MELLITUS WITHOUT COMPLICATION, WITHOUT LONG-TERM CURRENT USE OF INSULIN (H): ICD-10-CM

## 2022-10-31 RX ORDER — ATORVASTATIN CALCIUM 20 MG/1
TABLET, FILM COATED ORAL
Qty: 90 TABLET | Refills: 3 | Status: SHIPPED | OUTPATIENT
Start: 2022-10-31 | End: 2023-10-25

## 2022-11-02 ENCOUNTER — TRANSFERRED RECORDS (OUTPATIENT)
Dept: HEALTH INFORMATION MANAGEMENT | Facility: CLINIC | Age: 72
End: 2022-11-02

## 2022-11-02 LAB — RETINOPATHY: NEGATIVE

## 2022-11-16 ENCOUNTER — TELEPHONE (OUTPATIENT)
Dept: FAMILY MEDICINE | Facility: CLINIC | Age: 72
End: 2022-11-16

## 2022-11-16 NOTE — TELEPHONE ENCOUNTER
Patient Returning Call    Reason for call:  Pt has family emergency and canceled appt for today at 1140 am .  Pt will call back to reschedule, did not have her calendar with her now to reschedule.     Information relayed to patient:  Canceled appt and will let Dr. Jones know    Patient has additional questions:  No    Okay to leave a detailed message?: No task is completed     Call taken on 11/16/22 at 850 am by Marlen Hutchison CMA TC

## 2022-12-19 DIAGNOSIS — I10 ESSENTIAL HYPERTENSION: ICD-10-CM

## 2022-12-19 DIAGNOSIS — Z76.0 ENCOUNTER FOR MEDICATION REFILL: ICD-10-CM

## 2022-12-19 RX ORDER — SPIRONOLACTONE 25 MG/1
TABLET ORAL
Qty: 90 TABLET | Refills: 3 | Status: SHIPPED | OUTPATIENT
Start: 2022-12-19 | End: 2023-02-16

## 2022-12-19 RX ORDER — LISINOPRIL 40 MG/1
TABLET ORAL
Qty: 90 TABLET | Refills: 2 | Status: SHIPPED | OUTPATIENT
Start: 2022-12-19 | End: 2023-09-13

## 2022-12-19 NOTE — TELEPHONE ENCOUNTER
"Routing refill request to provider for review/approval because:  Labs out of range:  creatinine    Last Written Prescription Date:  1/4/22  Last Fill Quantity: 90,  # refills: 3   Last office visit provider:  8/11/22     Requested Prescriptions   Pending Prescriptions Disp Refills     spironolactone (ALDACTONE) 25 MG tablet [Pharmacy Med Name: SPIRONOLACTONE 25 MG TABLET] 90 tablet 3     Sig: TAKE 1 TABLET BY MOUTH EVERY DAY       Diuretics (Including Combos) Protocol Failed - 12/19/2022 12:22 AM        Failed - Normal serum creatinine on file in past 12 months     Recent Labs   Lab Test 08/11/22  1139   CR 1.52*              Passed - Blood pressure under 140/90 in past 12 months     BP Readings from Last 3 Encounters:   08/11/22 122/50   05/10/22 120/50   01/31/22 120/50                 Passed - Recent (12 mo) or future (30 days) visit within the authorizing provider's specialty     Patient has had an office visit with the authorizing provider or a provider within the authorizing providers department within the previous 12 mos or has a future within next 30 days. See \"Patient Info\" tab in inbasket, or \"Choose Columns\" in Meds & Orders section of the refill encounter.              Passed - Medication is active on med list        Passed - Patient is age 18 or older        Passed - No active pregancy on record        Passed - Normal serum potassium on file in past 12 months     Recent Labs   Lab Test 08/11/22  1139   POTASSIUM 4.7                    Passed - Normal serum sodium on file in past 12 months     Recent Labs   Lab Test 08/11/22  1139                 Passed - No positive pregnancy test in past 12 months             Genna Stone RN 12/19/22 12:36 PM  "

## 2022-12-19 NOTE — TELEPHONE ENCOUNTER
"Routing refill request to provider for review/approval because:  Labs out of range:  creatinine    Last Written Prescription Date:  2/21/22  Last Fill Quantity: 90,  # refills: 2   Last office visit provider:  8/11/22     Requested Prescriptions   Pending Prescriptions Disp Refills     lisinopril (ZESTRIL) 40 MG tablet [Pharmacy Med Name: LISINOPRIL 40 MG TABLET] 90 tablet 2     Sig: TAKE 1 TABLET BY MOUTH EVERY DAY       ACE Inhibitors (Including Combos) Protocol Failed - 12/19/2022 12:22 AM        Failed - Normal serum creatinine on file in past 12 months     Recent Labs   Lab Test 08/11/22  1139   CR 1.52*       Ok to refill medication if creatinine is low          Passed - Blood pressure under 140/90 in past 12 months     BP Readings from Last 3 Encounters:   08/11/22 122/50   05/10/22 120/50   01/31/22 120/50                 Passed - Recent (12 mo) or future (30 days) visit within the authorizing provider's specialty     Patient has had an office visit with the authorizing provider or a provider within the authorizing providers department within the previous 12 mos or has a future within next 30 days. See \"Patient Info\" tab in inbasket, or \"Choose Columns\" in Meds & Orders section of the refill encounter.              Passed - Medication is active on med list        Passed - Patient is age 18 or older        Passed - No active pregnancy on record        Passed - Normal serum potassium on file in past 12 months     Recent Labs   Lab Test 08/11/22  1139 05/10/22  1238 02/01/22  1200   POTASSIUM 4.7   < >  --    53154  --   --  4.3    < > = values in this interval not displayed.             Passed - No positive pregnancy test within past 12 months             Genna Stone RN 12/19/22 12:32 PM  "

## 2022-12-26 DIAGNOSIS — I10 ESSENTIAL HYPERTENSION: ICD-10-CM

## 2022-12-26 DIAGNOSIS — Z76.0 ENCOUNTER FOR MEDICATION REFILL: ICD-10-CM

## 2022-12-26 RX ORDER — METOPROLOL SUCCINATE 100 MG/1
TABLET, EXTENDED RELEASE ORAL
Qty: 135 TABLET | Refills: 2 | Status: SHIPPED | OUTPATIENT
Start: 2022-12-26 | End: 2023-04-13

## 2022-12-26 NOTE — TELEPHONE ENCOUNTER
"Last Written Prescription Date:  1/4/22  Last Fill Quantity: 135,  # refills: 3   Last office visit provider:  8/11/22     Requested Prescriptions   Pending Prescriptions Disp Refills     metoprolol succinate ER (TOPROL XL) 100 MG 24 hr tablet [Pharmacy Med Name: METOPROLOL SUCC  MG TAB] 135 tablet 3     Sig: TAKE 1.5 TABLETS BY MOUTH EVERY DAY       Beta-Blockers Protocol Passed - 12/26/2022 12:22 AM        Passed - Blood pressure under 140/90 in past 12 months     BP Readings from Last 3 Encounters:   08/11/22 122/50   05/10/22 120/50   01/31/22 120/50                 Passed - Patient is age 6 or older        Passed - Recent (12 mo) or future (30 days) visit within the authorizing provider's specialty     Patient has had an office visit with the authorizing provider or a provider within the authorizing providers department within the previous 12 mos or has a future within next 30 days. See \"Patient Info\" tab in inbasket, or \"Choose Columns\" in Meds & Orders section of the refill encounter.              Passed - Medication is active on med list             Rosario Guaman RN 12/26/22 8:42 AM  "

## 2023-01-16 ENCOUNTER — OFFICE VISIT (OUTPATIENT)
Dept: FAMILY MEDICINE | Facility: CLINIC | Age: 73
End: 2023-01-16
Payer: COMMERCIAL

## 2023-01-16 VITALS
SYSTOLIC BLOOD PRESSURE: 98 MMHG | WEIGHT: 155.75 LBS | RESPIRATION RATE: 20 BRPM | BODY MASS INDEX: 28.66 KG/M2 | HEIGHT: 62 IN | DIASTOLIC BLOOD PRESSURE: 59 MMHG | TEMPERATURE: 98 F | HEART RATE: 59 BPM | OXYGEN SATURATION: 100 %

## 2023-01-16 DIAGNOSIS — E11.21 TYPE 2 DIABETES MELLITUS WITH DIABETIC NEPHROPATHY, WITHOUT LONG-TERM CURRENT USE OF INSULIN (H): Primary | ICD-10-CM

## 2023-01-16 DIAGNOSIS — B18.2 CHRONIC HEPATITIS C WITHOUT HEPATIC COMA (H): ICD-10-CM

## 2023-01-16 DIAGNOSIS — R31.1 BENIGN ESSENTIAL MICROSCOPIC HEMATURIA: ICD-10-CM

## 2023-01-16 DIAGNOSIS — N18.32 STAGE 3B CHRONIC KIDNEY DISEASE (H): ICD-10-CM

## 2023-01-16 DIAGNOSIS — I10 ESSENTIAL HYPERTENSION: ICD-10-CM

## 2023-01-16 LAB
ANION GAP SERPL CALCULATED.3IONS-SCNC: 13 MMOL/L (ref 7–15)
BUN SERPL-MCNC: 35.7 MG/DL (ref 8–23)
CALCIUM SERPL-MCNC: 9.1 MG/DL (ref 8.8–10.2)
CHLORIDE SERPL-SCNC: 105 MMOL/L (ref 98–107)
CREAT SERPL-MCNC: 1.6 MG/DL (ref 0.51–0.95)
DEPRECATED HCO3 PLAS-SCNC: 24 MMOL/L (ref 22–29)
GFR SERPL CREATININE-BSD FRML MDRD: 34 ML/MIN/1.73M2
GLUCOSE SERPL-MCNC: 87 MG/DL (ref 70–99)
HBA1C MFR BLD: 5.1 % (ref 0–5.6)
POTASSIUM SERPL-SCNC: 4.3 MMOL/L (ref 3.4–5.3)
SODIUM SERPL-SCNC: 142 MMOL/L (ref 136–145)

## 2023-01-16 PROCEDURE — 83036 HEMOGLOBIN GLYCOSYLATED A1C: CPT | Performed by: FAMILY MEDICINE

## 2023-01-16 PROCEDURE — 36415 COLL VENOUS BLD VENIPUNCTURE: CPT | Performed by: FAMILY MEDICINE

## 2023-01-16 PROCEDURE — 99214 OFFICE O/P EST MOD 30 MIN: CPT | Performed by: FAMILY MEDICINE

## 2023-01-16 PROCEDURE — 80048 BASIC METABOLIC PNL TOTAL CA: CPT | Performed by: FAMILY MEDICINE

## 2023-01-16 NOTE — PROGRESS NOTES
Assessment & Plan     Type 2 diabetes mellitus with diabetic nephropathy, without long-term current use of insulin (H)  A1c 5.1% which is way below goal. Highest a1c was 8.0% several years ago. She lost 20lb in the past year and doing well. No medications, no chances. Recheck every 6-12 months unless circumstances change.   - Hemoglobin A1c  - Basic metabolic panel  (Ca, Cl, CO2, Creat, Gluc, K, Na, BUN)  - blood glucose (CONTOUR NEXT TEST) test strip  Dispense: 200 strip; Refill: 2    Essential hypertension  Benign essential microscopic hematuria  Stage 3b chronic kidney disease (H)  Well controlled - bp rechecked several times. Goal BP <140/90 and she is close to being hypotensive than normotensive. STOP hydrochlorothiazide. Check bmp today, follow K closely as she is still on spironolactone. If BP still well controlled, consider stopping spironolactone next. She lost 20lbs in 1 year from exercise and weight watchers! Will keep ACEi due to hx DM.   - Basic metabolic panel  (Ca, Cl, CO2, Creat, Gluc, K, Na, BUN)  - STOP hydrochlorothiazide  - recheck K+ next visit because she is still on spironolactone  - if BP still good next visit, consider stopping spironolactone.       Chronic hepatitis C without hepatic coma (H)  No issues, stable.           Return in about 1 month (around 2/16/2023) for blood pressure recheck.    Alejandro Jones MD  [facility] ROSA Cavazos is a 72 year old, presenting for the following health issues:  Diabetes      History of Present Illness       Diabetes:   She presents for follow up of diabetes.  She is checking home blood glucose two times daily. She checks blood glucose before meals.  Blood glucose is never over 200 and never under 70. She is aware of hypoglycemia symptoms including shakiness. She has no concerns regarding her diabetes at this time.  She is not experiencing numbness or burning in feet, excessive thirst, blurry vision, weight changes or  "redness, sores or blisters on feet.         She eats 2-3 servings of fruits and vegetables daily.She exercises with enough effort to increase her heart rate 30 to 60 minutes per day.  She exercises with enough effort to increase her heart rate 6 days per week.   She is taking medications regularly.     Her a1c has been really well controlled for a very long time. A1c is well below goal, today at 5.1% and she is not on any medications. Highest a1c was about 8.% several years ago.     She has been doing weight watchers a lot of her life and has heard about others stopping medications when they are able to make progress on weight loss.     Her bp is consistently 90s/40s when I personally rechecked. She isnt sure if she is dizzy from this, but maybe? She thought her goal was to get well below 120/80. We discussed SBP target goals of <140.       Review of Systems   Constitutional, HEENT, cardiovascular, pulmonary, gi and gu systems are negative, except as otherwise noted.      Objective    BP 98/59   Pulse 59   Temp 98  F (36.7  C) (Oral)   Resp 20   Ht 1.566 m (5' 1.64\")   Wt 70.6 kg (155 lb 12 oz)   SpO2 100%   BMI 28.82 kg/m    Body mass index is 28.82 kg/m .  Physical Exam   GENERAL: healthy, alert and no distress  RESP: lungs clear to auscultation - no rales, rhonchi or wheezes  CV: regular rate and rhythm, normal S1 S2, no S3 or S4, no murmur, click or rub, no peripheral edema and peripheral pulses strong  MS: no gross musculoskeletal defects noted, no edema  SKIN: no suspicious lesions or rashes  NEURO: Normal strength and tone, mentation intact and speech normal  PSYCH: mentation appears normal, affect normal/bright    Results for orders placed or performed in visit on 01/16/23   Hemoglobin A1c     Status: Normal   Result Value Ref Range    Hemoglobin A1C 5.1 0.0 - 5.6 %   Basic metabolic panel  (Ca, Cl, CO2, Creat, Gluc, K, Na, BUN)     Status: Abnormal   Result Value Ref Range    Sodium 142 136 - 145 " mmol/L    Potassium 4.3 3.4 - 5.3 mmol/L    Chloride 105 98 - 107 mmol/L    Carbon Dioxide (CO2) 24 22 - 29 mmol/L    Anion Gap 13 7 - 15 mmol/L    Urea Nitrogen 35.7 (H) 8.0 - 23.0 mg/dL    Creatinine 1.60 (H) 0.51 - 0.95 mg/dL    Calcium 9.1 8.8 - 10.2 mg/dL    Glucose 87 70 - 99 mg/dL    GFR Estimate 34 (L) >60 mL/min/1.73m2     Results for orders placed or performed in visit on 01/16/23 (from the past 24 hour(s))   Hemoglobin A1c   Result Value Ref Range    Hemoglobin A1C 5.1 0.0 - 5.6 %   Basic metabolic panel  (Ca, Cl, CO2, Creat, Gluc, K, Na, BUN)   Result Value Ref Range    Sodium 142 136 - 145 mmol/L    Potassium 4.3 3.4 - 5.3 mmol/L    Chloride 105 98 - 107 mmol/L    Carbon Dioxide (CO2) 24 22 - 29 mmol/L    Anion Gap 13 7 - 15 mmol/L    Urea Nitrogen 35.7 (H) 8.0 - 23.0 mg/dL    Creatinine 1.60 (H) 0.51 - 0.95 mg/dL    Calcium 9.1 8.8 - 10.2 mg/dL    Glucose 87 70 - 99 mg/dL    GFR Estimate 34 (L) >60 mL/min/1.73m2

## 2023-02-16 ENCOUNTER — OFFICE VISIT (OUTPATIENT)
Dept: FAMILY MEDICINE | Facility: CLINIC | Age: 73
End: 2023-02-16
Payer: COMMERCIAL

## 2023-02-16 VITALS
TEMPERATURE: 98 F | OXYGEN SATURATION: 98 % | BODY MASS INDEX: 25.83 KG/M2 | RESPIRATION RATE: 14 BRPM | DIASTOLIC BLOOD PRESSURE: 42 MMHG | HEIGHT: 65 IN | HEART RATE: 63 BPM | SYSTOLIC BLOOD PRESSURE: 110 MMHG | WEIGHT: 155 LBS

## 2023-02-16 DIAGNOSIS — I10 ESSENTIAL HYPERTENSION: Primary | ICD-10-CM

## 2023-02-16 DIAGNOSIS — E11.21 TYPE 2 DIABETES MELLITUS WITH DIABETIC NEPHROPATHY, WITHOUT LONG-TERM CURRENT USE OF INSULIN (H): ICD-10-CM

## 2023-02-16 PROCEDURE — 99214 OFFICE O/P EST MOD 30 MIN: CPT | Performed by: FAMILY MEDICINE

## 2023-02-16 NOTE — PROGRESS NOTES
"  Assessment & Plan     Essential hypertension  STOP spironolactone.   Recheck BMP next visit now that we stopped both spironolactone AND hydrochlorothiazide.   Both medications stopped are diuretics. If any fluid overload, would restart one of them and consider decreasing metoprolol instead.     Type 2 diabetes mellitus with diabetic nephropathy, without long-term current use of insulin (H)  Last a1c 5.1%, refilled test strips.   - blood glucose (CONTOUR NEXT TEST) test strip  Dispense: 200 strip; Refill: 2      BMI:   Estimated body mass index is 26.14 kg/m  as calculated from the following:    Height as of this encounter: 1.64 m (5' 4.57\").    Weight as of this encounter: 70.3 kg (155 lb).   Weight management plan: Discussed healthy diet and exercise guidelines        Return in about 2 months (around 4/16/2023) for blood pressure recheck.    Alejandro Jones MD  [facility] ROSA Cavazos is a 72 year old, presenting for the following health issues:  blood pressure check  and left arm pain       History of Present Illness       Reason for visit:  Bloo pressre check    She eats 2-3 servings of fruits and vegetables daily.She consumes 0 sweetened beverage(s) daily.She exercises with enough effort to increase her heart rate 30 to 60 minutes per day.  She exercises with enough effort to increase her heart rate 6 days per week.   She is taking medications regularly.         Last visit:   Essential hypertension  Benign essential microscopic hematuria  Stage 3b chronic kidney disease (H)  Well controlled - bp rechecked several times. Goal BP <140/90 and she is close to being hypotensive than normotensive. STOP hydrochlorothiazide. Check bmp today, follow K closely as she is still on spironolactone. If BP still well controlled, consider stopping spironolactone next. She lost 20lbs in 1 year from exercise and weight watchers! Will keep ACEi due to hx DM.   - Basic metabolic panel  (Ca, Cl, CO2, " "Creat, Gluc, K, Na, BUN)  - STOP hydrochlorothiazide  - recheck K+ next visit because she is still on spironolactone  - if BP still good next visit, consider stopping spironolactone.     This visit, doesn't feel any different off the hydrochlorothiazide. She is ok with stopping more medications. Discussed risks vs benefits and which to stop. With her history of DM (now well controlled), would like to keep lisinopril. Ok to keep metoprolol for now. Recommend stopping spironolactone.     Review of Systems   Constitutional, HEENT, cardiovascular, pulmonary, gi and gu systems are negative, except as otherwise noted.      Objective    /42   Pulse 63   Temp 98  F (36.7  C) (Oral)   Resp 14   Ht 1.64 m (5' 4.57\")   Wt 70.3 kg (155 lb)   SpO2 98%   BMI 26.14 kg/m    Body mass index is 26.14 kg/m .  Physical Exam   GENERAL: healthy, alert and no distress  NECK: no adenopathy, no asymmetry, masses, or scars and thyroid normal to palpation  RESP: lungs clear to auscultation - no rales, rhonchi or wheezes  CV: regular rate and rhythm, normal S1 S2, no S3 or S4, no murmur, click or rub, no peripheral edema and peripheral pulses strong  ABDOMEN: soft, nontender, no hepatosplenomegaly, no masses and bowel sounds normal  MS: no gross musculoskeletal defects noted, no edema    Office Visit on 01/16/2023   Component Date Value Ref Range Status     Hemoglobin A1C 01/16/2023 5.1  0.0 - 5.6 % Final    Normal <5.7%   Prediabetes 5.7-6.4%    Diabetes 6.5% or higher     Note: Adopted from ADA consensus guidelines.     Sodium 01/16/2023 142  136 - 145 mmol/L Final     Potassium 01/16/2023 4.3  3.4 - 5.3 mmol/L Final     Chloride 01/16/2023 105  98 - 107 mmol/L Final     Carbon Dioxide (CO2) 01/16/2023 24  22 - 29 mmol/L Final     Anion Gap 01/16/2023 13  7 - 15 mmol/L Final     Urea Nitrogen 01/16/2023 35.7 (H)  8.0 - 23.0 mg/dL Final     Creatinine 01/16/2023 1.60 (H)  0.51 - 0.95 mg/dL Final     Calcium 01/16/2023 9.1  8.8 - " 10.2 mg/dL Final     Glucose 01/16/2023 87  70 - 99 mg/dL Final     GFR Estimate 01/16/2023 34 (L)  >60 mL/min/1.73m2 Final    Effective December 21, 2021 eGFRcr in adults is calculated using the 2021 CKD-EPI creatinine equation which includes age and gender (Sonia et al., NEJM, DOI: 10.1056/UOJYwf0727059)     No results found for any visits on 02/16/23.  No results found for this or any previous visit (from the past 24 hour(s)).

## 2023-03-15 ENCOUNTER — NURSE TRIAGE (OUTPATIENT)
Dept: NURSING | Facility: CLINIC | Age: 73
End: 2023-03-15
Payer: COMMERCIAL

## 2023-03-15 ENCOUNTER — TELEPHONE (OUTPATIENT)
Dept: FAMILY MEDICINE | Facility: CLINIC | Age: 73
End: 2023-03-15
Payer: COMMERCIAL

## 2023-03-15 RX ORDER — HYDROCHLOROTHIAZIDE 25 MG/1
1 TABLET ORAL
COMMUNITY
Start: 2023-01-30 | End: 2023-04-13 | Stop reason: DRUGHIGH

## 2023-03-15 NOTE — TELEPHONE ENCOUNTER
Agree with RN assessment and management.   BP at last appointment was 110 systolic so this is a huge jump.     Have patient make sure she is sitting and resting for at least 5 min before checking bp.     Alejandro Jones MD

## 2023-03-15 NOTE — TELEPHONE ENCOUNTER
Triage call  Patient calling to report she has restarted herself on the spironolactone because her blood pressure has  171/65 it has been up for over a week. She took her bllod pressure today and it is still 171 she also says her left arm feels tight.  This has been goooing on for a few weeks.  She initially thought was because she she was sleeping on her arm but it does not improve.    Per care advice see in the office in the next 3 days.  Care advice given.  Verbalizes understanding and agrees with plan.  Transferred to scheduling for a appointment.  She did not want to come in tomorrow or Friday so recommended a phone visit she has a blood pressure cuff to take her own pressures.  Routing to PCP    Reason for Disposition    Systolic BP >= 160 OR Diastolic >= 100    Additional Information    Negative: Sounds like a life-threatening emergency to the triager    Negative: Pregnant > 20 weeks or postpartum (< 6 weeks after delivery) and new hand or face swelling    Negative: Pregnant > 20 weeks and BP > 140/90    Negative: Systolic BP >= 160 OR Diastolic >= 100, and any cardiac or neurologic symptoms (e.g., chest pain, difficulty breathing, unsteady gait, blurred vision)    Negative: Patient sounds very sick or weak to the triager    Negative: BP Systolic BP >= 140 OR Diastolic >= 90 and postpartum (from 0 to 6 weeks after delivery)    Negative: Systolic BP >= 180 OR Diastolic >= 110, and missed most recent dose of blood pressure medication    Negative: Systolic BP >= 180 OR Diastolic >= 110    Negative: Patient wants to be seen    Negative: Ran out of BP medications    Negative: Taking BP medications and feels is having side effects (e.g., impotence, cough, dizziness)    Protocols used: HIGH BLOOD PRESSURE-A-OH

## 2023-03-15 NOTE — TELEPHONE ENCOUNTER
Talked to patient on the phone who reported she just restart the spironolactone yesterday.  Today, BP is still high at 171/65.  Patient declined symptoms.  The arm is feeling ok as talked to nursing on the phone.      Will route encounter to provider for review for further recommendation.    SUSAN HuitronN, RN  [facility]

## 2023-03-15 NOTE — TELEPHONE ENCOUNTER
General Call      Reason for Call:  SHITAL    What are your questions or concerns:  Pt called and would like to inform PCP that she is back on her spironolactone (ALDACTONE) 25 MG tablet since yesterday because her BP was getting high. Any questions or concern, please call pt back. Thanks!    Date of last appointment with provider: 2/16/2023    Okay to leave a detailed message?: Yes at Home number on file [phone] (home)

## 2023-03-15 NOTE — TELEPHONE ENCOUNTER
Called patient to relay provider recommendation below.  Patient verbalized understood.  Has an upcoming appt with a spine provider on Monday and that her BP will be measured.  Patient declined an appt for now to be seen.  RN advised to seek urgent care/ER if symptomatic for elevated BP such as severe headache, chest pain, blurred vision or changes in LOC.  If vitals are still not improve after met with spine provider on Monday, will call back for an appt with pcp at the [facility].    TANYA Huitron, RN  [facility]      Agree with RN assessment and management.   BP at last appointment was 110 systolic so this is a huge jump.      Have patient make sure she is sitting and resting for at least 5 min before checking bp.      Alejandro Jones MD

## 2023-03-20 ENCOUNTER — OFFICE VISIT (OUTPATIENT)
Dept: PHYSICAL MEDICINE AND REHAB | Facility: CLINIC | Age: 73
End: 2023-03-20
Payer: COMMERCIAL

## 2023-03-20 VITALS
SYSTOLIC BLOOD PRESSURE: 132 MMHG | WEIGHT: 149 LBS | BODY MASS INDEX: 28.13 KG/M2 | HEART RATE: 55 BPM | HEIGHT: 61 IN | DIASTOLIC BLOOD PRESSURE: 58 MMHG

## 2023-03-20 DIAGNOSIS — M47.816 LUMBAR FACET ARTHROPATHY: ICD-10-CM

## 2023-03-20 DIAGNOSIS — M43.16 SPONDYLOLISTHESIS OF LUMBAR REGION: ICD-10-CM

## 2023-03-20 DIAGNOSIS — M41.25 OTHER IDIOPATHIC SCOLIOSIS, THORACOLUMBAR REGION: ICD-10-CM

## 2023-03-20 DIAGNOSIS — M54.50 LUMBAR SPINE PAIN: Primary | ICD-10-CM

## 2023-03-20 DIAGNOSIS — M79.18 MYOFASCIAL PAIN: ICD-10-CM

## 2023-03-20 DIAGNOSIS — M54.6 THORACIC SPINE PAIN: ICD-10-CM

## 2023-03-20 DIAGNOSIS — G89.29 CHRONIC PERISCAPULAR PAIN ON RIGHT SIDE: ICD-10-CM

## 2023-03-20 DIAGNOSIS — M25.511 CHRONIC PERISCAPULAR PAIN ON RIGHT SIDE: ICD-10-CM

## 2023-03-20 PROCEDURE — 99204 OFFICE O/P NEW MOD 45 MIN: CPT | Performed by: PHYSICAL MEDICINE & REHABILITATION

## 2023-03-20 ASSESSMENT — PAIN SCALES - GENERAL: PAINLEVEL: NO PAIN (0)

## 2023-03-20 NOTE — PROGRESS NOTES
Assessment/Plan:      Liz was seen today for back pain.    Diagnoses and all orders for this visit:    Lumbar spine pain    Lumbar facet arthropathy    Myofascial pain    Thoracic spine pain    Chronic periscapular pain on right side    Spondylolisthesis of lumbar region    Other idiopathic scoliosis, thoracolumbar region    Other orders  -     Orthopedic  Referral         Assessment: Pleasant 72 year old female with a history of hypertension, diabetes mellitus, thyroid disorder with:    1.  Chronic lumbar spine pain across lumbosacral junction consistent with facet arthropathy.  She has moderate to severe facet arthropathy L4-5 with slight spondylolisthesis which appears degenerative on CT abdomen pelvis from 2018.  This is accompanied by degenerative disc disease of the lumbar spine mild right-sided thoracolumbar scoliosis.    2.  Right parascapular pain most consistent with myofascial pain in the setting of mild scoliosis.  Cannot exclude cervical radicular component.      Discussion:    1.  I discussed the diagnosis and treatment options.  We discussed the chronic pain complaints she has had some physical therapy in the past but has progressive lumbar spine pain likely related to facet arthropathy.  We discussed options of further physical therapy, further/updated imaging.  She wants to avoid injections if possible surgery.    2.  Plain film cervical and lumbar spine.  Lumbar spine flexion-extension to evaluate for any instability.    3.*Physical therapy for cervical parascapular strengthening stabilization [place].  Lumbar stabilization program.    4.  Follow-up with me in 2 to 3 months and by nurse navigation after imaging has been available.    It was our pleasure caring for your patient today, if there any questions or concerns please do not hesitate to contact us.      Subjective:   Patient ID: Liz Pollard is a 72 year old female.    History of Present Illness: Patient presents at  the request of Dr. Paniagua for an evaluation of lumbar spine pain and right parascapular pain.  Is a chronic issue for her over several years intermittent.  No injury initially.  The low back pain is at the lumbosacral junction worsening with time.  First thing in the morning is the best time along with rest.  In the evening across lumbosacral junction with twisting turning or lifting she gets a severe pain at times.  She is also somewhat stiff first thing in the morning for a few minutes.  No radiation down legs paresthesias or weakness.  Reports having some physical therapy in the past and was seen by chiropractor in the past as well.    Right parascapular pain has also been present for years waxing and waning.  With no right sided arm paresthesias or pain.  Recently she was having some left arm and leg paresthesias which is intermittent improved with taking her blood pressure medications none today.  The entire arm felt like she had a rubber band around it.  For her neck she has seen a chiropractor in the past.       Imaging: CT scan abdomen pelvis from 2018 images personally reviewed.  Also recent CT chestChest images reviewed.  This is to evaluate the spine.  Lumbar spine shows degenerative disc disease through the lumbar spine most significant findings L4-5 L5-S1 disc height loss with severe facet arthropathy to moderate facet arthropathy L4-5 with slight degenerative spondylolisthesis.  Thoracic spine shows osteophytes anteriorly throughout.  No high-grade foraminal stenosis or central stenosis noted.  Slight right thoracolumbar scoliosis.       Review of Systems: Complains of ringing in the ears, change in vision, joint pain, muscle pain, dizziness, insomnia and depression.  Denies fever, weight loss, waking, headache, eye pain, chest pain, shortness of breath, Galileo pain, nausea vomiting, bowel or bladder cons, skin issues. Remainder of 12 point review systems negative unless listed above.    Past Medical  History:   Diagnosis Date     Diabetes mellitus (H)      Disease of thyroid gland      Hypertension      Infectious viral hepatitis        Family History   Problem Relation Age of Onset     Breast Cancer Maternal Aunt         in her 90 s     Cancer Mother 83.00         of stomach cancer     Colon Cancer Father 51.00         of colon cancer     Colon Cancer Brother 36.00         from colon cancer     Sickle Cell Trait Niece      Prostate Cancer Brother          Social History     Socioeconomic History     Marital status:      Spouse name: None     Number of children: None     Years of education: None     Highest education level: None   Tobacco Use     Smoking status: Former     Types: Cigarettes     Quit date: 2021     Years since quittin.9     Passive exposure: Never     Smokeless tobacco: Never   Vaping Use     Vaping Use: Never used   Substance and Sexual Activity     Alcohol use: No     Drug use: No     Sexual activity: Yes     Partners: Male     Birth control/protection: Post-menopausal     Social history: .  Retired from medical device company.  No tobacco or alcohol.    The following portions of the patient's history were reviewed and updated as appropriate: allergies, current medications, past family history, past medical history, past social history, past surgical history and problem list.    Oswestry (ALLA) Questionnaire    OSWESTRY DISABILITY INDEX 3/20/2023   Count 10   Sum 4   Oswestry Score (%) 8   Some recent data might be hidden       Neck Disability Index:  Neck Disability Index (  Darren H. and Dylan C. 1991. All rights reserved.; used with permission) 3/20/2023   SECTION 1 - PAIN INTENSITY 0   SECTION 2 - PERSONAL CARE 0   SECTION 3 - LIFTING 0   SECTION 4 - READING 0   SECTION 5 - HEADACHES 0   SECTION 6 - CONCENTRATION 0   SECTION 7 - WORK 0   SECTION 8 - DRIVING 0   SECTION 9 - SLEEPING 3   SECTION 10 - RECREATION 0   Count 10   Sum 3   Raw Score: /50 3   Neck  "Disability Index Score: (%) 6          PHQ-2 Score:     PHQ-2 ( 1999 [employer]) 1/16/2023 8/11/2022   Q1: Little interest or pleasure in doing things 0 1   Q2: Feeling down, depressed or hopeless 0 0   PHQ-2 Score 0 1   Q1: Little interest or pleasure in doing things Not at all Several days   Q2: Feeling down, depressed or hopeless Not at all Not at all   PHQ-2 Score 0 1                  Objective:   Physical Exam:    /58   Pulse 55   Ht 5' 1\" (1.549 m)   Wt 149 lb (67.6 kg)   BMI 28.15 kg/m    Body mass index is 28.15 kg/m .      General:  Well-appearing female in no acute distress.  Pleasant,   cooperative, and interactive throughout the examination and interview.  CV: No lower extremity edema.  Lymphatics: No cervical lymphadenopathy palpated.  Eyes: sclera clear.  Skin: No rashes or lesions seen.  Respirations unlabored.  MSK: Gait is nonantalgic.  Able to heel-toe walk without difficulty.    Negative Romberg.  Spine: Slight increased thoracic kyphotic curve flattened lumbar lordotic curve.  Slight right thoracolumbar scoliosis.  Moderately does lumbar flexion.  Floor testing.  Palpation: No significant tenderness over the cervical thoracic and lumbar paraspinals.  Extremities: Full range of motion of the shoulders and abduction, elbows, and wrists with no effusions or tenderness to palpation.  Negative arm drop, empty can, and Speed's test bilaterally.   Full range of motion cervical spine all planes.  Negative Spurling's bilaterally.  Full range of motion of the hips, knees, and ankles from a seated position with no effusions or tenderness to palpation.    Neurologic exam: Mental status: Patient is alert and oriented with normal affect.  Attention, knowledge, memory, and language are intact.  Normal coordination throughout the examination.  Reflexes are 2+ and symmetric biceps, triceps, brachioradialis, patellar, and Achilles with down-going toes and Negative Chanel's.  Sensation is intact to light " touch throughout the upper and lower extremities bilaterally.  Manual muscle testing reveals 5 out of 5 strength in the shoulder abductors, elbow   flexors/extensors, wrist extensors, interosseous, and finger flexors; 5 out of 5   in the hip flexors, knee flexors/extensors, ankle plantar flexors, ankle   dorsiflexors, and EHL.  Normal muscle bulk and tone.    Negative seated and supine straight leg raise bilaterally.

## 2023-03-20 NOTE — LETTER
3/20/2023         RE: Liz Pollard  [address]  [place] [address]        Dear Colleague,    Thank you for referring your patient, Liz Pollard, to the [facility]. Please see a copy of my visit note below.    Assessment/Plan:      Liz was seen today for back pain.    Diagnoses and all orders for this visit:    Lumbar spine pain    Lumbar facet arthropathy    Myofascial pain    Thoracic spine pain    Chronic periscapular pain on right side    Spondylolisthesis of lumbar region    Other idiopathic scoliosis, thoracolumbar region    Other orders  -     Orthopedic  Referral         Assessment: Pleasant 72 year old female with a history of hypertension, diabetes mellitus, thyroid disorder with:    1.  Chronic lumbar spine pain across lumbosacral junction consistent with facet arthropathy.  She has moderate to severe facet arthropathy L4-5 with slight spondylolisthesis which appears degenerative on CT abdomen pelvis from 2018.  This is accompanied by degenerative disc disease of the lumbar spine mild right-sided thoracolumbar scoliosis.    2.  Right parascapular pain most consistent with myofascial pain in the setting of mild scoliosis.  Cannot exclude cervical radicular component.      Discussion:    1.  I discussed the diagnosis and treatment options.  We discussed the chronic pain complaints she has had some physical therapy in the past but has progressive lumbar spine pain likely related to facet arthropathy.  We discussed options of further physical therapy, further/updated imaging.  She wants to avoid injections if possible surgery.    2.  Plain film cervical and lumbar spine.  Lumbar spine flexion-extension to evaluate for any instability.    3.*Physical therapy for cervical parascapular strengthening stabilization [place].  Lumbar stabilization program.    4.  Follow-up with me in 2 to 3 months and by nurse navigation after imaging has been  available.    It was our pleasure caring for your patient today, if there any questions or concerns please do not hesitate to contact us.      Subjective:   Patient ID: Liz Pollard is a 72 year old female.    History of Present Illness: Patient presents at the request of Dr. Paniagua for an evaluation of lumbar spine pain and right parascapular pain.  Is a chronic issue for her over several years intermittent.  No injury initially.  The low back pain is at the lumbosacral junction worsening with time.  First thing in the morning is the best time along with rest.  In the evening across lumbosacral junction with twisting turning or lifting she gets a severe pain at times.  She is also somewhat stiff first thing in the morning for a few minutes.  No radiation down legs paresthesias or weakness.  Reports having some physical therapy in the past and was seen by chiropractor in the past as well.    Right parascapular pain has also been present for years waxing and waning.  With no right sided arm paresthesias or pain.  Recently she was having some left arm and leg paresthesias which is intermittent improved with taking her blood pressure medications none today.  The entire arm felt like she had a rubber band around it.  For her neck she has seen a chiropractor in the past.       Imaging: CT scan abdomen pelvis from 2018 images personally reviewed.  Also recent CT chestChest images reviewed.  This is to evaluate the spine.  Lumbar spine shows degenerative disc disease through the lumbar spine most significant findings L4-5 L5-S1 disc height loss with severe facet arthropathy to moderate facet arthropathy L4-5 with slight degenerative spondylolisthesis.  Thoracic spine shows osteophytes anteriorly throughout.  No high-grade foraminal stenosis or central stenosis noted.  Slight right thoracolumbar scoliosis.       Review of Systems: Complains of ringing in the ears, change in vision, joint pain, muscle pain, dizziness,  insomnia and depression.  Denies fever, weight loss, waking, headache, eye pain, chest pain, shortness of breath, Galileo pain, nausea vomiting, bowel or bladder cons, skin issues. Remainder of 12 point review systems negative unless listed above.    Past Medical History:   Diagnosis Date     Diabetes mellitus (H)      Disease of thyroid gland      Hypertension      Infectious viral hepatitis        Family History   Problem Relation Age of Onset     Breast Cancer Maternal Aunt         in her 90 s     Cancer Mother 83.00         of stomach cancer     Colon Cancer Father 51.00         of colon cancer     Colon Cancer Brother 36.00         from colon cancer     Sickle Cell Trait Niece      Prostate Cancer Brother          Social History     Socioeconomic History     Marital status:      Spouse name: None     Number of children: None     Years of education: None     Highest education level: None   Tobacco Use     Smoking status: Former     Types: Cigarettes     Quit date: 2021     Years since quittin.9     Passive exposure: Never     Smokeless tobacco: Never   Vaping Use     Vaping Use: Never used   Substance and Sexual Activity     Alcohol use: No     Drug use: No     Sexual activity: Yes     Partners: Male     Birth control/protection: Post-menopausal     Social history: .  Retired from medical device company.  No tobacco or alcohol.    The following portions of the patient's history were reviewed and updated as appropriate: allergies, current medications, past family history, past medical history, past social history, past surgical history and problem list.    Oswestry (ALLA) Questionnaire    OSWESTRY DISABILITY INDEX 3/20/2023   Count 10   Sum 4   Oswestry Score (%) 8   Some recent data might be hidden       Neck Disability Index:  Neck Disability Index (  Darren H. and Dylan PATTERSON. 1991. All rights reserved.; used with permission) 3/20/2023   SECTION 1 - PAIN INTENSITY 0   SECTION 2 -  "PERSONAL CARE 0   SECTION 3 - LIFTING 0   SECTION 4 - READING 0   SECTION 5 - HEADACHES 0   SECTION 6 - CONCENTRATION 0   SECTION 7 - WORK 0   SECTION 8 - DRIVING 0   SECTION 9 - SLEEPING 3   SECTION 10 - RECREATION 0   Count 10   Sum 3   Raw Score: /50 3   Neck Disability Index Score: (%) 6          PHQ-2 Score:     PHQ-2 ( 1999 [employer]) 1/16/2023 8/11/2022   Q1: Little interest or pleasure in doing things 0 1   Q2: Feeling down, depressed or hopeless 0 0   PHQ-2 Score 0 1   Q1: Little interest or pleasure in doing things Not at all Several days   Q2: Feeling down, depressed or hopeless Not at all Not at all   PHQ-2 Score 0 1                  Objective:   Physical Exam:    /58   Pulse 55   Ht 5' 1\" (1.549 m)   Wt 149 lb (67.6 kg)   BMI 28.15 kg/m    Body mass index is 28.15 kg/m .      General:  Well-appearing female in no acute distress.  Pleasant,   cooperative, and interactive throughout the examination and interview.  CV: No lower extremity edema.  Lymphatics: No cervical lymphadenopathy palpated.  Eyes: sclera clear.  Skin: No rashes or lesions seen.  Respirations unlabored.  MSK: Gait is nonantalgic.  Able to heel-toe walk without difficulty.    Negative Romberg.  Spine: Slight increased thoracic kyphotic curve flattened lumbar lordotic curve.  Slight right thoracolumbar scoliosis.  Moderately does lumbar flexion.  Floor testing.  Palpation: No significant tenderness over the cervical thoracic and lumbar paraspinals.  Extremities: Full range of motion of the shoulders and abduction, elbows, and wrists with no effusions or tenderness to palpation.  Negative arm drop, empty can, and Speed's test bilaterally.   Full range of motion cervical spine all planes.  Negative Spurling's bilaterally.  Full range of motion of the hips, knees, and ankles from a seated position with no effusions or tenderness to palpation.    Neurologic exam: Mental status: Patient is alert and oriented with normal affect.  " Attention, knowledge, memory, and language are intact.  Normal coordination throughout the examination.  Reflexes are 2+ and symmetric biceps, triceps, brachioradialis, patellar, and Achilles with down-going toes and Negative Chanel's.  Sensation is intact to light touch throughout the upper and lower extremities bilaterally.  Manual muscle testing reveals 5 out of 5 strength in the shoulder abductors, elbow   flexors/extensors, wrist extensors, interosseous, and finger flexors; 5 out of 5   in the hip flexors, knee flexors/extensors, ankle plantar flexors, ankle   dorsiflexors, and EHL.  Normal muscle bulk and tone.    Negative seated and supine straight leg raise bilaterally.          Again, thank you for allowing me to participate in the care of your patient.        Sincerely,        Harlan Baker, DO

## 2023-03-20 NOTE — PATIENT INSTRUCTIONS
An Xray  was ordered for you today.  Please call Radiology at [phone].    A physical therapy order was provided for you today.  You will be contacted by physical therapy.  If nobody contacts you within 3 to 5 days, please contact the clinic at [phone].  It will be very important for you to do your physical therapy exercises on a regular basis to decrease your pain and prevent future pain flares.

## 2023-03-21 ENCOUNTER — HOSPITAL ENCOUNTER (OUTPATIENT)
Dept: GENERAL RADIOLOGY | Facility: HOSPITAL | Age: 73
Discharge: HOME | End: 2023-03-21
Attending: PHYSICAL MEDICINE & REHABILITATION
Payer: COMMERCIAL

## 2023-03-21 DIAGNOSIS — M41.25 OTHER IDIOPATHIC SCOLIOSIS, THORACOLUMBAR REGION: ICD-10-CM

## 2023-03-21 DIAGNOSIS — G89.29 CHRONIC PERISCAPULAR PAIN ON RIGHT SIDE: ICD-10-CM

## 2023-03-21 DIAGNOSIS — M25.511 CHRONIC PERISCAPULAR PAIN ON RIGHT SIDE: ICD-10-CM

## 2023-03-21 DIAGNOSIS — M43.16 SPONDYLOLISTHESIS OF LUMBAR REGION: ICD-10-CM

## 2023-03-21 DIAGNOSIS — M47.816 LUMBAR FACET ARTHROPATHY: ICD-10-CM

## 2023-03-21 DIAGNOSIS — M54.50 LUMBAR SPINE PAIN: ICD-10-CM

## 2023-03-21 PROCEDURE — 72040 X-RAY EXAM NECK SPINE 2-3 VW: CPT

## 2023-03-21 PROCEDURE — 72120 X-RAY BEND ONLY L-S SPINE: CPT

## 2023-03-22 ENCOUNTER — TELEPHONE (OUTPATIENT)
Dept: PHYSICAL MEDICINE AND REHAB | Facility: CLINIC | Age: 73
End: 2023-03-22
Payer: COMMERCIAL

## 2023-03-22 NOTE — TELEPHONE ENCOUNTER
----- Message from Harlan Baker DO sent at 3/22/2023  7:58 AM CDT -----  I reviewed cervical and lumbar x-rays.  There is a slight slippage of L4 on L5 that moves up to 4 mm from flexion to extension which is a borderline finding but not necessarily instability.  This would indicate that arthritis of the facet joints is likely responsible for her pain.    The x-rays of her neck show moderate wear-and-tear changes of the discs at C5-6 and 6-7.    I would recommend continue with the plan of physical therapy that was outlined at her appointment.  If she has not yet received a call to schedule physical therapy, please provide the phone number for her.

## 2023-03-22 NOTE — TELEPHONE ENCOUNTER
Call placed to patient with provider's results and recommendations.  Pt stated understanding and expressed appreciation. She is scheduled to begin PT next Tuesday 3/28. Date/time and address of PT facility given to patient.

## 2023-03-28 ENCOUNTER — HOSPITAL ENCOUNTER (OUTPATIENT)
Dept: PHYSICAL THERAPY | Facility: REHABILITATION | Age: 73
Discharge: HOME | End: 2023-03-28
Attending: PHYSICAL MEDICINE & REHABILITATION
Payer: COMMERCIAL

## 2023-03-28 DIAGNOSIS — M43.16 SPONDYLOLISTHESIS OF LUMBAR REGION: ICD-10-CM

## 2023-03-28 DIAGNOSIS — M41.25 OTHER IDIOPATHIC SCOLIOSIS, THORACOLUMBAR REGION: ICD-10-CM

## 2023-03-28 DIAGNOSIS — M54.50 LUMBAR SPINE PAIN: Primary | ICD-10-CM

## 2023-03-28 DIAGNOSIS — M25.511 CHRONIC PERISCAPULAR PAIN ON RIGHT SIDE: ICD-10-CM

## 2023-03-28 DIAGNOSIS — M47.816 LUMBAR FACET ARTHROPATHY: ICD-10-CM

## 2023-03-28 DIAGNOSIS — M79.18 MYOFASCIAL PAIN: ICD-10-CM

## 2023-03-28 DIAGNOSIS — M54.6 THORACIC SPINE PAIN: ICD-10-CM

## 2023-03-28 DIAGNOSIS — G89.29 CHRONIC PERISCAPULAR PAIN ON RIGHT SIDE: ICD-10-CM

## 2023-03-28 PROCEDURE — 97110 THERAPEUTIC EXERCISES: CPT | Mod: GP | Performed by: PHYSICAL THERAPIST

## 2023-03-28 PROCEDURE — 97161 PT EVAL LOW COMPLEX 20 MIN: CPT | Mod: GP | Performed by: PHYSICAL THERAPIST

## 2023-03-28 NOTE — PROGRESS NOTES
[facility]    OUTPATIENT PHYSICAL THERAPY ORTHOPEDIC EVALUATION  PLAN OF TREATMENT FOR OUTPATIENT REHABILITATION  (COMPLETE FOR INITIAL CLAIMS ONLY)  Patient's Last Name, First Name, M.I.  YOB: 1950  Liz Pollard    Provider s Name:  [facility]   Medical Record No.  [id number]   Start of Care Date:  03/28/23   Onset Date:  03/20/23 (Order date)   Type:     _X__PT   ___OT   ___SLP Medical Diagnosis:  Lumbar spine pain  Lumbar facet arthropathy  Myofascial pain  Thoracic spine pain  Chronic periscapular pain on right side  Spondylolisthesis of lumbar region  Other idiopathic scoliosis, thoracolumbar region     PT Diagnosis:  Chronic bilateral lower back pain and R thoracic back pain   Visits from SOC:  1      _________________________________________________________________________________  Plan of Treatment/Functional Goals:  joint mobilization, manual therapy, neuromuscular re-education, ROM, strengthening, stretching           Goals  Goal Identifier: Self-management/HEP  Goal Description: Patient will be independent in self-management of condition and HEP to reach functional goals.  Target Date: 06/20/23    Goal Identifier: Lifting  Goal Description: Patient will be able to lift 20# without pain in the lower back in order to improve functional independence.  Target Date: 06/20/23    Goal Identifier: Sit/Stand  Goal Description: Patient will be able to get up from a chair without pain or stiffness in the lower back to improve functional mobility.  Target Date: 06/20/23    Goal Identifier: Dishes  Goal Description: Patient will be able to do the dishes without pain in the scapular region to be able to perform household tasks.  Target Date: 06/20/23    Therapy Frequency:   (1x/week to every other week)  Predicted Duration of Therapy Intervention:  12  chula Pollard, PT, DPT, MHA                 I CERTIFY THE NEED FOR THESE SERVICES FURNISHED UNDER        THIS PLAN OF TREATMENT AND WHILE UNDER MY CARE     (Physician co-signature of this document indicates review and certification of the therapy plan).                     Certification Date From:  03/28/23   Certification Date To:  06/20/23    Referring Provider:  Harlan Baker DO    Initial Assessment        See Epic Evaluation Start of Care Date: 03/28/23 03/28/23 0700   General Information   Type of Visit Initial OP Ortho PT Evaluation   Start of Care Date 03/28/23   Referring Physician Harlan Baker, DO   Patient/Family Goals Statement Help with pain   Orders Evaluate and Treat   Orders Comment right parascaular pain and lumabr pain with facet arthropathy   Date of Order 03/20/23   Certification Required? Yes   Medical Diagnosis Lumbar spine pain  Lumbar facet arthropathy  Myofascial pain  Thoracic spine pain  Chronic periscapular pain on right side  Spondylolisthesis of lumbar region  Other idiopathic scoliosis, thoracolumbar region   Surgical/Medical history reviewed Yes   Precautions/Limitations no known precautions/limitations       Present No   Body Part(s)   Body Part(s) Lumbar Spine/SI;Cervical Spine   Presentation and Etiology   Pertinent history of current problem (include personal factors and/or comorbidities that impact the POC) The patient reports that she has dealing with back pain for 15+ years now.  She has had PT in the past as well as chiropractics.  Her pain has been progressively getting worse.  Every day is different, so it's hard to know how her back is going to feel.  She is ok when she first gets up in the morning, but then feels it more toward the end of the day.  In the evening, she can get a sharp pain that takes her breath away in the evening time.   Impairments A. Pain;D. Decreased ROM   Functional Limitations perform activities  of daily living;perform desired leisure / sports activities   Symptom Location Lower back, R shoulder blade   How/Where did it occur From insidious onset   Onset date of current episode/exacerbation 03/20/23  (Order date)   Chronicity Chronic   Pain rating (0-10 point scale) Best (/10);Worst (/10)   Best (/10) 0   Worst (/10) 10   Pain quality A. Sharp;B. Dull   Frequency of pain/symptoms B. Intermittent   Pain/symptoms are: Other   Pain symptoms comment Worse toward the end of the day   Pain/symptoms exacerbated by C. Lifting;A. Sitting;B. Walking;K. Home tasks   Pain/symptoms eased by C. Rest;I. OTC medication(s)   Progression of symptoms since onset: Worsened   Fall Risk Screen   Fall screen completed by PT   Have you fallen 2 or more times in the past year? No   Have you fallen and had an injury in the past year? No   Is patient a fall risk? No   Abuse Screen (yes response referral indicated)   Feels Unsafe at Home or Work/School no   Feels Threatened by Someone no   Does Anyone Try to Keep You From Having Contact with Others or Doing Things Outside Your Home? no   Physical Signs of Abuse Present no   Patient needs abuse support services and resources No   System Outcome Measures   Outcome Measures   (ALLA: 12%)   Lumbar Spine/SI Objective Findings   Flexion ROM To floor without pain   Extension ROM WNL   Right Side Bending ROM Pain R side - ROM is WNL   Left Side Bending ROM WNL   Lumbar ROM Comment Pain R side with bilateral rotation ROM   Segmental Mobility Hypomobility throughout lumbar spine   Palpation Tenderness across hip musculature/piriformis bilaterally   Posture Flat lumbar lordosis, increased thoracic kyphosis, moderate forward head   Cervical Spine   Cervical Left Side Bending ROM WNL   Cervical Right Rotation ROM Tightness, min limited   Cervical Left Rotation ROM Tightness, min limited   Cervical Flexion ROM WNL   Cervical Extension ROM WNL   Cervical Right Side Bending ROM WNL   Shoulder AROM  Screen Limited shoulder flexion ROM, otherwise WNL   Palpation Tenderness R inferior angle of the scapula and medially on thoracic paraspinal musculature   Planned Therapy Interventions   Planned Therapy Interventions joint mobilization;manual therapy;neuromuscular re-education;ROM;strengthening;stretching   Clinical Impression   Criteria for Skilled Therapeutic Interventions Met yes, treatment indicated   PT Diagnosis Chronic bilateral lower back pain and R thoracic back pain   Influenced by the following impairments ROM, pain, segmental mobility   Functional limitations due to impairments getting up from a chair, lifting, doing the dishes   Clinical Presentation Stable/Uncomplicated   Clinical Decision Making (Complexity) Low complexity   Therapy Frequency   (1x/week to every other week)   Predicted Duration of Therapy Intervention (days/wks) 12 weeks   Risk & Benefits of therapy have been explained Yes   Patient, Family & other staff in agreement with plan of care Yes   Clinical Impression Comments Liz Pollard is a 72 year old female who presents to PT with c/o bilateral lower back pain and R thoracic back pain.  Initial onset of symptoms was 15+ years ago, with more recent worsening of her pain.  She has PMH positive for chronic hepatitis C, obesity, hyperlipidemia, type 2 diabetes, HTN, scoliosis, and CKD stage 3.  Her pain can vary from 0-10/10 and is described as sharp and dull in nature.  She is having difficulty lifting heavy objects, sitting for long periods, walking, and performing home tasks.  On exam, patient demonstrates impaired side bending and rotation ROM of the lumbar spine, lumbar hypomobility, and impaired posture with increased kyphosis.  Her symptoms are most consistent with lumbar facet arthropathy and thoracic back pain secondary to posture.  She is appropriate for skilled 1:1 PT services to address the listed impairments and return to function.   ORTHO GOALS   PT Ortho Eval Goals  1;2;3;4   Ortho Goal 1   Goal Identifier Self-management/HEP   Goal Description Patient will be independent in self-management of condition and HEP to reach functional goals.   Target Date 06/20/23   Ortho Goal 2   Goal Identifier Lifting   Goal Description Patient will be able to lift 20# without pain in the lower back in order to improve functional independence.   Target Date 06/20/23   Ortho Goal 3   Goal Identifier Sit/Stand   Goal Description Patient will be able to get up from a chair without pain or stiffness in the lower back to improve functional mobility.   Target Date 06/20/23   Ortho Goal 4   Goal Identifier Dishes   Goal Description Patient will be able to do the dishes without pain in the scapular region to be able to perform household tasks.   Target Date 06/20/23   Total Evaluation Time   PT Eval, Low Complexity Minutes (48154) 20   Therapy Certification   Certification date from 03/28/23   Certification date to 06/20/23   Medical Diagnosis Lumbar spine pain  Lumbar facet arthropathy  Myofascial pain  Thoracic spine pain  Chronic periscapular pain on right side  Spondylolisthesis of lumbar region  Other idiopathic scoliosis, thoracolumbar region     Selam Pollard, PT, DPT, MHA  3/28/2023

## 2023-04-13 ENCOUNTER — OFFICE VISIT (OUTPATIENT)
Dept: FAMILY MEDICINE | Facility: CLINIC | Age: 73
End: 2023-04-13
Payer: COMMERCIAL

## 2023-04-13 VITALS
WEIGHT: 150.75 LBS | SYSTOLIC BLOOD PRESSURE: 101 MMHG | HEIGHT: 61 IN | BODY MASS INDEX: 28.46 KG/M2 | HEART RATE: 54 BPM | OXYGEN SATURATION: 100 % | RESPIRATION RATE: 18 BRPM | TEMPERATURE: 97.9 F | DIASTOLIC BLOOD PRESSURE: 63 MMHG

## 2023-04-13 DIAGNOSIS — E11.21 TYPE 2 DIABETES MELLITUS WITH DIABETIC NEPHROPATHY, WITHOUT LONG-TERM CURRENT USE OF INSULIN (H): ICD-10-CM

## 2023-04-13 DIAGNOSIS — I10 ESSENTIAL HYPERTENSION: ICD-10-CM

## 2023-04-13 DIAGNOSIS — Z76.0 ENCOUNTER FOR MEDICATION REFILL: ICD-10-CM

## 2023-04-13 DIAGNOSIS — E03.8 OTHER SPECIFIED HYPOTHYROIDISM: ICD-10-CM

## 2023-04-13 LAB
ANION GAP SERPL CALCULATED.3IONS-SCNC: 10 MMOL/L (ref 7–15)
BUN SERPL-MCNC: 47.9 MG/DL (ref 8–23)
CALCIUM SERPL-MCNC: 9.5 MG/DL (ref 8.8–10.2)
CHLORIDE SERPL-SCNC: 104 MMOL/L (ref 98–107)
CHOLEST SERPL-MCNC: 109 MG/DL
CREAT SERPL-MCNC: 1.49 MG/DL (ref 0.51–0.95)
DEPRECATED HCO3 PLAS-SCNC: 24 MMOL/L (ref 22–29)
GFR SERPL CREATININE-BSD FRML MDRD: 37 ML/MIN/1.73M2
GLUCOSE SERPL-MCNC: 84 MG/DL (ref 70–99)
HDLC SERPL-MCNC: 40 MG/DL
LDLC SERPL CALC-MCNC: 59 MG/DL
NONHDLC SERPL-MCNC: 69 MG/DL
POTASSIUM SERPL-SCNC: 4.7 MMOL/L (ref 3.4–5.3)
SODIUM SERPL-SCNC: 138 MMOL/L (ref 136–145)
TRIGL SERPL-MCNC: 51 MG/DL
TSH SERPL DL<=0.005 MIU/L-ACNC: 0.81 UIU/ML (ref 0.3–4.2)

## 2023-04-13 PROCEDURE — 36415 COLL VENOUS BLD VENIPUNCTURE: CPT | Performed by: FAMILY MEDICINE

## 2023-04-13 PROCEDURE — 84443 ASSAY THYROID STIM HORMONE: CPT | Performed by: FAMILY MEDICINE

## 2023-04-13 PROCEDURE — 80061 LIPID PANEL: CPT | Performed by: FAMILY MEDICINE

## 2023-04-13 PROCEDURE — 99214 OFFICE O/P EST MOD 30 MIN: CPT | Performed by: FAMILY MEDICINE

## 2023-04-13 PROCEDURE — 80048 BASIC METABOLIC PNL TOTAL CA: CPT | Performed by: FAMILY MEDICINE

## 2023-04-13 RX ORDER — SPIRONOLACTONE 25 MG/1
25 TABLET ORAL DAILY
Qty: 90 TABLET | Refills: 3 | Status: SHIPPED | OUTPATIENT
Start: 2023-04-13 | End: 2024-03-28

## 2023-04-13 RX ORDER — HYDROCHLOROTHIAZIDE 25 MG/1
25 TABLET ORAL DAILY
Qty: 90 TABLET | Refills: 3 | Status: SHIPPED | OUTPATIENT
Start: 2023-04-13 | End: 2023-04-13 | Stop reason: DRUGHIGH

## 2023-04-13 RX ORDER — METOPROLOL SUCCINATE 100 MG/1
100 TABLET, EXTENDED RELEASE ORAL DAILY
Qty: 90 TABLET | Refills: 3 | Status: SHIPPED | OUTPATIENT
Start: 2023-04-13 | End: 2024-02-26

## 2023-04-13 RX ORDER — HYDROCHLOROTHIAZIDE 12.5 MG/1
12.5 TABLET ORAL DAILY
Qty: 90 TABLET | Refills: 3 | Status: SHIPPED | OUTPATIENT
Start: 2023-04-13 | End: 2024-02-26

## 2023-04-13 NOTE — PROGRESS NOTES
Assessment & Plan     Type 2 diabetes mellitus with diabetic nephropathy, without long-term current use of insulin (H)  Weird leg symptoms - will check BMP to make sure electrolytes stable after restarting mediations.   - Lipid panel reflex to direct LDL Non-fasting  - Basic metabolic panel  (Ca, Cl, CO2, Creat, Gluc, K, Na, BUN)  - Lipid panel reflex to direct LDL Non-fasting  - Basic metabolic panel  (Ca, Cl, CO2, Creat, Gluc, K, Na, BUN)    Other specified hypothyroidism  - TSH with free T4 reflex    Essential hypertension  bp was stable after stopping hydrochlorothiazide, then stopped aldactone. BP went up to 170s at home and patient restarted both  But she also wants to stop medications. bp is well below goal. Will be less aggressive - cut hydrochlorothiazide in half and decrease metoprolol by 1/2 tablet. Follow up in 3 months to recheck - sooner if needed.   - metoprolol succinate ER (TOPROL XL) 100 MG 24 hr tablet  Dispense: 90 tablet; Refill: 3  - hydrochlorothiazide (HYDRODIURIL) 12.5 MG tablet  Dispense: 90 tablet; Refill: 3  - spironolactone (ALDACTONE) 25 MG tablet  Dispense: 90 tablet; Refill: 3          Return in about 4 months (around 8/13/2023) for Medication Check, blood pressure recheck, diabetes follow up.      Alejandro Jones MD  [facility] ROSA Cavazos is a 72 year old, presenting for the following health issues:  RECHECK (BP)        4/13/2023    11:00 AM   Additional Questions   Roomed by Fay ESCOBEDO     History of Present Illness       Hypertension: She presents for follow up of hypertension.  She does check blood pressure  regularly outside of the clinic. Outpatient blood pressures have not been over 140/90. She does not follow a low salt diet.     She eats 0-1 servings of fruits and vegetables daily.She consumes 0 sweetened beverage(s) daily.She exercises with enough effort to increase her heart rate 9 or less minutes per day.    She is taking medications  "regularly.             Review of Systems   Constitutional, HEENT, cardiovascular, pulmonary, gi and gu systems are negative, except as otherwise noted.      Objective    /63 (BP Location: Left arm, Patient Position: Sitting, Cuff Size: Adult Regular)   Pulse 54   Temp 97.9  F (36.6  C) (Oral)   Resp 18   Ht 1.549 m (5' 1\")   Wt 68.4 kg (150 lb 12 oz)   SpO2 100%   BMI 28.48 kg/m    Body mass index is 28.48 kg/m .  Physical Exam   GENERAL: healthy, alert and no distress  NECK: no adenopathy, no asymmetry, masses, or scars and thyroid normal to palpation  RESP: lungs clear to auscultation - no rales, rhonchi or wheezes  CV: regular rate and rhythm, normal S1 S2, no S3 or S4, no murmur, click or rub, no peripheral edema and peripheral pulses strong  ABDOMEN: soft, nontender, no hepatosplenomegaly, no masses and bowel sounds normal  MS: no gross musculoskeletal defects noted, no edema    No results found for this or any previous visit (from the past 24 hour(s)).                "

## 2023-04-19 ENCOUNTER — HOSPITAL ENCOUNTER (OUTPATIENT)
Dept: PHYSICAL THERAPY | Facility: REHABILITATION | Age: 73
Discharge: HOME | End: 2023-04-19
Payer: COMMERCIAL

## 2023-04-19 DIAGNOSIS — M25.511 CHRONIC PERISCAPULAR PAIN ON RIGHT SIDE: ICD-10-CM

## 2023-04-19 DIAGNOSIS — M43.16 SPONDYLOLISTHESIS OF LUMBAR REGION: ICD-10-CM

## 2023-04-19 DIAGNOSIS — M47.816 LUMBAR FACET ARTHROPATHY: ICD-10-CM

## 2023-04-19 DIAGNOSIS — M79.18 MYOFASCIAL PAIN: ICD-10-CM

## 2023-04-19 DIAGNOSIS — M54.6 THORACIC SPINE PAIN: ICD-10-CM

## 2023-04-19 DIAGNOSIS — M54.50 LUMBAR SPINE PAIN: Primary | ICD-10-CM

## 2023-04-19 DIAGNOSIS — M41.25 OTHER IDIOPATHIC SCOLIOSIS, THORACOLUMBAR REGION: ICD-10-CM

## 2023-04-19 DIAGNOSIS — G89.29 CHRONIC PERISCAPULAR PAIN ON RIGHT SIDE: ICD-10-CM

## 2023-04-19 PROCEDURE — 97140 MANUAL THERAPY 1/> REGIONS: CPT | Mod: GP | Performed by: PHYSICAL THERAPIST

## 2023-04-19 PROCEDURE — 97110 THERAPEUTIC EXERCISES: CPT | Mod: GP | Performed by: PHYSICAL THERAPIST

## 2023-04-26 ENCOUNTER — HOSPITAL ENCOUNTER (OUTPATIENT)
Dept: PHYSICAL THERAPY | Facility: REHABILITATION | Age: 73
Discharge: HOME | End: 2023-04-26
Payer: COMMERCIAL

## 2023-04-26 DIAGNOSIS — M54.50 LUMBAR SPINE PAIN: Primary | ICD-10-CM

## 2023-04-26 DIAGNOSIS — M47.816 LUMBAR FACET ARTHROPATHY: ICD-10-CM

## 2023-04-26 DIAGNOSIS — M54.6 THORACIC SPINE PAIN: ICD-10-CM

## 2023-04-26 PROCEDURE — 97140 MANUAL THERAPY 1/> REGIONS: CPT | Mod: GP

## 2023-04-26 PROCEDURE — 97110 THERAPEUTIC EXERCISES: CPT | Mod: GP

## 2023-05-02 ENCOUNTER — HOSPITAL ENCOUNTER (OUTPATIENT)
Dept: PHYSICAL THERAPY | Facility: REHABILITATION | Age: 73
Discharge: HOME | End: 2023-05-02
Payer: COMMERCIAL

## 2023-05-02 DIAGNOSIS — G89.29 CHRONIC PERISCAPULAR PAIN ON RIGHT SIDE: ICD-10-CM

## 2023-05-02 DIAGNOSIS — M54.6 THORACIC SPINE PAIN: ICD-10-CM

## 2023-05-02 DIAGNOSIS — M47.816 LUMBAR FACET ARTHROPATHY: ICD-10-CM

## 2023-05-02 DIAGNOSIS — M54.50 LUMBAR SPINE PAIN: Primary | ICD-10-CM

## 2023-05-02 DIAGNOSIS — M25.511 CHRONIC PERISCAPULAR PAIN ON RIGHT SIDE: ICD-10-CM

## 2023-05-02 DIAGNOSIS — M79.18 MYOFASCIAL PAIN: ICD-10-CM

## 2023-05-02 DIAGNOSIS — M43.16 SPONDYLOLISTHESIS OF LUMBAR REGION: ICD-10-CM

## 2023-05-02 PROCEDURE — 97110 THERAPEUTIC EXERCISES: CPT | Mod: GP | Performed by: PHYSICAL THERAPIST

## 2023-05-02 PROCEDURE — 97140 MANUAL THERAPY 1/> REGIONS: CPT | Mod: GP | Performed by: PHYSICAL THERAPIST

## 2023-05-09 ENCOUNTER — HOSPITAL ENCOUNTER (OUTPATIENT)
Dept: PHYSICAL THERAPY | Facility: REHABILITATION | Age: 73
Discharge: HOME | End: 2023-05-09
Payer: COMMERCIAL

## 2023-05-09 DIAGNOSIS — G89.29 CHRONIC PERISCAPULAR PAIN ON RIGHT SIDE: ICD-10-CM

## 2023-05-09 DIAGNOSIS — M79.18 MYOFASCIAL PAIN: ICD-10-CM

## 2023-05-09 DIAGNOSIS — M54.6 THORACIC SPINE PAIN: ICD-10-CM

## 2023-05-09 DIAGNOSIS — M47.816 LUMBAR FACET ARTHROPATHY: ICD-10-CM

## 2023-05-09 DIAGNOSIS — M25.511 CHRONIC PERISCAPULAR PAIN ON RIGHT SIDE: ICD-10-CM

## 2023-05-09 DIAGNOSIS — M43.16 SPONDYLOLISTHESIS OF LUMBAR REGION: ICD-10-CM

## 2023-05-09 DIAGNOSIS — M54.50 LUMBAR SPINE PAIN: Primary | ICD-10-CM

## 2023-05-09 DIAGNOSIS — M41.25 OTHER IDIOPATHIC SCOLIOSIS, THORACOLUMBAR REGION: ICD-10-CM

## 2023-05-09 PROCEDURE — 97140 MANUAL THERAPY 1/> REGIONS: CPT | Mod: GP | Performed by: PHYSICAL THERAPIST

## 2023-05-09 PROCEDURE — 97110 THERAPEUTIC EXERCISES: CPT | Mod: GP | Performed by: PHYSICAL THERAPIST

## 2023-05-11 ENCOUNTER — PATIENT OUTREACH (OUTPATIENT)
Dept: GASTROENTEROLOGY | Facility: CLINIC | Age: 73
End: 2023-05-11
Payer: COMMERCIAL

## 2023-05-11 DIAGNOSIS — Z12.11 SPECIAL SCREENING FOR MALIGNANT NEOPLASMS, COLON: Primary | ICD-10-CM

## 2023-05-11 NOTE — PROGRESS NOTES
"Last Colonoscopy in 2018 with [facility]. Patient meets high risk criteria for Colorectal Cancer team to place order. Order placed.     Ordering/Referring Provider: Alejandro Jones MD  BMI: Estimated body mass index is 28.48 kg/m  as calculated from the following:    Height as of 4/13/23: 1.549 m (5' 1\").    Weight as of 4/13/23: 68.4 kg (150 lb 12 oz).     Sedation:  Based on patients medical history patient is appropriate for   moderate sedation. If patient's BMI > 50 do not schedule in ASC.    Location:  Does patient have an LVAD?  No    Does patient have a history of moderate to severe sleep apnea?  No    Does patient have a history of asthma, COPD or any other lung disease?  No    Does patient have a history of cardiac disease?  No    Is pataient awaiting a heart or lung transplant?   No    Has patient had a stroke or transient ischemic attack in the last 6 months?   No    Is the patient currently on dialysis?   No    Prep:  Previous prep (last colonoscopy):   MiraLAX (No Mag Citrate)    Quality of previous prep:   Excellent    Is patient currently on dialysis, is ESRD, or CKD stage 4/5?   No (standard prep)    Does patient have a diagnosis of diabetes?  Yes (Golytely Prep)    Does patient have a diagnosis of cystic fibrosis (CF)?  No    BMI > 40?  No    Final Referral Status:  meets the criteria for placement of colonoscopy screening  referral order.      Referral order placed with the following recommendations:  Sedation: Moderate Sedation  Location Type: No Scheduling Restrictions  Prep: Standard Golytely        "

## 2023-05-11 NOTE — LETTER
5/11/2023         RE: Liz Pollard  [address]  [place] [address]        Jhon Hill,    We hope this letter finds you doing well.   Your health is important to us at [facility]. Our records indicate that you could be due, or possibly overdue, for a screening or surveillance colonoscopy.     An order has been placed for you to have this completed. Our scheduling team will be reaching out to you to assist in getting this scheduled. If you do not hear from them within 7 days or you would like to schedule sooner, please call [phone], option 2 for endoscopy scheduling.     If you have questions about this order or have further concerns, please reach out to your primary care provider.     Felix,     Colorectal Cancer RN Screening Team  [facility]            [facility], [facility] Intensive Care Progress Note  Date of Service (when I saw the patient): 09/04/2020     Assessment & Plan   Radha De Souza is a 66 year old female with prolonged hospitalization 4/2/20-4/25/20 at [facility], 5/2-8/27/20 [facility] for severe necrotizing pancreatitis with infected fluid collections (E.coli, VRE, Candida) s/p retroperitoneal drain placements and multiple surgical and gastroenterology procedures c/b bacteremia who is admitted for cholangitis.    Changes Today  September 3, 2020:  - Hydrocortisone 50 q6-> 100 q8  - Thiamine, Vitamin C   - Albumin 25g  - LA @ 830, 1230, 430: 8.3->4.6->2.2->_recheck @4:30pm__  - Trending blood gases to adjust vent   - CXR to confirm ETT placement  - Biliary drain fluid culture  - Wean phenylephrine   - Fentanyl drip with bolus  - Renal US, FeNa, UA  - D5 infusion w/ LR @ 125ml/hr  - Additional pRBCs  - Fibrinogen   - Trending central venous oxyhemoglobin @ 1230, 1630  - GI w/ discuss with surgery whether or not R perc drain should be removed    ===================================================================  NEURO/PSYCH:  #Pain/Sedation control  -Continue PTA Oxycodone 5mg Q4 PRN  -Discontinue Hydromorphone 0.5mg Q3 PRN  -Start fentanyl drip w/ PRN bolus  -Precedex for sedation (failed propofol and versed d/t hypotension)    #MDD  ---------------------------------------------------------------------------------------------------------------------  CARDIOVASCULAR:  EKG:  Sinus tachycardia, low voltage QRS  QTc: 429  EF:  55-60% noted 8/5/20  Significant cardiac history: None found    #Septic shock, stable/improving  Became hypotensive overnight 9/3-9/4, intubated requiring 3 pressors 9/4/20. Fluid resuscitated w/ POCUS 9/4/20 showing 2.6 IVC w/o respiratory variability (but intubated).   - Fluid resuscitation: bolus with albumin, then start 125ml/hr mIVF D5 LR  - Maintain MAPs >65  - Access via PICC   - POCUS w/  appropriate contractility, IVC dilated @ 2.6.   - Monitoring I:Os, will consider diuresis if UO doesn't   - Trending lactate: 8.3->4.6->2.2->_4:30pm__    #Chest pain, resolved  Noted sudden onset chest pain w/ worsening SOB 9/3/20. Suspect sepsis w/ prior h/o myalgias c/b rigors over true ACS. Troponin, EKG, CXR wnl.  ---------------------------------------------------------------------------------------------------------------------  PULMONARY:        Ventilation Mode: CMV/AC  (Continuous Mandatory Ventilation/ Assist Control)  Rate Set (breaths/minute): 18 breaths/min  Tidal Volume Set (mL): 450 mL  PEEP (cm H2O): 5 cmH2O  Oxygen Concentration (%): 50 %  Resp: 20    #Intubated for airway protection  #Respiratory compensation for AGMA  Noted to have resp alkalosis 9/3 d/t sepsis/pain. Gas from 9/4/20 w/ PCO2 slightly higher than expected, but normalizing (expected PCO2 31, currently 33). Tachypneic, thought secondary to pain, also concominant secondary metabolic acidosis   - Trending ABG: pH 7.37, PCO2 33, PO2 151  - CXR ordered to confirm ETT placement  - Trending bases, SvO2 ordered  ---------------------------------------------------------------------------------------------------------------------  RENAL:  Volume status on admission: hypovolemic   Intake/Output Summary (Last 24 hours) at 9/3/2020 1418  Last data filed at 9/3/2020 1400  Gross per 24 hour   Intake 3354.17 ml   Output 1340 ml   Net 2014.17 ml     Baseline Creatinine: 0.55-0.60    #Oliguric ELIER, improving  Cr on admission 0.91, baseline Cr 0.55-02.6. Cr 1.77 on 9/4/20. Etiology of ELIER most c/w prerenal azotemia (decreased renal perfusion pressure 2/2 hypovolemic state, from hemorrhage, diarrhea, or unreplenished insensible losses). However, does have history of ATN @ prior hospitalization. UA ordered: clean.  - Renal US: redemonstration of R hydonephrosis (which has been noted previously)  - Consider diuresis if UOP decreases  -Trend Cr  -  Renally adjust meds, hold nephrotoxic agents such as NSAIDs, diuretics where applicable  - Daily fluid balance, daily weights    #Metabolic acidosis w/ concomitant respiratory alkalosis  #Lactic acidosis, improving  -Trending LA, pressors to maintain MAP goal  -Intermittent blood gases   ---------------------------------------------------------------------------------------------------------------------  INFECTIOUS DISEASES:         Antibiotics/Antifungal/Antivirals:  Azithromycin (7/25-present) due to finish 9/7/20  Amikacin (7/25-8/28)  Tigecycline (8/14-present) due to finish 9/7/20  Synercid (8/19-9/3)  Cefepime 2g q8 9/3-current  Flagyl 500mg q8 9/3-current  Micafungin 100 every day 9/3-current    Cultures:  8/13/20 BC: AFB+ cultured on day 21 (9/3/20)  9/2/20 BC PICC: NGTD  9/2/20 BC PICC: NGTD  9/2/20 BC Peripheral: NGTD  9/3/20 BC AFB PICC: No AFB after 1 day  9/3/20 BC PICC: NGTD  9/3/20 Peritoneal Right GS: Many GNR, Moderate budding yeast, rare GPC; peritoneal fluid culture: Pseudomonas aeruginosa, heavy growth yeast  9/3/20 Peritoneal Left GS: Many GNR, Culture w/ heavy growth Pseudomonas aeruginosa  9/3/20 Fungal culture: NGTD  9/4/20 Biliary fluid culture: pending    Serologies/diagnostics:  Procalcitonin: 0.87    #Septic shock thought 2/2 cholangitis  #Recurrent Enterococcal bacteremia   #Recurrent Mycobacterium abscessus bacteremia   #Necrotizing pancreatitis  #H/o Pseudomonas aeruginosa resistant to meropenem  Septic on admission with imaging and lab findings concerning for biliary source. GI consulted, will place for ERCP 9/3/20. ID consulted given h/o multiple drug resistant organisms. Synercid discontinued (last dose due 9/3)  -Abx as listed above: cefepime, metronidazole, micafungin  -Continue Azithromycin and Tigecycline to treat prior M abscessus  -GI, ID following appreciate recs  -For repeat fever, obtain blood cultures including AFB blood  -Intravenous thiamine 200 mg every 12h for 4 days  "or until ICU discharge and Vitamin C 1.5g every 6h for 4 days or until ICU discharge w/ hydrocortisone 100mg Q8 (instead of 50 mg every 6h in trial) for 7 days or until ICU discharge followed by a taper over 3 days*    *Estuardo PAIZ et al. \"Hydrocortisone, Vitamin C, and Thiamine for the Treatment of Severe Sepsis and Septic Shock A Retrospective Before-After Study\". Chest. 2017. 151(6):1206-9568.  ---------------------------------------------------------------------------------------------------------------------  GI:  #Acute on chronic necrotizing pancreatitis with infected fluid collection s/p endoscopic transluminal and percutaneous drainages as well as surgical VARD x 4  #Choledocholithiasis s/p cholecystectomy, ERCP x 2 with biliary stents in place  #Gastric outlet obstruction s/p PEG-J+  Presented to [facility] w/ cholangitis, worsened pacreatitis. Imaging w/ biliary dilation, s/p ERCP w/ nasobiliary drain 9/3/20. Noted to have had juliann pus draining from biliary system.  -Followed by GI:   G tube to gravity   Flush bilat perc drains with 50cc tid    Flush nasobiliary drain with 10cc tid   GI to discuss with surgery role for R perc tube removal  -Abx as listed above  -Holding tube feeds  ---------------------------------------------------------------------------------------------------------------------  NUTRITION:  #Severe Malnutrition  #Exocrine Pancreatic Insuffiency  #Gastric Outlet Obstruction    Patient with PEG-J placement. She has had high output from the Gtube 2/2 gastric outlet obstruction which is a result of ongoing pancreatic inflammation. She should continue pancreatic enzyme supplementation, sodium bicarb, and fiber supplementations as directed.  -Currently NPO given duodenal edema  ---------------------------------------------------------------------------------------------------------------------  ENDOCRINE:  #Exocrine pancreatitic insufficiency  -SSI insulin, q4h blood sugar checks while NPO " otherwise QID, hypoglycemia protocol, target blood glucose range 140-180  -D5 drip w/ LR @125ml/hr     #Vitamin D deficiency: VitD replacement  ---------------------------------------------------------------------------------------------------------------------  HEME/ONC:  #Leukocytosis  #Reactive thrombocytosis  #Coagulopathy  #Chronic normocytic anemia  Likely due to critical illness, sepsis, inflammatory response. No overt signs of blood loss. Received 1U pRBC 9/3/20  -S/p 1U pRBC 9/3  -Fibrinogen ordered 9/4/20 normal  -Fluid resuscitation, abx as denoted above  -Trending CBC daily  -DVT ppx to be resumed once ensured no further interventions  ---------------------------------------------------------------------------------------------------------------------  RHEUM / MSK:  #Myalgias  #Joint pains  Physical exam w/o erythematous tender joints. Weakness of all four extremities. Focal back pain along several spinous processes. No change in sensation. Suspect related to synercid use.  -Inflammatory markers: ESR 76 9/4/20  -CT spine: No evidence of discitis/osteomyelitis in the spine    ---------------------------------------------------------------------------------------------------------------------    DVT Prophylaxis:  VTE prophylaxis indicated but may undergo possible further interventions, so will hold, continue SCDs  GI Prophylaxis: PPI  Restraints: Restraints for medical healing needed: Not Applicable  Family update by me today: Yes     Patient seen and discussed with MICU attending Dr. Liban Adams MD, MPH  Internal Medicine PGY-3  [facility]    ===================================================================  Interval history:   Hypotensive overnight following ERCP requiring intubation. Placed on 3 pressors. Able to wean to 2 this morning. Appeared uncomfortable in AM, complaining of abdominal pain (communicating by nodding). Family updated at bedside.  "  ===================================================================  Ventilation Mode: CMV/AC  (Continuous Mandatory Ventilation/ Assist Control)  Rate Set (breaths/minute): 18 breaths/min  Tidal Volume Set (mL): 450 mL  PEEP (cm H2O): 5 cmH2O  Oxygen Concentration (%): 50 %  Resp: 20    ABG  Recent Labs   Lab 09/04/20  0357 09/04/20  0038 09/03/20  2315 09/03/20 2055   PH 7.25* 7.21* 7.13* 7.24*   PCO2 35 40 50* 42   PO2 124* 114* 125* 155*   HCO3 15* 16* 17* 18*   O2PER 40 40 50 50       Physical Exam:  Blood pressure (!) 161/86, pulse 92, temperature 98  F (36.7  C), temperature source Axillary, resp. rate 20, height 1.651 m (5' 5\"), weight 52.8 kg (116 lb 6.5 oz), SpO2 100 %.  Vitals:    09/03/20 0145   Weight: 52.8 kg (116 lb 6.5 oz)     I/O last 3 completed shifts:  In: 4690.3 [I.V.:4340.3; Other:100; NG/GT:250]  Out: 1090 [Urine:370; Emesis/NG output:345; Drains:375]    General: alert, chronically ill appearing, anxiosu appearing, w/ rigors  HEENT: MMM, PERRL  CV: tachycardic, No appreciable murmurs or rubs  Resp: CTAB, no wheezes or rhonchi on anterolateral lung fields  Abd: Soft, ND, tender to palpation bilateral lower quadrants, + voluntary gaurding no peritoneal signs, normo to hypo active BS, 2RP drains w/ cloudy brown fluid, GJ capped  Ext: WWP, no LE edema. No tender warm swollen joints. Tender to palpation along multiple focal spiouc processes. Legs unable to raise against gravity, 2/5 bilateral. UE 3/5 strength. Normal LE sensation. Face symmetric.   Skin: No visible lesions, breakdown or rashes seen  Neuro: Legs unable to raise against gravity, 2/5 bilateral. UE 3/5 strength. Normal LE sensation. Face symmetric.     Labs  CMP  Recent Labs   Lab 09/04/20  0357 09/03/20  2315 09/03/20 2055 09/03/20  0440 09/03/20  0239 09/02/20  2225   * 132* 130* 128*  --  128*   POTASSIUM 4.1 3.3* 3.1* 3.8  --  3.6   CHLORIDE 98 96  --  92*  --  92*   CO2 14* 18*  --  25  --  24   ANIONGAP 20* 18*  --  12  " "--  12   GLC 87 80 56* 86  --  105*   BUN 52* 51*  --  53*  --  55*   CR 1.77* 1.69*  --  0.91 0.84 0.81   GFRESTIMATED 30* 32*  --  66 73 76   GFRESTBLACK 35* 37*  --  77 85 88   HAILEY 8.8 8.8  --  9.0  --  9.1   MAG  --  1.9  --  2.4*  --   --    PHOS  --  7.3*  --  6.1*  --   --    PROTTOTAL 5.6* 5.5*  --  6.4*  --  7.3   ALBUMIN 2.4* 2.2*  --  2.0*  --  2.3*   BILITOTAL 5.2* 5.2*  --  2.4*  --  1.6*   ALKPHOS 774* 940*  --  1,174*  --  989*   * 166*  --  226*  --  127*   ALT 73* 83*  --  85*  --  49     CBC  Recent Labs   Lab 09/04/20 0357 09/03/20 2315 09/03/20 2125 09/03/20 2055 09/03/20 0440 09/02/20  2225   WBC 47.8* 55.1*  --   --  22.3* 23.6*   RBC 2.35* 2.15*  --   --  2.86* 3.24*   HGB 7.6* 7.1* 6.9* 7.1* 9.4* 10.6*   HCT 23.3* 21.9* 21.3*  --  27.7* 31.6*   MCV 99 102*  --   --  97 98   MCH 32.3 33.0  --   --  32.9 32.7   MCHC 32.6 32.4  --   --  33.9 33.5   RDW 17.9* 16.0*  --   --  14.9 14.8    360  --   --  363 582*     INR  Recent Labs   Lab 09/04/20 0357 09/02/20  2225   INR 1.78* 1.32*     Imaging / Studies  CT spine:  Impression:   1A. Thoracic spine:  No acute fracture or definite abnormality of the  thoracic spinal vertebra. Mild degenerative changes without  significant spinal canal or neural foraminal stenosis.   1B. Lumbar Spine: No acute fracture. Mild degenerative changes without  significant spinal canal or neural foraminal stenosis at any level in  the lumbar spine.  Overall, No evidence of discitis/osteomyelitis in the spine.     2. New since CT 9/2/2020, bilateral pleural effusions, adjacent  parenchymal atelectasis and moderate free fluid in the pelvis.     3. Sequelae of necrotizing pancreatitis with grossly similar  appearance of peripancreatic fluid collections since 9/2/2020. Lines  and tubes as above.      4. Stable mild right hydronephrosis and proteinaceous versus  hemorrhagic cyst in the left kidney lower pole.    *Estuardo PAIZ et al. \"Hydrocortisone, Vitamin C, " "and Thiamine for the Treatment of Severe Sepsis and Septic Shock A Retrospective Before-After Study\". Chest. 2017. 151(6):6198-2408.  "

## 2023-05-16 ENCOUNTER — HOSPITAL ENCOUNTER (OUTPATIENT)
Dept: PHYSICAL THERAPY | Facility: REHABILITATION | Age: 73
Discharge: HOME | End: 2023-05-16
Payer: COMMERCIAL

## 2023-05-16 DIAGNOSIS — G89.29 CHRONIC PERISCAPULAR PAIN ON RIGHT SIDE: ICD-10-CM

## 2023-05-16 DIAGNOSIS — M54.50 LUMBAR SPINE PAIN: Primary | ICD-10-CM

## 2023-05-16 DIAGNOSIS — M47.816 LUMBAR FACET ARTHROPATHY: ICD-10-CM

## 2023-05-16 DIAGNOSIS — M25.511 CHRONIC PERISCAPULAR PAIN ON RIGHT SIDE: ICD-10-CM

## 2023-05-16 DIAGNOSIS — M54.6 THORACIC SPINE PAIN: ICD-10-CM

## 2023-05-16 DIAGNOSIS — M41.25 OTHER IDIOPATHIC SCOLIOSIS, THORACOLUMBAR REGION: ICD-10-CM

## 2023-05-16 DIAGNOSIS — M43.16 SPONDYLOLISTHESIS OF LUMBAR REGION: ICD-10-CM

## 2023-05-16 DIAGNOSIS — M79.18 MYOFASCIAL PAIN: ICD-10-CM

## 2023-05-16 PROCEDURE — 97110 THERAPEUTIC EXERCISES: CPT | Mod: GP | Performed by: PHYSICAL THERAPIST

## 2023-05-16 PROCEDURE — 97140 MANUAL THERAPY 1/> REGIONS: CPT | Mod: GP | Performed by: PHYSICAL THERAPIST

## 2023-05-23 ENCOUNTER — TELEPHONE (OUTPATIENT)
Dept: GASTROENTEROLOGY | Facility: CLINIC | Age: 73
End: 2023-05-23

## 2023-05-23 ENCOUNTER — HOSPITAL ENCOUNTER (OUTPATIENT)
Facility: AMBULATORY SURGERY CENTER | Age: 73
End: 2023-05-23
Attending: SURGERY
Payer: COMMERCIAL

## 2023-05-23 ENCOUNTER — THERAPY VISIT (OUTPATIENT)
Dept: PHYSICAL THERAPY | Facility: REHABILITATION | Age: 73
End: 2023-05-23
Payer: COMMERCIAL

## 2023-05-23 DIAGNOSIS — M54.50 LUMBAR SPINE PAIN: Primary | ICD-10-CM

## 2023-05-23 PROCEDURE — 97110 THERAPEUTIC EXERCISES: CPT | Mod: GP | Performed by: PHYSICAL THERAPIST

## 2023-05-23 PROCEDURE — 97140 MANUAL THERAPY 1/> REGIONS: CPT | Mod: GP | Performed by: PHYSICAL THERAPIST

## 2023-05-23 NOTE — TELEPHONE ENCOUNTER
Screening Questions  BLUE  KIND OF PREP RED  LOCATION [review exclusion criteria] GREEN  SEDATION TYPE        N Are you active on mychart?       Alejandro Jones MD    Ordering/Referring Provider?        BCBS/MEDICARE What type of coverage do you have?      N Have you had a positive covid test in the last 14 days?     26.5 1. BMI  [BMI 40+ - review exclusion criteria& smart-phrase document]    Y  2. Are you able to give consent for your medical care? [IF NO,RN REVIEW]          N  3. Are you taking any prescription pain medications on a routine schedule   (ex narcotics: oxycodone, roxicodone, oxycontin,  and percocet)? [RN Review]          3a. EXTENDED PREP What kind of prescription?     N 4. Do you have any chemical dependencies such as alcohol, street drugs, or methadone?        **If yes 3- 5 , please schedule with MAC sedation.**          IF YES TO ANY 6 - 10 - HOSPITAL SETTING ONLY.     N 6.   Do you need assistance transferring?     N 7.   Have you had a heart or lung transplant?    N 8.   Are you currently on dialysis?   N 9.   Do you use daily home oxygen?   N 10. Do you take nitroglycerin?   10a.  If yes, how often?     N 11. Are you currently pregnant?    11a.  If yes, how many weeks? [ Greater than 12 weeks, OR NEEDED]    N 12. Do you have Pulmonary Hypertension? *NEED PAC APPT AT [facility] w/ [facility]*     N 13. [review exclusion criteria]  Do you have any implantable devices in your body (pacemaker, defib, LVAD)?    N 14. In the past 6 months, have you had any heart related issues including cardiomyopathy or heart attack?     14a.  If yes, did it require cardiac stenting if so when?     N 15. Have you had a stroke or Transient ischemic attack (TIA - aka  mini stroke ) within 6 months?      N 16. Do you have mod to severe Obstructive Sleep Apnea?  [Hospital only]    N 17. Do you have SEVERE AND UNCONTROLLED asthma? *NEED PAC APPT AT [facility] w/[facility]*     18.Do you take blood thinners?  No    N 19. Do you take any of  "the following medications?    N Phentermine    N Ozempic    N Wegovy (Semaglutide)      19a. If yes, \"Hold for 7 days before procedure.  Please consult your prescribing provider if you have questions about holding this medication.\"     N  20. Do you have chronic kidney disease?      Y  21. Do you have a diagnosis of diabetes?     N  22. On a regular basis do you go 3-5 days between bowel movements?      23. Preferred LOCAL Pharmacy for Pre Prescription         [id number] - [facility] [address]      - CLOSING REMINDERS -    You will receive a call from a Nurse to review instructions and health history.  This assessment must be completed prior to your procedure.  Failure to complete the Nurse assessment may result in the procedure being cancelled.      On the day of your procedure, please designatean adult(s) who can drive you home stay with you for the next 24 hours. The medicines used in the exam will make you sleepy. You will not be able to drive.      You cannot take public transportation, ride share services, or non-medical taxi service without a responsible caregiver.  Medical transport services are allowed with the requirement that a responsible caregiver will receive you at your destination.  We require that drivers and caregivers are confirmed prior to your procedure.      - SCHEDULING DETAILS -  NO &  Hospital Setting Required & If yes, what is the exclusion?   ZAHRAA  Surgeon    9/27  Date of Procedure  Lower Endoscopy [Colonoscopy]  [facility]   STANDARD [facility]- If you answer yes to questions #8, #20, #21 [  pts ]Which Colonoscopy Prep was Sent?     MAC, PER LOCATION Sedation Type     N PAC / Pre-op Required                 "

## 2023-05-30 ENCOUNTER — THERAPY VISIT (OUTPATIENT)
Dept: PHYSICAL THERAPY | Facility: REHABILITATION | Age: 73
End: 2023-05-30
Payer: COMMERCIAL

## 2023-05-30 DIAGNOSIS — M54.50 LUMBAR SPINE PAIN: Primary | ICD-10-CM

## 2023-05-30 DIAGNOSIS — M54.6 THORACIC SPINE PAIN: ICD-10-CM

## 2023-05-30 PROCEDURE — 97140 MANUAL THERAPY 1/> REGIONS: CPT | Mod: GP | Performed by: PHYSICAL THERAPIST

## 2023-05-30 PROCEDURE — 97110 THERAPEUTIC EXERCISES: CPT | Mod: GP | Performed by: PHYSICAL THERAPIST

## 2023-05-31 ENCOUNTER — TELEPHONE (OUTPATIENT)
Dept: GASTROENTEROLOGY | Facility: CLINIC | Age: 73
End: 2023-05-31
Payer: COMMERCIAL

## 2023-05-31 NOTE — TELEPHONE ENCOUNTER
Caller: JESSICA  Reason for Reschedule/Cancellation (please be detailed, any staff messages or encounters to note?): NA      Prior to reschedule please review:    Ordering Provider:JOSEPH RECIO    Sedation per order:MODERATE    Does patient have any ASC Exclusions, please identify?: N      Notes on Cancelled Procedure:    Procedure:Lower Endoscopy [Colonoscopy]     Date: 9/27    Location:[facility]; [address] [place] [address] [address], [place] [address]    Surgeon: ZAHRAA        Rescheduled: No      PATIENT ALREADY SCHEDULED ELSEWHERE

## 2023-06-06 ENCOUNTER — THERAPY VISIT (OUTPATIENT)
Dept: PHYSICAL THERAPY | Facility: REHABILITATION | Age: 73
End: 2023-06-06
Payer: COMMERCIAL

## 2023-06-06 DIAGNOSIS — M54.6 THORACIC SPINE PAIN: ICD-10-CM

## 2023-06-06 DIAGNOSIS — M54.50 LUMBAR SPINE PAIN: Primary | ICD-10-CM

## 2023-06-06 PROCEDURE — 97110 THERAPEUTIC EXERCISES: CPT | Mod: GP

## 2023-06-09 ENCOUNTER — TRANSFERRED RECORDS (OUTPATIENT)
Dept: HEALTH INFORMATION MANAGEMENT | Facility: CLINIC | Age: 73
End: 2023-06-09

## 2023-06-13 ENCOUNTER — THERAPY VISIT (OUTPATIENT)
Dept: PHYSICAL THERAPY | Facility: REHABILITATION | Age: 73
End: 2023-06-13
Payer: COMMERCIAL

## 2023-06-13 DIAGNOSIS — M54.6 THORACIC SPINE PAIN: ICD-10-CM

## 2023-06-13 DIAGNOSIS — M54.50 LUMBAR SPINE PAIN: Primary | ICD-10-CM

## 2023-06-13 PROCEDURE — 97110 THERAPEUTIC EXERCISES: CPT | Mod: GP

## 2023-06-13 NOTE — PROGRESS NOTES
DISCHARGE  Reason for Discharge: Patient has met all goals.    Discharge Plan: Patient to continue home program.    Referring Provider:  Alejandro Jones            06/13/23 0500   Appointment Info   Signing clinician's name / credentials Jamal Ruiz, PT, DPT, CSCS   Visits Used 10/10   Medical Diagnosis Lumbar spine pain  Lumbar facet arthropathy  Myofascial pain  Thoracic spine pain  Chronic periscapular pain on right side  Spondylolisthesis of lumbar region  Other idiopathic scoliosis, thoracolumbar region   PT Tx Diagnosis Chronic bilateral lower back pain and R thoracic back pain   Precautions/Limitations None   Progress Note/Certification   Onset of illness/injury or Date of Surgery 03/20/23   Therapy Frequency 1x/week to every other week   Predicted Duration 12 weeks       Present No   GOALS   PT Goals 2;3;4   PT Goal 1   Goal Identifier Self-management/HEP   Goal Description Patient will be independent in self-management of condition and HEP to reach functional goals.   Goal Progress 100%   Target Date 06/20/23   Date Met 06/13/23   PT Goal 2   Goal Identifier Lifting   Goal Description Patient will be able to lift 20# without pain in the lower back in order to improve functional independence.   Goal Progress 100%   Target Date 06/20/23   Date Met 06/13/23   PT Goal 3   Goal Identifier Sit/Stand   Goal Description Patient will be able to get up from a chair without pain or stiffness in the lower back to improve functional mobility.   Goal Progress Met   Target Date 06/20/23   Date Met 05/30/23   PT Goal 4   Goal Identifier Dishes   Goal Description Patient will be able to do the dishes without pain in the scapular region to be able to perform household tasks.   Goal Progress Met   Target Date 06/20/23   Date Met 05/30/23   Subjective Report   Subjective Report Patient reports she hasn't been getting much relief. She has had difficulty walking long distances for exercise recently.  "  Objective Measures   Objective Measures Objective Measure 1;Objective Measure 2;Objective Measure 3;Objective Measure 4   Objective Measure 1   Objective Measure Pain   Details 0/10   Objective Measure 2   Objective Measure palpation   Details TPs noted in thoracic paraspinal musculature   Objective Measure 3   Objective Measure Cervical ROM   Details WFL all planes   Objective Measure 4   Objective Measure Lumbar ROM   Details Tightness R side with rotation and side bending ROM   Treatment Interventions (PT)   Interventions Therapeutic Procedure/Exercise;Manual Therapy   Therapeutic Procedure/Exercise   Therapeutic Procedures: strength, endurance, ROM, flexibillity minutes (06937) 40   Ther Proc 1 - Details Treadmill interval training x5 min (1 min on/1 min off varying levels of incline); supine bridge 2x15; Supine SLR 2x12 each (verbal and tactile cues for quad engagement)  Standing S/L bent over kick back 2x8 each; Seated hinge 2x10 @ 15/20#; Banded paloff march 2x20 R; Step up 6\" 2x8 each @ 8#   Skilled Intervention Progressive hip/core strengthening   Patient Response/Progress Patient worked to appropriate level of fatigue   Education   Learner/Method Patient   Plan   Homework PTRx   Home program PPT, LTR, clamshells, bridging, scapular retractions, pec doorway stretch, TA set, kneeling hip flexor stretch   Plan for next session Assess lifting technique - have patient lift 20# if able to assess lifting goal.  Plan to discharge if doing well.   Comments   Comments Patient reports she returned to gym since previous treatment. She is eager to get started and is interested in finding a . Patient worked to appropriate level of fatigue. Dimitrios educated on balance training and obtaining a referral for balance and patient demonstrated understanding. At this time, dimitrios is appropriate for discharge for meeting all of her stated goals.   Medicare Claim Information   Medical Diagnosis Lumbar spine " pain  Lumbar facet arthropathy  Myofascial pain  Thoracic spine pain  Chronic periscapular pain on right side  Spondylolisthesis of lumbar region  Other idiopathic scoliosis, thoracolumbar region   PT Diagnosis Chronic bilateral lower back pain and R thoracic back pain   Start of Care Date 03/28/23   Onset date of current episode/exacerbation 03/20/23  (Order date)   Certification date from 03/28/23   Ortho Goal 1   Goal Identifier Self-management/HEP   Goal Description Patient will be independent in self-management of condition and HEP to reach functional goals.   Target Date 06/20/23   Ortho Goal 2   Goal Identifier Lifting   Goal Description Patient will be able to lift 20# without pain in the lower back in order to improve functional independence.   Target Date 06/20/23   Ortho Goal 3   Goal Identifier Sit/Stand   Goal Description Patient will be able to get up from a chair without pain or stiffness in the lower back to improve functional mobility.   Target Date 06/20/23   Ortho Goal 4   Goal Identifier Dishes   Goal Description Patient will be able to do the dishes without pain in the scapular region to be able to perform household tasks.   Target Date 06/20/23   PT Outcome Measures   Outcome Measures   (ALLA: 12%)         Jamal Ruiz, PT on 6/13/2023 at 9:48 AM

## 2023-06-20 ENCOUNTER — OFFICE VISIT (OUTPATIENT)
Dept: PHYSICAL MEDICINE AND REHAB | Facility: CLINIC | Age: 73
End: 2023-06-20
Payer: COMMERCIAL

## 2023-06-20 VITALS
BODY MASS INDEX: 27.21 KG/M2 | SYSTOLIC BLOOD PRESSURE: 106 MMHG | WEIGHT: 144 LBS | DIASTOLIC BLOOD PRESSURE: 51 MMHG | HEART RATE: 58 BPM

## 2023-06-20 DIAGNOSIS — M47.816 LUMBAR FACET ARTHROPATHY: ICD-10-CM

## 2023-06-20 DIAGNOSIS — G89.29 CHRONIC PERISCAPULAR PAIN ON RIGHT SIDE: ICD-10-CM

## 2023-06-20 DIAGNOSIS — M25.511 CHRONIC PERISCAPULAR PAIN ON RIGHT SIDE: ICD-10-CM

## 2023-06-20 DIAGNOSIS — M79.18 MYOFASCIAL PAIN: ICD-10-CM

## 2023-06-20 DIAGNOSIS — M43.16 SPONDYLOLISTHESIS OF LUMBAR REGION: Primary | ICD-10-CM

## 2023-06-20 PROCEDURE — 99213 OFFICE O/P EST LOW 20 MIN: CPT | Performed by: PHYSICAL MEDICINE & REHABILITATION

## 2023-06-20 ASSESSMENT — PAIN SCALES - GENERAL: PAINLEVEL: NO PAIN (0)

## 2023-06-20 NOTE — LETTER
6/20/2023         RE: Liz Pollard  [address]  [place] [address]        Dear Colleague,    Thank you for referring your patient, Liz Pollard, to the [facility]. Please see a copy of my visit note below.    Assessment/Plan:      Liz was seen today for back pain and neck pain.    Diagnoses and all orders for this visit:    Spondylolisthesis of lumbar region    Lumbar facet arthropathy    Chronic periscapular pain on right side    Myofascial pain         Assessment: Pleasant 72 year old female with a history of hypertension, diabetes mellitus, thyroid disorder with:     1.  Chronic lumbar spine pain across lumbosacral junction consistent with facet arthropathy.  She has moderate to severe facet arthropathy L4-5 with degenerative spondylolisthesis which moves 4 mm from flexion to extension.  This is accompanied by degenerative disc disease of the lumbar spine mild right-sided thoracolumbar scoliosis.  She  is doing quite well with home exercises from physical therapy no significant pain.        2.  Right parascapular pain most consistent with myofascial pain in the setting of mild scoliosis.  Moderate disc height loss at C5-6 and 6-7.  Doing well with physical therapy.      Discussion:    1.  Overall she is doing quite well with her physical therapy home exercises and she is started back at the gym.  I encouraged her to continue with home exercises.  She is not having any pain today and is managing symptoms at home well.  I discussed the diagnosis of spondylolisthesis with mild movement from flexion to extension.  If she is not having pain I would not recommend further interventions or imaging at this time.  She agrees with this plan.    2.  Continue home exercises.    3.  Follow-up with me as needed.    It was our pleasure caring for your patient today, if there any questions or concerns please do not hesitate to contact us.      Subjective:   Patient  ID: Liz Pollard is a 72 year old female.    History of Present Illness: Patient presents for follow-up of lumbar spine pain cervical spine pain parascapular pain.  Overall she is doing quite well.  She has been involved in physical therapy doing home exercises.  She in fact has gone to 10 visits and is continuing to do home exercises regularly and has now started the gym and doing more weight training.  She was having stiffness in the cervical spine and parascapular region using up tennis ball to roll on her upper back and doing her exercises for her neck and that is doing quite well no pain.  Also her lumbar spine is no longer having any pain with the home exercises.  She is pleased with her progress.  She has had plain films since last visit which were reviewed with her today.      Imaging: Cervical spine and lumbar spine plain films and flexion-extension were personally reviewed.  Moderate degenerative changes of the disc at C5-6 and 6-7 with mild disc height loss at C4-5.    Lumbar spine films show retrolisthesis L2-3 grade 1 spondylolisthesis L4 on L5.  The spondylolisthesis moves 4 mm from flexion to extension.    Review of Systems: Pertinent positives: None.  Pertinent negatives: No numbness, tingling or weakness.  No bowel or bladder incontinence.  No urinary retention.  No fevers, unintentional weight loss, balance changes, headaches, frequent falling, difficulty swallowing, or coordination difficulties.  All others reviewed are negative.    Past Medical History:   Diagnosis Date     Diabetes mellitus (H)      Disease of thyroid gland      Hypertension      Infectious viral hepatitis        The following portions of the patient's history were reviewed and updated as appropriate: allergies, current medications, past family history, past medical history, past social history, past surgical history and problem list.           Objective:   Physical Exam:    /51   Pulse 58   Wt 144 lb (65.3 kg)    BMI 27.21 kg/m    Body mass index is 27.21 kg/m .      General: Alert and oriented with normal affect. Attention, knowledge, memory, and language are intact. No acute distress.    CV: No lower extremity edema.  Skin: No rashes seen.    Gait:  Nonantalgic  Full flexion extension lumbar spine.  Sensation is intact to light touch throughout the   lower extremities.  Reflexes are   1+ patellar and 0 Achilles      Manual muscle testing reveals:  Right /Left out of 5     5/5 hip flexors  5/5 knee flexors  5/5 knee extensors  5/5 ankle plantar flexors  5/5 ankle dorsiflexors  5/5   ankle evertors      Again, thank you for allowing me to participate in the care of your patient.        Sincerely,        Harlan Baker, DO

## 2023-06-20 NOTE — PROGRESS NOTES
Assessment/Plan:      Liz was seen today for back pain and neck pain.    Diagnoses and all orders for this visit:    Spondylolisthesis of lumbar region    Lumbar facet arthropathy    Chronic periscapular pain on right side    Myofascial pain         Assessment: Pleasant 72 year old female with a history of hypertension, diabetes mellitus, thyroid disorder with:     1.  Chronic lumbar spine pain across lumbosacral junction consistent with facet arthropathy.  She has moderate to severe facet arthropathy L4-5 with degenerative spondylolisthesis which moves 4 mm from flexion to extension.  This is accompanied by degenerative disc disease of the lumbar spine mild right-sided thoracolumbar scoliosis.  She  is doing quite well with home exercises from physical therapy no significant pain.        2.  Right parascapular pain most consistent with myofascial pain in the setting of mild scoliosis.  Moderate disc height loss at C5-6 and 6-7.  Doing well with physical therapy.      Discussion:    1.  Overall she is doing quite well with her physical therapy home exercises and she is started back at the gym.  I encouraged her to continue with home exercises.  She is not having any pain today and is managing symptoms at home well.  I discussed the diagnosis of spondylolisthesis with mild movement from flexion to extension.  If she is not having pain I would not recommend further interventions or imaging at this time.  She agrees with this plan.    2.  Continue home exercises.    3.  Follow-up with me as needed.    It was our pleasure caring for your patient today, if there any questions or concerns please do not hesitate to contact us.      Subjective:   Patient ID: Liz Pollard is a 72 year old female.    History of Present Illness: Patient presents for follow-up of lumbar spine pain cervical spine pain parascapular pain.  Overall she is doing quite well.  She has been involved in physical therapy doing home exercises.   She in fact has gone to 10 visits and is continuing to do home exercises regularly and has now started the gym and doing more weight training.  She was having stiffness in the cervical spine and parascapular region using up tennis ball to roll on her upper back and doing her exercises for her neck and that is doing quite well no pain.  Also her lumbar spine is no longer having any pain with the home exercises.  She is pleased with her progress.  She has had plain films since last visit which were reviewed with her today.      Imaging: Cervical spine and lumbar spine plain films and flexion-extension were personally reviewed.  Moderate degenerative changes of the disc at C5-6 and 6-7 with mild disc height loss at C4-5.    Lumbar spine films show retrolisthesis L2-3 grade 1 spondylolisthesis L4 on L5.  The spondylolisthesis moves 4 mm from flexion to extension.    Review of Systems: Pertinent positives: None.  Pertinent negatives: No numbness, tingling or weakness.  No bowel or bladder incontinence.  No urinary retention.  No fevers, unintentional weight loss, balance changes, headaches, frequent falling, difficulty swallowing, or coordination difficulties.  All others reviewed are negative.    Past Medical History:   Diagnosis Date     Diabetes mellitus (H)      Disease of thyroid gland      Hypertension      Infectious viral hepatitis        The following portions of the patient's history were reviewed and updated as appropriate: allergies, current medications, past family history, past medical history, past social history, past surgical history and problem list.           Objective:   Physical Exam:    /51   Pulse 58   Wt 144 lb (65.3 kg)   BMI 27.21 kg/m    Body mass index is 27.21 kg/m .      General: Alert and oriented with normal affect. Attention, knowledge, memory, and language are intact. No acute distress.    CV: No lower extremity edema.  Skin: No rashes seen.    Gait:  Nonantalgic  Full flexion  extension lumbar spine.  Sensation is intact to light touch throughout the   lower extremities.  Reflexes are   1+ patellar and 0 Achilles      Manual muscle testing reveals:  Right /Left out of 5     5/5 hip flexors  5/5 knee flexors  5/5 knee extensors  5/5 ankle plantar flexors  5/5 ankle dorsiflexors  5/5   ankle evertors

## 2023-07-12 ENCOUNTER — PATIENT OUTREACH (OUTPATIENT)
Dept: CARE COORDINATION | Facility: CLINIC | Age: 73
End: 2023-07-12
Payer: COMMERCIAL

## 2023-07-26 ENCOUNTER — PATIENT OUTREACH (OUTPATIENT)
Dept: CARE COORDINATION | Facility: CLINIC | Age: 73
End: 2023-07-26
Payer: COMMERCIAL

## 2023-08-02 ENCOUNTER — TRANSFERRED RECORDS (OUTPATIENT)
Dept: HEALTH INFORMATION MANAGEMENT | Facility: CLINIC | Age: 73
End: 2023-08-02
Payer: COMMERCIAL

## 2023-08-05 DIAGNOSIS — Z76.0 ENCOUNTER FOR MEDICATION REFILL: ICD-10-CM

## 2023-08-05 RX ORDER — LEVOTHYROXINE SODIUM 75 UG/1
TABLET ORAL
Qty: 90 TABLET | Refills: 3 | Status: SHIPPED | OUTPATIENT
Start: 2023-08-05 | End: 2024-07-25

## 2023-08-06 NOTE — TELEPHONE ENCOUNTER
"  Last Written Prescription Date:  8/15/22  Last Fill Quantity: 90,  # refills: 3   Last office visit provider:   4/13/23    Requested Prescriptions   Pending Prescriptions Disp Refills    levothyroxine (SYNTHROID/LEVOTHROID) 75 MCG tablet [Pharmacy Med Name: LEVOTHYROXINE 75 MCG TABLET] 90 tablet 3     Sig: TAKE 1 TABLET BY MOUTH EVERY DAY       Thyroid Protocol Passed - 8/5/2023  7:41 AM        Passed - Patient is 12 years or older        Passed - Recent (12 mo) or future (30 days) visit within the authorizing provider's specialty     Patient has had an office visit with the authorizing provider or a provider within the authorizing providers department within the previous 12 mos or has a future within next 30 days. See \"Patient Info\" tab in inbasket, or \"Choose Columns\" in Meds & Orders section of the refill encounter.              Passed - Medication is active on med list        Passed - Normal TSH on file in past 12 months     Recent Labs   Lab Test 04/13/23  1158   TSH 0.81              Passed - No active pregnancy on record     If patient is pregnant or has had a positive pregnancy test, please check TSH.          Passed - No positive pregnancy test in past 12 months     If patient is pregnant or has had a positive pregnancy test, please check TSH.               Alysia Bey RN 08/05/23 9:17 PM  "

## 2023-08-14 ENCOUNTER — OFFICE VISIT (OUTPATIENT)
Dept: FAMILY MEDICINE | Facility: CLINIC | Age: 73
End: 2023-08-14
Payer: COMMERCIAL

## 2023-08-14 VITALS
TEMPERATURE: 97.5 F | WEIGHT: 144.08 LBS | SYSTOLIC BLOOD PRESSURE: 100 MMHG | OXYGEN SATURATION: 98 % | HEIGHT: 61 IN | BODY MASS INDEX: 27.2 KG/M2 | HEART RATE: 74 BPM | RESPIRATION RATE: 16 BRPM | DIASTOLIC BLOOD PRESSURE: 50 MMHG

## 2023-08-14 DIAGNOSIS — I10 ESSENTIAL HYPERTENSION: ICD-10-CM

## 2023-08-14 DIAGNOSIS — N18.30 STAGE 3 CHRONIC KIDNEY DISEASE, UNSPECIFIED WHETHER STAGE 3A OR 3B CKD (H): ICD-10-CM

## 2023-08-14 DIAGNOSIS — E11.21 TYPE 2 DIABETES MELLITUS WITH DIABETIC NEPHROPATHY, WITHOUT LONG-TERM CURRENT USE OF INSULIN (H): Primary | ICD-10-CM

## 2023-08-14 PROBLEM — N18.31 STAGE 3A CHRONIC KIDNEY DISEASE (H): Status: ACTIVE | Noted: 2018-07-02

## 2023-08-14 LAB
ANION GAP SERPL CALCULATED.3IONS-SCNC: 11 MMOL/L (ref 7–15)
BUN SERPL-MCNC: 58.2 MG/DL (ref 8–23)
CALCIUM SERPL-MCNC: 9.1 MG/DL (ref 8.8–10.2)
CHLORIDE SERPL-SCNC: 107 MMOL/L (ref 98–107)
CREAT SERPL-MCNC: 1.71 MG/DL (ref 0.51–0.95)
DEPRECATED HCO3 PLAS-SCNC: 22 MMOL/L (ref 22–29)
ERYTHROCYTE [DISTWIDTH] IN BLOOD BY AUTOMATED COUNT: 13.4 % (ref 10–15)
GFR SERPL CREATININE-BSD FRML MDRD: 32 ML/MIN/1.73M2
GLUCOSE SERPL-MCNC: 65 MG/DL (ref 70–99)
HBA1C MFR BLD: 5.3 % (ref 0–5.6)
HCT VFR BLD AUTO: 34.3 % (ref 35–47)
HGB BLD-MCNC: 10.8 G/DL (ref 11.7–15.7)
MCH RBC QN AUTO: 28.3 PG (ref 26.5–33)
MCHC RBC AUTO-ENTMCNC: 31.5 G/DL (ref 31.5–36.5)
MCV RBC AUTO: 90 FL (ref 78–100)
PLATELET # BLD AUTO: 190 10E3/UL (ref 150–450)
POTASSIUM SERPL-SCNC: 5.1 MMOL/L (ref 3.4–5.3)
RBC # BLD AUTO: 3.81 10E6/UL (ref 3.8–5.2)
SODIUM SERPL-SCNC: 140 MMOL/L (ref 136–145)
WBC # BLD AUTO: 6.1 10E3/UL (ref 4–11)

## 2023-08-14 PROCEDURE — 83036 HEMOGLOBIN GLYCOSYLATED A1C: CPT | Performed by: FAMILY MEDICINE

## 2023-08-14 PROCEDURE — 99214 OFFICE O/P EST MOD 30 MIN: CPT | Performed by: FAMILY MEDICINE

## 2023-08-14 PROCEDURE — 99207 PR FOOT EXAM NO CHARGE: CPT | Performed by: FAMILY MEDICINE

## 2023-08-14 PROCEDURE — 85027 COMPLETE CBC AUTOMATED: CPT | Performed by: FAMILY MEDICINE

## 2023-08-14 PROCEDURE — 80048 BASIC METABOLIC PNL TOTAL CA: CPT | Performed by: FAMILY MEDICINE

## 2023-08-14 PROCEDURE — 36415 COLL VENOUS BLD VENIPUNCTURE: CPT | Performed by: FAMILY MEDICINE

## 2023-08-14 NOTE — PROGRESS NOTES
"  Assessment & Plan     Type 2 diabetes mellitus with diabetic nephropathy, without long-term current use of insulin (H)  Last time her A1c was in DM range was 2015, has been at or below 5.5% since then.   - Hemoglobin A1c  - Basic metabolic panel  (Ca, Cl, CO2, Creat, Gluc, K, Na, BUN)  - FOOT EXAM  - Hemoglobin A1c  - Basic metabolic panel  (Ca, Cl, CO2, Creat, Gluc, K, Na, BUN)    Essential hypertension  Well below goal, went way high when we stopped aldactone and hydrochlorothiazide. She might be a little orthostatic but has gotten used to it - recommend holding hydrochlorothiazide for a week and recheck bp - it may initially rebound but should stabilize. Goal BP <140/90  - Basic metabolic panel  (Ca, Cl, CO2, Creat, Gluc, K, Na, BUN)  - Basic metabolic panel  (Ca, Cl, CO2, Creat, Gluc, K, Na, BUN)    Stage 3 chronic kidney disease, unspecified whether stage 3a or 3b CKD (H)  Recheck bmp, if elevated cr would be even more reason to stop hydrochlorothiazide.   - Basic metabolic panel  (Ca, Cl, CO2, Creat, Gluc, K, Na, BUN)  - CBC with platelets  - Basic metabolic panel  (Ca, Cl, CO2, Creat, Gluc, K, Na, BUN)  - CBC with platelets       BMI:   Estimated body mass index is 27.22 kg/m  as calculated from the following:    Height as of this encounter: 1.549 m (5' 1\").    Weight as of this encounter: 65.4 kg (144 lb 1.3 oz).       Return in about 6 months (around 2/14/2024) for Medication Check, diabetes follow up, blood pressure recheck.      Alejandro Jones MD  [facility] ROSA Cavazos is a 72 year old, presenting for the following health issues:  Recheck Medication and Blood Pressure Check      History of Present Illness       Reason for visit:  Checkup    She eats 2-3 servings of fruits and vegetables daily.She consumes 0 sweetened beverage(s) daily.She exercises with enough effort to increase her heart rate 30 to 60 minutes per day.  She exercises with enough effort to increase her " "heart rate 5 days per week.   She is taking medications regularly.     No issues.   Doesn't feel like she has symptoms.   Seen by ophtho ,thinks cataracts acting up.   Going in october      Review of Systems   Constitutional, HEENT, cardiovascular, pulmonary, gi and gu systems are negative, except as otherwise noted.      Objective    /50   Pulse 74   Temp 97.5  F (36.4  C) (Temporal)   Resp 16   Ht 1.549 m (5' 1\")   Wt 65.4 kg (144 lb 1.3 oz)   SpO2 98%   BMI 27.22 kg/m    Body mass index is 27.22 kg/m .  Physical Exam   GENERAL: healthy, alert and no distress  NECK: no adenopathy, no asymmetry, masses, or scars and thyroid normal to palpation  RESP: lungs clear to auscultation - no rales, rhonchi or wheezes  CV: regular rate and rhythm, normal S1 S2, no S3 or S4, no murmur, click or rub, no peripheral edema and peripheral pulses strong  ABDOMEN: soft, nontender, no hepatosplenomegaly, no masses and bowel sounds normal  MS: no gross musculoskeletal defects noted, no edema  Diabetic foot exam: normal DP and PT pulses, no trophic changes or ulcerative lesions, and normal sensory exam      Results for orders placed or performed in visit on 08/14/23 (from the past 24 hour(s))   Hemoglobin A1c   Result Value Ref Range    Hemoglobin A1C 5.3 0.0 - 5.6 %   CBC with platelets   Result Value Ref Range    WBC Count 6.1 4.0 - 11.0 10e3/uL    RBC Count 3.81 3.80 - 5.20 10e6/uL    Hemoglobin 10.8 (L) 11.7 - 15.7 g/dL    Hematocrit 34.3 (L) 35.0 - 47.0 %    MCV 90 78 - 100 fL    MCH 28.3 26.5 - 33.0 pg    MCHC 31.5 31.5 - 36.5 g/dL    RDW 13.4 10.0 - 15.0 %    Platelet Count 190 150 - 450 10e3/uL                     "

## 2023-08-29 ENCOUNTER — TELEPHONE (OUTPATIENT)
Dept: FAMILY MEDICINE | Facility: CLINIC | Age: 73
End: 2023-08-29
Payer: COMMERCIAL

## 2023-08-29 DIAGNOSIS — Z87.891 PERSONAL HISTORY OF TOBACCO USE: Primary | ICD-10-CM

## 2023-08-29 PROCEDURE — G0296 VISIT TO DETERM LDCT ELIG: HCPCS | Performed by: FAMILY MEDICINE

## 2023-08-29 NOTE — TELEPHONE ENCOUNTER
Order/Referral Request    Who is requesting: Miller from [facility] Radiology Scheduling    Orders being requested: Chest Lung cancer screening    Reason service is needed/diagnosis: routine    When are orders needed by: ASAP    Has this been discussed with Provider:     Does patient have a preference on a Group/Provider/Facility? [facility]    Does patient have an appointment scheduled?: No    Where to send orders: Place orders within Epic    Okay to leave a detailed message?: Yes at Home number on file [phone] ([place])

## 2023-09-05 NOTE — TELEPHONE ENCOUNTER
Lung Cancer Screening Shared Decision Making Visit     Liz Pollard, a 72 year old female, is eligible for lung cancer screening    History   Smoking Status     Former     Types: Cigarettes     Quit date: 4/11/2021   Smokeless Tobacco     Never       I have discussed with patient the risks and benefits of screening for lung cancer with low-dose CT.     The risks include:    radiation exposure: one low dose chest CT has as much ionizing radiation as about 15 chest x-rays, or 6 months of background radiation living in [place]      false positives: most findings/nodules are NOT cancer, but some might still require additional diagnostic evaluation, including biopsy    over-diagnosis: some slow growing cancers that might never have been clinically significant will be detected and treated unnecessarily     The benefit of early detection of lung cancer is contingent upon adherence to annual screening or more frequent follow up if indicated.     Furthermore, to benefit from screening, Liz must be willing and able to undergo diagnostic procedures, if indicated. Although no specific guide is available for determining severity of comorbidities, it is reasonable to withhold screening in patients who have greater mortality risk from other diseases.     We did discuss that the best way to prevent lung cancer is to not smoke.    Some patients may value a numeric estimation of lung cancer risk when evaluating if lung cancer screening is right for them, here is one calculator:    ShouldIScreen

## 2023-09-05 NOTE — PATIENT INSTRUCTIONS
Lung Cancer Screening   Frequently Asked Questions  If you are at high-risk for lung cancer, getting screened with low-dose computed tomography (LDCT) every year can help save your life. This handout offers answers to some of the most common questions about lung cancer screening. If you have other questions, please call [phone] ([phone]).     What is it?  Lung cancer screening uses special X-ray technology to create an image of your lung tissue. The exam is quick and easy and takes less than 10 seconds. We don t give you any medicine or use any needles. You can eat before and after the exam. You don t need to change your clothes as long as the clothing on your chest doesn t contain metal. But, you do need to be able to hold your breath for at least 6 seconds during the exam.    What is the goal of lung cancer screening?  The goal of lung cancer screening is to save lives. Many times, lung cancer is not found until a person starts having physical symptoms. Lung cancer screening can help detect lung cancer in the earliest stages when it may be easier to treat.    Who should be screened for lung cancer?  We suggest lung cancer screening for anyone who is at high-risk for lung cancer. You are in the high-risk group if you:      are between the ages of 55 and 79, and    have smoked at least 1 pack of cigarettes a day for 20 or more years, and    still smoke or have quit within the past 15 years.    However, if you have a new cough or shortness of breath, you should talk to your doctor before being screened.    Why does it matter if I have symptoms?  Certain symptoms can be a sign that you have a condition in your lungs that should be checked and treated by your doctor. These symptoms include fever, chest pain, a new or changing cough, shortness of breath that you have never felt before, coughing up blood or unexplained weight loss. Having any of these symptoms can greatly affect the results of lung  cancer screening.       Should all smokers get an LDCT lung cancer screening exam?  It depends. Lung cancer screening is for a very specific group of men and women who have a history of heavy smoking over a long period of time (see  Who should be screened for lung cancer  above).  I am in the high-risk group, but have been diagnosed with cancer in the past. Is LDCT lung cancer screening right for me?  In some cases, you should not have LDCT lung screening, such as when your doctor is already following your cancer with CT scan studies. Your doctor will help you decide if LDCT lung screening is right for you.  Do I need to have a screening exam every year?  Yes. If you are in the high-risk group described earlier, you should get an LDCT lung cancer screening exam every year until you are 79, or are no longer willing or able to undergo screening and possible procedures to diagnose and treat lung cancer.  How effective is LDCT at preventing death from lung cancer?  Studies have shown that LDCT lung cancer screening can lower the risk of death from lung cancer by 20 percent in people who are at high-risk.  What are the risks?  There are some risks and limitations of LDCT lung cancer screening. We want to make sure you understand the risks and benefits, so please let us know if you have any questions. Your doctor may want to talk with you more about these risks.    Radiation exposure: As with any exam that uses radiation, there is a very small increased risk of cancer. The amount of radiation in LDCT is small--about the same amount a person would get from a mammogram. Your doctor orders the exam when he or she feels the potential benefits outweigh the risks.    False negatives: No test is perfect, including LDCT. It is possible that you may have a medical condition, including lung cancer, that is not found during your exam. This is called a false negative result.    False positives and more testing: LDCT very often finds  something in the lung that could be cancer, but in fact is not. This is called a false positive result. False positive tests often cause anxiety. To make sure these findings are not cancer, you may need to have more tests. These tests will be done only if you give us permission. Sometimes patients need a treatment that can have side effects, such as a biopsy. For more information on false positives, see  What can I expect from the results?     Findings not related to lung cancer: Your LDCT exam also takes pictures of areas of your body next to your lungs. In a very small number of cases, the CT scan will show an abnormal finding in one of these areas, such as your kidneys, adrenal glands, liver or thyroid. This finding may not be serious, but you may need more tests. Your doctor can help you decide what other tests you may need, if any.  What can I expect from the results?  About 1 out of 4 LDCT exams will find something that may need more tests. Most of the time, these findings are lung nodules. Lung nodules are very small collections of tissue in the lung. These nodules are very common, and the vast majority--more than 97 percent--are not cancer (benign). Most are normal lymph nodes or small areas of scarring from past infections.  But, if a small lung nodule is found to be cancer, the cancer can be cured more than 90 percent of the time. To know if the nodule is cancer, we may need to get more images before your next yearly screening exam. If the nodule has suspicious features (for example, it is large, has an odd shape or grows over time), we will refer you to a specialist for further testing.  Will my doctor also get the results?  Yes. Your doctor will get a copy of your results.  Is it okay to keep smoking now that there s a cancer screening exam?  No. Tobacco is one of the strongest cancer-causing agents. It causes not only lung cancer, but other cancers and cardiovascular (heart) diseases as well. The damage  caused by smoking builds over time. This means that the longer you smoke, the higher your risk of disease. While it is never too late to quit, the sooner you quit, the better.  Where can I find help to quit smoking?  The best way to prevent lung cancer is to stop smoking. If you have already quit smoking, congratulations and keep it up! For help on quitting smoking, please call [employer] at 9-441-QUITNOW ([phone]) or the American [employer] Society at 1-396.749.1214 to find local resources near you.  One-on-one health coaching:  If you d prefer to work individually with a health care provider on tobacco cessation, we offer:      Medication Therapy Management:  Our specially trained pharmacists work closely with you and your doctor to help you quit smoking.  Call [phone] or [phone] (toll free).

## 2023-09-08 ENCOUNTER — HOSPITAL ENCOUNTER (OUTPATIENT)
Dept: CT IMAGING | Facility: HOSPITAL | Age: 73
Discharge: HOME | End: 2023-09-08
Attending: FAMILY MEDICINE | Admitting: FAMILY MEDICINE
Payer: COMMERCIAL

## 2023-09-08 DIAGNOSIS — Z87.891 PERSONAL HISTORY OF TOBACCO USE: ICD-10-CM

## 2023-09-08 PROCEDURE — 71271 CT THORAX LUNG CANCER SCR C-: CPT

## 2023-09-13 DIAGNOSIS — I10 ESSENTIAL HYPERTENSION: ICD-10-CM

## 2023-09-13 RX ORDER — LISINOPRIL 40 MG/1
TABLET ORAL
Qty: 90 TABLET | Refills: 2 | Status: SHIPPED | OUTPATIENT
Start: 2023-09-13 | End: 2024-06-28

## 2023-09-14 ENCOUNTER — ANCILLARY PROCEDURE (OUTPATIENT)
Dept: MAMMOGRAPHY | Facility: CLINIC | Age: 73
End: 2023-09-14
Attending: FAMILY MEDICINE
Payer: COMMERCIAL

## 2023-09-14 DIAGNOSIS — Z12.31 SCREENING MAMMOGRAM, ENCOUNTER FOR: ICD-10-CM

## 2023-09-14 PROCEDURE — 77067 SCR MAMMO BI INCL CAD: CPT

## 2023-10-06 ENCOUNTER — TELEPHONE (OUTPATIENT)
Dept: FAMILY MEDICINE | Facility: CLINIC | Age: 73
End: 2023-10-06
Payer: COMMERCIAL

## 2023-10-06 NOTE — TELEPHONE ENCOUNTER
Test Results    Contacts         Type Contact Phone/Fax    10/06/2023 01:59 PM CDT Phone (Incoming) Dirk Pollard (Self) [phone] (M)            Who ordered the test:  Dr. Jones    Type of test: Lab and CT    Date of test:  9/08/23    Where was the test performed:  [facility]    What are your questions/concerns?:  What are my results? I have not heard back about those results yet. Please have Dr. Jones call me when she can.     Okay to leave a detailed message?: Yes at Cell number on file:    Telephone Information:   Mobile [phone]

## 2023-10-25 DIAGNOSIS — E78.2 MIXED HYPERLIPIDEMIA: ICD-10-CM

## 2023-10-25 DIAGNOSIS — Z76.0 ENCOUNTER FOR MEDICATION REFILL: ICD-10-CM

## 2023-10-25 DIAGNOSIS — E11.9 CONTROLLED TYPE 2 DIABETES MELLITUS WITHOUT COMPLICATION, WITHOUT LONG-TERM CURRENT USE OF INSULIN (H): ICD-10-CM

## 2023-10-25 RX ORDER — ATORVASTATIN CALCIUM 20 MG/1
TABLET, FILM COATED ORAL
Qty: 90 TABLET | Refills: 2 | Status: SHIPPED | OUTPATIENT
Start: 2023-10-25 | End: 2024-07-12

## 2023-11-08 ENCOUNTER — TRANSFERRED RECORDS (OUTPATIENT)
Dept: HEALTH INFORMATION MANAGEMENT | Facility: CLINIC | Age: 73
End: 2023-11-08
Payer: COMMERCIAL

## 2023-11-08 LAB — RETINOPATHY: NEGATIVE

## 2023-12-05 ENCOUNTER — PATIENT OUTREACH (OUTPATIENT)
Dept: GASTROENTEROLOGY | Facility: CLINIC | Age: 73
End: 2023-12-05
Payer: COMMERCIAL

## 2023-12-26 ENCOUNTER — TELEPHONE (OUTPATIENT)
Dept: FAMILY MEDICINE | Facility: CLINIC | Age: 73
End: 2023-12-26
Payer: COMMERCIAL

## 2023-12-26 NOTE — TELEPHONE ENCOUNTER
Patient Quality Outreach    Patient is due for the following:   Diabetes -  Diabetic Follow-Up Visit  Physical Annual Wellness Visit    Next Steps:   Patient was scheduled for AWV and DM     Type of outreach:    Phone, spoke to patient/parent. patient      Questions for provider review:    None           Suzi Wang MA

## 2024-01-26 ENCOUNTER — MEDICAL CORRESPONDENCE (OUTPATIENT)
Dept: FAMILY MEDICINE | Facility: CLINIC | Age: 74
End: 2024-01-26
Payer: COMMERCIAL

## 2024-01-28 ENCOUNTER — TRANSFERRED RECORDS (OUTPATIENT)
Dept: HEALTH INFORMATION MANAGEMENT | Facility: CLINIC | Age: 74
End: 2024-01-28
Payer: COMMERCIAL

## 2024-02-26 ENCOUNTER — OFFICE VISIT (OUTPATIENT)
Dept: FAMILY MEDICINE | Facility: CLINIC | Age: 74
End: 2024-02-26
Payer: COMMERCIAL

## 2024-02-26 VITALS
TEMPERATURE: 97.9 F | OXYGEN SATURATION: 100 % | SYSTOLIC BLOOD PRESSURE: 116 MMHG | RESPIRATION RATE: 16 BRPM | HEIGHT: 61 IN | HEART RATE: 48 BPM | DIASTOLIC BLOOD PRESSURE: 60 MMHG | WEIGHT: 138.25 LBS | BODY MASS INDEX: 26.1 KG/M2

## 2024-02-26 DIAGNOSIS — E03.8 OTHER SPECIFIED HYPOTHYROIDISM: ICD-10-CM

## 2024-02-26 DIAGNOSIS — E11.21 TYPE 2 DIABETES MELLITUS WITH DIABETIC NEPHROPATHY, WITHOUT LONG-TERM CURRENT USE OF INSULIN (H): ICD-10-CM

## 2024-02-26 DIAGNOSIS — K74.00 LIVER FIBROSIS: ICD-10-CM

## 2024-02-26 DIAGNOSIS — N18.32 STAGE 3B CHRONIC KIDNEY DISEASE (H): ICD-10-CM

## 2024-02-26 DIAGNOSIS — I10 ESSENTIAL HYPERTENSION: ICD-10-CM

## 2024-02-26 DIAGNOSIS — B18.2 CHRONIC HEPATITIS C WITHOUT HEPATIC COMA (H): ICD-10-CM

## 2024-02-26 DIAGNOSIS — Z00.00 ENCOUNTER FOR MEDICARE ANNUAL WELLNESS EXAM: ICD-10-CM

## 2024-02-26 DIAGNOSIS — E78.5 HYPERLIPIDEMIA LDL GOAL <130: Primary | ICD-10-CM

## 2024-02-26 PROBLEM — N18.31 STAGE 3A CHRONIC KIDNEY DISEASE (H): Status: RESOLVED | Noted: 2018-07-02 | Resolved: 2024-02-26

## 2024-02-26 LAB
ERYTHROCYTE [DISTWIDTH] IN BLOOD BY AUTOMATED COUNT: 13 % (ref 10–15)
HBA1C MFR BLD: 4.9 % (ref 0–5.6)
HCT VFR BLD AUTO: 36.7 % (ref 35–47)
HGB BLD-MCNC: 12.1 G/DL (ref 11.7–15.7)
MCH RBC QN AUTO: 29.5 PG (ref 26.5–33)
MCHC RBC AUTO-ENTMCNC: 33 G/DL (ref 31.5–36.5)
MCV RBC AUTO: 90 FL (ref 78–100)
PLATELET # BLD AUTO: 164 10E3/UL (ref 150–450)
RBC # BLD AUTO: 4.1 10E6/UL (ref 3.8–5.2)
WBC # BLD AUTO: 4.2 10E3/UL (ref 4–11)

## 2024-02-26 PROCEDURE — 80061 LIPID PANEL: CPT | Performed by: FAMILY MEDICINE

## 2024-02-26 PROCEDURE — 83036 HEMOGLOBIN GLYCOSYLATED A1C: CPT | Performed by: FAMILY MEDICINE

## 2024-02-26 PROCEDURE — 99214 OFFICE O/P EST MOD 30 MIN: CPT | Mod: 25 | Performed by: FAMILY MEDICINE

## 2024-02-26 PROCEDURE — 80053 COMPREHEN METABOLIC PANEL: CPT | Performed by: FAMILY MEDICINE

## 2024-02-26 PROCEDURE — 84443 ASSAY THYROID STIM HORMONE: CPT | Performed by: FAMILY MEDICINE

## 2024-02-26 PROCEDURE — 85027 COMPLETE CBC AUTOMATED: CPT | Performed by: FAMILY MEDICINE

## 2024-02-26 PROCEDURE — G0439 PPPS, SUBSEQ VISIT: HCPCS | Performed by: FAMILY MEDICINE

## 2024-02-26 PROCEDURE — 36415 COLL VENOUS BLD VENIPUNCTURE: CPT | Performed by: FAMILY MEDICINE

## 2024-02-26 RX ORDER — METOPROLOL SUCCINATE 100 MG/1
50 TABLET, EXTENDED RELEASE ORAL DAILY
COMMUNITY
Start: 2024-02-26

## 2024-02-26 SDOH — HEALTH STABILITY: PHYSICAL HEALTH: ON AVERAGE, HOW MANY DAYS PER WEEK DO YOU ENGAGE IN MODERATE TO STRENUOUS EXERCISE (LIKE A BRISK WALK)?: 6 DAYS

## 2024-02-26 ASSESSMENT — SOCIAL DETERMINANTS OF HEALTH (SDOH): HOW OFTEN DO YOU GET TOGETHER WITH FRIENDS OR RELATIVES?: PATIENT DECLINED

## 2024-02-26 NOTE — PROGRESS NOTES
"Preventive Care Visit  [facility]  Feb 26, 2024    Assessment & Plan     Encounter for Medicare annual wellness exam  Recommend RSV, will need to get at pharmacy.     Hyperlipidemia LDL goal <130  On atorvastatin 20mg, normal lipids. Discussed risk vs benefits, her LDL and total cholesterol so low that you cannot calculate ascvd. She is ok continuing.   - Lipid panel reflex to direct LDL Fasting  - Lipid panel reflex to direct LDL Fasting    Type 2 diabetes mellitus with diabetic nephropathy, without long-term current use of insulin (H)  A1c 4.9%, not on any medications, at goal. Highest A1c up to 9.8% back in 2015  - Hemoglobin A1c  - CBC with platelets  - Comprehensive metabolic panel (BMP + Alb, Alk Phos, ALT, AST, Total. Bili, TP)  - Hemoglobin A1c  - CBC with platelets  - Comprehensive metabolic panel (BMP + Alb, Alk Phos, ALT, AST, Total. Bili, TP)    Essential hypertension  Well controlled. Decrease metoprolol again now to 50mg daily due to bradycardia but she is asymptomatic. Will have her come back for BP check with RN.   - CBC with platelets  - CBC with platelets  - metoprolol succinate ER (TOPROL XL) 100 MG 24 hr tablet    Chronic hepatitis C without hepatic coma (H)  Liver fibrosis  Needs ultrasound due to fibrosis and history of hep C  - Comprehensive metabolic panel (BMP + Alb, Alk Phos, ALT, AST, Total. Bili, TP)    Stage 3b chronic kidney disease (H)  Follows with nephrology.     Other specified hypothyroidism  Continue levothyroxine 75 mcg daily, recheck TSH.   - TSH with free T4 reflex  - TSH with free T4 reflex              BMI  Estimated body mass index is 26.1 kg/m  as calculated from the following:    Height as of this encounter: 1.55 m (5' 1.02\").    Weight as of this encounter: 62.7 kg (138 lb 4 oz).       Counseling  Appropriate preventive services were discussed with this patient, including applicable screening as appropriate for " fall prevention, nutrition, physical activity, Tobacco-use cessation, weight loss and cognition.  Checklist reviewing preventive services available has been given to the patient.  Reviewed patient's diet, addressing concerns and/or questions.   The patient was provided with written information regarding signs of hearing loss.       Return in about 6 months (around 8/26/2024) for blood pressure recheck, diabetes follow up.      Subjective   Dirk is a 73 year old, presenting for the following:  Wellness Visit and Pain (Right side pain, comes and goes for over the last year. Unsure what's causing it /)        2/26/2024     1:19 PM   Additional Questions   Roomed by Iveth CHATTERJEE         Health Care Directive  Patient does not have a Health Care Directive or Living Will: Discussed advance care planning with patient; information given to patient to review.    HPI          2/26/2024   General Health   How would you rate your overall physical health? Good   Feel stress (tense, anxious, or unable to sleep) To some extent   (!) STRESS CONCERN      2/26/2024   Nutrition   Diet: Diabetic         2/26/2024   Exercise   Days per week of moderate/strenous exercise 6 days         2/26/2024   Social Factors   Frequency of gathering with friends or relatives Patient declined   Worry food won't last until get money to buy more No   Food not last or not have enough money for food? No   Do you have housing?  Yes   Are you worried about losing your housing? No   Lack of transportation? No   Unable to get utilities (heat,electricity)? No         2/26/2024   Fall Risk   Fallen 2 or more times in the past year? No   Trouble with walking or balance? No          2/26/2024   Activities of Daily Living- Home Safety   Needs help with the following daily activites None of the above   Safety concerns in the home None of the above         2/26/2024   Dental   Dentist two times every year? Yes         2/26/2024   Hearing Screening   Hearing concerns?  (!) I NEED TO ASK PEOPLE TO SPEAK UP OR REPEAT THEMSELVES.         2024   Driving Risk Screening   Patient/family members have concerns about driving No         2024   General Alertness/Fatigue Screening   Have you been more tired than usual lately? No         2024   Urinary Incontinence Screening   Bothered by leaking urine in past 6 months No            Today's PHQ-2 Score:       2024     1:13 PM   PHQ-2 (  [employer])   Q1: Little interest or pleasure in doing things 0   Q2: Feeling down, depressed or hopeless 1   PHQ-2 Score 1   Q1: Little interest or pleasure in doing things Not at all   Q2: Feeling down, depressed or hopeless Several days   PHQ-2 Score 1           2024   Substance Use   Alcohol more than 3/day or more than 7/wk No   Do you have a current opioid prescription? No   How severe/bad is pain from 1 to 10? 7/10   Do you use any other substances recreationally? No     Social History     Tobacco Use    Smoking status: Former     Types: Cigarettes     Quit date: 2021     Years since quittin.8     Passive exposure: Never    Smokeless tobacco: Never   Vaping Use    Vaping Use: Never used   Substance Use Topics    Alcohol use: No    Drug use: No           2022   LAST FHS-7 RESULTS   1st degree relative breast or ovarian cancer Yes   Any relative bilateral breast cancer Yes            ASCVD Risk   The ASCVD Risk score (Triny MICHELLE, et al., 2019) failed to calculate for the following reasons:    The valid total cholesterol range is 130 to 320 mg/dL            Reviewed and updated as needed this visit by Provider   Tobacco  Allergies  Meds  Problems  Med Hx  Surg Hx  Fam Hx     Sexual Activity            Current providers sharing in care for this patient include:  Patient Care Team:  Alejandro Jones MD as PCP - General (Family Medicine)  Alejandro Jones MD as Assigned PCP  Harlan Baker DO as Assigned Neuroscience Provider    The following health  "maintenance items are reviewed in Epic and correct as of today:  Health Maintenance   Topic Date Due    PARATHYROID  Never done    PHOSPHORUS  Never done    RSV VACCINE (Pregnancy & 60+) (1 - 1-dose 60+ series) Never done    LIPID  04/13/2024    TSH W/FREE T4 REFLEX  04/13/2024    BMP  08/14/2024    DIABETIC FOOT EXAM  08/14/2024    ANNUAL REVIEW OF HM ORDERS  08/14/2024    A1C  08/26/2024    LUNG CANCER SCREENING  09/08/2024    EYE EXAM  11/08/2024    MEDICARE ANNUAL WELLNESS VISIT  02/26/2025    FALL RISK ASSESSMENT  02/26/2025    HEMOGLOBIN  02/26/2025    MAMMO SCREENING  09/14/2025    ADVANCE CARE PLANNING  08/12/2027    COLORECTAL CANCER SCREENING  08/02/2028    DTAP/TDAP/TD IMMUNIZATION (3 - Td or Tdap) 07/11/2029    DEXA  02/11/2035    PHQ-2 (once per calendar year)  Completed    INFLUENZA VACCINE  Completed    Pneumococcal Vaccine: 65+ Years  Completed    URINALYSIS  Completed    ALK PHOS  Completed    ZOSTER IMMUNIZATION  Completed    HEPATITIS A IMMUNIZATION  Completed    HEPATITIS B IMMUNIZATION  Completed    COVID-19 Vaccine  Completed    IPV IMMUNIZATION  Aged Out    HPV IMMUNIZATION  Aged Out    MENINGITIS IMMUNIZATION  Aged Out    RSV MONOCLONAL ANTIBODY  Aged Out    MICROALBUMIN  Discontinued            Objective    Exam  /60   Pulse (!) 48   Temp 97.9  F (36.6  C) (Oral)   Resp 16   Ht 1.55 m (5' 1.02\")   Wt 62.7 kg (138 lb 4 oz)   SpO2 100%   BMI 26.10 kg/m     Estimated body mass index is 26.1 kg/m  as calculated from the following:    Height as of this encounter: 1.55 m (5' 1.02\").    Weight as of this encounter: 62.7 kg (138 lb 4 oz).    Physical Exam  GENERAL: alert and no distress  NECK: no adenopathy, no asymmetry, masses, or scars  RESP: lungs clear to auscultation - no rales, rhonchi or wheezes  CV: regular rate and rhythm, normal S1 S2, no S3 or S4, no murmur, click or rub, no peripheral edema  ABDOMEN: soft, nontender, no hepatosplenomegaly, no masses and bowel sounds " normal  MS: no gross musculoskeletal defects noted, no edema         2/26/2024   Mini Cog   Clock Draw Score 2 Normal   3 Item Recall 3 objects recalled   Mini Cog Total Score 5            Signed Electronically by: Alejandro Jones MD

## 2024-02-26 NOTE — PATIENT INSTRUCTIONS
Preventive Care Advice   This is general advice given by our system to help you stay healthy. However, your care team may have specific advice just for you. Please talk to your care team about your preventive care needs.  Nutrition  Eat 5 or more servings of fruits and vegetables each day.  Try wheat bread, brown rice and whole grain pasta (instead of white bread, rice, and pasta).  Get enough calcium and vitamin D. Check the label on foods and aim for 100% of the RDA (recommended daily allowance).  Lifestyle  Exercise at least 150 minutes each week  (30 minutes a day, 5 days a week).  Do muscle strengthening activities 2 days a week. These help control your weight and prevent disease.  No smoking.  Wear sunscreen to prevent skin cancer.  Have a dental exam and cleaning every 6 months.  Yearly exams  See your health care team every year to talk about:  Any changes in your health.  Any medicines your care team has prescribed.  Preventive care, family planning, and ways to prevent chronic diseases.  Shots (vaccines)   HPV shots (up to age 26), if you've never had them before.  Hepatitis B shots (up to age 59), if you've never had them before.  COVID-19 shot: Get this shot when it's due.  Flu shot: Get a flu shot every year.  Tetanus shot: Get a tetanus shot every 10 years.  Pneumococcal, hepatitis A, and RSV shots: Ask your care team if you need these based on your risk.  Shingles shot (for age 50 and up)  General health tests  Diabetes screening:  Starting at age 35, Get screened for diabetes at least every 3 years.  If you are younger than age 35, ask your care team if you should be screened for diabetes.  Cholesterol test: At age 39, start having a cholesterol test every 5 years, or more often if advised.  Bone density scan (DEXA): At age 50, ask your care team if you should have this scan for osteoporosis (brittle bones).  Hepatitis C: Get tested at least once in your life.  STIs (sexually transmitted  infections)  Before age 24: Ask your care team if you should be screened for STIs.  After age 24: Get screened for STIs if you're at risk. You are at risk for STIs (including HIV) if:  You are sexually active with more than one person.  You don't use condoms every time.  You or a partner was diagnosed with a sexually transmitted infection.  If you are at risk for HIV, ask about PrEP medicine to prevent HIV.  Get tested for HIV at least once in your life, whether you are at risk for HIV or not.  Cancer screening tests  Cervical cancer screening: If you have a cervix, begin getting regular cervical cancer screening tests starting at age 21.  Breast cancer scan (mammogram): If you've ever had breasts, begin having regular mammograms starting at age 40. This is a scan to check for breast cancer.  Colon cancer screening: It is important to start screening for colon cancer at age 45.  Have a colonoscopy test every 10 years (or more often if you're at risk) Or, ask your provider about stool tests like a FIT test every year or Cologuard test every 3 years.  To learn more about your testing options, visit:   https://www.[employer]/910810.pdf.  For help making a decision, visit:   https://bit.ly/cp09647.  Prostate cancer screening test: If you have a prostate, ask your care team if a prostate cancer screening test (PSA) at age 55 is right for you.  Lung cancer screening: If you are a current or former smoker ages 50 to 80, ask your care team if ongoing lung cancer screenings are right for you.  For informational purposes only. Not to replace the advice of your health care provider. Copyright   2023 [employer]. All rights reserved. Clinically reviewed by the [facility] Transitions Program. [employer] [id number] - REV 01/24.

## 2024-02-27 LAB
ALBUMIN SERPL BCG-MCNC: 4.4 G/DL (ref 3.5–5.2)
ALP SERPL-CCNC: 83 U/L (ref 40–150)
ALT SERPL W P-5'-P-CCNC: 51 U/L (ref 0–50)
ANION GAP SERPL CALCULATED.3IONS-SCNC: 8 MMOL/L (ref 7–15)
AST SERPL W P-5'-P-CCNC: 50 U/L (ref 0–45)
BILIRUB SERPL-MCNC: 0.3 MG/DL
BUN SERPL-MCNC: 37.3 MG/DL (ref 8–23)
CALCIUM SERPL-MCNC: 9.2 MG/DL (ref 8.8–10.2)
CHLORIDE SERPL-SCNC: 109 MMOL/L (ref 98–107)
CHOLEST SERPL-MCNC: 134 MG/DL
CREAT SERPL-MCNC: 1.32 MG/DL (ref 0.51–0.95)
DEPRECATED HCO3 PLAS-SCNC: 24 MMOL/L (ref 22–29)
EGFRCR SERPLBLD CKD-EPI 2021: 43 ML/MIN/1.73M2
FASTING STATUS PATIENT QL REPORTED: ABNORMAL
GLUCOSE SERPL-MCNC: 81 MG/DL (ref 70–99)
HDLC SERPL-MCNC: 48 MG/DL
LDLC SERPL CALC-MCNC: 77 MG/DL
NONHDLC SERPL-MCNC: 86 MG/DL
POTASSIUM SERPL-SCNC: 4.9 MMOL/L (ref 3.4–5.3)
PROT SERPL-MCNC: 7.6 G/DL (ref 6.4–8.3)
SODIUM SERPL-SCNC: 141 MMOL/L (ref 135–145)
TRIGL SERPL-MCNC: 45 MG/DL
TSH SERPL DL<=0.005 MIU/L-ACNC: 2.23 UIU/ML (ref 0.3–4.2)

## 2024-03-12 ENCOUNTER — ALLIED HEALTH/NURSE VISIT (OUTPATIENT)
Dept: FAMILY MEDICINE | Facility: CLINIC | Age: 74
End: 2024-03-12
Attending: FAMILY MEDICINE
Payer: COMMERCIAL

## 2024-03-12 VITALS — DIASTOLIC BLOOD PRESSURE: 64 MMHG | SYSTOLIC BLOOD PRESSURE: 118 MMHG

## 2024-03-12 DIAGNOSIS — I10 ESSENTIAL HYPERTENSION: Primary | ICD-10-CM

## 2024-03-12 PROCEDURE — 99207 PR NO CHARGE NURSE ONLY: CPT

## 2024-03-12 NOTE — PROGRESS NOTES
I met with Liz Pollard at the request of Dr. Jones to recheck her blood pressure.  Blood pressure medications on the med list were reviewed with patient.    Patient has taken all medications as per usual regimen: Yes  Patient reports tolerating them without any issues or concerns: Yes    Vitals:    03/12/24 1203   BP: 118/64   BP Location: Right arm   Patient Position: Sitting   Cuff Size: Adult Regular       Blood pressure was taken, previous encounter was reviewed, recorded blood pressure below 140/90.  Patient was discharged and the note will be sent to the provider for final review.        French Covarrubias BSN RN  [facility]

## 2024-03-22 DIAGNOSIS — E11.21 TYPE 2 DIABETES MELLITUS WITH DIABETIC NEPHROPATHY, WITHOUT LONG-TERM CURRENT USE OF INSULIN (H): ICD-10-CM

## 2024-03-28 DIAGNOSIS — I10 ESSENTIAL HYPERTENSION: ICD-10-CM

## 2024-03-28 DIAGNOSIS — Z76.0 ENCOUNTER FOR MEDICATION REFILL: ICD-10-CM

## 2024-03-28 RX ORDER — SPIRONOLACTONE 25 MG/1
25 TABLET ORAL DAILY
Qty: 90 TABLET | Refills: 3 | Status: SHIPPED | OUTPATIENT
Start: 2024-03-28

## 2024-04-29 DIAGNOSIS — I10 ESSENTIAL HYPERTENSION: ICD-10-CM

## 2024-04-29 DIAGNOSIS — Z76.0 ENCOUNTER FOR MEDICATION REFILL: ICD-10-CM

## 2024-04-29 DIAGNOSIS — E78.2 MIXED HYPERLIPIDEMIA: ICD-10-CM

## 2024-04-29 DIAGNOSIS — E11.9 CONTROLLED TYPE 2 DIABETES MELLITUS WITHOUT COMPLICATION, WITHOUT LONG-TERM CURRENT USE OF INSULIN (H): ICD-10-CM

## 2024-04-29 RX ORDER — LEVOTHYROXINE SODIUM 75 UG/1
TABLET ORAL
Qty: 90 TABLET | Refills: 3 | OUTPATIENT
Start: 2024-04-29

## 2024-04-29 RX ORDER — LISINOPRIL 40 MG/1
TABLET ORAL
Qty: 90 TABLET | Refills: 2 | OUTPATIENT
Start: 2024-04-29

## 2024-04-29 RX ORDER — ATORVASTATIN CALCIUM 20 MG/1
TABLET, FILM COATED ORAL
Qty: 90 TABLET | Refills: 2 | OUTPATIENT
Start: 2024-04-29

## 2024-05-02 NOTE — TELEPHONE ENCOUNTER
Cyndee is a 31 year old female,      , Patient's last menstrual period was 2023 (exact date). here for a  6  week postpartum visit. She delivered on 3/6/2024 vaginally a  baby girl. She is doing well with no complaints. Her baby is doing well.  She is breast feeding.  She has elected oral contraceptives her form of contraception.  Her lochia is absent. She has not resumed sexual activity.    PHYSICAL EXAMINATION:  Visit Vitals  /66 (BP Location: RUE - Right upper extremity, Patient Position: Sitting, Cuff Size: Regular)   Wt 67 kg (147 lb 11.3 oz)   LMP 2023 (Exact Date)   Breastfeeding Yes   BMI 24.58 kg/m²     General: no apparent distress  BREASTS: declined  ABDOMEN: soft, nontender, no masses  EXTREMITIES: no edema, nontender    PELVIC EXAMINATION:  Vulva: no lesions, laceration/episiotomy without induration, erythema and well approximated  Vagina: normal caliber, moist, pink, no lesions or exudate  Cervix: multiparous without lesions or lacerations, os closed  Uterus: small, mobile, nontender, normal size and shape  Adnexa: no masses, fullness or tenderness  Rectal: sphincter intact, nontender    ASSESSMENT: 6 week postpartum exam doing well. Contraceptive management.    PLAN:  > Encouraged to obtain Paps on a regular basis and to perform monthly SBE (self breast examination).  > Patient can resume regular activities without restrictions.  > Patient instructed to continue on prenatal vitamins while nursing.  > Start OCP's (oral contraceptive pills) -  Prescription with refill for 1 year.       Medication refill queued and pended. Prescriber please review, sign, and send if appropriate. Thank you.

## 2024-06-28 DIAGNOSIS — I10 ESSENTIAL HYPERTENSION: ICD-10-CM

## 2024-06-28 RX ORDER — LISINOPRIL 40 MG/1
TABLET ORAL
Qty: 90 TABLET | Refills: 2 | Status: SHIPPED | OUTPATIENT
Start: 2024-06-28

## 2024-07-10 NOTE — PROGRESS NOTES
DISCHARGE  Reason for Discharge: Patient has met all goals.  No further expectation of progress.  Patient chooses to discontinue therapy.    Equipment Issued: None    Discharge Plan: Patient to continue home program.    Referring Provider:  Alejandro Jones

## 2024-07-12 DIAGNOSIS — E78.2 MIXED HYPERLIPIDEMIA: ICD-10-CM

## 2024-07-12 DIAGNOSIS — E11.9 CONTROLLED TYPE 2 DIABETES MELLITUS WITHOUT COMPLICATION, WITHOUT LONG-TERM CURRENT USE OF INSULIN (H): ICD-10-CM

## 2024-07-12 DIAGNOSIS — Z76.0 ENCOUNTER FOR MEDICATION REFILL: ICD-10-CM

## 2024-07-12 RX ORDER — ATORVASTATIN CALCIUM 20 MG/1
TABLET, FILM COATED ORAL
Qty: 90 TABLET | Refills: 1 | Status: SHIPPED | OUTPATIENT
Start: 2024-07-12 | End: 2024-08-26 | Stop reason: SINTOL

## 2024-07-24 DIAGNOSIS — Z76.0 ENCOUNTER FOR MEDICATION REFILL: ICD-10-CM

## 2024-07-25 RX ORDER — LEVOTHYROXINE SODIUM 75 UG/1
TABLET ORAL
Qty: 90 TABLET | Refills: 3 | Status: SHIPPED | OUTPATIENT
Start: 2024-07-25

## 2024-08-02 ENCOUNTER — TRANSFERRED RECORDS (OUTPATIENT)
Dept: HEALTH INFORMATION MANAGEMENT | Facility: CLINIC | Age: 74
End: 2024-08-02
Payer: COMMERCIAL

## 2024-08-02 LAB — RETINOPATHY: NEGATIVE

## 2024-08-26 ENCOUNTER — OFFICE VISIT (OUTPATIENT)
Dept: FAMILY MEDICINE | Facility: CLINIC | Age: 74
End: 2024-08-26
Attending: FAMILY MEDICINE
Payer: COMMERCIAL

## 2024-08-26 VITALS
HEIGHT: 61 IN | TEMPERATURE: 98 F | SYSTOLIC BLOOD PRESSURE: 137 MMHG | OXYGEN SATURATION: 100 % | BODY MASS INDEX: 27.94 KG/M2 | WEIGHT: 148 LBS | DIASTOLIC BLOOD PRESSURE: 63 MMHG | RESPIRATION RATE: 16 BRPM | HEART RATE: 62 BPM

## 2024-08-26 DIAGNOSIS — N18.32 STAGE 3B CHRONIC KIDNEY DISEASE (H): ICD-10-CM

## 2024-08-26 DIAGNOSIS — I10 ESSENTIAL HYPERTENSION: ICD-10-CM

## 2024-08-26 DIAGNOSIS — E11.21 TYPE 2 DIABETES MELLITUS WITH DIABETIC NEPHROPATHY, WITHOUT LONG-TERM CURRENT USE OF INSULIN (H): Primary | ICD-10-CM

## 2024-08-26 DIAGNOSIS — R79.89 ELEVATED LFTS: ICD-10-CM

## 2024-08-26 DIAGNOSIS — E03.8 OTHER SPECIFIED HYPOTHYROIDISM: ICD-10-CM

## 2024-08-26 DIAGNOSIS — M79.10 MUSCLE ACHE: ICD-10-CM

## 2024-08-26 DIAGNOSIS — M79.622 PAIN OF LEFT UPPER ARM: ICD-10-CM

## 2024-08-26 LAB
HBA1C MFR BLD: 4.9 % (ref 0–5.6)
PTH-INTACT SERPL-MCNC: 5 PG/ML (ref 15–65)

## 2024-08-26 PROCEDURE — 84100 ASSAY OF PHOSPHORUS: CPT | Performed by: FAMILY MEDICINE

## 2024-08-26 PROCEDURE — 84443 ASSAY THYROID STIM HORMONE: CPT | Performed by: FAMILY MEDICINE

## 2024-08-26 PROCEDURE — 83036 HEMOGLOBIN GLYCOSYLATED A1C: CPT | Performed by: FAMILY MEDICINE

## 2024-08-26 PROCEDURE — 99207 PR FOOT EXAM NO CHARGE: CPT | Performed by: FAMILY MEDICINE

## 2024-08-26 PROCEDURE — 83970 ASSAY OF PARATHORMONE: CPT | Performed by: FAMILY MEDICINE

## 2024-08-26 PROCEDURE — 80053 COMPREHEN METABOLIC PANEL: CPT | Performed by: FAMILY MEDICINE

## 2024-08-26 PROCEDURE — G2211 COMPLEX E/M VISIT ADD ON: HCPCS | Performed by: FAMILY MEDICINE

## 2024-08-26 PROCEDURE — 36415 COLL VENOUS BLD VENIPUNCTURE: CPT | Performed by: FAMILY MEDICINE

## 2024-08-26 PROCEDURE — 82550 ASSAY OF CK (CPK): CPT | Performed by: FAMILY MEDICINE

## 2024-08-26 PROCEDURE — 99214 OFFICE O/P EST MOD 30 MIN: CPT | Performed by: FAMILY MEDICINE

## 2024-08-26 ASSESSMENT — PATIENT HEALTH QUESTIONNAIRE - PHQ9
10. IF YOU CHECKED OFF ANY PROBLEMS, HOW DIFFICULT HAVE THESE PROBLEMS MADE IT FOR YOU TO DO YOUR WORK, TAKE CARE OF THINGS AT HOME, OR GET ALONG WITH OTHER PEOPLE: NOT DIFFICULT AT ALL
SUM OF ALL RESPONSES TO PHQ QUESTIONS 1-9: 4
SUM OF ALL RESPONSES TO PHQ QUESTIONS 1-9: 4

## 2024-08-26 NOTE — PROGRESS NOTES
"  Assessment & Plan     Type 2 diabetes mellitus with diabetic nephropathy, without long-term current use of insulin (H)  A1c 4.9%, no medications.   - FOOT EXAM  - Hemoglobin A1c  - Comprehensive metabolic panel (BMP + Alb, Alk Phos, ALT, AST, Total. Bili, TP)  - Hemoglobin A1c  - Comprehensive metabolic panel (BMP + Alb, Alk Phos, ALT, AST, Total. Bili, TP)    Essential hypertension  Well controlled. No changes in medications. Check kidney function.   - Comprehensive metabolic panel (BMP + Alb, Alk Phos, ALT, AST, Total. Bili, TP)  - Parathyroid Hormone Intact  - Phosphorus    Stage 3b chronic kidney disease (H)  Check labs below.   - Parathyroid Hormone Intact  - Phosphorus    Other specified hypothyroidism  Recheck due to symptoms.   - TSH with free T4 reflex  - TSH with free T4 reflex    Pain of left upper arm  No clear pattern.     Muscle aches   Hold statin, see if that could be the cause.   ADDENDUM 08/29/24   Hold acetaminophen as well due to increased LFTs. Recheck in 2 months.   Add CK to labs      BMI  Estimated body mass index is 27.95 kg/m  as calculated from the following:    Height as of this encounter: 1.55 m (5' 1.02\").    Weight as of this encounter: 67.1 kg (148 lb).   Weight management plan: Discussed healthy diet and exercise guidelines      Return in about 2 months (around 10/26/2024) for muscle aches.      Subjective   Dirk is a 73 year old, presenting for the following health issues:  Blood Pressure Check and Diabetes    History of Present Illness       Back Pain:  She presents for follow up of back pain. Patient's back pain is a recurring problem.  Location of back pain:  Right upper back  Description of back pain: burning  Back pain spreads: nowhere    Since patient first noticed back pain, pain is: unchanged  Does back pain interfere with her job:  No       Reason for visit:  Check for dletbetis  Symptom onset:  More than a month  Symptom progression:  Staying the same  Had these " "symptoms before:  Yes  Has tried/received treatment for these symptoms:  Yes  Previous treatment was successful:  Yes   She is taking medications regularly.             Objective    /63   Pulse 62   Temp 98  F (36.7  C) (Oral)   Resp 16   Ht 1.55 m (5' 1.02\")   Wt 67.1 kg (148 lb)   SpO2 100%   BMI 27.95 kg/m    Body mass index is 27.95 kg/m .  Physical Exam   GENERAL: alert and no distress  NECK: no adenopathy, no asymmetry, masses, or scars  RESP: lungs clear to auscultation - no rales, rhonchi or wheezes  CV: regular rate and rhythm, normal S1 S2, no S3 or S4, no murmur, click or rub, no peripheral edema  ABDOMEN: soft, nontender, no hepatosplenomegaly, no masses and bowel sounds normal  MS: no gross musculoskeletal defects noted, no edema    Results for orders placed or performed in visit on 08/26/24   Hemoglobin A1c     Status: Normal   Result Value Ref Range    Hemoglobin A1C 4.9 0.0 - 5.6 %     Results for orders placed or performed in visit on 08/26/24 (from the past 24 hour(s))   Hemoglobin A1c   Result Value Ref Range    Hemoglobin A1C 4.9 0.0 - 5.6 %           Signed Electronically by: Alejandro Jones MD    "

## 2024-08-26 NOTE — Clinical Note
Please call patient and let her know that her liver numbers have increased. We want to make sure she stops her statin and hold on the tylenol until we retest. She can either come in for a lab only in 2-3 weeks or wait until we see her again in November. I will place an order in case she wants to come in sooner.

## 2024-08-27 LAB
ALBUMIN SERPL BCG-MCNC: 4 G/DL (ref 3.5–5.2)
ALP SERPL-CCNC: 143 U/L (ref 40–150)
ALT SERPL W P-5'-P-CCNC: 95 U/L (ref 0–50)
ANION GAP SERPL CALCULATED.3IONS-SCNC: 9 MMOL/L (ref 7–15)
AST SERPL W P-5'-P-CCNC: 108 U/L (ref 0–45)
BILIRUB SERPL-MCNC: 0.5 MG/DL
BUN SERPL-MCNC: 28.6 MG/DL (ref 8–23)
CALCIUM SERPL-MCNC: 9.1 MG/DL (ref 8.8–10.4)
CHLORIDE SERPL-SCNC: 113 MMOL/L (ref 98–107)
CREAT SERPL-MCNC: 1.51 MG/DL (ref 0.51–0.95)
EGFRCR SERPLBLD CKD-EPI 2021: 37 ML/MIN/1.73M2
GLUCOSE SERPL-MCNC: 97 MG/DL (ref 70–99)
HCO3 SERPL-SCNC: 23 MMOL/L (ref 22–29)
PHOSPHATE SERPL-MCNC: 3.7 MG/DL (ref 2.5–4.5)
POTASSIUM SERPL-SCNC: 4.5 MMOL/L (ref 3.4–5.3)
PROT SERPL-MCNC: 7.1 G/DL (ref 6.4–8.3)
SODIUM SERPL-SCNC: 145 MMOL/L (ref 135–145)
TSH SERPL DL<=0.005 MIU/L-ACNC: 0.98 UIU/ML (ref 0.3–4.2)

## 2024-08-29 ENCOUNTER — TELEPHONE (OUTPATIENT)
Dept: FAMILY MEDICINE | Facility: CLINIC | Age: 74
End: 2024-08-29
Payer: COMMERCIAL

## 2024-08-29 LAB — CK SERPL-CCNC: 203 U/L (ref 26–192)

## 2024-09-27 ENCOUNTER — ANCILLARY PROCEDURE (OUTPATIENT)
Dept: MAMMOGRAPHY | Facility: CLINIC | Age: 74
End: 2024-09-27
Attending: FAMILY MEDICINE
Payer: COMMERCIAL

## 2024-09-27 DIAGNOSIS — Z12.31 ENCOUNTER FOR SCREENING MAMMOGRAM FOR BREAST CANCER: ICD-10-CM

## 2024-09-27 PROCEDURE — 77063 BREAST TOMOSYNTHESIS BI: CPT

## 2024-10-06 DIAGNOSIS — E11.21 TYPE 2 DIABETES MELLITUS WITH DIABETIC NEPHROPATHY, WITHOUT LONG-TERM CURRENT USE OF INSULIN (H): ICD-10-CM

## 2024-10-14 ENCOUNTER — OFFICE VISIT (OUTPATIENT)
Dept: FAMILY MEDICINE | Facility: CLINIC | Age: 74
End: 2024-10-14
Payer: COMMERCIAL

## 2024-10-14 VITALS
OXYGEN SATURATION: 98 % | RESPIRATION RATE: 14 BRPM | SYSTOLIC BLOOD PRESSURE: 113 MMHG | WEIGHT: 143 LBS | BODY MASS INDEX: 27 KG/M2 | HEIGHT: 61 IN | HEART RATE: 86 BPM | TEMPERATURE: 97.9 F | DIASTOLIC BLOOD PRESSURE: 67 MMHG

## 2024-10-14 DIAGNOSIS — I10 ESSENTIAL HYPERTENSION: ICD-10-CM

## 2024-10-14 DIAGNOSIS — H25.9 AGE-RELATED CATARACT OF BOTH EYES, UNSPECIFIED AGE-RELATED CATARACT TYPE: ICD-10-CM

## 2024-10-14 DIAGNOSIS — E11.21 TYPE 2 DIABETES MELLITUS WITH DIABETIC NEPHROPATHY, WITHOUT LONG-TERM CURRENT USE OF INSULIN (H): ICD-10-CM

## 2024-10-14 DIAGNOSIS — N18.32 STAGE 3B CHRONIC KIDNEY DISEASE (H): ICD-10-CM

## 2024-10-14 DIAGNOSIS — Z01.818 PREOP EXAMINATION: Primary | ICD-10-CM

## 2024-10-14 DIAGNOSIS — R74.8 ELEVATED CK: ICD-10-CM

## 2024-10-14 DIAGNOSIS — R79.89 ELEVATED LFTS: ICD-10-CM

## 2024-10-14 LAB
ALBUMIN SERPL BCG-MCNC: 4.2 G/DL (ref 3.5–5.2)
ALP SERPL-CCNC: 224 U/L (ref 40–150)
ALT SERPL W P-5'-P-CCNC: 126 U/L (ref 0–50)
ANION GAP SERPL CALCULATED.3IONS-SCNC: 10 MMOL/L (ref 7–15)
AST SERPL W P-5'-P-CCNC: 136 U/L (ref 0–45)
BILIRUB SERPL-MCNC: 0.6 MG/DL
BUN SERPL-MCNC: 23.2 MG/DL (ref 8–23)
CALCIUM SERPL-MCNC: 9.5 MG/DL (ref 8.8–10.4)
CHLORIDE SERPL-SCNC: 105 MMOL/L (ref 98–107)
CK SERPL-CCNC: 222 U/L (ref 26–192)
CREAT SERPL-MCNC: 1.28 MG/DL (ref 0.51–0.95)
EGFRCR SERPLBLD CKD-EPI 2021: 45 ML/MIN/1.73M2
GLUCOSE SERPL-MCNC: 115 MG/DL (ref 70–99)
HCO3 SERPL-SCNC: 25 MMOL/L (ref 22–29)
POTASSIUM SERPL-SCNC: 4.4 MMOL/L (ref 3.4–5.3)
PROT SERPL-MCNC: 7.8 G/DL (ref 6.4–8.3)
SODIUM SERPL-SCNC: 140 MMOL/L (ref 135–145)

## 2024-10-14 PROCEDURE — 36415 COLL VENOUS BLD VENIPUNCTURE: CPT | Performed by: FAMILY MEDICINE

## 2024-10-14 PROCEDURE — 80053 COMPREHEN METABOLIC PANEL: CPT | Performed by: FAMILY MEDICINE

## 2024-10-14 PROCEDURE — 82550 ASSAY OF CK (CPK): CPT | Performed by: FAMILY MEDICINE

## 2024-10-14 PROCEDURE — 99214 OFFICE O/P EST MOD 30 MIN: CPT | Performed by: FAMILY MEDICINE

## 2024-10-14 RX ORDER — PREDNISOLONE ACETATE 10 MG/ML
SUSPENSION/ DROPS OPHTHALMIC
COMMUNITY
Start: 2024-10-07

## 2024-10-14 NOTE — PROGRESS NOTES
Preoperative Evaluation  [address]  [place] [address]  Phone: [phone]  Fax: [phone]  Primary Provider: Alejandro Jones MD  Pre-op Performing Provider: Alejandro Jones MD  Oct 14, 2024               10/14/2024   Surgical Information   What procedure is being done? cataract right eye   Facility or Hospital where procedure/surgery will be performed: [facility]   Who is doing the procedure / surgery? dont remember   Date of surgery / procedure: [id number] ,2nd surgery [id number]   Time of surgery / procedure: 1130 am   Where do you plan to recover after surgery? at home with family      Fax number for surgical facility: [phone]    Assessment & Plan     The proposed surgical procedure is considered LOW risk.    Preop examination  Age-related cataract of both eyes, unspecified age-related cataract type  Plan for R then L.     Essential hypertension  Well controlled on lisinopril 40, spironolactone 25, metoprolol 50mg    Type 2 diabetes mellitus with diabetic nephropathy, without long-term current use of insulin (H)  Well controlled with A1c <5 for over 3 years without medications.     Stage 3b chronic kidney disease (H)  Stable.     Hypothyroid  Continue levothyroxine 75mcg          - No identified additional risk factors other than previously addressed    Preoperative Medication Instructions  Antiplatelet or Anticoagulation Medication Instructions   - Patient is on no antiplatelet or anticoagulation medications.    Additional Medication Instructions  Take all scheduled medications on the day of surgery    Recommendation  Approval given to proceed with proposed procedure, without further diagnostic evaluation.    Subjective   Dirk is a 73 year old, presenting for the following:  Pre-Op Exam        HPI related to upcoming procedure: Issues with vision, plan for R cataract then L.           10/14/2024   Pre-Op Questionnaire   Have you ever had a  heart attack or stroke? No   Have you ever had surgery on your heart or blood vessels, such as a stent placement, a coronary artery bypass, or surgery on an artery in your head, neck, heart, or legs? No   Do you have chest pain with activity? No   Do you have a history of heart failure? No   Do you currently have a cold, bronchitis or symptoms of other infection? No   Do you have a cough, shortness of breath, or wheezing? No   Do you or anyone in your family have previous history of blood clots? No   Do you or does anyone in your family have a serious bleeding problem such as prolonged bleeding following surgeries or cuts? No   Have you ever had problems with anemia or been told to take iron pills? No   Have you had any abnormal blood loss such as black, tarry or bloody stools, or abnormal vaginal bleeding? No   Have you ever had a blood transfusion? No   Are you willing to have a blood transfusion if it is medically needed before, during, or after your surgery? (!) NO    Have you or any of your relatives ever had problems with anesthesia? No   Do you have sleep apnea, excessive snoring or daytime drowsiness? No   Do you have any artifical heart valves or other implanted medical devices like a pacemaker, defibrillator, or continuous glucose monitor? No   Do you have artificial joints? No   Are you allergic to latex? No      Health Care Directive  Patient does not have a Health Care Directive or Living Will: Discussed advance care planning with patient; information given to patient to review.    Preoperative Review of    reviewed - no record of controlled substances prescribed.          Patient Active Problem List    Diagnosis Date Noted    Stage 3b chronic kidney disease (H) 02/26/2024     Priority: Medium    Lumbar spine pain 04/26/2023     Priority: Medium    Thoracic spine pain 04/26/2023     Priority: Medium    Varicose veins of right lower extremity with pain 08/31/2021     Priority: Medium    Liver  fibrosis 08/31/2021     Priority: Medium    Personal history of tobacco use 08/31/2021     Priority: Medium    Type 2 diabetes mellitus with diabetic nephropathy, without long-term current use of insulin (H)      Priority: Medium     Created by Conversion  [facility] Annotation: Jul 11 2011  3:23PM - Yamila Paniagua: diet   controlled        Scoliosis 01/03/2019     Priority: Medium    Age-related osteoporosis without current pathological fracture 07/02/2018     Priority: Medium    Vitamin D deficiency 09/12/2016     Priority: Medium    Trigger Finger Of The Right Ring Finger      Priority: Medium     Created by Conversion  Replacement Utility updated for latest IMO load        Other specified hypothyroidism      Priority: Medium     Created by Conversion  [facility] Annotation: Apr 7 2008  2:39PM - Yamila Paniagua: iatrogenic   from   Interferron tx for Hep C  Replacement Utility updated for latest IMO load        Essential hypertension      Priority: Medium     Created by Conversion  Replacement Utility updated for latest IMO load        Hiatal Hernia      Priority: Medium     Created by Conversion  Replacement Utility updated for latest IMO load        Hyperlipidemia LDL goal <130      Priority: Medium     Created by Conversion        Chronic hepatitis C without hepatic coma (H)      Priority: Medium     Created by Conversion  [facility] Annotation: Apr 7 2008  2:34PM - Mariposa Plunkett (Brittaney): tx'd with   interferon around 2000        Hepatic Cyst      Priority: Medium     Created by Conversion        Midback Pain      Priority: Medium     Created by Conversion        Benign essential microscopic hematuria      Priority: Medium     Created by Conversion  [facility] Annotation: Mar 20 2013 12:02PM - Yamila Paniagua: hematuria   work   by urology unrevealing - no stones, nl cysto, nl cytology, followed by uro        Class 1 obesity due to excess calories without serious comorbidity with body mass index (BMI) of 30.0 to  30.9 in adult      Priority: Medium     Created by Conversion        De Quervain's Tenosynovitis      Priority: Medium     Created by Conversion        Proteinuria      Priority: Medium     Created by Conversion          Past Medical History:   Diagnosis Date    Diabetes mellitus (H)     Disease of thyroid gland     Hypertension     Infectious viral hepatitis      Past Surgical History:   Procedure Laterality Date    HC REMOVAL GALLBLADDER      Description: Cholecystectomy;  Recorded: 05/10/2010;    HYSTERECTOMY  1987    OOPHORECTOMY      ZZC TOTAL ABDOM HYSTERECTOMY      Description: Hysterectomy;  Recorded: 06/16/2009;     Current Outpatient Medications   Medication Sig Dispense Refill    aspirin 81 MG EC tablet [ASPIRIN 81 MG EC TABLET] Take 81 mg by mouth as needed for pain.      blood glucose (CONTOUR NEXT TEST) test strip USE 1 STRIP EVERY  strip 2    blood-glucose meter Misc [BLOOD-GLUCOSE METER MISC] 1 glucometer device  - any brand covered by insurance (contour covered by insurance? ) 1 each 0    levothyroxine (SYNTHROID/LEVOTHROID) 75 MCG tablet TAKE 1 TABLET BY MOUTH EVERY DAY 90 tablet 3    lisinopril (ZESTRIL) 40 MG tablet TAKE 1 TABLET BY MOUTH EVERY DAY 90 tablet 2    metoprolol succinate ER (TOPROL XL) 100 MG 24 hr tablet Take 0.5 tablets (50 mg) by mouth daily      prednisoLONE acetate (PRED FORTE) 1 % ophthalmic suspension PLEASE SEE ATTACHED FOR DETAILED DIRECTIONS      spironolactone (ALDACTONE) 25 MG tablet TAKE 1 TABLET BY MOUTH EVERY DAY 90 tablet 3       Allergies   Allergen Reactions    Amlodipine Unknown     Gums swelled    Hydralazine Itching     Pt states that she is not allergic to anything        Social History     Tobacco Use    Smoking status: Former     Current packs/day: 0.00     Types: Cigarettes     Quit date: 4/11/2021     Years since quitting: 3.5     Passive exposure: Never    Smokeless tobacco: Never   Substance Use Topics    Alcohol use: No       History   Drug Use No  "              Objective    /67   Pulse 86   Temp 97.9  F (36.6  C) (Oral)   Resp 14   Ht 1.56 m (5' 1.42\")   Wt 64.9 kg (143 lb)   SpO2 98%   BMI 26.65 kg/m     Estimated body mass index is 26.65 kg/m  as calculated from the following:    Height as of this encounter: 1.56 m (5' 1.42\").    Weight as of this encounter: 64.9 kg (143 lb).  Physical Exam  GENERAL: alert and no distress  NECK: no adenopathy, no asymmetry, masses, or scars  RESP: lungs clear to auscultation - no rales, rhonchi or wheezes  CV: regular rate and rhythm, normal S1 S2, no S3 or S4, no murmur, click or rub, no peripheral edema  ABDOMEN: soft, nontender, no hepatosplenomegaly, no masses and bowel sounds normal  MS: no gross musculoskeletal defects noted, no edema    Recent Labs   Lab Test 08/26/24  1423 02/26/24  1323   HGB  --  12.1   PLT  --  164    141   POTASSIUM 4.5 4.9   CR 1.51* 1.32*   A1C 4.9 4.9        Diagnostics  No results found for this or any previous visit (from the past 48 hour(s)).   No EKG required for low risk surgery (cataract, skin procedure, breast biopsy, etc).    Revised Cardiac Risk Index (RCRI)  The patient has the following serious cardiovascular risks for perioperative complications:   - No serious cardiac risks = 0 points     RCRI Interpretation: 0 points: Class I (very low risk - 0.4% complication rate)         Signed Electronically by: Alejandro Jones MD  A copy of this evaluation report is provided to the requesting physician.         "

## 2024-10-14 NOTE — PATIENT INSTRUCTIONS
How to Take Your Medication Before Surgery  Preoperative Medication Instructions   Antiplatelet or Anticoagulation Medication Instructions   - Patient is on no antiplatelet or anticoagulation medications.    Additional Medication Instructions  Take all scheduled medications on the day of surgery       Patient Education   Preparing for Your Surgery  For Adults  Getting started  In most cases, a nurse will call to review your health history and instructions. They will give you an arrival time based on your scheduled surgery time. Please be ready to share:  Your doctor's clinic name and phone number  Your medical, surgical, and anesthesia history  A list of allergies and sensitivities  A list of medicines, including herbal treatments and over-the-counter drugs  Whether the patient has a legal guardian (ask how to send us the papers in advance)  Note: You may not receive a call if you were seen at our PAC (Preoperative Assessment Center).  Please tell us if you're pregnant--or if there's any chance you might be pregnant. Some surgeries may injure a fetus (unborn baby), so they require a pregnancy test. Surgeries that are safe for a fetus don't always need a test, and you can choose whether to have one.   Preparing for surgery  Within 10 to 30 days of surgery: Have a pre-op exam (sometimes called an H&P, or History and Physical). This can be done at a clinic or pre-operative center.  If you're having a , you may not need this exam. Talk to your care team.  At your pre-op exam, talk to your care team about all medicines you take. (This includes CBD oil and any drugs, such as THC, marijuana, and other forms of cannabis.) If you need to stop any medicine before surgery, ask when to start taking it again.  This is for your safety. Many medicines and drugs can make you bleed too much during surgery. Some change how well surgery (anesthesia) drugs work.  Call your insurance company to let them know you're having  surgery. (If you don't have insurance, call [phone].)  Call your clinic if there's any change in your health. This includes a scrape or scratch near the surgery site, or any signs of a cold (sore throat, runny nose, cough, rash, fever).  Eating and drinking guidelines  For your safety: Unless your surgeon tells you otherwise, follow the guidelines below.  Eat and drink as normal until 8 hours before you arrive for surgery. After that, no food or milk. You can spit out gum when you arrive.  Drink clear liquids until 2 hours before you arrive. These are liquids you can see through, like water, Gatorade, and Propel Water. They also include plain black coffee and tea (no cream or milk).  No alcohol for 24 hours before you arrive. The night before surgery, stop any drinks that contain THC.  If your care team tells you to take medicine on the morning of surgery, it's okay to take it with a sip of water. No other medicines or drugs are allowed (including CBD oil)--follow your care team's instructions.  If you have questions the day of surgery, call your hospital or surgery center.   Preventing infection  Shower or bathe the night before and the morning of surgery. Follow the instructions your clinic gave you. (If no instructions, use regular soap.)  Don't shave or clip hair near your surgery site. We'll remove the hair if needed.  Don't smoke or vape the morning of surgery. No chewing tobacco for 6 hours before you arrive. A nicotine patch is okay. You may spit out nicotine gum when you arrive.  For some surgeries, the surgeon will tell you to fully quit smoking and nicotine.  We will make every effort to keep you safe from infection. We will:  Clean our hands often with soap and water (or an alcohol-based hand rub).  Clean the skin at your surgery site with a special soap that kills germs.  Give you a special gown to keep you warm. (Cold raises the risk of infection.)  Wear hair covers, masks, gowns, and gloves  during surgery.  Give antibiotic medicine, if prescribed. Not all surgeries need this medicine.  What to bring on the day of surgery  Photo ID and insurance card  Copy of your health care directive, if you have one  Glasses and hearing aids (bring cases)  You can't wear contacts during surgery  Inhaler and eye drops, if you use them (tell us about these when you arrive)  CPAP machine or breathing device, if you use them  A few personal items, if spending the night  If you have . . .  A pacemaker, ICD (cardiac defibrillator), or other implant: Bring the ID card.  An implanted stimulator: Bring the remote control.  A legal guardian: Bring a copy of the certified (court-stamped) guardianship papers.  Please remove any jewelry, including body piercings. Leave jewelry and other valuables at home.  If you're going home the day of surgery  You must have a responsible adult drive you home. They should stay with you overnight as well.  If you don't have someone to stay with you, and you aren't safe to go home alone, we may keep you overnight. Insurance often won't pay for this.  After surgery  If it's hard to control your pain or you need more pain medicine, please call your surgeon's office.  Questions?   If you have any questions for your care team, list them here:   ____________________________________________________________________________________________________________________________________________________________________________________________________________________________________________________________  For informational purposes only. Not to replace the advice of your health care provider. Copyright   2003, 2019 [facility]. All rights reserved. Clinically reviewed by Mike Nava MD. [employer] [id number] - REV 08/24.

## 2024-11-18 ENCOUNTER — OFFICE VISIT (OUTPATIENT)
Dept: FAMILY MEDICINE | Facility: CLINIC | Age: 74
End: 2024-11-18
Payer: COMMERCIAL

## 2024-11-18 VITALS
BODY MASS INDEX: 26.64 KG/M2 | RESPIRATION RATE: 15 BRPM | TEMPERATURE: 98.6 F | DIASTOLIC BLOOD PRESSURE: 72 MMHG | SYSTOLIC BLOOD PRESSURE: 132 MMHG | WEIGHT: 141.12 LBS | HEART RATE: 61 BPM | OXYGEN SATURATION: 100 % | HEIGHT: 61 IN

## 2024-11-18 DIAGNOSIS — R79.89 ELEVATED LFTS: Primary | ICD-10-CM

## 2024-11-18 DIAGNOSIS — Z87.891 PERSONAL HISTORY OF TOBACCO USE: ICD-10-CM

## 2024-11-18 DIAGNOSIS — I10 ESSENTIAL HYPERTENSION: ICD-10-CM

## 2024-11-18 DIAGNOSIS — M79.10 MUSCLE ACHE: ICD-10-CM

## 2024-11-18 DIAGNOSIS — R74.8 ELEVATED CK: ICD-10-CM

## 2024-11-18 PROCEDURE — 82550 ASSAY OF CK (CPK): CPT | Performed by: FAMILY MEDICINE

## 2024-11-18 PROCEDURE — 99214 OFFICE O/P EST MOD 30 MIN: CPT | Performed by: FAMILY MEDICINE

## 2024-11-18 PROCEDURE — 36415 COLL VENOUS BLD VENIPUNCTURE: CPT | Performed by: FAMILY MEDICINE

## 2024-11-18 PROCEDURE — G2211 COMPLEX E/M VISIT ADD ON: HCPCS | Performed by: FAMILY MEDICINE

## 2024-11-18 PROCEDURE — 80053 COMPREHEN METABOLIC PANEL: CPT | Performed by: FAMILY MEDICINE

## 2024-11-18 PROCEDURE — G0296 VISIT TO DETERM LDCT ELIG: HCPCS | Performed by: FAMILY MEDICINE

## 2024-11-18 RX ORDER — CALCIUM CARBONATE/VITAMIN D3 500-10/5ML
1 LIQUID (ML) ORAL DAILY
COMMUNITY

## 2024-11-18 RX ORDER — METOPROLOL SUCCINATE 50 MG/1
50 TABLET, EXTENDED RELEASE ORAL DAILY
Qty: 90 TABLET | Refills: 3 | Status: SHIPPED | OUTPATIENT
Start: 2024-11-18

## 2024-11-18 RX ORDER — METOPROLOL SUCCINATE 100 MG/1
50 TABLET, EXTENDED RELEASE ORAL DAILY
Qty: 90 TABLET | Refills: 3 | Status: SHIPPED | OUTPATIENT
Start: 2024-11-18 | End: 2024-11-18 | Stop reason: DRUGHIGH

## 2024-11-18 NOTE — PATIENT INSTRUCTIONS
Lung Cancer Screening   Frequently Asked Questions  If you are at high-risk for lung cancer, getting screened with low-dose computed tomography (LDCT) every year can help save your life. This handout offers answers to some of the most common questions about lung cancer screening. If you have other questions, please call [phone] ([phone]).     What is it?  Lung cancer screening uses special X-ray technology to create an image of your lung tissue. The exam is quick and easy and takes less than 10 seconds. We don t give you any medicine or use any needles. You can eat before and after the exam. You don t need to change your clothes as long as the clothing on your chest doesn t contain metal. But, you do need to be able to hold your breath for at least 6 seconds during the exam.    What is the goal of lung cancer screening?  The goal of lung cancer screening is to save lives. Many times, lung cancer is not found until a person starts having physical symptoms. Lung cancer screening can help detect lung cancer in the earliest stages when it may be easier to treat.    Who should be screened for lung cancer?  We suggest lung cancer screening for anyone who is at high-risk for lung cancer. You are in the high-risk group if you:      are between the ages of 55 and 79, and    have smoked at least 1 pack of cigarettes a day for 20 or more years, and    still smoke or have quit within the past 15 years.    However, if you have a new cough or shortness of breath, you should talk to your doctor before being screened.    Why does it matter if I have symptoms?  Certain symptoms can be a sign that you have a condition in your lungs that should be checked and treated by your doctor. These symptoms include fever, chest pain, a new or changing cough, shortness of breath that you have never felt before, coughing up blood or unexplained weight loss. Having any of these symptoms can greatly affect the results of lung  cancer screening.       Should all smokers get an LDCT lung cancer screening exam?  It depends. Lung cancer screening is for a very specific group of men and women who have a history of heavy smoking over a long period of time (see  Who should be screened for lung cancer  above).  I am in the high-risk group, but have been diagnosed with cancer in the past. Is LDCT lung cancer screening right for me?  In some cases, you should not have LDCT lung screening, such as when your doctor is already following your cancer with CT scan studies. Your doctor will help you decide if LDCT lung screening is right for you.  Do I need to have a screening exam every year?  Yes. If you are in the high-risk group described earlier, you should get an LDCT lung cancer screening exam every year until you are 79, or are no longer willing or able to undergo screening and possible procedures to diagnose and treat lung cancer.  How effective is LDCT at preventing death from lung cancer?  Studies have shown that LDCT lung cancer screening can lower the risk of death from lung cancer by 20 percent in people who are at high-risk.  What are the risks?  There are some risks and limitations of LDCT lung cancer screening. We want to make sure you understand the risks and benefits, so please let us know if you have any questions. Your doctor may want to talk with you more about these risks.    Radiation exposure: As with any exam that uses radiation, there is a very small increased risk of cancer. The amount of radiation in LDCT is small--about the same amount a person would get from a mammogram. Your doctor orders the exam when he or she feels the potential benefits outweigh the risks.    False negatives: No test is perfect, including LDCT. It is possible that you may have a medical condition, including lung cancer, that is not found during your exam. This is called a false negative result.    False positives and more testing: LDCT very often finds  something in the lung that could be cancer, but in fact is not. This is called a false positive result. False positive tests often cause anxiety. To make sure these findings are not cancer, you may need to have more tests. These tests will be done only if you give us permission. Sometimes patients need a treatment that can have side effects, such as a biopsy. For more information on false positives, see  What can I expect from the results?     Findings not related to lung cancer: Your LDCT exam also takes pictures of areas of your body next to your lungs. In a very small number of cases, the CT scan will show an abnormal finding in one of these areas, such as your kidneys, adrenal glands, liver or thyroid. This finding may not be serious, but you may need more tests. Your doctor can help you decide what other tests you may need, if any.  What can I expect from the results?  About 1 out of 4 LDCT exams will find something that may need more tests. Most of the time, these findings are lung nodules. Lung nodules are very small collections of tissue in the lung. These nodules are very common, and the vast majority--more than 97 percent--are not cancer (benign). Most are normal lymph nodes or small areas of scarring from past infections.  But, if a small lung nodule is found to be cancer, the cancer can be cured more than 90 percent of the time. To know if the nodule is cancer, we may need to get more images before your next yearly screening exam. If the nodule has suspicious features (for example, it is large, has an odd shape or grows over time), we will refer you to a specialist for further testing.  Will my doctor also get the results?  Yes. Your doctor will get a copy of your results.  Is it okay to keep smoking now that there s a cancer screening exam?  No. Tobacco is one of the strongest cancer-causing agents. It causes not only lung cancer, but other cancers and cardiovascular (heart) diseases as well. The damage  caused by smoking builds over time. This means that the longer you smoke, the higher your risk of disease. While it is never too late to quit, the sooner you quit, the better.  Where can I find help to quit smoking?  The best way to prevent lung cancer is to stop smoking. If you have already quit smoking, congratulations and keep it up! For help on quitting smoking, please call [employer] at 3-555-QUITNOW (1-611.155.5879) or the [employer] at 1-408.707.1990 to find local resources near you.  One-on-one health coaching:  If you d prefer to work individually with a health care provider on tobacco cessation, we offer:      Medication Therapy Management:  Our specially trained pharmacists work closely with you and your doctor to help you quit smoking.  Call [phone] or [phone] (toll free).

## 2024-11-18 NOTE — PROGRESS NOTES
"  Assessment & Plan     Elevated LFTs  Elevated CK  Statin stopped 8/2024 and rechecking labs. History of treated hep C and known liver cyst from prior US. Will check labs, repeat US regardless of results but that will help determine if we need to get her to GI sooner.   - Comprehensive metabolic panel (BMP + Alb, Alk Phos, ALT, AST, Total. Bili, TP)  - CK total  - Comprehensive metabolic panel (BMP + Alb, Alk Phos, ALT, AST, Total. Bili, TP)  - CK total    Muscle ache - improving    Essential hypertension  Well controlled. Changing her 100mg tablets to 50 (she was taking only half tablet per day)   - metoprolol succinate ER (TOPROL XL) 50 MG 24 hr tablet  Dispense: 90 tablet; Refill: 3    Personal history of tobacco use  Recheck CT scan, smoking for well over 50  years but quit 3 years ago.   - Prof fee: Shared Decision Making for Lung Cancer Screening  - CT Chest Lung Cancer Scrn Low Dose wo              Return in about 3 months (around 2/18/2025) for Medication Check.    The longitudinal plan of care for the diagnosis(es)/condition(s) as documented were addressed during this visit. Due to the added complexity in care, I will continue to support Dirk in the subsequent management and with ongoing continuity of care.    Subjective   Dirk is a 73 year old, presenting for the following health issues:  Follow Up (MUSCLE ACHES, DISCONTINUED STATIN AUGUST)    History of Present Illness       Reason for visit:  MUSCLE ACHES, BETTER        Reviewed labs and plan.   Stopped statin in August 2024.   She says her muscle aches have been getting better.               Objective    /72 (BP Location: Right arm, Patient Position: Sitting, Cuff Size: Adult Regular)   Pulse 61   Temp 98.6  F (37  C) (Oral)   Resp 15   Ht 1.549 m (5' 1\")   Wt 64 kg (141 lb 1.9 oz)   SpO2 100%   BMI 26.66 kg/m    Body mass index is 26.66 kg/m .  Physical Exam   GENERAL: alert and no distress  NECK: no adenopathy, no asymmetry, masses, or " scars  RESP: lungs clear to auscultation - no rales, rhonchi or wheezes  CV: regular rate and rhythm, normal S1 S2, no S3 or S4, no murmur, click or rub, no peripheral edema  ABDOMEN: soft, nontender, no hepatosplenomegaly, no masses and bowel sounds normal  MS: no gross musculoskeletal defects noted, no edema    No results found for any visits on 11/18/24.  No results found for this or any previous visit (from the past 24 hours).        Signed Electronically by: Alejandro Jones MD    Lung Cancer Screening Shared Decision Making Visit     Liz Pollard, a 73 year old female, is eligible for lung cancer screening    History   Smoking Status    Former    Types: Cigarettes   Smokeless Tobacco    Never       I have discussed with patient the risks and benefits of screening for lung cancer with low-dose CT.     The risks include:    radiation exposure: one low dose chest CT has as much ionizing radiation as about 15 chest x-rays, or 6 months of background radiation living in [place]      false positives: most findings/nodules are NOT cancer, but some might still require additional diagnostic evaluation, including biopsy    over-diagnosis: some slow growing cancers that might never have been clinically significant will be detected and treated unnecessarily     The benefit of early detection of lung cancer is contingent upon adherence to annual screening or more frequent follow up if indicated.     Furthermore, to benefit from screening, Liz must be willing and able to undergo diagnostic procedures, if indicated. Although no specific guide is available for determining severity of comorbidities, it is reasonable to withhold screening in patients who have greater mortality risk from other diseases.     We did discuss that the best way to prevent lung cancer is to not smoke.    Some patients may value a numeric estimation of lung cancer risk when evaluating if lung cancer screening is right for them, here is one  calculator:    ShouldIScreen

## 2024-11-19 LAB
ALBUMIN SERPL BCG-MCNC: 3.9 G/DL (ref 3.5–5.2)
ALP SERPL-CCNC: 259 U/L (ref 40–150)
ALT SERPL W P-5'-P-CCNC: 169 U/L (ref 0–50)
ANION GAP SERPL CALCULATED.3IONS-SCNC: 11 MMOL/L (ref 7–15)
AST SERPL W P-5'-P-CCNC: 177 U/L (ref 0–45)
BILIRUB SERPL-MCNC: 0.5 MG/DL
BUN SERPL-MCNC: 27.1 MG/DL (ref 8–23)
CALCIUM SERPL-MCNC: 9.6 MG/DL (ref 8.8–10.4)
CHLORIDE SERPL-SCNC: 109 MMOL/L (ref 98–107)
CK SERPL-CCNC: 107 U/L (ref 26–192)
CREAT SERPL-MCNC: 1.14 MG/DL (ref 0.51–0.95)
EGFRCR SERPLBLD CKD-EPI 2021: 51 ML/MIN/1.73M2
GLUCOSE SERPL-MCNC: 91 MG/DL (ref 70–99)
HCO3 SERPL-SCNC: 24 MMOL/L (ref 22–29)
POTASSIUM SERPL-SCNC: 4.4 MMOL/L (ref 3.4–5.3)
PROT SERPL-MCNC: 7.8 G/DL (ref 6.4–8.3)
SODIUM SERPL-SCNC: 144 MMOL/L (ref 135–145)

## 2024-12-17 ENCOUNTER — TELEPHONE (OUTPATIENT)
Dept: FAMILY MEDICINE | Facility: CLINIC | Age: 74
End: 2024-12-17
Payer: COMMERCIAL

## 2024-12-17 DIAGNOSIS — E11.21 TYPE 2 DIABETES MELLITUS WITH DIABETIC NEPHROPATHY, WITHOUT LONG-TERM CURRENT USE OF INSULIN (H): Primary | ICD-10-CM

## 2024-12-17 NOTE — TELEPHONE ENCOUNTER
Medication Question or Refill        What medication are you calling about (include dose and sig)?: Meter for her glucose    Preferred Pharmacy:   [facility]/pharmacy #[id number] - [place], [place] - [address] [facility]  [address] [place] [id number]  Phone: [phone] Fax: [phone]      Controlled Substance Agreement on file:   CSA -- Patient Level:    CSA: None found at the patient level.       Who prescribed the medication?: Reported by patient    Do you need a refill? Yes    When did you use the medication last? NA    Patient offered an appointment? No    Do you have any questions or concerns?  Yes: Pt lost her meter and needs a new one.

## 2024-12-19 RX ORDER — LANCETS
EACH MISCELLANEOUS
Qty: 100 EACH | Refills: 3 | Status: SHIPPED | OUTPATIENT
Start: 2024-12-19

## 2024-12-19 NOTE — CONFIDENTIAL NOTE
New meter ordered. If received one recently, may not be covered by insurance.     Alejandro Jones MD

## 2025-01-09 ENCOUNTER — HOSPITAL ENCOUNTER (OUTPATIENT)
Dept: CT IMAGING | Facility: HOSPITAL | Age: 75
End: 2025-01-09
Attending: FAMILY MEDICINE
Payer: COMMERCIAL

## 2025-01-09 DIAGNOSIS — D44.10 MASS OF UNCERTAIN BEHAVIOR OF ADRENAL GLAND: ICD-10-CM

## 2025-01-09 LAB
CREAT BLD-MCNC: 1.3 MG/DL (ref 0.6–1.1)
EGFRCR SERPLBLD CKD-EPI 2021: 43 ML/MIN/1.73M2

## 2025-01-09 PROCEDURE — 250N000011 HC RX IP 250 OP 636: Performed by: FAMILY MEDICINE

## 2025-01-09 PROCEDURE — 82565 ASSAY OF CREATININE: CPT

## 2025-01-09 PROCEDURE — 74177 CT ABD & PELVIS W/CONTRAST: CPT

## 2025-01-09 PROCEDURE — 250N000009 HC RX 250: Performed by: FAMILY MEDICINE

## 2025-01-09 RX ORDER — IOPAMIDOL 755 MG/ML
69 INJECTION, SOLUTION INTRAVASCULAR ONCE
Status: COMPLETED | OUTPATIENT
Start: 2025-01-09 | End: 2025-01-09

## 2025-01-09 RX ADMIN — SODIUM CHLORIDE 90 ML: 9 INJECTION, SOLUTION INTRAVENOUS at 09:19

## 2025-01-09 RX ADMIN — IOPAMIDOL 69 ML: 755 INJECTION, SOLUTION INTRAVENOUS at 09:18

## 2025-01-13 ENCOUNTER — LAB (OUTPATIENT)
Dept: LAB | Facility: CLINIC | Age: 75
End: 2025-01-13
Payer: COMMERCIAL

## 2025-01-13 DIAGNOSIS — G89.3 NEOPLASM RELATED PAIN (ACUTE) (CHRONIC): ICD-10-CM

## 2025-01-13 DIAGNOSIS — N18.32 STAGE 3B CHRONIC KIDNEY DISEASE (H): Primary | ICD-10-CM

## 2025-01-13 DIAGNOSIS — K76.9 LESION OF LIVER: ICD-10-CM

## 2025-01-13 DIAGNOSIS — K73.9 CHRONIC HEPATITIS, UNSPECIFIED (H): ICD-10-CM

## 2025-01-13 DIAGNOSIS — D64.9 ANEMIA, UNSPECIFIED TYPE: ICD-10-CM

## 2025-01-13 DIAGNOSIS — R93.2 ABNORMAL FINDINGS ON DIAGNOSTIC IMAGING OF LIVER AND BILIARY TRACT: ICD-10-CM

## 2025-01-13 LAB
AFP SERPL-MCNC: ABNORMAL NG/ML
ALBUMIN SERPL BCG-MCNC: 3.6 G/DL (ref 3.5–5.2)
ALP SERPL-CCNC: 397 U/L (ref 40–150)
ALT SERPL W P-5'-P-CCNC: 188 U/L (ref 0–50)
ANION GAP SERPL CALCULATED.3IONS-SCNC: 10 MMOL/L (ref 7–15)
AST SERPL W P-5'-P-CCNC: 265 U/L (ref 0–45)
BILIRUB SERPL-MCNC: 0.7 MG/DL
BUN SERPL-MCNC: 19.5 MG/DL (ref 8–23)
CALCIUM SERPL-MCNC: 9.5 MG/DL (ref 8.8–10.4)
CHLORIDE SERPL-SCNC: 105 MMOL/L (ref 98–107)
CREAT SERPL-MCNC: 1.21 MG/DL (ref 0.51–0.95)
EGFRCR SERPLBLD CKD-EPI 2021: 47 ML/MIN/1.73M2
ERYTHROCYTE [DISTWIDTH] IN BLOOD BY AUTOMATED COUNT: 16.3 % (ref 10–15)
GLUCOSE SERPL-MCNC: 95 MG/DL (ref 70–99)
HCO3 SERPL-SCNC: 25 MMOL/L (ref 22–29)
HCT VFR BLD AUTO: 30 % (ref 35–47)
HGB BLD-MCNC: 9.6 G/DL (ref 11.7–15.7)
INR PPP: 1.04 (ref 0.85–1.15)
MCH RBC QN AUTO: 28.4 PG (ref 26.5–33)
MCHC RBC AUTO-ENTMCNC: 32 G/DL (ref 31.5–36.5)
MCV RBC AUTO: 89 FL (ref 78–100)
PLATELET # BLD AUTO: 204 10E3/UL (ref 150–450)
POTASSIUM SERPL-SCNC: 3.9 MMOL/L (ref 3.4–5.3)
PROT SERPL-MCNC: 7.9 G/DL (ref 6.4–8.3)
RBC # BLD AUTO: 3.38 10E6/UL (ref 3.8–5.2)
SODIUM SERPL-SCNC: 140 MMOL/L (ref 135–145)
WBC # BLD AUTO: 7.2 10E3/UL (ref 4–11)

## 2025-01-13 PROCEDURE — 99000 SPECIMEN HANDLING OFFICE-LAB: CPT

## 2025-01-13 PROCEDURE — 82105 ALPHA-FETOPROTEIN SERUM: CPT

## 2025-01-13 PROCEDURE — 85027 COMPLETE CBC AUTOMATED: CPT

## 2025-01-13 PROCEDURE — 82728 ASSAY OF FERRITIN: CPT

## 2025-01-13 PROCEDURE — 83550 IRON BINDING TEST: CPT

## 2025-01-13 PROCEDURE — 36415 COLL VENOUS BLD VENIPUNCTURE: CPT

## 2025-01-13 PROCEDURE — 80053 COMPREHEN METABOLIC PANEL: CPT

## 2025-01-13 PROCEDURE — 86301 IMMUNOASSAY TUMOR CA 19-9: CPT | Mod: 90

## 2025-01-13 PROCEDURE — 85610 PROTHROMBIN TIME: CPT

## 2025-01-13 PROCEDURE — 83540 ASSAY OF IRON: CPT

## 2025-01-14 LAB — CANCER AG19-9 SERPL IA-ACNC: 141 U/ML

## 2025-01-14 NOTE — TELEPHONE ENCOUNTER
RECORDS STATUS - ALL OTHER DIAGNOSIS      RECORDS RECEIVED FROM: [facility]   NOTES STATUS DETAILS   OFFICE NOTE from referring provider Epic 11/18/2024 - Dr. Stefano Jones   MEDICATION LIST [facility]    LABS     PATHOLOGY REPORTS     ANYTHING RELATED TO DIAGNOSIS Epic 1/13/2025   IMAGING (NEED IMAGES & REPORT)     CT SCANS PACS CT Abdomen Pelvis: 1/9/2025   ULTRASOUND PACS US Abdomen: 9/2/2021, 9/30/2020

## 2025-01-16 LAB
FERRITIN SERPL-MCNC: 218 NG/ML (ref 11–328)
IRON BINDING CAPACITY (ROCHE): 469 UG/DL (ref 240–430)
IRON SATN MFR SERPL: 11 % (ref 15–46)
IRON SERPL-MCNC: 51 UG/DL (ref 37–145)
IRON SERPL-MCNC: 51 UG/DL (ref 37–145)

## 2025-01-22 ENCOUNTER — TELEPHONE (OUTPATIENT)
Dept: GASTROENTEROLOGY | Facility: CLINIC | Age: 75
End: 2025-01-22
Payer: COMMERCIAL

## 2025-01-22 NOTE — TELEPHONE ENCOUNTER
Called patient to offer liver biopsy scheduling number. Patient understanding and will call to schedule the liver biopsy.    TANYA Nunes, RN, PHN  Hepatology Clinic  [facility]  [facility]

## 2025-01-27 ENCOUNTER — PATIENT OUTREACH (OUTPATIENT)
Dept: CARE COORDINATION | Facility: CLINIC | Age: 75
End: 2025-01-27
Payer: COMMERCIAL

## 2025-01-28 ENCOUNTER — TELEPHONE (OUTPATIENT)
Dept: MEDSURG UNIT | Facility: CLINIC | Age: 75
End: 2025-01-28
Payer: COMMERCIAL

## 2025-01-28 NOTE — PROGRESS NOTES
RADIOLOGY PROCEDURE INSTRUCTIONS     You are scheduled for an upcoming procedure in the   Radiology Department at [facility].       Date: 2/4/25      Procedure: US Biopsy Liver     Address: [facility]                 [address] [place], [place]  [address]     [facility] [address] is located on [facility], across the street from hospital.  There is a skyway attached to this ramp that will direct you to the patient check in area.     Check into the [facility] at: 0900    Do not eat any food or liquids after: No foods after 0100; clear liquids until 0700       Two visitors may accompany you to your procedure.    You will need to have someone available to drive you home.    We recommend that you have a responsible adult with you for at least 6 hours after you get home.    Please take all of your medications as prescribed with a sip of water in the am, unless you are contacted by a nurse from our department to hold them.  If you are on a GLP-1 (dulaglutide, exenatide, semaglutide, liraglutide, lixisenatide, semaglutide) weight loss drug you must hold this medication for 7 days prior to your procedure.   ASA hold 5 days    You need to have an up to date History & Physical exam (H&P)  by your primary physician in the 30 day period prior to your scheduled procedure.  Please contact your primary care team for this appointment.  H&P Hepatology Clinic Visit 1/16/25    If you develop a fever, cold symptoms or test positive for COVID-19 please call us to receive direction or reschedule.        If you have any questions, please call the [facility] nurses at [phone].     Thank you!      [facility] procedural RN

## 2025-01-29 ENCOUNTER — LAB (OUTPATIENT)
Dept: INFUSION THERAPY | Facility: HOSPITAL | Age: 75
End: 2025-01-29
Attending: FAMILY MEDICINE
Payer: COMMERCIAL

## 2025-01-29 ENCOUNTER — PRE VISIT (OUTPATIENT)
Dept: ONCOLOGY | Facility: HOSPITAL | Age: 75
End: 2025-01-29
Payer: COMMERCIAL

## 2025-01-29 ENCOUNTER — ONCOLOGY VISIT (OUTPATIENT)
Dept: ONCOLOGY | Facility: HOSPITAL | Age: 75
End: 2025-01-29
Attending: FAMILY MEDICINE
Payer: COMMERCIAL

## 2025-01-29 ENCOUNTER — PATIENT OUTREACH (OUTPATIENT)
Dept: ONCOLOGY | Facility: HOSPITAL | Age: 75
End: 2025-01-29

## 2025-01-29 VITALS
SYSTOLIC BLOOD PRESSURE: 205 MMHG | OXYGEN SATURATION: 100 % | HEART RATE: 64 BPM | RESPIRATION RATE: 16 BRPM | TEMPERATURE: 97.9 F | BODY MASS INDEX: 26.91 KG/M2 | HEIGHT: 62 IN | DIASTOLIC BLOOD PRESSURE: 82 MMHG | WEIGHT: 146.2 LBS

## 2025-01-29 DIAGNOSIS — B18.2 CHRONIC HEPATITIS C WITHOUT HEPATIC COMA (H): Primary | ICD-10-CM

## 2025-01-29 DIAGNOSIS — Z11.59 ENCOUNTER FOR SCREENING FOR OTHER VIRAL DISEASES: ICD-10-CM

## 2025-01-29 DIAGNOSIS — I81 PORTAL VEIN THROMBOSIS: ICD-10-CM

## 2025-01-29 DIAGNOSIS — C22.0 HEPATOMA (H): ICD-10-CM

## 2025-01-29 DIAGNOSIS — R16.0 LIVER MASS: ICD-10-CM

## 2025-01-29 DIAGNOSIS — D44.10 MASS OF UNCERTAIN BEHAVIOR OF ADRENAL GLAND: ICD-10-CM

## 2025-01-29 PROCEDURE — 86706 HEP B SURFACE ANTIBODY: CPT

## 2025-01-29 PROCEDURE — 87340 HEPATITIS B SURFACE AG IA: CPT

## 2025-01-29 PROCEDURE — 36415 COLL VENOUS BLD VENIPUNCTURE: CPT

## 2025-01-29 PROCEDURE — 87522 HEPATITIS C REVRS TRNSCRPJ: CPT

## 2025-01-29 PROCEDURE — 86803 HEPATITIS C AB TEST: CPT

## 2025-01-29 PROCEDURE — G0463 HOSPITAL OUTPT CLINIC VISIT: HCPCS | Performed by: INTERNAL MEDICINE

## 2025-01-29 ASSESSMENT — PAIN SCALES - GENERAL: PAINLEVEL_OUTOF10: NO PAIN (0)

## 2025-01-29 NOTE — PROGRESS NOTES
[facility] Hematology and Oncology Consult Note    Patient: Liz Pollard  MRN: [id number]  Date of Service: Jan 29, 2025           Reason for consultation      Problem List Items Addressed This Visit          Digestive    Chronic hepatitis C without hepatic coma (H) - Primary    Liver mass    Relevant Orders    Hepatitis B surface antigen    Hepatitis B Surface Antibody    Hepatitis C antibody    Hepatitis C RNA, quantitative       Other    Encounter for screening for other viral diseases    Relevant Orders    Hepatitis B surface antigen    Hepatitis B Surface Antibody    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  Certainly locally advanced possibly metastatic hepatocellular carcinoma in a patient with a prior history of hepatitis C which resulted in cirrhosis.  2.  History of hepatitis C which was treated back in 2010 with some antiviral agents.  Apparently was cured of it.  However she did have cirrhosis by the time it was cured.  3.  It is a possibility that the adrenal gland might be a separate malignancy but we will see.  4.  Severe hypertension.  5.  Significant history of smoking for over 40 years.  She quit in 2021.  35-pack-year of history.  6.  Elevated liver function test.  7.  Portal vein thrombosis and either thrombus or tumor thrombus in inferior vena cava.    Plan and medical decision making      Reviewed notes from each unique source.  Reviewed each unique test.    Ordered tests.    Independently interpreted lab tests and radiological exams performed by other physicians.  Personally reviewed the images of the CT scan of the chest which  showed enlarged right adrenal gland and then a contrast-enhanced CT scan of the abdomen and pelvis which showed poorly defined mass of the liver and enhancing mass of the right adrenal area along with a thrombus in the veins.    Although this level of alpha-fetoprotein in the patient with known history of hepatitis C is almost diagnostic of hepatocellular carcinoma.  Since she has been treated in the past and theoretically was cured we will need to get a biopsy.  I am also going to get hepatitis serology again.  PET scan for full staging.  Evaluation by hepatobiliary surgery to see if she has any surgical option, local therapy options etc.  She will need anticoagulation after she is done with the biopsy.  We will start her on some Eliquis.  Discussed with the patient that this entity more than likely is not going to be a cured by any treatment.  However there are treatments available which are palliative in nature.  How much they have depends upon how somebody responds to them.  The longitudinal plan of care for the diagnosis(es)/condition(s) as documented were addressed during this visit. Due to the added complexity in care, I will continue to support Dirk in the subsequent management and with ongoing continuity of care.     Clinical/pathological stage      Cancer Staging   No matching staging information was found for the patient.      History of present illness      Ms. Liz Pollard is a 74 year old woman who has been referred to me for newly diagnosed advanced suspected hepatocellular carcinoma.  This is located in the right lobe of the liver measures at least 7-8 cm in size irregularly shaped and associated with intrahepatic portal vein thrombosis which is extending into the main portal vein.  In addition to that there appears to be involvement of the right adrenal gland which appears to be partially invading the inferior vena cava.  This presented when she underwent low-dose CT screening for lung  cancer.  That CT scan noted enlargement of the right adrenal area and recommended contrast-enhanced CT scan.  That CT scan was done on 2025 and revealed a mass in the liver and the thrombosis of the portal vein etc. along with metastasis to the right adrenal gland.  Lab testing indicated very elevated alpha-fetoprotein levels and also some elevation of CA 19-9.    The patient is feeling some achiness and fatigue.  Having some leg pains.  Her blood pressure is quite elevated.    Detailed review of systems      A review of systems was obtained.  Positive findings noted in the history.  Rest of the review of system is otherwise negative.      Past medical/surgical/social/family history        Past Medical History:   Diagnosis Date    Diabetes mellitus (H)     Disease of thyroid gland     Hypertension     Infectious viral hepatitis      Past Surgical History:   Procedure Laterality Date    HC REMOVAL GALLBLADDER      Description: Cholecystectomy;  Recorded: 05/10/2010;    HYSTERECTOMY  1987    OOPHORECTOMY      ZZC TOTAL ABDOM HYSTERECTOMY      Description: Hysterectomy;  Recorded: 2009;     Family History   Problem Relation Age of Onset    Breast Cancer Maternal Aunt         in her 90 s    Cancer Mother 83.00         of stomach cancer    Colon Cancer Father 51.00         of colon cancer    Colon Cancer Brother 36.00         from colon cancer    Sickle Cell Trait Niece     Prostate Cancer Brother      Social History     Socioeconomic History    Marital status:      Spouse name: None    Number of children: None    Years of education: None    Highest education level: None   Tobacco Use    Smoking status: Former     Current packs/day: 0.00     Types: Cigarettes     Quit date: 2021     Years since quitting: 3.8     Passive exposure: Never    Smokeless tobacco: Never   Vaping Use    Vaping status: Never Used   Substance and Sexual Activity    Alcohol use: No    Drug use: No    Sexual  activity: Yes     Partners: Male     Birth control/protection: Post-menopausal     Social Drivers of Health     Financial Resource Strain: Low Risk  (2/26/2024)    Financial Resource Strain     Within the past 12 months, have you or your family members you live with been unable to get utilities (heat, electricity) when it was really needed?: No   Food Insecurity: Low Risk  (2/26/2024)    Food Insecurity     Within the past 12 months, did you worry that your food would run out before you got money to buy more?: No     Within the past 12 months, did the food you bought just not last and you didn t have money to get more?: No   Transportation Needs: Low Risk  (2/26/2024)    Transportation Needs     Within the past 12 months, has lack of transportation kept you from medical appointments, getting your medicines, non-medical meetings or appointments, work, or from getting things that you need?: No   Physical Activity: Unknown (2/26/2024)    Exercise Vital Sign     Days of Exercise per Week: 6 days   Stress: Stress Concern Present (2/26/2024)    [employer] [place] [employer] - Occupational Stress Questionnaire     Feeling of Stress : To some extent   Social Connections: Unknown (2/26/2024)    Social Connection and Isolation Panel [NHANES]     Frequency of Social Gatherings with Friends and Family: Patient declined   Interpersonal Safety: Low Risk  (8/26/2024)    Interpersonal Safety     Do you feel physically and emotionally safe where you currently live?: Yes     Within the past 12 months, have you been hit, slapped, kicked or otherwise physically hurt by someone?: No     Within the past 12 months, have you been humiliated or emotionally abused in other ways by your partner or ex-partner?: No   Housing Stability: Low Risk  (2/26/2024)    Housing Stability     Do you have housing? : Yes     Are you worried about losing your housing?: No           Allergies      Allergies   Allergen Reactions    Amlodipine  "Unknown     Gums swelled    Hydralazine Itching     Pt states that she is not allergic to anything         Physical exam        BP (!) 205/82 (BP Location: Left arm, Patient Position: Sitting, Cuff Size: Adult Regular)   Pulse 64   Temp 97.9  F (36.6  C) (Oral)   Resp 16   Ht 1.562 m (5' 1.5\")   Wt 66.3 kg (146 lb 3.2 oz)   SpO2 100%   BMI 27.18 kg/m        GENERAL: no acute distress. Cooperative in conversation.   HEENT: pupils are equal, round and reactive. Oral mucosa is moist and intact.  RESP:Chest symmetric. Regular respiratory rate. No stridor.  ABD: Nondistended, soft.  EXTREMITIES: No lower extremity edema.   NEURO: non focal. Alert and oriented x3.   PSYCH: within normal limits. No depression or anxiety.  SKIN: warm dry intact         Laboratory data      Recent Results (from the past 720 hours)   Creatinine POCT    Collection Time: 01/09/25  9:05 AM   Result Value Ref Range    Creatinine POCT 1.3 (H) 0.6 - 1.1 mg/dL    GFR, ESTIMATED POCT 43 (L) >60 mL/min/1.73m2   CBC with platelets    Collection Time: 01/13/25 11:32 AM   Result Value Ref Range    WBC Count 7.2 4.0 - 11.0 10e3/uL    RBC Count 3.38 (L) 3.80 - 5.20 10e6/uL    Hemoglobin 9.6 (L) 11.7 - 15.7 g/dL    Hematocrit 30.0 (L) 35.0 - 47.0 %    MCV 89 78 - 100 fL    MCH 28.4 26.5 - 33.0 pg    MCHC 32.0 31.5 - 36.5 g/dL    RDW 16.3 (H) 10.0 - 15.0 %    Platelet Count 204 150 - 450 10e3/uL   Comprehensive metabolic panel    Collection Time: 01/13/25 11:32 AM   Result Value Ref Range    Sodium 140 135 - 145 mmol/L    Potassium 3.9 3.4 - 5.3 mmol/L    Carbon Dioxide (CO2) 25 22 - 29 mmol/L    Anion Gap 10 7 - 15 mmol/L    Urea Nitrogen 19.5 8.0 - 23.0 mg/dL    Creatinine 1.21 (H) 0.51 - 0.95 mg/dL    GFR Estimate 47 (L) >60 mL/min/1.73m2    Calcium 9.5 8.8 - 10.4 mg/dL    Chloride 105 98 - 107 mmol/L    Glucose 95 70 - 99 mg/dL    Alkaline Phosphatase 397 (H) 40 - 150 U/L     (H) 0 - 45 U/L     (H) 0 - 50 U/L    Protein Total 7.9 6.4 " - 8.3 g/dL    Albumin 3.6 3.5 - 5.2 g/dL    Bilirubin Total 0.7 <=1.2 mg/dL   INR    Collection Time: 01/13/25 11:32 AM   Result Value Ref Range    INR 1.04 0.85 - 1.15   AFP tumor marker    Collection Time: 01/13/25 11:32 AM   Result Value Ref Range    AFP tumor marker 32,288.0 (H) <=8.3 ng/mL   Cancer antigen 19-9    Collection Time: 01/13/25 11:32 AM   Result Value Ref Range    Cancer Antigen 19-9 141 (H) <=35 U/mL   FERRITIN    Collection Time: 01/13/25 11:32 AM   Result Value Ref Range    Ferritin 218 11 - 328 ng/mL   IRON    Collection Time: 01/13/25 11:32 AM   Result Value Ref Range    Iron 51 37 - 145 ug/dL   IRON AND IRON BINDING CAPACITY    Collection Time: 01/13/25 11:32 AM   Result Value Ref Range    Iron 51 37 - 145 ug/dL    Iron Binding Capacity 469 (H) 240 - 430 ug/dL    Iron Sat Index 11 (L) 15 - 46 %         Imaging results        CT Abdomen Pelvis w Contrast    Result Date: 1/9/2025  EXAM: CT ABDOMEN PELVIS W CONTRAST LOCATION: [facility] DATE: 1/9/2025 PHYSICIAN ALERT INDICATION: Characterize new indeterminate right suprarenal fossa mass seen on CT lung cancer screen. COMPARISON: Recent CT chest 12/26/2024. CT 9/12/2022 and 9/9/2021. TECHNIQUE: CT scan of the abdomen and pelvis was performed following injection of IV contrast. Multiplanar reformats were obtained. Dose reduction techniques were used. CONTRAST: 69 ml Isovue 370 FINDINGS: LOWER CHEST: Normal. HEPATOBILIARY: Poorly defined infiltrative mass in the entire right lobe of the liver with extensive portal vein thrombosis throughout the entire liver and main portal vein to the level of the portal venous confluence with appearance suggestive of malignant tumor thrombus. PANCREAS: Normal. SPLEEN: Normal. ADRENAL GLANDS: Right adrenal mass measures 4.7 x 3.7 cm which partially infiltrates into the adrenal vein and IVC by 11 mm. Likely a metastasis. Hounsfield density as well as absolute and relative washout all not  consistent with an adenoma. KIDNEYS/BLADDER: Normal. BOWEL: Normal. LYMPH NODES: No retroperitoneal lymphadenopathy. VASCULATURE: Normal. PELVIC ORGANS: Normal. MUSCULOSKELETAL: Normal.     IMPRESSION: 1. Infiltrative poorly defined mass throughout the right lobe of the liver with tumor thrombus within the intrahepatic portal veins in the right and left lobes, as well as the main portal vein to the level of the portal venous confluence. 2. Metastasis to the right adrenal gland also partially invades into the IVC. 3. Favor primary hepatic malignancy, either HCC or cholangiocarcinoma. 4. These findings were likely all present on the lung cancer screening CT 12/26/2024 but poorly seen without the use of IV contrast. I will call this report to Dr. Jones.         This note has been dictated using voice recognition software. Any grammatical or context distortions are unintentional and inherent to the software      Signed by: Bautista Holland MD

## 2025-01-29 NOTE — PROGRESS NOTES
[facility]: Cancer Care                                                                                            Situation: Patient chart reviewed by care coordinator.    Background: Patient with a diagnosis of adrenal gland mass as well as liver mass.    Assessment: Patient comes in today for oncology consultation with Dr Holland.  He states that she has at least locally advanced possibly metastatic hepatocellular carcinoma in a patient with a prior history of hepatitis C which resulted in cirrhosis. She has a history of hepatitis C which was treated back in 2010 with some antiviral agents. Apparently was cured of it. However she did have cirrhosis by the time it was cured.  He states that it is a possibility that the adrenal gland might be a separate malignancy.    He recommends that she have a PET scan, an ultrasound biopsy of the liver and a visit with Dr Aceves or one of his colleagues.  He should then follow-up with Dr Holland after that.    Plan/Recommendations: Patient has been scheduled for liver biopsy at the [facility] on 2/4, PET scan on 2/13 and will be back to see Dr Holland on 2/17.     I sent a message over to Dr Aceves's nurse [place] and she will get the patient on the schedule for a consult.    Signature:  Alysia Ngo RN

## 2025-01-29 NOTE — LETTER
"1/29/2025      Liz Pollard  [address]  [place] [address]      Dear Colleague,    Thank you for referring your patient, Liz Pollard, to the [facility]. Please see a copy of my visit note below.    Oncology Rooming Note    January 29, 2025 1:04 PM   Liz Pollard is a 74 year old female who presents for:    Chief Complaint   Patient presents with     Oncology Clinic Visit     New Oncology-Mass of uncertain behavior of adrenal gland. Liver mass.     Initial Vitals: BP (!) 207/78 (BP Location: Left arm, Patient Position: Sitting, Cuff Size: Adult Regular)   Pulse 64   Temp 97.9  F (36.6  C) (Oral)   Resp 16   Ht 1.562 m (5' 1.5\")   Wt 66.3 kg (146 lb 3.2 oz)   SpO2 100%   BMI 27.18 kg/m   Estimated body mass index is 27.18 kg/m  as calculated from the following:    Height as of this encounter: 1.562 m (5' 1.5\").    Weight as of this encounter: 66.3 kg (146 lb 3.2 oz). Body surface area is 1.7 meters squared.  No Pain (0) Comment: Data Unavailable   No LMP recorded. Patient has had a hysterectomy.  Allergies reviewed: Yes  Medications reviewed: Yes    Medications: Medication refills not needed today.  Pharmacy name entered into [employer]: [id number] [address] [facility]    Frailty Screening:   Is the patient here for a new oncology consult visit in cancer care? 1. Yes. Over the past month, have you experienced difficulty or required a caregiver to assist with:   1. Balance, walking or general mobility (including any falls)? YES  2. Completion of self-care tasks such as bathing, dressing, toileting, grooming/hygiene?  NO  3. Concentration or memory that affects your daily life?  NO       Clinical concerns: nausea.  Dr Holland was notified.      Sara Lisa, [facility] Hematology and Oncology Consult Note    Patient: Liz Pollard  MRN: [id number]  Date of Service: Jan 29, 2025 "           Reason for consultation      Problem List Items Addressed This Visit          Digestive    Chronic hepatitis C without hepatic coma (H) - Primary    Liver mass    Relevant Orders    Hepatitis B surface antigen    Hepatitis B Surface Antibody    Hepatitis C antibody    Hepatitis C RNA, quantitative       Other    Encounter for screening for other viral diseases    Relevant Orders    Hepatitis B surface antigen    Hepatitis B Surface Antibody    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  Certainly locally advanced possibly metastatic hepatocellular carcinoma in a patient with a prior history of hepatitis C which resulted in cirrhosis.  2.  History of hepatitis C which was treated back in 2010 with some antiviral agents.  Apparently was cured of it.  However she did have cirrhosis by the time it was cured.  3.  It is a possibility that the adrenal gland might be a separate malignancy but we will see.  4.  Severe hypertension.  5.  Significant history of smoking for over 40 years.  She quit in 2021.  35-pack-year of history.  6.  Elevated liver function test.  7.  Portal vein thrombosis and either thrombus or tumor thrombus in inferior vena cava.    Plan and medical decision making      Reviewed notes from each unique source.  Reviewed each unique test.    Ordered tests.    Independently interpreted lab tests and radiological exams performed by other physicians.  Personally reviewed the images of the CT scan of the chest which showed enlarged right adrenal gland and then a contrast-enhanced CT scan of the abdomen and pelvis which showed poorly defined mass of the  liver and enhancing mass of the right adrenal area along with a thrombus in the veins.    Although this level of alpha-fetoprotein in the patient with known history of hepatitis C is almost diagnostic of hepatocellular carcinoma.  Since she has been treated in the past and theoretically was cured we will need to get a biopsy.  I am also going to get hepatitis serology again.  PET scan for full staging.  Evaluation by hepatobiliary surgery to see if she has any surgical option, local therapy options etc.  She will need anticoagulation after she is done with the biopsy.  We will start her on some Eliquis.  Discussed with the patient that this entity more than likely is not going to be a cured by any treatment.  However there are treatments available which are palliative in nature.  How much they have depends upon how somebody responds to them.  The longitudinal plan of care for the diagnosis(es)/condition(s) as documented were addressed during this visit. Due to the added complexity in care, I will continue to support Dirk in the subsequent management and with ongoing continuity of care.     Clinical/pathological stage      Cancer Staging   No matching staging information was found for the patient.      History of present illness      Ms. Liz Pollard is a 74 year old woman who has been referred to me for newly diagnosed advanced suspected hepatocellular carcinoma.  This is located in the right lobe of the liver measures at least 7-8 cm in size irregularly shaped and associated with intrahepatic portal vein thrombosis which is extending into the main portal vein.  In addition to that there appears to be involvement of the right adrenal gland which appears to be partially invading the inferior vena cava.  This presented when she underwent low-dose CT screening for lung cancer.  That CT scan noted enlargement of the right adrenal area and recommended contrast-enhanced CT scan.  That CT scan was done on 9 January   and revealed a mass in the liver and the thrombosis of the portal vein etc. along with metastasis to the right adrenal gland.  Lab testing indicated very elevated alpha-fetoprotein levels and also some elevation of CA 19-9.    The patient is feeling some achiness and fatigue.  Having some leg pains.  Her blood pressure is quite elevated.    Detailed review of systems      A review of systems was obtained.  Positive findings noted in the history.  Rest of the review of system is otherwise negative.      Past medical/surgical/social/family history        Past Medical History:   Diagnosis Date     Diabetes mellitus (H)      Disease of thyroid gland      Hypertension      Infectious viral hepatitis      Past Surgical History:   Procedure Laterality Date     HC REMOVAL GALLBLADDER      Description: Cholecystectomy;  Recorded: 05/10/2010;     HYSTERECTOMY  1987     OOPHORECTOMY       ZZC TOTAL ABDOM HYSTERECTOMY      Description: Hysterectomy;  Recorded: 2009;     Family History   Problem Relation Age of Onset     Breast Cancer Maternal Aunt         in her 90 s     Cancer Mother 83.00         of stomach cancer     Colon Cancer Father 51.00         of colon cancer     Colon Cancer Brother 36.00         from colon cancer     Sickle Cell Trait Niece      Prostate Cancer Brother      Social History     Socioeconomic History     Marital status:      Spouse name: None     Number of children: None     Years of education: None     Highest education level: None   Tobacco Use     Smoking status: Former     Current packs/day: 0.00     Types: Cigarettes     Quit date: 2021     Years since quitting: 3.8     Passive exposure: Never     Smokeless tobacco: Never   Vaping Use     Vaping status: Never Used   Substance and Sexual Activity     Alcohol use: No     Drug use: No     Sexual activity: Yes     Partners: Male     Birth control/protection: Post-menopausal     Social Drivers of Health     Financial  Resource Strain: Low Risk  (2/26/2024)    Financial Resource Strain      Within the past 12 months, have you or your family members you live with been unable to get utilities (heat, electricity) when it was really needed?: No   Food Insecurity: Low Risk  (2/26/2024)    Food Insecurity      Within the past 12 months, did you worry that your food would run out before you got money to buy more?: No      Within the past 12 months, did the food you bought just not last and you didn t have money to get more?: No   Transportation Needs: Low Risk  (2/26/2024)    Transportation Needs      Within the past 12 months, has lack of transportation kept you from medical appointments, getting your medicines, non-medical meetings or appointments, work, or from getting things that you need?: No   Physical Activity: Unknown (2/26/2024)    Exercise Vital Sign      Days of Exercise per Week: 6 days   Stress: Stress Concern Present (2/26/2024)    [employer] - Occupational Stress Questionnaire      Feeling of Stress : To some extent   Social Connections: Unknown (2/26/2024)    Social Connection and Isolation Panel [NHANES]      Frequency of Social Gatherings with Friends and Family: Patient declined   Interpersonal Safety: Low Risk  (8/26/2024)    Interpersonal Safety      Do you feel physically and emotionally safe where you currently live?: Yes      Within the past 12 months, have you been hit, slapped, kicked or otherwise physically hurt by someone?: No      Within the past 12 months, have you been humiliated or emotionally abused in other ways by your partner or ex-partner?: No   Housing Stability: Low Risk  (2/26/2024)    Housing Stability      Do you have housing? : Yes      Are you worried about losing your housing?: No           Allergies      Allergies   Allergen Reactions     Amlodipine Unknown     Gums swelled     Hydralazine Itching     Pt states that she is not allergic to anything  "        Physical exam        BP (!) 205/82 (BP Location: Left arm, Patient Position: Sitting, Cuff Size: Adult Regular)   Pulse 64   Temp 97.9  F (36.6  C) (Oral)   Resp 16   Ht 1.562 m (5' 1.5\")   Wt 66.3 kg (146 lb 3.2 oz)   SpO2 100%   BMI 27.18 kg/m        GENERAL: no acute distress. Cooperative in conversation.   HEENT: pupils are equal, round and reactive. Oral mucosa is moist and intact.  RESP:Chest symmetric. Regular respiratory rate. No stridor.  ABD: Nondistended, soft.  EXTREMITIES: No lower extremity edema.   NEURO: non focal. Alert and oriented x3.   PSYCH: within normal limits. No depression or anxiety.  SKIN: warm dry intact         Laboratory data      Recent Results (from the past 720 hours)   Creatinine POCT    Collection Time: 01/09/25  9:05 AM   Result Value Ref Range    Creatinine POCT 1.3 (H) 0.6 - 1.1 mg/dL    GFR, ESTIMATED POCT 43 (L) >60 mL/min/1.73m2   CBC with platelets    Collection Time: 01/13/25 11:32 AM   Result Value Ref Range    WBC Count 7.2 4.0 - 11.0 10e3/uL    RBC Count 3.38 (L) 3.80 - 5.20 10e6/uL    Hemoglobin 9.6 (L) 11.7 - 15.7 g/dL    Hematocrit 30.0 (L) 35.0 - 47.0 %    MCV 89 78 - 100 fL    MCH 28.4 26.5 - 33.0 pg    MCHC 32.0 31.5 - 36.5 g/dL    RDW 16.3 (H) 10.0 - 15.0 %    Platelet Count 204 150 - 450 10e3/uL   Comprehensive metabolic panel    Collection Time: 01/13/25 11:32 AM   Result Value Ref Range    Sodium 140 135 - 145 mmol/L    Potassium 3.9 3.4 - 5.3 mmol/L    Carbon Dioxide (CO2) 25 22 - 29 mmol/L    Anion Gap 10 7 - 15 mmol/L    Urea Nitrogen 19.5 8.0 - 23.0 mg/dL    Creatinine 1.21 (H) 0.51 - 0.95 mg/dL    GFR Estimate 47 (L) >60 mL/min/1.73m2    Calcium 9.5 8.8 - 10.4 mg/dL    Chloride 105 98 - 107 mmol/L    Glucose 95 70 - 99 mg/dL    Alkaline Phosphatase 397 (H) 40 - 150 U/L     (H) 0 - 45 U/L     (H) 0 - 50 U/L    Protein Total 7.9 6.4 - 8.3 g/dL    Albumin 3.6 3.5 - 5.2 g/dL    Bilirubin Total 0.7 <=1.2 mg/dL   INR    Collection " Time: 01/13/25 11:32 AM   Result Value Ref Range    INR 1.04 0.85 - 1.15   AFP tumor marker    Collection Time: 01/13/25 11:32 AM   Result Value Ref Range    AFP tumor marker 32,288.0 (H) <=8.3 ng/mL   Cancer antigen 19-9    Collection Time: 01/13/25 11:32 AM   Result Value Ref Range    Cancer Antigen 19-9 141 (H) <=35 U/mL   FERRITIN    Collection Time: 01/13/25 11:32 AM   Result Value Ref Range    Ferritin 218 11 - 328 ng/mL   IRON    Collection Time: 01/13/25 11:32 AM   Result Value Ref Range    Iron 51 37 - 145 ug/dL   IRON AND IRON BINDING CAPACITY    Collection Time: 01/13/25 11:32 AM   Result Value Ref Range    Iron 51 37 - 145 ug/dL    Iron Binding Capacity 469 (H) 240 - 430 ug/dL    Iron Sat Index 11 (L) 15 - 46 %         Imaging results        CT Abdomen Pelvis w Contrast    Result Date: 1/9/2025  EXAM: CT ABDOMEN PELVIS W CONTRAST LOCATION: [facility] DATE: 1/9/2025 PHYSICIAN ALERT INDICATION: Characterize new indeterminate right suprarenal fossa mass seen on CT lung cancer screen. COMPARISON: Recent CT chest 12/26/2024. CT 9/12/2022 and 9/9/2021. TECHNIQUE: CT scan of the abdomen and pelvis was performed following injection of IV contrast. Multiplanar reformats were obtained. Dose reduction techniques were used. CONTRAST: 69 ml Isovue 370 FINDINGS: LOWER CHEST: Normal. HEPATOBILIARY: Poorly defined infiltrative mass in the entire right lobe of the liver with extensive portal vein thrombosis throughout the entire liver and main portal vein to the level of the portal venous confluence with appearance suggestive of malignant tumor thrombus. PANCREAS: Normal. SPLEEN: Normal. ADRENAL GLANDS: Right adrenal mass measures 4.7 x 3.7 cm which partially infiltrates into the adrenal vein and IVC by 11 mm. Likely a metastasis. Hounsfield density as well as absolute and relative washout all not consistent with an adenoma. KIDNEYS/BLADDER: Normal. BOWEL: Normal. LYMPH NODES: No retroperitoneal  lymphadenopathy. VASCULATURE: Normal. PELVIC ORGANS: Normal. MUSCULOSKELETAL: Normal.     IMPRESSION: 1. Infiltrative poorly defined mass throughout the right lobe of the liver with tumor thrombus within the intrahepatic portal veins in the right and left lobes, as well as the main portal vein to the level of the portal venous confluence. 2. Metastasis to the right adrenal gland also partially invades into the IVC. 3. Favor primary hepatic malignancy, either HCC or cholangiocarcinoma. 4. These findings were likely all present on the lung cancer screening CT 12/26/2024 but poorly seen without the use of IV contrast. I will call this report to Dr. Jones.         This note has been dictated using voice recognition software. Any grammatical or context distortions are unintentional and inherent to the software      Signed by: Bautista Holland MD      Again, thank you for allowing me to participate in the care of your patient.        Sincerely,        Bautista Holland MD    Electronically signed

## 2025-01-29 NOTE — H&P (VIEW-ONLY)
[facility] Hematology and Oncology Consult Note    Patient: Liz Pollard  MRN: [id number]  Date of Service: Jan 29, 2025           Reason for consultation      Problem List Items Addressed This Visit          Digestive    Chronic hepatitis C without hepatic coma (H) - Primary    Liver mass    Relevant Orders    Hepatitis B surface antigen    Hepatitis B Surface Antibody    Hepatitis C antibody    Hepatitis C RNA, quantitative       Other    Encounter for screening for other viral diseases    Relevant Orders    Hepatitis B surface antigen    Hepatitis B Surface Antibody    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  Certainly locally advanced possibly metastatic hepatocellular carcinoma in a patient with a prior history of hepatitis C which resulted in cirrhosis.  2.  History of hepatitis C which was treated back in 2010 with some antiviral agents.  Apparently was cured of it.  However she did have cirrhosis by the time it was cured.  3.  It is a possibility that the adrenal gland might be a separate malignancy but we will see.  4.  Severe hypertension.  5.  Significant history of smoking for over 40 years.  She quit in 2021.  35-pack-year of history.  6.  Elevated liver function test.  7.  Portal vein thrombosis and either thrombus or tumor thrombus in inferior vena cava.    Plan and medical decision making      Reviewed notes from each unique source.  Reviewed each unique test.    Ordered tests.    Independently interpreted lab tests and radiological exams performed by other physicians.  Personally reviewed the images of the CT scan of the chest which  showed enlarged right adrenal gland and then a contrast-enhanced CT scan of the abdomen and pelvis which showed poorly defined mass of the liver and enhancing mass of the right adrenal area along with a thrombus in the veins.    Although this level of alpha-fetoprotein in the patient with known history of hepatitis C is almost diagnostic of hepatocellular carcinoma.  Since she has been treated in the past and theoretically was cured we will need to get a biopsy.  I am also going to get hepatitis serology again.  PET scan for full staging.  Evaluation by hepatobiliary surgery to see if she has any surgical option, local therapy options etc.  She will need anticoagulation after she is done with the biopsy.  We will start her on some Eliquis.  Discussed with the patient that this entity more than likely is not going to be a cured by any treatment.  However there are treatments available which are palliative in nature.  How much they have depends upon how somebody responds to them.  The longitudinal plan of care for the diagnosis(es)/condition(s) as documented were addressed during this visit. Due to the added complexity in care, I will continue to support Dirk in the subsequent management and with ongoing continuity of care.     Clinical/pathological stage      Cancer Staging   No matching staging information was found for the patient.      History of present illness      Ms. Liz Pollard is a 74 year old woman who has been referred to me for newly diagnosed advanced suspected hepatocellular carcinoma.  This is located in the right lobe of the liver measures at least 7-8 cm in size irregularly shaped and associated with intrahepatic portal vein thrombosis which is extending into the main portal vein.  In addition to that there appears to be involvement of the right adrenal gland which appears to be partially invading the inferior vena cava.  This presented when she underwent low-dose CT screening for lung  cancer.  That CT scan noted enlargement of the right adrenal area and recommended contrast-enhanced CT scan.  That CT scan was done on 2025 and revealed a mass in the liver and the thrombosis of the portal vein etc. along with metastasis to the right adrenal gland.  Lab testing indicated very elevated alpha-fetoprotein levels and also some elevation of CA 19-9.    The patient is feeling some achiness and fatigue.  Having some leg pains.  Her blood pressure is quite elevated.    Detailed review of systems      A review of systems was obtained.  Positive findings noted in the history.  Rest of the review of system is otherwise negative.      Past medical/surgical/social/family history        Past Medical History:   Diagnosis Date    Diabetes mellitus (H)     Disease of thyroid gland     Hypertension     Infectious viral hepatitis      Past Surgical History:   Procedure Laterality Date    HC REMOVAL GALLBLADDER      Description: Cholecystectomy;  Recorded: 05/10/2010;    HYSTERECTOMY  1987    OOPHORECTOMY      ZZC TOTAL ABDOM HYSTERECTOMY      Description: Hysterectomy;  Recorded: 2009;     Family History   Problem Relation Age of Onset    Breast Cancer Maternal Aunt         in her 90 s    Cancer Mother 83.00         of stomach cancer    Colon Cancer Father 51.00         of colon cancer    Colon Cancer Brother 36.00         from colon cancer    Sickle Cell Trait Niece     Prostate Cancer Brother      Social History     Socioeconomic History    Marital status:      Spouse name: None    Number of children: None    Years of education: None    Highest education level: None   Tobacco Use    Smoking status: Former     Current packs/day: 0.00     Types: Cigarettes     Quit date: 2021     Years since quitting: 3.8     Passive exposure: Never    Smokeless tobacco: Never   Vaping Use    Vaping status: Never Used   Substance and Sexual Activity    Alcohol use: No    Drug use: No    Sexual  activity: Yes     Partners: Male     Birth control/protection: Post-menopausal     Social Drivers of Health     Financial Resource Strain: Low Risk  (2/26/2024)    Financial Resource Strain     Within the past 12 months, have you or your family members you live with been unable to get utilities (heat, electricity) when it was really needed?: No   Food Insecurity: Low Risk  (2/26/2024)    Food Insecurity     Within the past 12 months, did you worry that your food would run out before you got money to buy more?: No     Within the past 12 months, did the food you bought just not last and you didn t have money to get more?: No   Transportation Needs: Low Risk  (2/26/2024)    Transportation Needs     Within the past 12 months, has lack of transportation kept you from medical appointments, getting your medicines, non-medical meetings or appointments, work, or from getting things that you need?: No   Physical Activity: Unknown (2/26/2024)    Exercise Vital Sign     Days of Exercise per Week: 6 days   Stress: Stress Concern Present (2/26/2024)    [employer] [place] [employer] - Occupational Stress Questionnaire     Feeling of Stress : To some extent   Social Connections: Unknown (2/26/2024)    Social Connection and Isolation Panel [NHANES]     Frequency of Social Gatherings with Friends and Family: Patient declined   Interpersonal Safety: Low Risk  (8/26/2024)    Interpersonal Safety     Do you feel physically and emotionally safe where you currently live?: Yes     Within the past 12 months, have you been hit, slapped, kicked or otherwise physically hurt by someone?: No     Within the past 12 months, have you been humiliated or emotionally abused in other ways by your partner or ex-partner?: No   Housing Stability: Low Risk  (2/26/2024)    Housing Stability     Do you have housing? : Yes     Are you worried about losing your housing?: No           Allergies      Allergies   Allergen Reactions    Amlodipine  "Unknown     Gums swelled    Hydralazine Itching     Pt states that she is not allergic to anything         Physical exam        BP (!) 205/82 (BP Location: Left arm, Patient Position: Sitting, Cuff Size: Adult Regular)   Pulse 64   Temp 97.9  F (36.6  C) (Oral)   Resp 16   Ht 1.562 m (5' 1.5\")   Wt 66.3 kg (146 lb 3.2 oz)   SpO2 100%   BMI 27.18 kg/m        GENERAL: no acute distress. Cooperative in conversation.   HEENT: pupils are equal, round and reactive. Oral mucosa is moist and intact.  RESP:Chest symmetric. Regular respiratory rate. No stridor.  ABD: Nondistended, soft.  EXTREMITIES: No lower extremity edema.   NEURO: non focal. Alert and oriented x3.   PSYCH: within normal limits. No depression or anxiety.  SKIN: warm dry intact         Laboratory data      Recent Results (from the past 720 hours)   Creatinine POCT    Collection Time: 01/09/25  9:05 AM   Result Value Ref Range    Creatinine POCT 1.3 (H) 0.6 - 1.1 mg/dL    GFR, ESTIMATED POCT 43 (L) >60 mL/min/1.73m2   CBC with platelets    Collection Time: 01/13/25 11:32 AM   Result Value Ref Range    WBC Count 7.2 4.0 - 11.0 10e3/uL    RBC Count 3.38 (L) 3.80 - 5.20 10e6/uL    Hemoglobin 9.6 (L) 11.7 - 15.7 g/dL    Hematocrit 30.0 (L) 35.0 - 47.0 %    MCV 89 78 - 100 fL    MCH 28.4 26.5 - 33.0 pg    MCHC 32.0 31.5 - 36.5 g/dL    RDW 16.3 (H) 10.0 - 15.0 %    Platelet Count 204 150 - 450 10e3/uL   Comprehensive metabolic panel    Collection Time: 01/13/25 11:32 AM   Result Value Ref Range    Sodium 140 135 - 145 mmol/L    Potassium 3.9 3.4 - 5.3 mmol/L    Carbon Dioxide (CO2) 25 22 - 29 mmol/L    Anion Gap 10 7 - 15 mmol/L    Urea Nitrogen 19.5 8.0 - 23.0 mg/dL    Creatinine 1.21 (H) 0.51 - 0.95 mg/dL    GFR Estimate 47 (L) >60 mL/min/1.73m2    Calcium 9.5 8.8 - 10.4 mg/dL    Chloride 105 98 - 107 mmol/L    Glucose 95 70 - 99 mg/dL    Alkaline Phosphatase 397 (H) 40 - 150 U/L     (H) 0 - 45 U/L     (H) 0 - 50 U/L    Protein Total 7.9 6.4 " - 8.3 g/dL    Albumin 3.6 3.5 - 5.2 g/dL    Bilirubin Total 0.7 <=1.2 mg/dL   INR    Collection Time: 01/13/25 11:32 AM   Result Value Ref Range    INR 1.04 0.85 - 1.15   AFP tumor marker    Collection Time: 01/13/25 11:32 AM   Result Value Ref Range    AFP tumor marker 32,288.0 (H) <=8.3 ng/mL   Cancer antigen 19-9    Collection Time: 01/13/25 11:32 AM   Result Value Ref Range    Cancer Antigen 19-9 141 (H) <=35 U/mL   FERRITIN    Collection Time: 01/13/25 11:32 AM   Result Value Ref Range    Ferritin 218 11 - 328 ng/mL   IRON    Collection Time: 01/13/25 11:32 AM   Result Value Ref Range    Iron 51 37 - 145 ug/dL   IRON AND IRON BINDING CAPACITY    Collection Time: 01/13/25 11:32 AM   Result Value Ref Range    Iron 51 37 - 145 ug/dL    Iron Binding Capacity 469 (H) 240 - 430 ug/dL    Iron Sat Index 11 (L) 15 - 46 %         Imaging results        CT Abdomen Pelvis w Contrast    Result Date: 1/9/2025  EXAM: CT ABDOMEN PELVIS W CONTRAST LOCATION: [facility] DATE: 1/9/2025 PHYSICIAN ALERT INDICATION: Characterize new indeterminate right suprarenal fossa mass seen on CT lung cancer screen. COMPARISON: Recent CT chest 12/26/2024. CT 9/12/2022 and 9/9/2021. TECHNIQUE: CT scan of the abdomen and pelvis was performed following injection of IV contrast. Multiplanar reformats were obtained. Dose reduction techniques were used. CONTRAST: 69 ml Isovue 370 FINDINGS: LOWER CHEST: Normal. HEPATOBILIARY: Poorly defined infiltrative mass in the entire right lobe of the liver with extensive portal vein thrombosis throughout the entire liver and main portal vein to the level of the portal venous confluence with appearance suggestive of malignant tumor thrombus. PANCREAS: Normal. SPLEEN: Normal. ADRENAL GLANDS: Right adrenal mass measures 4.7 x 3.7 cm which partially infiltrates into the adrenal vein and IVC by 11 mm. Likely a metastasis. Hounsfield density as well as absolute and relative washout all not  consistent with an adenoma. KIDNEYS/BLADDER: Normal. BOWEL: Normal. LYMPH NODES: No retroperitoneal lymphadenopathy. VASCULATURE: Normal. PELVIC ORGANS: Normal. MUSCULOSKELETAL: Normal.     IMPRESSION: 1. Infiltrative poorly defined mass throughout the right lobe of the liver with tumor thrombus within the intrahepatic portal veins in the right and left lobes, as well as the main portal vein to the level of the portal venous confluence. 2. Metastasis to the right adrenal gland also partially invades into the IVC. 3. Favor primary hepatic malignancy, either HCC or cholangiocarcinoma. 4. These findings were likely all present on the lung cancer screening CT 12/26/2024 but poorly seen without the use of IV contrast. I will call this report to Dr. Jones.         This note has been dictated using voice recognition software. Any grammatical or context distortions are unintentional and inherent to the software      Signed by: Bautista Holland MD

## 2025-01-29 NOTE — PROGRESS NOTES
"Oncology Rooming Note    January 29, 2025 1:04 PM   Liz Pollard is a 74 year old female who presents for:    Chief Complaint   Patient presents with    Oncology Clinic Visit     New Oncology-Mass of uncertain behavior of adrenal gland. Liver mass.     Initial Vitals: BP (!) 207/78 (BP Location: Left arm, Patient Position: Sitting, Cuff Size: Adult Regular)   Pulse 64   Temp 97.9  F (36.6  C) (Oral)   Resp 16   Ht 1.562 m (5' 1.5\")   Wt 66.3 kg (146 lb 3.2 oz)   SpO2 100%   BMI 27.18 kg/m   Estimated body mass index is 27.18 kg/m  as calculated from the following:    Height as of this encounter: 1.562 m (5' 1.5\").    Weight as of this encounter: 66.3 kg (146 lb 3.2 oz). Body surface area is 1.7 meters squared.  No Pain (0) Comment: Data Unavailable   No LMP recorded. Patient has had a hysterectomy.  Allergies reviewed: Yes  Medications reviewed: Yes    Medications: Medication refills not needed today.  Pharmacy name entered into App[employer]: [id number] [address]    Frailty Screening:   Is the patient here for a new oncology consult visit in cancer care? 1. Yes. Over the past month, have you experienced difficulty or required a caregiver to assist with:   1. Balance, walking or general mobility (including any falls)? YES  2. Completion of self-care tasks such as bathing, dressing, toileting, grooming/hygiene?  NO  3. Concentration or memory that affects your daily life?  NO       Clinical concerns: nausea.  Dr Holland was notified.      Sara Lisa CMA            "

## 2025-01-30 ENCOUNTER — PATIENT OUTREACH (OUTPATIENT)
Dept: SURGERY | Facility: CLINIC | Age: 75
End: 2025-01-30
Payer: COMMERCIAL

## 2025-01-30 LAB
HBV SURFACE AB SERPL IA-ACNC: <3.5 M[IU]/ML
HBV SURFACE AB SERPL IA-ACNC: NONREACTIVE M[IU]/ML
HBV SURFACE AG SERPL QL IA: NONREACTIVE
HCV AB SERPL QL IA: REACTIVE
HCV RNA SERPL NAA+PROBE-ACNC: NOT DETECTED IU/ML

## 2025-01-30 NOTE — PROGRESS NOTES
New Patient Oncology Nurse Navigator Note     Referring provider: Dr. Holland     Referring Clinic/Organization: [facility]     Referred to: Surgical Oncology -  Hepatobiliary / GI Cancers     Requested provider (if applicable): Dr. Kade Aceves     Referral Received: 01/30/25       Evaluation for : Liver mass      Clinical History (per Nurse review of records provided):      See book marked documents:     Referring MD office note  Pathology report  Imaging reports     Allergies   Allergen Reactions    Amlodipine Unknown     Gums swelled    Hydralazine Itching     Pt states that she is not allergic to anything        Past Medical History:   Diagnosis Date    Diabetes mellitus (H)     Disease of thyroid gland     Hypertension     Infectious viral hepatitis        Past Surgical History:   Procedure Laterality Date    HC REMOVAL GALLBLADDER      Description: Cholecystectomy;  Recorded: 05/10/2010;    HYSTERECTOMY  1987    OOPHORECTOMY      ZZC TOTAL ABDOM HYSTERECTOMY      Description: Hysterectomy;  Recorded: 06/16/2009;       Current Outpatient Medications   Medication Sig Dispense Refill    aspirin 81 MG EC tablet Take 81 mg by mouth daily.      blood glucose (CONTOUR NEXT TEST) test strip USE 1 STRIP EVERY  strip 2    blood glucose (NO BRAND SPECIFIED) test strip Use to test blood sugar 1 times daily or as directed. To accompany: Blood Glucose Monitor Brands: per insurance. 100 strip 3    blood glucose monitoring (NO BRAND SPECIFIED) meter device kit Use to test blood sugar 1 times daily or as directed. Preferred blood glucose meter OR supplies to accompany: Blood Glucose Monitor Brands: per insurance. 1 kit 0    blood-glucose meter Misc [BLOOD-GLUCOSE METER MISC] 1 glucometer device  - any brand covered by insurance (contour covered by insurance? ) 1 each 0    ferrous gluconate (FERGON) 324 (38 Fe) MG tablet Take 1 tablet (324 mg) by mouth every other day.      levothyroxine (SYNTHROID/LEVOTHROID) 75  MCG tablet TAKE 1 TABLET BY MOUTH EVERY DAY 90 tablet 3    lisinopril (ZESTRIL) 40 MG tablet TAKE 1 TABLET BY MOUTH EVERY DAY 90 tablet 2    magnesium oxide 400 MG CAPS Take 1 tablet by mouth daily.      metoprolol succinate ER (TOPROL XL) 50 MG 24 hr tablet Take 1 tablet (50 mg) by mouth daily. 90 tablet 3    prednisoLONE acetate (PRED FORTE) 1 % ophthalmic suspension PLEASE SEE ATTACHED FOR DETAILED DIRECTIONS (Patient not taking: Reported on 1/29/2025)      spironolactone (ALDACTONE) 25 MG tablet TAKE 1 TABLET BY MOUTH EVERY DAY 90 tablet 3    thin (NO BRAND SPECIFIED) lancets Use with lanceting device. To accompany: Blood Glucose Monitor Brands: per insurance. 100 each 3        Patient Active Problem List   Diagnosis    Trigger Finger Of The Right Ring Finger    Midback Pain    Benign essential microscopic hematuria    Hyperlipidemia LDL goal <130    Chronic hepatitis C without hepatic coma (H)    Other specified hypothyroidism    Type 2 diabetes mellitus with diabetic nephropathy, without long-term current use of insulin (H)    Class 1 obesity due to excess calories without serious comorbidity with body mass index (BMI) of 30.0 to 30.9 in adult    Essential hypertension    De Quervain's Tenosynovitis    Proteinuria    Hiatal Hernia    Hepatic Cyst    Vitamin D deficiency    Age-related osteoporosis without current pathological fracture    Scoliosis    Varicose veins of right lower extremity with pain    Liver fibrosis    Personal history of tobacco use    Lumbar spine pain    Thoracic spine pain    Stage 3b chronic kidney disease (H)    Encounter for screening for other viral diseases    Liver mass    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     Records Location:     [facility]     Records Needed:     ABDOMINAL IMAGING (CT SCANS, MRI, US, PET SCANS)  DATING BACK TO 2018      Additional testing needed prior to consult:     NONE AT  THIS TIME    Referral updates and Plan:       Consult with Surgical Oncology     01/30/2025 8:57 AM - called and spoke with patient regarding scheduling consult with Dr. Aceves. Informed her that he is unfortunately not at the [place] location for a few weeks but that we can see her Monday and it would be ok to see prior to biopsy and upcoming PET scan. Discussed options and she was agreeable to plan to proceed with scheduling. Patient was transferred to scheduling to finalize appointment details.         Temitope Hilton, RN, BSN   Surgical Oncology New Patient Nurse Navigator  [facility]  [phone]

## 2025-01-30 NOTE — TELEPHONE ENCOUNTER
----- Message from Narciso Garza DO sent at 12/7/2020 10:06 AM CST -----  CBC and CMP are unremarkable   RECORDS STATUS - ALL OTHER DIAGNOSIS      RECORDS RECEIVED FROM: [facility]   NOTES STATUS DETAILS   OFFICE NOTE from referring provider Epic 1/29/2025 - Dr. Baum Amalia    MEDICATION LIST [facility]    LABS     PATHOLOGY REPORTS     ANYTHING RELATED TO DIAGNOSIS Epic 1/29/2025   IMAGING (NEED IMAGES & REPORT)     CT SCANS PACS CT Abdomen Pelvis: 1/9/2025    ULTRASOUND PACS US Abdomen: 9/2/2021, 9/30/2020

## 2025-02-03 ENCOUNTER — TELEPHONE (OUTPATIENT)
Dept: ONCOLOGY | Facility: HOSPITAL | Age: 75
End: 2025-02-03
Payer: COMMERCIAL

## 2025-02-03 ENCOUNTER — ONCOLOGY VISIT (OUTPATIENT)
Dept: SURGERY | Facility: CLINIC | Age: 75
End: 2025-02-03
Attending: INTERNAL MEDICINE
Payer: COMMERCIAL

## 2025-02-03 ENCOUNTER — PRE VISIT (OUTPATIENT)
Dept: SURGERY | Facility: CLINIC | Age: 75
End: 2025-02-03
Payer: COMMERCIAL

## 2025-02-03 ENCOUNTER — TELEPHONE (OUTPATIENT)
Dept: MEDSURG UNIT | Facility: CLINIC | Age: 75
End: 2025-02-03
Payer: COMMERCIAL

## 2025-02-03 VITALS
OXYGEN SATURATION: 100 % | WEIGHT: 148.5 LBS | DIASTOLIC BLOOD PRESSURE: 66 MMHG | BODY MASS INDEX: 27.33 KG/M2 | RESPIRATION RATE: 16 BRPM | HEIGHT: 62 IN | SYSTOLIC BLOOD PRESSURE: 166 MMHG | TEMPERATURE: 98.6 F | HEART RATE: 75 BPM

## 2025-02-03 DIAGNOSIS — C22.0 HEPATOCELLULAR CARCINOMA (H): Primary | ICD-10-CM

## 2025-02-03 DIAGNOSIS — C22.0 HEPATOMA (H): ICD-10-CM

## 2025-02-03 PROCEDURE — G0463 HOSPITAL OUTPT CLINIC VISIT: HCPCS | Performed by: SURGERY

## 2025-02-03 RX ORDER — BLOOD-GLUCOSE METER
EACH MISCELLANEOUS
COMMUNITY
Start: 2024-12-19

## 2025-02-03 ASSESSMENT — PAIN SCALES - GENERAL: PAINLEVEL_OUTOF10: NO PAIN (0)

## 2025-02-03 NOTE — PROGRESS NOTES
[facility]  [address] [address]  Phone: [phone]  Fax: [phone]  Department of Surgery  Division of Surgical Oncology    New Patient Consultation    Patient:  Liz Pollard, Date of birth 1950  Date of Visit:  02/04/2025  Referring Provider Bautista Holland      Assessment & Plan      Liz Pollard is a 74 year old female with a new infiltrative hepatic tumor with portal vein thrombus, in the setting of prior HepC suspicious for HCC    The natural history of liver cancers was discussed with the patient and accompanying family members and/or other guests.    I had a comprehensive but park discussion with Dirk. This is a very challenging situation and unlikely to be cured or removed. Her extensive portal vein thrombus is most likely from tumor extension. The utility of anticoagulation with tumor thrombus is often debated, so I will defer to her main treatment team regarding that. What she truly needs is systemic therapy.    We briefly reviewed local therapy options. Arterial therapies like TACE or TARE have been performed in patients with PVT but are quite high risk. Her tumor is not perfectly imaged on her CT so the extent of disease burden in her liver is unclear and could be presumed to be quite extensive, so arterial therapy is not likely to be safe.    All questions were answered to their apparent satisfaction, and contact information was provided should additional questions or concerns arise.    Plan Summary:  -Recommend systemic therapies; likely palliative    Kade Aceves MD MPHS        Medical Decision Making    Today's visit was centered around a new cancer diagnosis in the setting of additional medical comorbidities. The risk of additional morbidity or mortality with or without further treatment is high. Total time spent was 60 minutes, including but not limited to patient-facing time, chart review, review of  "tests/studies as noted above, and care coordination.             History of Present Illness     Pertinent history obtained from: chart review and patient    Dirk was getting a CT for an unrelated reason and they identified a liver mass. Dedicated imaging showed a poorly defined mass with extensive portal vein thrombus.    She does have a history of Hepatitis C that was treated with antiviral therapy, but she denies any knowledge of surveillance protocols like serial ultrasounds or AFP levels. No jaundice, no new swelling of ankles or abdomen.    Last colonoscopy in September 2024; history of polyps only one time    Past Medical History, Past Surgical History, and Family History reviewed - see appropriate tabs in EMR    Physical Exam     Vital signs:  BP (!) 166/66 (BP Location: Right arm, Patient Position: Sitting, Cuff Size: Adult Regular)   Pulse 75   Temp 98.6  F (37  C) (Oral)   Resp 16   Ht 1.562 m (5' 1.5\")   Wt 67.4 kg (148 lb 8 oz)   SpO2 100%   BMI 27.60 kg/m          General: NAD, alert, oriented  HEENT: no scleral icterus, EOMI  Pulm: non-labored breathing, equal excursion bilaterally  CV: regular rate and rhythm  Abdomen: soft, non-tender, non-distended, no palpable liver edge  Extremities: warm, no edema  Skin: no apparent rashes or lesions  Neuro: no significant functional deficit, able to ambulate to and from exam table without assistance    Data   Laboratory data and imaging listed below were reviewed as part of this encounter.     Labs:  Labs from 1/13 and 1/29 reviewed, extremely high AFP    Imaging:  CT 1/9/25 reviewed               "

## 2025-02-03 NOTE — TELEPHONE ENCOUNTER
I received a voice message today from Dirk.  She is calling because she saw Dr. Aceves today and he is recommending that she start treatment ASAP.  Dirk would like a call back to discuss getting her treatment scheduled.  She can be reached at [phone].  She states if she does not answer her phone it is okay to leave her a detailed voice message.  I gave her a call back to let her know that I received her message and was passing this information over to Dr. Holland and his nurse Alysia, [email or web address].     Nandini Barrios RN on 2/3/2025 at 1:57 PM

## 2025-02-03 NOTE — TELEPHONE ENCOUNTER
[facility]: Cancer Care                                                                                            Situation: Patient chart reviewed by care coordinator.    Background: Patient with a diagnosis of adrenal gland mass as well as liver mass.     Assessment: Patient calls in today with concerns about upcoming appointments.  She saw Dr. Aceves, surgical oncology today who told her she needs to get started on treatment ASAP.    I called the patient back and let her know that we do need to have the liver biopsy results back as well as the PET scan results back.    Plan/Recommendations: Her liver biopsy is scheduled on 2/4 with PET scan on 2/13.  She will then follow-up with labs and Dr Holland on 2/17.    Patient did verbalize understanding.    Signature:  Alysia Ngo RN

## 2025-02-03 NOTE — LETTER
2/3/2025      Liz Pollard  [address]  [place] [address]      Dear Colleague,    Thank you for referring your patient, Liz Pollard, to the [facility]. Please see a copy of my visit note below.      [facility]  [address] [facility] 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    I had a comprehensive but park discussion with Dirk. This is a very challenging situation and unlikely to be cured or removed. Her extensive portal vein thrombus is most likely from tumor extension. The utility of anticoagulation with tumor thrombus is often debated, so I will defer to her main treatment team regarding that. What she truly needs is systemic therapy.    We briefly reviewed local therapy options. Arterial therapies like TACE or TARE have been performed in patients with PVT but are quite high risk. Her tumor is not perfectly imaged on her CT so the extent of disease burden in her liver is unclear and could be presumed to be quite extensive, so arterial therapy is not likely to be safe.    All questions were answered to their apparent satisfaction, and contact information was provided should additional questions or concerns arise.    Plan Summary:  -Recommend systemic therapies; likely palliative    Kade Aceves MD MPHS        Medical Decision Making   Today's visit was centered around a new cancer diagnosis in the  "setting of additional medical comorbidities. The risk of additional morbidity or mortality with or without further treatment is high. Total time spent was 60 minutes, including but not limited to patient-facing time, chart review, review of tests/studies as noted above, and care coordination.             History of Present Illness    Pertinent history obtained from: chart review and patient    Dirk was getting a CT for an unrelated reason and they identified a liver mass. Dedicated imaging showed a poorly defined mass with extensive portal vein thrombus.    She does have a history of Hepatitis C that was treated with antiviral therapy, but she denies any knowledge of surveillance protocols like serial ultrasounds or AFP levels. No jaundice, no new swelling of ankles or abdomen.    Last colonoscopy in September 2024; history of polyps only one time    Past Medical History, Past Surgical History, and Family History reviewed - see appropriate tabs in EMR    Physical Exam    Vital signs:  BP (!) 166/66 (BP Location: Right arm, Patient Position: Sitting, Cuff Size: Adult Regular)   Pulse 75   Temp 98.6  F (37  C) (Oral)   Resp 16   Ht 1.562 m (5' 1.5\")   Wt 67.4 kg (148 lb 8 oz)   SpO2 100%   BMI 27.60 kg/m          General: NAD, alert, oriented  HEENT: no scleral icterus, EOMI  Pulm: non-labored breathing, equal excursion bilaterally  CV: regular rate and rhythm  Abdomen: soft, non-tender, non-distended, no palpable liver edge  Extremities: warm, no edema  Skin: no apparent rashes or lesions  Neuro: no significant functional deficit, able to ambulate to and from exam table without assistance    Data  Laboratory data and imaging listed below were reviewed as part of this encounter.     Labs:  Labs from 1/13 and 1/29 reviewed, extremely high AFP    Imaging:  CT 1/9/25 reviewed                   Again, thank you for allowing me to participate in the care of your patient.        Sincerely,        Kade Aceves, " MD    Electronically signed

## 2025-02-03 NOTE — NURSING NOTE
"Oncology Rooming Note    February 3, 2025 11:23 AM   Liz Pollard is a 74 year old female who presents for:    Chief Complaint   Patient presents with    Oncology Clinic Visit     Hepatoma     Initial Vitals: BP (!) 166/66 (BP Location: Right arm, Patient Position: Sitting, Cuff Size: Adult Regular)   Pulse 75   Temp 98.6  F (37  C) (Oral)   Resp 16   Ht 1.562 m (5' 1.5\")   Wt 67.4 kg (148 lb 8 oz)   SpO2 100%   BMI 27.60 kg/m   Estimated body mass index is 27.6 kg/m  as calculated from the following:    Height as of this encounter: 1.562 m (5' 1.5\").    Weight as of this encounter: 67.4 kg (148 lb 8 oz). Body surface area is 1.71 meters squared.  No Pain (0) Comment: Data Unavailable   No LMP recorded. Patient has had a hysterectomy.  Allergies reviewed: Yes  Medications reviewed: Yes    Medications: Medication refills not needed today.  Pharmacy name entered into [employer]: [id number] [address]    Frailty Screening:   Is the patient here for a new oncology consult visit in cancer care? 2. No      Clinical concerns: /66. Asymptomatic, patient reported not taking BP medication this morning.      Jorge Russo            "

## 2025-02-04 ENCOUNTER — HOSPITAL ENCOUNTER (OUTPATIENT)
Facility: CLINIC | Age: 75
Discharge: HOME | End: 2025-02-04
Admitting: RADIOLOGY
Payer: COMMERCIAL

## 2025-02-04 ENCOUNTER — TRANSFERRED RECORDS (OUTPATIENT)
Dept: HEALTH INFORMATION MANAGEMENT | Facility: CLINIC | Age: 75
End: 2025-02-04

## 2025-02-04 ENCOUNTER — HOSPITAL ENCOUNTER (OUTPATIENT)
Dept: ULTRASOUND IMAGING | Facility: CLINIC | Age: 75
Discharge: HOME | End: 2025-02-04
Attending: PHYSICIAN ASSISTANT | Admitting: RADIOLOGY
Payer: COMMERCIAL

## 2025-02-04 VITALS
DIASTOLIC BLOOD PRESSURE: 67 MMHG | HEART RATE: 67 BPM | OXYGEN SATURATION: 100 % | RESPIRATION RATE: 16 BRPM | TEMPERATURE: 97.5 F | SYSTOLIC BLOOD PRESSURE: 138 MMHG

## 2025-02-04 DIAGNOSIS — D44.10 MASS OF UNCERTAIN BEHAVIOR OF ADRENAL GLAND: ICD-10-CM

## 2025-02-04 DIAGNOSIS — R16.0 LIVER MASS: ICD-10-CM

## 2025-02-04 LAB
APTT PPP: 28 SECONDS (ref 22–38)
HOLD SPECIMEN: NORMAL
INR PPP: 1.07 (ref 0.85–1.15)

## 2025-02-04 PROCEDURE — 88342 IMHCHEM/IMCYTCHM 1ST ANTB: CPT | Mod: 26 | Performed by: PATHOLOGY

## 2025-02-04 PROCEDURE — 76942 ECHO GUIDE FOR BIOPSY: CPT

## 2025-02-04 PROCEDURE — 250N000013 HC RX MED GY IP 250 OP 250 PS 637

## 2025-02-04 PROCEDURE — 88307 TISSUE EXAM BY PATHOLOGIST: CPT | Mod: TC | Performed by: PHYSICIAN ASSISTANT

## 2025-02-04 PROCEDURE — 96372 THER/PROPH/DIAG INJ SC/IM: CPT | Performed by: RADIOLOGY

## 2025-02-04 PROCEDURE — 250N000009 HC RX 250: Performed by: RADIOLOGY

## 2025-02-04 PROCEDURE — 99153 MOD SED SAME PHYS/QHP EA: CPT

## 2025-02-04 PROCEDURE — 88307 TISSUE EXAM BY PATHOLOGIST: CPT | Mod: 26 | Performed by: PATHOLOGY

## 2025-02-04 PROCEDURE — 36415 COLL VENOUS BLD VENIPUNCTURE: CPT | Performed by: PHYSICIAN ASSISTANT

## 2025-02-04 PROCEDURE — 85610 PROTHROMBIN TIME: CPT | Performed by: PHYSICIAN ASSISTANT

## 2025-02-04 PROCEDURE — 85730 THROMBOPLASTIN TIME PARTIAL: CPT | Performed by: PHYSICIAN ASSISTANT

## 2025-02-04 PROCEDURE — 999N000154 HC STATISTIC RADIOLOGY XRAY, US, CT, MAR, NM

## 2025-02-04 PROCEDURE — 88341 IMHCHEM/IMCYTCHM EA ADD ANTB: CPT | Mod: TC | Performed by: PHYSICIAN ASSISTANT

## 2025-02-04 PROCEDURE — 250N000011 HC RX IP 250 OP 636: Performed by: RADIOLOGY

## 2025-02-04 PROCEDURE — 88341 IMHCHEM/IMCYTCHM EA ADD ANTB: CPT | Mod: 26 | Performed by: PATHOLOGY

## 2025-02-04 RX ORDER — NALOXONE HYDROCHLORIDE 0.4 MG/ML
0.2 INJECTION, SOLUTION INTRAMUSCULAR; INTRAVENOUS; SUBCUTANEOUS
Status: DISCONTINUED | OUTPATIENT
Start: 2025-02-04 | End: 2025-02-05 | Stop reason: HOSPADM

## 2025-02-04 RX ORDER — NALOXONE HYDROCHLORIDE 0.4 MG/ML
0.4 INJECTION, SOLUTION INTRAMUSCULAR; INTRAVENOUS; SUBCUTANEOUS
Status: DISCONTINUED | OUTPATIENT
Start: 2025-02-04 | End: 2025-02-05 | Stop reason: HOSPADM

## 2025-02-04 RX ORDER — FENTANYL CITRATE 50 UG/ML
25-50 INJECTION, SOLUTION INTRAMUSCULAR; INTRAVENOUS EVERY 5 MIN PRN
Status: DISCONTINUED | OUTPATIENT
Start: 2025-02-04 | End: 2025-02-05 | Stop reason: HOSPADM

## 2025-02-04 RX ORDER — LIDOCAINE 40 MG/G
CREAM TOPICAL
Status: DISCONTINUED | OUTPATIENT
Start: 2025-02-04 | End: 2025-02-04 | Stop reason: HOSPADM

## 2025-02-04 RX ORDER — FLUMAZENIL 0.1 MG/ML
0.2 INJECTION, SOLUTION INTRAVENOUS
Status: DISCONTINUED | OUTPATIENT
Start: 2025-02-04 | End: 2025-02-05 | Stop reason: HOSPADM

## 2025-02-04 RX ORDER — ACETAMINOPHEN 325 MG/1
650 TABLET ORAL ONCE
Status: COMPLETED | OUTPATIENT
Start: 2025-02-04 | End: 2025-02-04

## 2025-02-04 RX ORDER — LIDOCAINE HYDROCHLORIDE 10 MG/ML
5 INJECTION, SOLUTION EPIDURAL; INFILTRATION; INTRACAUDAL; PERINEURAL ONCE
Status: COMPLETED | OUTPATIENT
Start: 2025-02-04 | End: 2025-02-04

## 2025-02-04 RX ORDER — LIDOCAINE HYDROCHLORIDE 10 MG/ML
10 INJECTION, SOLUTION EPIDURAL; INFILTRATION; INTRACAUDAL; PERINEURAL ONCE
Status: COMPLETED | OUTPATIENT
Start: 2025-02-04 | End: 2025-02-04

## 2025-02-04 RX ORDER — ONDANSETRON 2 MG/ML
4 INJECTION INTRAMUSCULAR; INTRAVENOUS
Status: DISCONTINUED | OUTPATIENT
Start: 2025-02-04 | End: 2025-02-05 | Stop reason: HOSPADM

## 2025-02-04 RX ADMIN — LIDOCAINE HYDROCHLORIDE 5 ML: 10 INJECTION, SOLUTION EPIDURAL; INFILTRATION; INTRACAUDAL; PERINEURAL at 11:45

## 2025-02-04 RX ADMIN — LIDOCAINE HYDROCHLORIDE 10 ML: 10 INJECTION, SOLUTION EPIDURAL; INFILTRATION; INTRACAUDAL; PERINEURAL at 11:46

## 2025-02-04 RX ADMIN — FENTANYL CITRATE 50 MCG: 50 INJECTION INTRAMUSCULAR; INTRAVENOUS at 11:27

## 2025-02-04 RX ADMIN — MIDAZOLAM 1 MG: 1 INJECTION INTRAMUSCULAR; INTRAVENOUS at 11:27

## 2025-02-04 RX ADMIN — ACETAMINOPHEN 650 MG: 325 TABLET, FILM COATED ORAL at 13:13

## 2025-02-04 ASSESSMENT — ACTIVITIES OF DAILY LIVING (ADL)
ADLS_ACUITY_SCORE: 41

## 2025-02-04 NOTE — DISCHARGE INSTRUCTIONS
Liver Biopsy Discharge Instructions     After you go home:    You may resume your normal diet  Have an adult stay with you for 6 hours if you received sedation       For 24 hours - due to the sedation you received:  Relax and take it easy  Do NOT make any important or legal decisions  Do NOT drive or operate machines at home or at work  Do NOT drink alcohol    Care of Puncture Site:    For the first 48 hrs, check your puncture site every couple hours while you are awake   You may remove/change the bandaid tomorrow  You may shower tomorrow  No tub baths, whirlpools or swimming until your puncture site has fully healed    Activity     You may go back to normal activity in 24 hours   Wait 48 hours before lifting, straining, exercise or other strenuous activity    Medicines:    You may resume all medications  Resume your Warfarin/Coumadin at your regular dose today. Follow up with your provider to have your INR rechecked  Resume your Platelet Inhibitors and Aspirin tomorrow at your regular dose  For minor pain, you may take Acetaminophen (Tylenol) or Ibuprofen (Advil)            Call the provider who ordered this test if:    Increased pain or a large or growing hard lump around the site  Blood or fluid is draining from the site  The site is red, swollen, hot or tender  Chills or a fever greater than 101 F (38 C)  Pain that is getting worse  Any questions or concerns    Call  911 or go to the Emergency Room if:    Severe pain or trouble breathing  Bleeding that you cannot control        If you have questions call:          [facility] @ [phone]    Or you can contact your provider via My Chart

## 2025-02-04 NOTE — PROGRESS NOTES
Care Suites Admission Nursing Note    Patient Information  Name: Liz Pollard  Age: 74 year old  Reason for admission: liver biopsy  Care Suites arrival time: 09    Visitor Information  Name: Destini     Patient Admission/Assessment   Pre-procedure assessment complete: Yes  If abnormal assessment/labs, provider notified: N/A  NPO: Yes  Medications held per instructions/orders: Yes  Consent: deferred  If applicable, pregnancy test status: deferred  Patient oriented to room: Yes  Education/questions answered: Yes  Plan/other: Admitted by Kurt HERNANDEZ RN, Reviewed plan for today with pt. She does want sedation for this procedure    Discharge Planning  Discharge name/phone number:Destini  Overnight post sedation caregiver: Barry  Discharge location: home    Pati Maradiaga RN

## 2025-02-04 NOTE — PRE-PROCEDURE
GENERAL PRE-PROCEDURE:   Date/Time:  2/4/2025 10:51 AM    Written consent obtained?: Yes    Risks and benefits: Risks, benefits and alternatives were discussed    Consent given by:  Patient  Patient states understanding of procedure being performed: Yes    Patient's understanding of procedure matches consent: Yes    Procedure consent matches procedure scheduled: Yes    Expected level of sedation:  Moderate  Appropriately NPO:  Yes  ASA Class:  2  Mallampati  :  Grade 1- soft palate, uvula, tonsillar pillars, and posterior pharyngeal wall visible  Lungs:  Lungs clear with good breath sounds bilaterally  Heart:  Normal heart sounds and rate  History & Physical reviewed:  History and physical reviewed and no updates needed  Statement of review:  I have reviewed the lab findings, diagnostic data, medications, and the plan for sedation

## 2025-02-04 NOTE — PROGRESS NOTES
Care Suites Discharge Nursing Note    Patient Information  Name: Liz Pollard  Age: 74 year old    Discharge Education:  Discharge instructions reviewed: Yes  Additional education/resources provided: no  Patient/patient representative verbalizes understanding: Yes  Patient discharging on new medications: No  Medication education completed: N/A    Discharge Plans:   Discharge location: home  Discharge ride contacted: Yes  Approximate discharge time: 1400    Discharge Criteria:  Discharge criteria met and vital signs stable: Yes    Patient Belongs:  Patient belongings returned to patient: Yes    Katey Majano RN

## 2025-02-04 NOTE — PROCEDURES
[facility]    Procedure: Imaging Procedure Note    Date/Time: 2/4/2025 11:53 AM    Performed by: Vin Leung MD  Authorized by: Vin Leung MD  IR Fellow Physician:    Pre Procedure Diagnosis: Right hepatic mass  Post Procedure Diagnosis: Same    UNIVERSAL PROTOCOL   Site Marked: Yes  Prior Images Obtained and Reviewed:  Yes  Required items: Required blood products, implants, devices and special equipment available    Patient identity confirmed:  Verbally with patient and arm band  Patient was reevaluated immediately before administering moderate or deep sedation or anesthesia  Confirmation Checklist:  Patient's identity using two indicators and relevant allergies  Time out: Immediately prior to the procedure a time out was called    Universal Protocol: the Joint Commission Universal Protocol was followed    ESBL (mL):  5     ANESTHESIA    Local Anesthetic:  Lidocaine 1% without epinephrine  Anesthetic Total (mL):  15      SEDATION  Patient Sedated: Yes    Sedation Type:  Moderate (conscious) sedation  Sedation:  Fentanyl, midazolam and see MAR for details  Vital signs: Vital signs monitored during sedation    Fluoroscopy Time: 0 minute(s)  Findings: US guided biopsy of Right Hepatic mass.    6 passes with 17/18 ga Bard coaxial needle.    Needle tract plugged with gelfoam.    Specimens: core needle biopsy specimens sent for pathological analysis    Procedural Complications: None    Condition: Stable    Plan: Post procedure monitoring.      PROCEDURE  Describe Procedure: See above.  Patient Tolerance:  Patient tolerated the procedure well with no immediate complications  Length of time physician/provider present for 1:1 monitoring during sedation:  0-22 min

## 2025-02-06 LAB
PATH REPORT.COMMENTS IMP SPEC: ABNORMAL
PATH REPORT.COMMENTS IMP SPEC: YES
PATH REPORT.FINAL DX SPEC: ABNORMAL
PATH REPORT.GROSS SPEC: ABNORMAL
PATH REPORT.MICROSCOPIC SPEC OTHER STN: ABNORMAL
PATH REPORT.MICROSCOPIC SPEC OTHER STN: ABNORMAL
PATH REPORT.RELEVANT HX SPEC: ABNORMAL
PHOTO IMAGE: ABNORMAL

## 2025-02-10 ENCOUNTER — PATIENT OUTREACH (OUTPATIENT)
Dept: CARE COORDINATION | Facility: CLINIC | Age: 75
End: 2025-02-10
Payer: COMMERCIAL

## 2025-02-13 ENCOUNTER — HOSPITAL ENCOUNTER (OUTPATIENT)
Dept: PET IMAGING | Facility: HOSPITAL | Age: 75
End: 2025-02-13
Attending: INTERNAL MEDICINE
Payer: COMMERCIAL

## 2025-02-13 DIAGNOSIS — C22.0 HEPATOMA (H): ICD-10-CM

## 2025-02-13 LAB — GLUCOSE BLDC GLUCOMTR-MCNC: 107 MG/DL (ref 70–99)

## 2025-02-13 PROCEDURE — 82962 GLUCOSE BLOOD TEST: CPT

## 2025-02-13 PROCEDURE — A9552 F18 FDG: HCPCS | Performed by: INTERNAL MEDICINE

## 2025-02-13 PROCEDURE — 78815 PET IMAGE W/CT SKULL-THIGH: CPT | Mod: PI

## 2025-02-13 PROCEDURE — 343N000001 HC RX 343 MED OP 636: Performed by: INTERNAL MEDICINE

## 2025-02-13 RX ORDER — FLUDEOXYGLUCOSE F 18 200 MCI/ML
8-16 INJECTION, SOLUTION INTRAVENOUS ONCE
Status: COMPLETED | OUTPATIENT
Start: 2025-02-13 | End: 2025-02-13

## 2025-02-13 RX ADMIN — FLUDEOXYGLUCOSE F 18 10.16 MILLICURIE: 200 INJECTION, SOLUTION INTRAVENOUS at 11:01

## 2025-02-17 ENCOUNTER — LAB (OUTPATIENT)
Dept: INFUSION THERAPY | Facility: HOSPITAL | Age: 75
End: 2025-02-17
Attending: INTERNAL MEDICINE
Payer: COMMERCIAL

## 2025-02-17 ENCOUNTER — PATIENT OUTREACH (OUTPATIENT)
Dept: ONCOLOGY | Facility: HOSPITAL | Age: 75
End: 2025-02-17

## 2025-02-17 ENCOUNTER — ONCOLOGY VISIT (OUTPATIENT)
Dept: ONCOLOGY | Facility: HOSPITAL | Age: 75
End: 2025-02-17
Attending: INTERNAL MEDICINE
Payer: COMMERCIAL

## 2025-02-17 VITALS
DIASTOLIC BLOOD PRESSURE: 82 MMHG | BODY MASS INDEX: 27.92 KG/M2 | RESPIRATION RATE: 16 BRPM | WEIGHT: 150.2 LBS | OXYGEN SATURATION: 99 % | TEMPERATURE: 97.9 F | SYSTOLIC BLOOD PRESSURE: 193 MMHG | HEART RATE: 74 BPM

## 2025-02-17 DIAGNOSIS — C22.0 HEPATOCELLULAR CARCINOMA (H): ICD-10-CM

## 2025-02-17 DIAGNOSIS — D44.10 MASS OF UNCERTAIN BEHAVIOR OF ADRENAL GLAND: ICD-10-CM

## 2025-02-17 DIAGNOSIS — I81 PORTAL VEIN THROMBOSIS: ICD-10-CM

## 2025-02-17 DIAGNOSIS — R16.0 LIVER MASS: ICD-10-CM

## 2025-02-17 DIAGNOSIS — B18.2 CHRONIC HEPATITIS C WITHOUT HEPATIC COMA (H): Primary | ICD-10-CM

## 2025-02-17 LAB
AFP SERPL-MCNC: ABNORMAL NG/ML
ALBUMIN SERPL BCG-MCNC: 3.7 G/DL (ref 3.5–5.2)
ALP SERPL-CCNC: 380 U/L (ref 40–150)
ALT SERPL W P-5'-P-CCNC: 145 U/L (ref 0–50)
ANION GAP SERPL CALCULATED.3IONS-SCNC: 10 MMOL/L (ref 7–15)
AST SERPL W P-5'-P-CCNC: 259 U/L (ref 0–45)
BASOPHILS # BLD AUTO: 0.1 10E3/UL (ref 0–0.2)
BASOPHILS NFR BLD AUTO: 1 %
BILIRUB SERPL-MCNC: 0.8 MG/DL
BUN SERPL-MCNC: 12.1 MG/DL (ref 8–23)
CALCIUM SERPL-MCNC: 9.5 MG/DL (ref 8.8–10.4)
CHLORIDE SERPL-SCNC: 105 MMOL/L (ref 98–107)
CREAT SERPL-MCNC: 1.15 MG/DL (ref 0.51–0.95)
EGFRCR SERPLBLD CKD-EPI 2021: 50 ML/MIN/1.73M2
EOSINOPHIL # BLD AUTO: 0.1 10E3/UL (ref 0–0.7)
EOSINOPHIL NFR BLD AUTO: 1 %
ERYTHROCYTE [DISTWIDTH] IN BLOOD BY AUTOMATED COUNT: 17 % (ref 10–15)
GLUCOSE SERPL-MCNC: 151 MG/DL (ref 70–99)
HCO3 SERPL-SCNC: 26 MMOL/L (ref 22–29)
HCT VFR BLD AUTO: 29.2 % (ref 35–47)
HGB BLD-MCNC: 8.6 G/DL (ref 11.7–15.7)
IMM GRANULOCYTES # BLD: 0.1 10E3/UL
IMM GRANULOCYTES NFR BLD: 2 %
LYMPHOCYTES # BLD AUTO: 1.6 10E3/UL (ref 0.8–5.3)
LYMPHOCYTES NFR BLD AUTO: 21 %
MCH RBC QN AUTO: 26.3 PG (ref 26.5–33)
MCHC RBC AUTO-ENTMCNC: 29.5 G/DL (ref 31.5–36.5)
MCV RBC AUTO: 89 FL (ref 78–100)
MONOCYTES # BLD AUTO: 0.8 10E3/UL (ref 0–1.3)
MONOCYTES NFR BLD AUTO: 11 %
NEUTROPHILS # BLD AUTO: 4.7 10E3/UL (ref 1.6–8.3)
NEUTROPHILS NFR BLD AUTO: 64 %
NRBC # BLD AUTO: 0 10E3/UL
NRBC BLD AUTO-RTO: 0 /100
PLATELET # BLD AUTO: 218 10E3/UL (ref 150–450)
POTASSIUM SERPL-SCNC: 3.9 MMOL/L (ref 3.4–5.3)
PROT SERPL-MCNC: 7.8 G/DL (ref 6.4–8.3)
RBC # BLD AUTO: 3.27 10E6/UL (ref 3.8–5.2)
SODIUM SERPL-SCNC: 141 MMOL/L (ref 135–145)
WBC # BLD AUTO: 7.3 10E3/UL (ref 4–11)

## 2025-02-17 PROCEDURE — 82105 ALPHA-FETOPROTEIN SERUM: CPT

## 2025-02-17 PROCEDURE — 36415 COLL VENOUS BLD VENIPUNCTURE: CPT

## 2025-02-17 PROCEDURE — G2211 COMPLEX E/M VISIT ADD ON: HCPCS | Performed by: INTERNAL MEDICINE

## 2025-02-17 PROCEDURE — 85025 COMPLETE CBC W/AUTO DIFF WBC: CPT

## 2025-02-17 PROCEDURE — 82040 ASSAY OF SERUM ALBUMIN: CPT

## 2025-02-17 PROCEDURE — G0463 HOSPITAL OUTPT CLINIC VISIT: HCPCS | Performed by: INTERNAL MEDICINE

## 2025-02-17 PROCEDURE — 99214 OFFICE O/P EST MOD 30 MIN: CPT | Performed by: INTERNAL MEDICINE

## 2025-02-17 RX ORDER — MEPERIDINE HYDROCHLORIDE 25 MG/ML
25 INJECTION INTRAMUSCULAR; INTRAVENOUS; SUBCUTANEOUS
OUTPATIENT
Start: 2025-04-10

## 2025-02-17 RX ORDER — ALBUTEROL SULFATE 90 UG/1
1-2 INHALANT RESPIRATORY (INHALATION)
Start: 2025-03-20

## 2025-02-17 RX ORDER — DIPHENHYDRAMINE HYDROCHLORIDE 50 MG/ML
25 INJECTION INTRAMUSCULAR; INTRAVENOUS
Start: 2025-02-27

## 2025-02-17 RX ORDER — LORAZEPAM 2 MG/ML
0.5 INJECTION INTRAMUSCULAR EVERY 4 HOURS PRN
OUTPATIENT
Start: 2025-04-10

## 2025-02-17 RX ORDER — DIPHENHYDRAMINE HYDROCHLORIDE 50 MG/ML
25 INJECTION INTRAMUSCULAR; INTRAVENOUS
Start: 2025-03-20

## 2025-02-17 RX ORDER — MEPERIDINE HYDROCHLORIDE 25 MG/ML
25 INJECTION INTRAMUSCULAR; INTRAVENOUS; SUBCUTANEOUS
OUTPATIENT
Start: 2025-03-20

## 2025-02-17 RX ORDER — METHYLPREDNISOLONE SODIUM SUCCINATE 40 MG/ML
40 INJECTION INTRAMUSCULAR; INTRAVENOUS
Start: 2025-03-20

## 2025-02-17 RX ORDER — METHYLPREDNISOLONE SODIUM SUCCINATE 40 MG/ML
40 INJECTION INTRAMUSCULAR; INTRAVENOUS
Start: 2025-04-10

## 2025-02-17 RX ORDER — LORAZEPAM 2 MG/ML
0.5 INJECTION INTRAMUSCULAR EVERY 4 HOURS PRN
OUTPATIENT
Start: 2025-03-20

## 2025-02-17 RX ORDER — ALBUTEROL SULFATE 90 UG/1
1-2 INHALANT RESPIRATORY (INHALATION)
Start: 2025-04-10

## 2025-02-17 RX ORDER — DIPHENHYDRAMINE HYDROCHLORIDE 50 MG/ML
50 INJECTION INTRAMUSCULAR; INTRAVENOUS
Start: 2025-02-27

## 2025-02-17 RX ORDER — ALBUTEROL SULFATE 0.83 MG/ML
2.5 SOLUTION RESPIRATORY (INHALATION)
OUTPATIENT
Start: 2025-03-20

## 2025-02-17 RX ORDER — DIPHENHYDRAMINE HYDROCHLORIDE 50 MG/ML
50 INJECTION INTRAMUSCULAR; INTRAVENOUS
Start: 2025-04-10

## 2025-02-17 RX ORDER — LORAZEPAM 2 MG/ML
0.5 INJECTION INTRAMUSCULAR EVERY 4 HOURS PRN
OUTPATIENT
Start: 2025-02-27

## 2025-02-17 RX ORDER — EPINEPHRINE 1 MG/ML
0.3 INJECTION, SOLUTION INTRAMUSCULAR; SUBCUTANEOUS EVERY 5 MIN PRN
OUTPATIENT
Start: 2025-02-27

## 2025-02-17 RX ORDER — DIPHENHYDRAMINE HYDROCHLORIDE 50 MG/ML
25 INJECTION INTRAMUSCULAR; INTRAVENOUS
Start: 2025-04-10

## 2025-02-17 RX ORDER — ALBUTEROL SULFATE 0.83 MG/ML
2.5 SOLUTION RESPIRATORY (INHALATION)
OUTPATIENT
Start: 2025-04-10

## 2025-02-17 RX ORDER — EPINEPHRINE 1 MG/ML
0.3 INJECTION, SOLUTION INTRAMUSCULAR; SUBCUTANEOUS EVERY 5 MIN PRN
OUTPATIENT
Start: 2025-03-20

## 2025-02-17 RX ORDER — DIPHENHYDRAMINE HYDROCHLORIDE 50 MG/ML
50 INJECTION INTRAMUSCULAR; INTRAVENOUS
Start: 2025-03-20

## 2025-02-17 RX ORDER — METHYLPREDNISOLONE SODIUM SUCCINATE 40 MG/ML
40 INJECTION INTRAMUSCULAR; INTRAVENOUS
Start: 2025-02-27

## 2025-02-17 RX ORDER — ALBUTEROL SULFATE 90 UG/1
1-2 INHALANT RESPIRATORY (INHALATION)
Start: 2025-02-27

## 2025-02-17 RX ORDER — MEPERIDINE HYDROCHLORIDE 25 MG/ML
25 INJECTION INTRAMUSCULAR; INTRAVENOUS; SUBCUTANEOUS
OUTPATIENT
Start: 2025-02-27

## 2025-02-17 RX ORDER — ALBUTEROL SULFATE 0.83 MG/ML
2.5 SOLUTION RESPIRATORY (INHALATION)
OUTPATIENT
Start: 2025-02-27

## 2025-02-17 RX ORDER — EPINEPHRINE 1 MG/ML
0.3 INJECTION, SOLUTION INTRAMUSCULAR; SUBCUTANEOUS EVERY 5 MIN PRN
OUTPATIENT
Start: 2025-04-10

## 2025-02-17 ASSESSMENT — PAIN SCALES - GENERAL: PAINLEVEL_OUTOF10: MODERATE PAIN (4)

## 2025-02-17 NOTE — LETTER
"2/17/2025      Liz Pollard  [address]  [facility] [address]      Dear Colleague,    Thank you for referring your patient, Liz Pollard, to the [facility]. Please see a copy of my visit note below.    Oncology Rooming Note    February 17, 2025 10:39 AM   Liz Pollard is a 74 year old female who presents for:    Chief Complaint   Patient presents with     Oncology Clinic Visit     Return visit with lab. PET 02/13/25. Chronic hepatitis C without hepatic coma.     Initial Vitals: BP (!) 188/83 (BP Location: Left arm, Patient Position: Sitting, Cuff Size: Adult Regular)   Pulse 74   Temp 97.9  F (36.6  C) (Oral)   Resp 16   Wt 68.1 kg (150 lb 3.2 oz)   SpO2 99%   BMI 27.92 kg/m   Estimated body mass index is 27.92 kg/m  as calculated from the following:    Height as of 2/3/25: 1.562 m (5' 1.5\").    Weight as of this encounter: 68.1 kg (150 lb 3.2 oz). Body surface area is 1.72 meters squared.  Moderate Pain (4) Comment: Data Unavailable   No LMP recorded. Patient has had a hysterectomy.  Allergies reviewed: Yes  Medications reviewed: Yes    Medications: Medication refills not needed today.  Pharmacy name entered into [employer]: [id number] - [place] [id number] [facility]    Frailty Screening:   Is the patient here for a new oncology consult visit in cancer care? 2. No    PHQ9:  Did this patient require a PHQ9?: No      Clinical concerns: pain and swelling in both legs and feet.  Dr Holland was notified.      Sara Lisa, [facility] cancer Care Progress Note  Patient: Liz Pollard   MRN:  [id number]   Date of Service : Feb 17, 2025         Reason for consultation      Problem List Items Addressed This Visit          Digestive    Chronic hepatitis C without hepatic coma (H) - Primary    Hepatocellular carcinoma (H)    Relevant Orders    Infusion Appointment Request - Adult    Infusion Appointment Request - " Adult    Infusion Appointment Request - Adult    Pathology Additional Testing: Foundation one testing; [facility]    Adult GI  Referral - Procedure Only       Circulatory    Portal vein thrombosis    Relevant Orders    Adult GI  Referral - Procedure Only         Assessment / number of problems addressed      1.  Locally advanced and metastatic hepatocellular carcinoma in a patient with a prior history of hepatitis C which resulted in cirrhosis.  2.  History of hepatitis C which was treated back in 2010 with some antiviral agents.  Apparently was cured of it.  However she did have cirrhosis by the time it was cured.  3.  It is a possibility that the adrenal gland might be a separate malignancy but we will see.  4.  Severe hypertension.  5.  Significant history of smoking for over 40 years.  She quit in 2021.  35-pack-year of history.  6.  Elevated liver function test.  7.  Portal vein thrombosis and either thrombus or tumor thrombus in inferior vena cava.    Plan and medical decision making      Reviewed notes from each unique source.  Reviewed each unique test.    Ordered tests.    Independently interpreted lab tests and radiological exams performed by other physicians.  Personally reviewed the images of the PET scan showing hypermetabolic lesions in the liver, adrenal gland and involvement of the portal vein along with the inferior vena cava.    Discussed with the patient that the treatment recommendations for advanced hepatocellular carcinoma.  We will recommend treatment with the tislelizumab with bevacizumab.  This is a combination of immunotherapy and a veg F therapy.  We discussed the side effects including worsening of the thromboembolic phenomena. Monitor with AFP levels and scans.  We will get an EGD before starting the treatment.  Monitor her for progression of the thrombus.  If needed she may need to be on anticoagulation.  Currently my impression is that this is a tumor thrombus and  not hypercoagulable state of thrombosis.  Discussed with the patient that this is a palliative therapy not curative therapy.  At some point we will have to switch to different therapy if this stops working.  Will send her tumor for foundation 1 testing.  Continue good diet and exercise.  The longitudinal plan of care for the diagnosis(es)/condition(s) as documented were addressed during this visit. Due to the added complexity in care, I will continue to support Dirk in the subsequent management and with ongoing continuity of care.     Clinical/pathological stage       Cancer Staging   No matching staging information was found for the patient.      History of present illness      Ms. Liz Pollard is  74 year old  woman who was referred to me for newly diagnosed advanced suspected hepatocellular carcinoma.  This is located in the right lobe of the liver measures at least 7-8 cm in size irregularly shaped and associated with intrahepatic portal vein thrombosis which is extending into the main portal vein.  In addition to that there appears to be involvement of the right adrenal gland which appears to be partially invading the inferior vena cava.  This presented when she underwent low-dose CT screening for lung cancer.  That CT scan noted enlargement of the right adrenal area and recommended contrast-enhanced CT scan.  That CT scan was done on 9 January 2025 and revealed a mass in the liver and the thrombosis of the portal vein etc. along with metastasis to the right adrenal gland.  Lab testing indicated very elevated alpha-fetoprotein levels and also some elevation of CA 19-9.    The patient is feeling some achiness and fatigue.  Having some leg pains.    She was referred for biopsy as well as a PET scan.  She is here after that with the biopsy has been done and is positive for hepatocellular carcinoma.    Detailed review of systems      A review of systems was obtained.  Positive findings noted in the history.   Rest of the review of system is otherwise negative.      Past medical/surgical/social/family history        Past Medical History:   Diagnosis Date     Diabetes mellitus (H)      Disease of thyroid gland      Hypertension      Infectious viral hepatitis      Past Surgical History:   Procedure Laterality Date     HC REMOVAL GALLBLADDER      Description: Cholecystectomy;  Recorded: 05/10/2010;     HYSTERECTOMY  1987     OOPHORECTOMY       ZZC TOTAL ABDOM HYSTERECTOMY      Description: Hysterectomy;  Recorded: 2009;     Family History   Problem Relation Age of Onset     Cancer Mother 83         of stomach cancer     Colon Cancer Father 51         of colon cancer     Colon Cancer Brother 36         from colon cancer     Prostate Cancer Brother      Breast Cancer Maternal Aunt         in her 90 s     Sickle Cell Trait Niece      Lung Cancer Maternal Uncle        Review of system      Details noted in the history of present illness.  A detailed review of systems is otherwise negative.      Physical exam        BP (!) 193/82 (BP Location: Left arm, Patient Position: Sitting, Cuff Size: Adult Regular)   Pulse 74   Temp 97.9  F (36.6  C) (Oral)   Resp 16   Wt 68.1 kg (150 lb 3.2 oz)   SpO2 99%   BMI 27.92 kg/m      GENERAL: No acute distress. Cooperative in conversation.   HEENT:  Pupils are equal, round and reactive. Oral mucosa is clean and intact. No ulcerations or mucositis noted. No bleeding noted.  RESP:Chest symmetric lungs are clear bilaterally per auscultation. Regular respiratory rate. No wheezes or rhonchi.  CV: Normal S1 S2 Regular, rate and rhythm.     ABD: Nondistended, soft, nontender. Positive bowel sounds. No organomegaly.   EXTREMITIES: No lower extremity edema.   NEURO: Non- focal. Alert and oriented x3.  Cranial nerves appear intact.  PSYCH: Within normal limits. No depression or anxiety.  SKIN: Warm dry intact.      Lab results Reviewed      Recent Results (from the past  week)   Glucose by meter   Result Value Ref Range    GLUCOSE BY METER POCT 107 (H) 70 - 99 mg/dL   Comprehensive metabolic panel   Result Value Ref Range    Sodium 141 135 - 145 mmol/L    Potassium 3.9 3.4 - 5.3 mmol/L    Carbon Dioxide (CO2) 26 22 - 29 mmol/L    Anion Gap 10 7 - 15 mmol/L    Urea Nitrogen 12.1 8.0 - 23.0 mg/dL    Creatinine 1.15 (H) 0.51 - 0.95 mg/dL    GFR Estimate 50 (L) >60 mL/min/1.73m2    Calcium 9.5 8.8 - 10.4 mg/dL    Chloride 105 98 - 107 mmol/L    Glucose 151 (H) 70 - 99 mg/dL    Alkaline Phosphatase 380 (H) 40 - 150 U/L     (H) 0 - 45 U/L     (H) 0 - 50 U/L    Protein Total 7.8 6.4 - 8.3 g/dL    Albumin 3.7 3.5 - 5.2 g/dL    Bilirubin Total 0.8 <=1.2 mg/dL   CBC with platelets and differential   Result Value Ref Range    WBC Count 7.3 4.0 - 11.0 10e3/uL    RBC Count 3.27 (L) 3.80 - 5.20 10e6/uL    Hemoglobin 8.6 (L) 11.7 - 15.7 g/dL    Hematocrit 29.2 (L) 35.0 - 47.0 %    MCV 89 78 - 100 fL    MCH 26.3 (L) 26.5 - 33.0 pg    MCHC 29.5 (L) 31.5 - 36.5 g/dL    RDW 17.0 (H) 10.0 - 15.0 %    Platelet Count 218 150 - 450 10e3/uL    % Neutrophils 64 %    % Lymphocytes 21 %    % Monocytes 11 %    % Eosinophils 1 %    % Basophils 1 %    % Immature Granulocytes 2 %    NRBCs per 100 WBC 0 <1 /100    Absolute Neutrophils 4.7 1.6 - 8.3 10e3/uL    Absolute Lymphocytes 1.6 0.8 - 5.3 10e3/uL    Absolute Monocytes 0.8 0.0 - 1.3 10e3/uL    Absolute Eosinophils 0.1 0.0 - 0.7 10e3/uL    Absolute Basophils 0.1 0.0 - 0.2 10e3/uL    Absolute Immature Granulocytes 0.1 <=0.4 10e3/uL    Absolute NRBCs 0.0 10e3/uL       Imaging results Reviewed        PET Oncology (Eyes to Thighs)    Result Date: 2/13/2025  EXAM: PET ONCOLOGY (EYES TO THIGHS) LOCATION: [facility] DATE: 2/13/2025 INDICATION: Initial treatment planning and staging for liver cell carcinoma COMPARISON: CT the abdomen pelvis dated 1/9/2025 CONTRAST: None TECHNIQUE: Serum glucose level 107 mg/dL. One hour post  intravenous administration of 10.16mCi F18 FDG, PET imaging was performed from the skull vertex to mid thighs, utilizing attenuation correction with concurrent axial CT and PET/CT image fusion. 10.2  Dose reduction techniques were used. PET/CT FINDINGS: Ill-defined FDG avid lesion involving the posterior right hepatic dome measuring approximately 6.7 x 4.1 x 3.9 cm (max SUV 9 point) with FDG avid lesion involving the right adrenal gland measuring 4.8 x 3.1 cm (Max SUV 7.2) suspicious for right hepatic lobe hepatocellular carcinoma with right adrenal gland metastasis. FDG avid expansile soft tissue involving the portal vein and posterior aspect of the IVC (max SUV 6.7) suspicious for an in situ thrombus, which is better seen on prior CT of the abdomen pelvis dated 1/9/2025 CT FINDINGS: Mild-to-moderate senescent intercranial changes. Postoperative changes of the bilateral lenses. Mild carotid artery bifurcation calcification. Mild coronary artery calcium. Anterior liver parenchyma cyst. Cholecystectomy. Sigmoid diverticulosis. Small Intra-abdominal ascites. Multilevel degenerative changes of the spine.     IMPRESSION: 1. Findings suspicious for right hepatic lobe hepatocellular carcinoma with right adrenal gland metastasis. 2. FDG avid expansile soft tissue the portal vein and posterior aspect of the IVC suspicious for an in situ thrombus, which is better seen on prior CT of the abdomen pelvis dated 1/9/2025.    US Biopsy Liver    Result Date: 2/4/2025  EXAM: 1. PERCUTANEOUS BIOPSY RIGHT HEPATIC MASS 2. ULTRASOUND GUIDANCE 3. CONSCIOUS SEDATION LOCATION: [facility] DATE: 2/4/2025 INDICATION: Chronic hepatitis C. Infiltrating right hepatic mass extending into portal veins and probable right adrenal metastasis. PROCEDURE: Informed consent obtained. Site marked. Prior images reviewed. Required items made available. Patient identity was confirmed verbally and with arm band. Patient reevaluated  immediately before administering sedation. Universal protocol was followed. Time out performed. A limited ultrasound of the abdomen is performed demonstrating an infiltrating isoechoic to slightly hypoechoic right hepatic mass and the overlying skin was prepped and draped in sterile fashion. 15 mL of 1 percent lidocaine was infused into the local soft tissues. Using standard technique and under direct ultrasound guidance, a 17/18 gauge Bard coaxial biopsy needle was used to make a total of 6 core biopsies. Needle tract was plugged with Gelfoam. Tissue was submitted to Pathology. The patient tolerated the procedure well. No complications. SEDATION: Versed 1 mg. Fentanyl fifth mcg. The procedure was performed with administration intravenous conscious sedation with appropriate preoperative, intraoperative, and postoperative evaluation. 15 minutes of supervised face to face conscious sedation time was provided by a radiology nurse under my direct supervision.     IMPRESSION: Ultrasound-guided percutaneous biopsy of right hepatic mass performed without complication. Final pathology pending. Reference CPT Code: 21739, 74686, 77412       Signed by: Bautista Holland MD      This note has been dictated using voice recognition software. Any grammatical or context distortions are unintentional and inherent to the software       Again, thank you for allowing me to participate in the care of your patient.        Sincerely,        Bautista Holland MD    Electronically signed

## 2025-02-17 NOTE — PROGRESS NOTES
[facility]: Cancer Care                                                                                            Situation: Patient chart reviewed by care coordinator.    Background:  Patient with a diagnosis of adrenal gland mass as well as liver mass.     Assessment: Patient comes in today to follow-up with Dr Holland after having had a liver biopsy at the [facility] on 2/4/25.  She then went on to have a PET scan on 2/13/2025.    Plan/Recommendations: Per Dr Holland, patient needs an urgent upper endoscopy/EGD at [facility].  We need this completed prior to her starting immunotherapy with atezoluzimab and bevacizumab.  After visit summary with specific information on these medications was given to the patient.  I also assisted her in setting up MyChart.    I explained to her and her friend that we are sending her tissue for foundation 1 testing.  I also let them know that I will be calling to do a chemotherapy teach once I see that we are scheduled for her to start.  I will continue to follow-up on this upper endoscopy referral that our  staff was able to put in the [facility] portal today.    Signature:  Alysia Ngo RN

## 2025-02-17 NOTE — PROGRESS NOTES
"Oncology Rooming Note    February 17, 2025 10:39 AM   Liz Pollard is a 74 year old female who presents for:    Chief Complaint   Patient presents with    Oncology Clinic Visit     Return visit with lab. PET 02/13/25. Chronic hepatitis C without hepatic coma.     Initial Vitals: BP (!) 188/83 (BP Location: Left arm, Patient Position: Sitting, Cuff Size: Adult Regular)   Pulse 74   Temp 97.9  F (36.6  C) (Oral)   Resp 16   Wt 68.1 kg (150 lb 3.2 oz)   SpO2 99%   BMI 27.92 kg/m   Estimated body mass index is 27.92 kg/m  as calculated from the following:    Height as of 2/3/25: 1.562 m (5' 1.5\").    Weight as of this encounter: 68.1 kg (150 lb 3.2 oz). Body surface area is 1.72 meters squared.  Moderate Pain (4) Comment: Data Unavailable   No LMP recorded. Patient has had a hysterectomy.  Allergies reviewed: Yes  Medications reviewed: Yes    Medications: Medication refills not needed today.  Pharmacy name entered into [employer]: [id number] [address] [facility]    Frailty Screening:   Is the patient here for a new oncology consult visit in cancer care? 2. No    PHQ9:  Did this patient require a PHQ9?: No      Clinical concerns: pain and swelling in both legs and feet.  Dr Holland was notified.      Sara Lisa [facility]            "

## 2025-02-17 NOTE — PROGRESS NOTES
[facility] cancer Care Progress Note  Patient: Liz Pollard   MRN:  [id number]   Date of Service : Feb 17, 2025         Reason for consultation      Problem List Items Addressed This Visit          Digestive    Chronic hepatitis C without hepatic coma (H) - Primary    Hepatocellular carcinoma (H)    Relevant Orders    Infusion Appointment Request - Adult    Infusion Appointment Request - Adult    Infusion Appointment Request - Adult    Pathology Additional Testing: Foundation one testing; [facility] one    Adult GI  Referral - Procedure Only       Circulatory    Portal vein thrombosis    Relevant Orders    Adult GI  Referral - Procedure Only         Assessment / number of problems addressed      1.  Locally advanced and metastatic hepatocellular carcinoma in a patient with a prior history of hepatitis C which resulted in cirrhosis.  2.  History of hepatitis C which was treated back in 2010 with some antiviral agents.  Apparently was cured of it.  However she did have cirrhosis by the time it was cured.  3.  It is a possibility that the adrenal gland might be a separate malignancy but we will see.  4.  Severe hypertension.  5.  Significant history of smoking for over 40 years.  She quit in 2021.  35-pack-year of history.  6.  Elevated liver function test.  7.  Portal vein thrombosis and either thrombus or tumor thrombus in inferior vena cava.    Plan and medical decision making      Reviewed notes from each unique source.  Reviewed each unique test.    Ordered tests.    Independently interpreted lab tests and radiological exams performed by other physicians.  Personally reviewed the images of the PET scan showing hypermetabolic lesions in the liver, adrenal gland and involvement of the portal vein along with the inferior vena cava.    Discussed with the patient that the treatment recommendations for advanced hepatocellular carcinoma.  We will recommend treatment with the tislelizumab  with bevacizumab.  This is a combination of immunotherapy and a veg F therapy.  We discussed the side effects including worsening of the thromboembolic phenomena. Monitor with AFP levels and scans.  We will get an EGD before starting the treatment.  Monitor her for progression of the thrombus.  If needed she may need to be on anticoagulation.  Currently my impression is that this is a tumor thrombus and not hypercoagulable state of thrombosis.  Discussed with the patient that this is a palliative therapy not curative therapy.  At some point we will have to switch to different therapy if this stops working.  Will send her tumor for foundation 1 testing.  Continue good diet and exercise.  The longitudinal plan of care for the diagnosis(es)/condition(s) as documented were addressed during this visit. Due to the added complexity in care, I will continue to support Dirk in the subsequent management and with ongoing continuity of care.     Clinical/pathological stage       Cancer Staging   No matching staging information was found for the patient.      History of present illness      Ms. Liz Pollard is  74 year old  woman who was referred to me for newly diagnosed advanced suspected hepatocellular carcinoma.  This is located in the right lobe of the liver measures at least 7-8 cm in size irregularly shaped and associated with intrahepatic portal vein thrombosis which is extending into the main portal vein.  In addition to that there appears to be involvement of the right adrenal gland which appears to be partially invading the inferior vena cava.  This presented when she underwent low-dose CT screening for lung cancer.  That CT scan noted enlargement of the right adrenal area and recommended contrast-enhanced CT scan.  That CT scan was done on 9 January 2025 and revealed a mass in the liver and the thrombosis of the portal vein etc. along with metastasis to the right adrenal gland.  Lab testing indicated very  elevated alpha-fetoprotein levels and also some elevation of CA 19-9.    The patient is feeling some achiness and fatigue.  Having some leg pains.    She was referred for biopsy as well as a PET scan.  She is here after that with the biopsy has been done and is positive for hepatocellular carcinoma.    Detailed review of systems      A review of systems was obtained.  Positive findings noted in the history.  Rest of the review of system is otherwise negative.      Past medical/surgical/social/family history        Past Medical History:   Diagnosis Date    Diabetes mellitus (H)     Disease of thyroid gland     Hypertension     Infectious viral hepatitis      Past Surgical History:   Procedure Laterality Date    HC REMOVAL GALLBLADDER      Description: Cholecystectomy;  Recorded: 05/10/2010;    HYSTERECTOMY  1987    OOPHORECTOMY      ZZC TOTAL ABDOM HYSTERECTOMY      Description: Hysterectomy;  Recorded: 2009;     Family History   Problem Relation Age of Onset    Cancer Mother 83         of stomach cancer    Colon Cancer Father 51         of colon cancer    Colon Cancer Brother 36         from colon cancer    Prostate Cancer Brother     Breast Cancer Maternal Aunt         in her 90 s    Sickle Cell Trait Niece     Lung Cancer Maternal Uncle        Review of system      Details noted in the history of present illness.  A detailed review of systems is otherwise negative.      Physical exam        BP (!) 193/82 (BP Location: Left arm, Patient Position: Sitting, Cuff Size: Adult Regular)   Pulse 74   Temp 97.9  F (36.6  C) (Oral)   Resp 16   Wt 68.1 kg (150 lb 3.2 oz)   SpO2 99%   BMI 27.92 kg/m      GENERAL: No acute distress. Cooperative in conversation.   HEENT:  Pupils are equal, round and reactive. Oral mucosa is clean and intact. No ulcerations or mucositis noted. No bleeding noted.  RESP:Chest symmetric lungs are clear bilaterally per auscultation. Regular respiratory rate. No wheezes  or rhonchi.  CV: Normal S1 S2 Regular, rate and rhythm.     ABD: Nondistended, soft, nontender. Positive bowel sounds. No organomegaly.   EXTREMITIES: No lower extremity edema.   NEURO: Non- focal. Alert and oriented x3.  Cranial nerves appear intact.  PSYCH: Within normal limits. No depression or anxiety.  SKIN: Warm dry intact.      Lab results Reviewed      Recent Results (from the past week)   Glucose by meter   Result Value Ref Range    GLUCOSE BY METER POCT 107 (H) 70 - 99 mg/dL   Comprehensive metabolic panel   Result Value Ref Range    Sodium 141 135 - 145 mmol/L    Potassium 3.9 3.4 - 5.3 mmol/L    Carbon Dioxide (CO2) 26 22 - 29 mmol/L    Anion Gap 10 7 - 15 mmol/L    Urea Nitrogen 12.1 8.0 - 23.0 mg/dL    Creatinine 1.15 (H) 0.51 - 0.95 mg/dL    GFR Estimate 50 (L) >60 mL/min/1.73m2    Calcium 9.5 8.8 - 10.4 mg/dL    Chloride 105 98 - 107 mmol/L    Glucose 151 (H) 70 - 99 mg/dL    Alkaline Phosphatase 380 (H) 40 - 150 U/L     (H) 0 - 45 U/L     (H) 0 - 50 U/L    Protein Total 7.8 6.4 - 8.3 g/dL    Albumin 3.7 3.5 - 5.2 g/dL    Bilirubin Total 0.8 <=1.2 mg/dL   CBC with platelets and differential   Result Value Ref Range    WBC Count 7.3 4.0 - 11.0 10e3/uL    RBC Count 3.27 (L) 3.80 - 5.20 10e6/uL    Hemoglobin 8.6 (L) 11.7 - 15.7 g/dL    Hematocrit 29.2 (L) 35.0 - 47.0 %    MCV 89 78 - 100 fL    MCH 26.3 (L) 26.5 - 33.0 pg    MCHC 29.5 (L) 31.5 - 36.5 g/dL    RDW 17.0 (H) 10.0 - 15.0 %    Platelet Count 218 150 - 450 10e3/uL    % Neutrophils 64 %    % Lymphocytes 21 %    % Monocytes 11 %    % Eosinophils 1 %    % Basophils 1 %    % Immature Granulocytes 2 %    NRBCs per 100 WBC 0 <1 /100    Absolute Neutrophils 4.7 1.6 - 8.3 10e3/uL    Absolute Lymphocytes 1.6 0.8 - 5.3 10e3/uL    Absolute Monocytes 0.8 0.0 - 1.3 10e3/uL    Absolute Eosinophils 0.1 0.0 - 0.7 10e3/uL    Absolute Basophils 0.1 0.0 - 0.2 10e3/uL    Absolute Immature Granulocytes 0.1 <=0.4 10e3/uL    Absolute NRBCs 0.0 10e3/uL        Imaging results Reviewed        PET Oncology (Eyes to Thighs)    Result Date: 2/13/2025  EXAM: PET ONCOLOGY (EYES TO THIGHS) LOCATION: [facility] DATE: 2/13/2025 INDICATION: Initial treatment planning and staging for liver cell carcinoma COMPARISON: CT the abdomen pelvis dated 1/9/2025 CONTRAST: None TECHNIQUE: Serum glucose level 107 mg/dL. One hour post intravenous administration of 10.16mCi F18 FDG, PET imaging was performed from the skull vertex to mid thighs, utilizing attenuation correction with concurrent axial CT and PET/CT image fusion. 10.2  Dose reduction techniques were used. PET/CT FINDINGS: Ill-defined FDG avid lesion involving the posterior right hepatic dome measuring approximately 6.7 x 4.1 x 3.9 cm (max SUV 9 point) with FDG avid lesion involving the right adrenal gland measuring 4.8 x 3.1 cm (Max SUV 7.2) suspicious for right hepatic lobe hepatocellular carcinoma with right adrenal gland metastasis. FDG avid expansile soft tissue involving the portal vein and posterior aspect of the IVC (max SUV 6.7) suspicious for an in situ thrombus, which is better seen on prior CT of the abdomen pelvis dated 1/9/2025 CT FINDINGS: Mild-to-moderate senescent intercranial changes. Postoperative changes of the bilateral lenses. Mild carotid artery bifurcation calcification. Mild coronary artery calcium. Anterior liver parenchyma cyst. Cholecystectomy. Sigmoid diverticulosis. Small Intra-abdominal ascites. Multilevel degenerative changes of the spine.     IMPRESSION: 1. Findings suspicious for right hepatic lobe hepatocellular carcinoma with right adrenal gland metastasis. 2. FDG avid expansile soft tissue the portal vein and posterior aspect of the IVC suspicious for an in situ thrombus, which is better seen on prior CT of the abdomen pelvis dated 1/9/2025.    US Biopsy Liver    Result Date: 2/4/2025  EXAM: 1. PERCUTANEOUS BIOPSY RIGHT HEPATIC MASS 2. ULTRASOUND GUIDANCE 3. CONSCIOUS  SEDATION LOCATION: [facility] DATE: 2/4/2025 INDICATION: Chronic hepatitis C. Infiltrating right hepatic mass extending into portal veins and probable right adrenal metastasis. PROCEDURE: Informed consent obtained. Site marked. Prior images reviewed. Required items made available. Patient identity was confirmed verbally and with arm band. Patient reevaluated immediately before administering sedation. Universal protocol was followed. Time out performed. A limited ultrasound of the abdomen is performed demonstrating an infiltrating isoechoic to slightly hypoechoic right hepatic mass and the overlying skin was prepped and draped in sterile fashion. 15 mL of 1 percent lidocaine was infused into the local soft tissues. Using standard technique and under direct ultrasound guidance, a 17/18 gauge Bard coaxial biopsy needle was used to make a total of 6 core biopsies. Needle tract was plugged with Gelfoam. Tissue was submitted to Pathology. The patient tolerated the procedure well. No complications. SEDATION: Versed 1 mg. Fentanyl fifth mcg. The procedure was performed with administration intravenous conscious sedation with appropriate preoperative, intraoperative, and postoperative evaluation. 15 minutes of supervised face to face conscious sedation time was provided by a radiology nurse under my direct supervision.     IMPRESSION: Ultrasound-guided percutaneous biopsy of right hepatic mass performed without complication. Final pathology pending. Reference CPT Code: 99659, 10377, 67025       Signed by: Bautista Holland MD      This note has been dictated using voice recognition software. Any grammatical or context distortions are unintentional and inherent to the software

## 2025-02-18 ENCOUNTER — OFFICE VISIT (OUTPATIENT)
Dept: FAMILY MEDICINE | Facility: CLINIC | Age: 75
End: 2025-02-18
Payer: COMMERCIAL

## 2025-02-18 ENCOUNTER — PATIENT OUTREACH (OUTPATIENT)
Dept: ONCOLOGY | Facility: HOSPITAL | Age: 75
End: 2025-02-18

## 2025-02-18 VITALS
WEIGHT: 148.04 LBS | DIASTOLIC BLOOD PRESSURE: 50 MMHG | OXYGEN SATURATION: 100 % | BODY MASS INDEX: 27.24 KG/M2 | TEMPERATURE: 97.9 F | HEIGHT: 62 IN | HEART RATE: 69 BPM | RESPIRATION RATE: 16 BRPM | SYSTOLIC BLOOD PRESSURE: 184 MMHG

## 2025-02-18 DIAGNOSIS — C22.0 HEPATOCELLULAR CARCINOMA (H): ICD-10-CM

## 2025-02-18 DIAGNOSIS — Z01.818 PREOP EXAMINATION: Primary | ICD-10-CM

## 2025-02-18 DIAGNOSIS — K74.00 LIVER FIBROSIS: ICD-10-CM

## 2025-02-18 DIAGNOSIS — M62.838 MUSCLE SPASM: ICD-10-CM

## 2025-02-18 DIAGNOSIS — I81 PORTAL VEIN THROMBOSIS: ICD-10-CM

## 2025-02-18 DIAGNOSIS — N18.32 STAGE 3B CHRONIC KIDNEY DISEASE (H): ICD-10-CM

## 2025-02-18 DIAGNOSIS — E11.21 TYPE 2 DIABETES MELLITUS WITH DIABETIC NEPHROPATHY, WITHOUT LONG-TERM CURRENT USE OF INSULIN (H): ICD-10-CM

## 2025-02-18 DIAGNOSIS — I10 ESSENTIAL HYPERTENSION: ICD-10-CM

## 2025-02-18 RX ORDER — SPIRONOLACTONE 50 MG/1
50 TABLET, FILM COATED ORAL DAILY
Qty: 90 TABLET | Refills: 3 | Status: SHIPPED | OUTPATIENT
Start: 2025-02-18

## 2025-02-18 NOTE — PROGRESS NOTES
PRE-OP exam note printed and faxed to [facility] as requested (fax number [phone]).

## 2025-02-18 NOTE — PROGRESS NOTES
[facility]: Cancer Care                                                                                          Call placed to [facility] to check the status of the urgent referral that was placed in the system yesterday.  The coordinator that I spoke to said that scheduling would normally reach out to the patient today.  She will go ahead and reach out to the patient to try to get this scheduled.  She does understand that we do need the results of the upper endoscopy back prior to her starting treatment.  Per Dr Holland, if the upper endoscopy comes back showing esophageal varices, he will need to change her treatment plan as she will not be able to receive bevacizumab/Avastin.    I will call back later today to check and see if this has been scheduled.    Signature:  Alysia Nog RN

## 2025-02-18 NOTE — PROGRESS NOTES
[facility]: Cancer Care                                                                                          Call came back in from [facility].  They state that the patient is scheduled on 2/25 at 1:30 PM for her procedure.  They asked that pertinent records to be faxed over to them at [phone].  I have sent over Dr Holland's 2/17/2025 office note, her pathology report and all imaging since 12/26/2024 which started this whole process.      Signature:  Alysia Ngo RN

## 2025-02-18 NOTE — PROGRESS NOTES
Preoperative Evaluation  [address]  [place] [address]  Phone: [phone]  Fax: [phone]  Primary Provider: Alejandro Jones MD  Pre-op Performing Provider: Alejandro Jones MD  Feb 18, 2025 2/18/2025   Surgical Information   What procedure is being done? EGD   Facility or Hospital where procedure/surgery will be performed: [place]  [facility]   Who is doing the procedure / surgery? unknown   Date of surgery / procedure: 2/25/ 2025   Time of surgery / procedure: 1:30 PM   Where do you plan to recover after surgery? at home with family     Fax number for surgical facility:     Assessment & Plan     The proposed surgical procedure is considered LOW risk.    Preop examination  Hepatocellular carcinoma (H)  Liver fibrosis  Plan for EGD prior to starting treatment.   Discussed goals of care with patient and she knows what she wants. Will need an updated health care directive and needs assistance.   - Primary Care - Care Coordination Referral      Essential hypertension  Not well controlled, maybe related to her new diagnosis. Increase spironolactone.   - spironolactone (ALDACTONE) 50 MG tablet  Dispense: 90 tablet; Refill: 3    Stage 3b chronic kidney disease (H)  Stable.     Portal vein thrombosis  Muscle spasm  - diclofenac (VOLTAREN) 1 % topical gel  Dispense: 350 g; Refill: 3    Type 2 diabetes mellitus with diabetic nephropathy, without long-term current use of insulin (H)  Well controlled, A1c consistently <6.5%.               - No identified additional risk factors other than previously addressed    Antiplatelet or Anticoagulation Medication Instructions   - Bleeding risk is low for this procedure (e.g. dental, skin, cataract).    Additional Medication Instructions  Take all scheduled medications on the day of surgery    Recommendation  Approval given to proceed with proposed procedure, without further diagnostic evaluation.    Subjective    Dirk is a 74 year old, presenting for the following:  Pre-Op Exam, Recheck Medication, and hip and leg pain on the right side       HPI related to upcoming procedure: patient recently diagnosed with HCC, needing EGD prior to starting treatment.           2/18/2025   Pre-Op Questionnaire   Have you ever had a heart attack or stroke? No   Have you ever had surgery on your heart or blood vessels, such as a stent placement, a coronary artery bypass, or surgery on an artery in your head, neck, heart, or legs? No   Do you have chest pain with activity? No   Do you have a history of heart failure? No   Do you currently have a cold, bronchitis or symptoms of other infection? No   Do you have a cough, shortness of breath, or wheezing? No   Do you or anyone in your family have previous history of blood clots? No   Do you or does anyone in your family have a serious bleeding problem such as prolonged bleeding following surgeries or cuts? No   Have you ever had problems with anemia or been told to take iron pills? (!) YES hgb 8.9 last draw. Ok to proceed regardless.    Have you had any abnormal blood loss such as black, tarry or bloody stools, or abnormal vaginal bleeding? No   Have you ever had a blood transfusion? No   Are you willing to have a blood transfusion if it is medically needed before, during, or after your surgery? Yes   Have you or any of your relatives ever had problems with anesthesia? (!) YES - no personal history.    Do you have sleep apnea, excessive snoring or daytime drowsiness? No   Do you have any artifical heart valves or other implanted medical devices like a pacemaker, defibrillator, or continuous glucose monitor? No   Do you have artificial joints? No   Are you allergic to latex? No     Health Care Directive  Patient does not have a Health Care Directive: Discussed advance care planning with patient; information given to patient to review.    Preoperative Review of    reviewed - no record of  controlled substances prescribed.          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 01/29/2025     Priority: Medium    Stage 3b chronic kidney disease (H) 02/26/2024     Priority: Medium    Lumbar spine pain 04/26/2023     Priority: Medium    Thoracic spine pain 04/26/2023     Priority: Medium    Varicose veins of right lower extremity with pain 08/31/2021     Priority: Medium    Liver fibrosis 08/31/2021     Priority: Medium    Personal history of tobacco use 08/31/2021     Priority: Medium    Type 2 diabetes mellitus with diabetic nephropathy, without long-term current use of insulin (H)      Priority: Medium     Created by [employer] [facility] Annotation: Jul 11 2011  3:23PM - Yamila Paniagua: diet   controlled        Scoliosis 01/03/2019     Priority: Medium    Age-related osteoporosis without current pathological fracture 07/02/2018     Priority: Medium    Vitamin D deficiency 09/12/2016     Priority: Medium    Trigger Finger Of The Right Ring Finger      Priority: Medium     Created by Conversion  Replacement Utility updated for latest IMO load        Other specified hypothyroidism      Priority: Medium     Created by [employer] [facility] Annotation: Apr 7 2008  2:39PM Yamila Colunga: iatrogenic   from   Interferron tx for Hep C  Replacement Utility updated for latest IMO load        Essential hypertension      Priority: Medium     Created by Conversion  Replacement Utility updated for latest IMO load        Hiatal Hernia      Priority: Medium     Created by Conversion  Replacement Utility updated for latest IMO load        Hyperlipidemia LDL goal <130      Priority: Medium     Created by  Conversion        Chronic hepatitis C without hepatic coma (H)      Priority: Medium     Created by Conversion  [facility] Annotation: Apr 7 2008  2:34PM - Mariposa Plunkett (Brittaney): tx'd with   interferon around 2000        Hepatic Cyst      Priority: Medium     Created by Conversion        Midback Pain      Priority: Medium     Created by Conversion        Benign essential microscopic hematuria      Priority: Medium     Created by Conversion  [facility] Annotation: Mar 20 2013 12:02PM - Yamila Paniagua: hematuria   work   by urology unrevealing - no stones, nl cysto, nl cytology, followed by uro        Class 1 obesity due to excess calories without serious comorbidity with body mass index (BMI) of 30.0 to 30.9 in adult      Priority: Medium     Created by Conversion        De Quervain's Tenosynovitis      Priority: Medium     Created by Conversion        Proteinuria      Priority: Medium     Created by Conversion          Past Medical History:   Diagnosis Date    Diabetes mellitus (H)     Disease of thyroid gland     Hypertension     Infectious viral hepatitis      Past Surgical History:   Procedure Laterality Date    HC REMOVAL GALLBLADDER      Description: Cholecystectomy;  Recorded: 05/10/2010;    HYSTERECTOMY  01/01/1987    OOPHORECTOMY      ZZC TOTAL ABDOM HYSTERECTOMY      Description: Hysterectomy;  Recorded: 06/16/2009;     Current Outpatient Medications   Medication Sig Dispense Refill    aspirin 81 MG EC tablet Take 81 mg by mouth daily.      blood glucose (CONTOUR NEXT TEST) test strip USE 1 STRIP EVERY  strip 2    blood glucose (NO BRAND SPECIFIED) test strip Use to test blood sugar 1 times daily or as directed. To accompany: Blood Glucose Monitor Brands: per insurance. 100 strip 3    blood glucose monitoring (NO BRAND SPECIFIED) meter device kit Use to test blood sugar 1 times daily or as directed. Preferred blood glucose meter OR supplies to accompany: Blood Glucose Monitor Brands: per insurance. 1  "kit 0    Blood Glucose Monitoring Suppl (CONTOUR NEXT ONE) SAMEERA PLEASE SEE ATTACHED FOR DETAILED DIRECTIONS      blood-glucose meter Misc [BLOOD-GLUCOSE METER MISC] 1 glucometer device  - any brand covered by insurance (contour covered by insurance? ) 1 each 0    diclofenac (VOLTAREN) 1 % topical gel Apply 4 g topically 4 times daily. Leg and back pain 350 g 3    ferrous gluconate (FERGON) 324 (38 Fe) MG tablet Take 1 tablet (324 mg) by mouth every other day.      levothyroxine (SYNTHROID/LEVOTHROID) 75 MCG tablet TAKE 1 TABLET BY MOUTH EVERY DAY 90 tablet 3    lisinopril (ZESTRIL) 40 MG tablet TAKE 1 TABLET BY MOUTH EVERY DAY 90 tablet 2    magnesium oxide 400 MG CAPS Take 1 tablet by mouth daily.      metoprolol succinate ER (TOPROL XL) 50 MG 24 hr tablet Take 1 tablet (50 mg) by mouth daily. 90 tablet 3    prednisoLONE acetate (PRED FORTE) 1 % ophthalmic suspension       spironolactone (ALDACTONE) 50 MG tablet Take 1 tablet (50 mg) by mouth daily. 90 tablet 3    thin (NO BRAND SPECIFIED) lancets Use with lanceting device. To accompany: Blood Glucose Monitor Brands: per insurance. 100 each 3       Allergies   Allergen Reactions    Amlodipine Unknown     Gums swelled    Hydralazine Itching     Pt states that she is not allergic to anything        Social History     Tobacco Use    Smoking status: Former     Current packs/day: 0.00     Types: Cigarettes     Quit date: 4/11/2021     Years since quitting: 3.8     Passive exposure: Never    Smokeless tobacco: Never   Substance Use Topics    Alcohol use: No       History   Drug Use No               Objective    BP (!) 184/50   Pulse 69   Temp 97.9  F (36.6  C) (Oral)   Resp 16   Ht 1.562 m (5' 1.5\")   Wt 67.2 kg (148 lb 0.6 oz)   SpO2 100%   BMI 27.52 kg/m     Estimated body mass index is 27.52 kg/m  as calculated from the following:    Height as of this encounter: 1.562 m (5' 1.5\").    Weight as of this encounter: 67.2 kg (148 lb 0.6 oz).  Physical Exam  GENERAL: " alert and no distress  NECK: no adenopathy, no asymmetry, masses, or scars  RESP: lungs clear to auscultation - no rales, rhonchi or wheezes  CV: regular rate and rhythm, normal S1 S2, no S3 or S4, no murmur, click or rub, no peripheral edema  ABDOMEN: soft, nontender, no hepatosplenomegaly, no masses and bowel sounds normal  MS: no gross musculoskeletal defects noted, no edema    Recent Labs   Lab Test 02/17/25  1022 02/04/25  0939 01/13/25  1132 10/14/24  1048 08/26/24  1423 02/26/24  1323   HGB 8.6*  --  9.6*  --   --  12.1     --  204  --   --  164   INR  --  1.07 1.04  --   --   --      --  140   < > 145 141   POTASSIUM 3.9  --  3.9   < > 4.5 4.9   CR 1.15*  --  1.21*   < > 1.51* 1.32*   A1C  --   --   --   --  4.9 4.9    < > = values in this interval not displayed.        Diagnostics  Recent Results (from the past 720 hours)   Hepatitis B surface antigen    Collection Time: 01/29/25  2:00 PM   Result Value Ref Range    Hepatitis B Surface Antigen Nonreactive Nonreactive   Hepatitis B Surface Antibody    Collection Time: 01/29/25  2:00 PM   Result Value Ref Range    Hepatitis B Surface Antibody Nonreactive     Hepatitis B Surface Antibody Instrument Value <3.50 <8.5 m[IU]/mL   Hepatitis C antibody    Collection Time: 01/29/25  2:00 PM   Result Value Ref Range    Hepatitis C Antibody Reactive (A) Nonreactive   Hepatitis C RNA, quantitative    Collection Time: 01/29/25  2:00 PM   Result Value Ref Range    Hepatitis C RNA IU/mL Not Detected Not Detected IU/mL   INR    Collection Time: 02/04/25  9:39 AM   Result Value Ref Range    INR 1.07 0.85 - 1.15   Partial thromboplastin time    Collection Time: 02/04/25  9:39 AM   Result Value Ref Range    aPTT 28 22 - 38 Seconds   Extra Purple Top Tube    Collection Time: 02/04/25  9:39 AM   Result Value Ref Range    Hold Specimen X    Surgical Pathology Exam    Collection Time: 02/04/25 11:53 AM   Result Value Ref Range    Case Report       Surgical Pathology  Report                         Case: [id number]                                  Authorizing Provider:  MarinaYamil,    Collected:           02/04/2025 11:53 AM                                 RASHAUN                                                                         Ordering Location:     [facility]          Received:            02/04/2025 12:02 PM                                 [facility]                                                        Pathologist:           Shaggy Duffy MD                                                     Specimen:    Liver, Rt Liver Biopsy                                                                     Final Diagnosis       A.  Right liver biopsy:  - Hepatocellular carcinoma      Comment       Pro-intradepartmental consultation was obtained.      Clinical Information       89 year old female with CT showing  Hepatomegaly. Innumerable confluent masses throughout the liver involving the entire left lobe and almost the entire right lobe. A representative mass includes a 6.3 x 3.6 cm mass in the left lobe lateral segment (series 3, image 80) and a  6.1 x 4.2 cm mass in the left lobe more centrally .  Right adrenal mass measures 4.7 x 3.7 cm which partially infiltrates into the adrenal vein and IVC by 11 mm, likely a metastasis.  Pancreas normal. Abdominal adenopathy. No pelvic masses. No bone lesions. No lower chest lesions.    IMPRESSION:      1. Infiltrative poorly defined mass throughout the right lobe of the liver with tumor thrombus within the intrahepatic portal veins in the right and left lobes, as well as the main portal vein to the level of the portal venous confluence.  2. Metastasis to the right adrenal gland also partially invades into the IVC.  3. Favor primary hepatic malignancy, either HCC or cholangiocarcinoma.  4. These findings were likely all present on the lung cancer screening CT 12/26/2024 but poorly seen without the use of  "IV contrast.      Gross Description       A(1). Liver, Rt Liver Biopsy:  The specimen is received in formalin, labeled with the patient's name, medical record number and other identifying information designated \"right liver biopsy\". It consists of multiple core and core fragments, 0.2-1.1 cm.  Wrapped and entirely submitted into cassettes.   ([facility]) 2/4/2025 12:13 PM       Microscopic Description       The sections show a neoplastic proliferation of the cells within a haphazard arrangement with best doing and coalescing tumor nests comprised of cells with moderate to marked nuclear pleomorphism, oval nuclei with occasional distinct nucleoli and a moderate amount of eosinophilic cytoplasm.  Mitotic activity is brisk.  Areas of necrosis are present in the background with focal residual normal hepatic parenchyma.      Special Stains       Immunohistochemical stains were performed on the tissue showing the following reaction pattern support or of the diagnosis as noted:    POSITIVE:  Arginase, Glypican-3  NEGATIVE: CK7, CK20, CK19, MOC-31, SALL4, CEAm, EMA,     Note:  These immunohistochemical stains are deemed medically necessary.  Appropriate, positive staining control tissues are reviewed concurrently showing a reaction pattern approriate for interpretation.  The findings substantiate the final diagnosis      MCRS Yes (A) N/A    Performing Labs       The technical component of this testing was completed at [facility].    Stain controls for all stains resulted within this report have been reviewed and show appropriate reactivity.       Case Images     Glucose by meter    Collection Time: 02/13/25 10:58 AM   Result Value Ref Range    GLUCOSE BY METER POCT 107 (H) 70 - 99 mg/dL   Comprehensive metabolic panel    Collection Time: 02/17/25 10:22 AM   Result Value Ref Range    Sodium 141 135 - 145 mmol/L    Potassium 3.9 3.4 - 5.3 mmol/L    " Carbon Dioxide (CO2) 26 22 - 29 mmol/L    Anion Gap 10 7 - 15 mmol/L    Urea Nitrogen 12.1 8.0 - 23.0 mg/dL    Creatinine 1.15 (H) 0.51 - 0.95 mg/dL    GFR Estimate 50 (L) >60 mL/min/1.73m2    Calcium 9.5 8.8 - 10.4 mg/dL    Chloride 105 98 - 107 mmol/L    Glucose 151 (H) 70 - 99 mg/dL    Alkaline Phosphatase 380 (H) 40 - 150 U/L     (H) 0 - 45 U/L     (H) 0 - 50 U/L    Protein Total 7.8 6.4 - 8.3 g/dL    Albumin 3.7 3.5 - 5.2 g/dL    Bilirubin Total 0.8 <=1.2 mg/dL   AFP tumor marker    Collection Time: 02/17/25 10:22 AM   Result Value Ref Range    AFP tumor marker 39,110.0 (H) <=8.3 ng/mL   CBC with platelets and differential    Collection Time: 02/17/25 10:22 AM   Result Value Ref Range    WBC Count 7.3 4.0 - 11.0 10e3/uL    RBC Count 3.27 (L) 3.80 - 5.20 10e6/uL    Hemoglobin 8.6 (L) 11.7 - 15.7 g/dL    Hematocrit 29.2 (L) 35.0 - 47.0 %    MCV 89 78 - 100 fL    MCH 26.3 (L) 26.5 - 33.0 pg    MCHC 29.5 (L) 31.5 - 36.5 g/dL    RDW 17.0 (H) 10.0 - 15.0 %    Platelet Count 218 150 - 450 10e3/uL    % Neutrophils 64 %    % Lymphocytes 21 %    % Monocytes 11 %    % Eosinophils 1 %    % Basophils 1 %    % Immature Granulocytes 2 %    NRBCs per 100 WBC 0 <1 /100    Absolute Neutrophils 4.7 1.6 - 8.3 10e3/uL    Absolute Lymphocytes 1.6 0.8 - 5.3 10e3/uL    Absolute Monocytes 0.8 0.0 - 1.3 10e3/uL    Absolute Eosinophils 0.1 0.0 - 0.7 10e3/uL    Absolute Basophils 0.1 0.0 - 0.2 10e3/uL    Absolute Immature Granulocytes 0.1 <=0.4 10e3/uL    Absolute NRBCs 0.0 10e3/uL      No EKG required, no history of coronary heart disease, significant arrhythmia, peripheral arterial disease or other structural heart disease.    Revised Cardiac Risk Index (RCRI)  The patient has the following serious cardiovascular risks for perioperative complications:   - No serious cardiac risks = 0 points     RCRI Interpretation: 0 points: Class I (very low risk - 0.4% complication rate)         Signed Electronically by: Alejandro Jones,  MD  A copy of this evaluation report is provided to the requesting physician.

## 2025-02-18 NOTE — PROGRESS NOTES
[facility]: Cancer Care                                                                                          [facility] testing has been ordered by Dr Holland.  Requisition form has been filled out and signed by Dr Holland and faxed back to [facility] [phone] (phone # [phone]) along with patient's insurance information and pathology report.  This was faxed on 2/17 at [phone].  I will continue to follow the status of this.    Signature:  Alysia Ngo RN

## 2025-02-19 ENCOUNTER — PATIENT OUTREACH (OUTPATIENT)
Dept: CARE COORDINATION | Facility: CLINIC | Age: 75
End: 2025-02-19
Payer: COMMERCIAL

## 2025-02-19 ENCOUNTER — TELEPHONE (OUTPATIENT)
Dept: ONCOLOGY | Facility: HOSPITAL | Age: 75
End: 2025-02-19
Payer: COMMERCIAL

## 2025-02-19 NOTE — PROGRESS NOTES
Clinic Care Coordination Contact  Community Health Worker Initial Outreach    CHW Initial Information Gathering:  Referral Source: PCP  Current living arrangement:: I live in a private home with spouse  Type of residence:: Private home - no stairs  Community Resources: Other (see comment) (Medicare and Social Security)  Supplies Currently Used at Home: None  Equipment Currently Used at Home: none  Informal Support system:: Spouse, Other (In-laws (Husbands parents/family))  No PCP office visit in Past Year: No  Transportation means:: Regular car  CHW Additional Questions  If ED/Hospital discharge, follow-up appointment scheduled as recommended?: N/A  Medication changes made following ED/Hospital discharge?: N/A  MyChart active?: No  Patient agreeable to assistance with activating MyChart?: No    Chart Review: Referral from PCP   Reason for Referral: Patient recently diagnosed with terminal cancer, undergoing treatment soon. Needs help with health care directive.   Requesting social work help as well because her  has memory issues and she needs to plan for his care as well.    Patient accepts CC: Yes. Patient scheduled for assessment with ADAM Dutta on 3/13/2 at 2pm. Patient noted desire to discuss support with Health Care Directive.     CLIVE Ramos  Clinic Care Coordination  [facility]    Phone: [phone]

## 2025-02-19 NOTE — TELEPHONE ENCOUNTER
I received a phone call today from Dirk.  She is calling because she is scheduled to start her first treatment next week and she has a question about driving after treatment.  I let her know that it is not a bad idea to have somebody drive her home after the first treatment because there is no way to know how she will feel or react to the medications.  She verbalized understanding and has no other questions at this time.    Nandini Barrios RN on 2/19/2025 at 2:39 PM

## 2025-02-20 ENCOUNTER — PATIENT OUTREACH (OUTPATIENT)
Dept: CARE COORDINATION | Facility: CLINIC | Age: 75
End: 2025-02-20
Payer: COMMERCIAL

## 2025-02-20 LAB
BKR LAB AP ADD'L TEST STATUS: NORMAL
BKR PATH ADDL TEST FINAL COMMENTS: NORMAL

## 2025-02-20 NOTE — PROGRESS NOTES
Social Work - Distress Screen Intervention  [facility]    Identified Concern and Score from Distress Screenin. How concerned are you about your ability to eat? 0     2. How concerned are you about unintended weight loss or your current weight? 0     3. How concerned are you about feeling depressed or very sad?  5     4. How concerned are you about feeling anxious or very scared?  0     5. Do you struggle with the loss of meaning and daniela in your life?  Not at all     6. How concerned are you about work and home life issues that may be affected by your cancer?  (!) 10     7. How concerned are you about knowing what resources are available to help you?  0     8. Do you currently have what you would describe as Lutheran or spiritual struggles? Not at all     9. If you want to be contacted by one of our professionals, I can send a message to them right now.  No data recorded     Date of Distress Screen: 25  Data: At time of last visit, patient scored positive on distress screening.  outreached to patient today to follow up on elevated distress and introduce psychosocial services and support.  Intervention/Education provided:  contacted patient by phone to discuss distress screening results. She is concerned about placement for her . Encouraged her to talk with his primary MD. Also provided information re Onc SW role and support available. Dirk was appreciative of call and will reach out as needs arise. She is also connected with SW and CHW at [facility].     Follow-up Required:  will remain available to patient for support as needed      TERRY Goins, [facility]  Adult Oncology Clinics  [place] (M,W), [place] (T) & [place] ()  *I am off Friday  Office: [phone]

## 2025-02-26 ENCOUNTER — PATIENT OUTREACH (OUTPATIENT)
Dept: ONCOLOGY | Facility: HOSPITAL | Age: 75
End: 2025-02-26
Payer: COMMERCIAL

## 2025-02-26 NOTE — PROGRESS NOTES
[facility]: Cancer Care                                                                                            Situation: Patient chart reviewed by care coordinator.    Background: Patient with a diagnosis of hepatocellular carcinoma.  This is biopsy-proven on 2/4/2025 with a right liver biopsy.    Assessment: Patient was last seen in the clinic by Dr Holland on 2/17/2025.  He recommended that she start treatment with Tecentriq and bevacizumab IV once every 3 weeks.  Before she get started on this treatment he recommended that she have an EGD to check for esophageal varices.    Patient had this procedure done yesterday.  They did take some biopsies and results are still pending.  They have faxed the procedure report over to us.  No esophageal varices are noted.  Patient does have gastritis.  Dr Holland has reviewed the full procedure report and states that patient should be on Prilosec 40 mg daily for the gastritis.  Otherwise she is good to go ahead and come in for her appointment tomorrow to start treatment.    Plan/Recommendations:     [facility]/Voicemail  Clinical Data: Care Coordinator Outreach    Outreach attempted x 1.  Left message on patient's voicemail with call back information and requested return call.  I let her know that we do have an update regarding the procedure she had yesterday and wanted to discuss some things prior to her appointment tomorrow where she will start treatment.    I told her that if she does not give us a call back today, we will go over these items tomorrow.    Signature:  Alysia Ngo RN

## 2025-02-27 ENCOUNTER — INFUSION THERAPY VISIT (OUTPATIENT)
Dept: INFUSION THERAPY | Facility: HOSPITAL | Age: 75
End: 2025-02-27
Attending: INTERNAL MEDICINE
Payer: COMMERCIAL

## 2025-02-27 ENCOUNTER — PATIENT OUTREACH (OUTPATIENT)
Dept: ONCOLOGY | Facility: HOSPITAL | Age: 75
End: 2025-02-27

## 2025-02-27 ENCOUNTER — ONCOLOGY VISIT (OUTPATIENT)
Dept: ONCOLOGY | Facility: HOSPITAL | Age: 75
End: 2025-02-27
Attending: INTERNAL MEDICINE
Payer: COMMERCIAL

## 2025-02-27 VITALS
SYSTOLIC BLOOD PRESSURE: 167 MMHG | BODY MASS INDEX: 27.27 KG/M2 | DIASTOLIC BLOOD PRESSURE: 71 MMHG | HEART RATE: 72 BPM | RESPIRATION RATE: 18 BRPM | OXYGEN SATURATION: 100 % | WEIGHT: 148.2 LBS | HEIGHT: 62 IN | TEMPERATURE: 98.6 F

## 2025-02-27 DIAGNOSIS — C22.0 HEPATOCELLULAR CARCINOMA (H): Primary | ICD-10-CM

## 2025-02-27 DIAGNOSIS — Z76.0 ENCOUNTER FOR MEDICATION REFILL: ICD-10-CM

## 2025-02-27 LAB
ALBUMIN SERPL BCG-MCNC: 3.4 G/DL (ref 3.5–5.2)
ALBUMIN UR-MCNC: 100 MG/DL
ALP SERPL-CCNC: 347 U/L (ref 40–150)
ALT SERPL W P-5'-P-CCNC: 136 U/L (ref 0–50)
ANION GAP SERPL CALCULATED.3IONS-SCNC: 10 MMOL/L (ref 7–15)
AST SERPL W P-5'-P-CCNC: 280 U/L (ref 0–45)
BILIRUB SERPL-MCNC: 0.8 MG/DL
BUN SERPL-MCNC: 17.2 MG/DL (ref 8–23)
CALCIUM SERPL-MCNC: 9.5 MG/DL (ref 8.8–10.4)
CHLORIDE SERPL-SCNC: 107 MMOL/L (ref 98–107)
CREAT SERPL-MCNC: 1.13 MG/DL (ref 0.51–0.95)
EGFRCR SERPLBLD CKD-EPI 2021: 51 ML/MIN/1.73M2
GLUCOSE SERPL-MCNC: 141 MG/DL (ref 70–99)
HCO3 SERPL-SCNC: 26 MMOL/L (ref 22–29)
POTASSIUM SERPL-SCNC: 4.4 MMOL/L (ref 3.4–5.3)
PROT SERPL-MCNC: 7.6 G/DL (ref 6.4–8.3)
SODIUM SERPL-SCNC: 143 MMOL/L (ref 135–145)
T4 FREE SERPL-MCNC: 0.78 NG/DL (ref 0.9–1.7)
TSH SERPL DL<=0.005 MIU/L-ACNC: 34.59 UIU/ML (ref 0.3–4.2)

## 2025-02-27 PROCEDURE — 81003 URINALYSIS AUTO W/O SCOPE: CPT | Performed by: INTERNAL MEDICINE

## 2025-02-27 PROCEDURE — 80053 COMPREHEN METABOLIC PANEL: CPT | Performed by: INTERNAL MEDICINE

## 2025-02-27 PROCEDURE — G0463 HOSPITAL OUTPT CLINIC VISIT: HCPCS | Performed by: NURSE PRACTITIONER

## 2025-02-27 PROCEDURE — 84443 ASSAY THYROID STIM HORMONE: CPT | Performed by: INTERNAL MEDICINE

## 2025-02-27 PROCEDURE — 258N000003 HC RX IP 258 OP 636: Performed by: INTERNAL MEDICINE

## 2025-02-27 PROCEDURE — 36415 COLL VENOUS BLD VENIPUNCTURE: CPT | Performed by: INTERNAL MEDICINE

## 2025-02-27 PROCEDURE — 250N000011 HC RX IP 250 OP 636: Mod: JZ | Performed by: INTERNAL MEDICINE

## 2025-02-27 PROCEDURE — 84439 ASSAY OF FREE THYROXINE: CPT | Performed by: INTERNAL MEDICINE

## 2025-02-27 RX ORDER — METHYLPREDNISOLONE SODIUM SUCCINATE 40 MG/ML
40 INJECTION INTRAMUSCULAR; INTRAVENOUS
Status: DISCONTINUED | OUTPATIENT
Start: 2025-02-27 | End: 2025-02-27 | Stop reason: HOSPADM

## 2025-02-27 RX ORDER — ALBUTEROL SULFATE 0.83 MG/ML
2.5 SOLUTION RESPIRATORY (INHALATION)
Status: DISCONTINUED | OUTPATIENT
Start: 2025-02-27 | End: 2025-02-27 | Stop reason: HOSPADM

## 2025-02-27 RX ORDER — ALBUTEROL SULFATE 90 UG/1
1-2 INHALANT RESPIRATORY (INHALATION)
Status: DISCONTINUED | OUTPATIENT
Start: 2025-02-27 | End: 2025-02-27 | Stop reason: HOSPADM

## 2025-02-27 RX ORDER — DIPHENHYDRAMINE HYDROCHLORIDE 50 MG/ML
50 INJECTION INTRAMUSCULAR; INTRAVENOUS
Status: DISCONTINUED | OUTPATIENT
Start: 2025-02-27 | End: 2025-02-27 | Stop reason: HOSPADM

## 2025-02-27 RX ORDER — MEPERIDINE HYDROCHLORIDE 25 MG/ML
25 INJECTION INTRAMUSCULAR; INTRAVENOUS; SUBCUTANEOUS
Status: DISCONTINUED | OUTPATIENT
Start: 2025-02-27 | End: 2025-02-27 | Stop reason: HOSPADM

## 2025-02-27 RX ORDER — DIPHENHYDRAMINE HYDROCHLORIDE 50 MG/ML
25 INJECTION INTRAMUSCULAR; INTRAVENOUS
Status: DISCONTINUED | OUTPATIENT
Start: 2025-02-27 | End: 2025-02-27 | Stop reason: HOSPADM

## 2025-02-27 RX ORDER — LEVOTHYROXINE SODIUM 100 UG/1
100 TABLET ORAL DAILY
Qty: 30 TABLET | Refills: 5 | Status: SHIPPED | OUTPATIENT
Start: 2025-02-27

## 2025-02-27 RX ORDER — EPINEPHRINE 1 MG/ML
0.3 INJECTION, SOLUTION INTRAMUSCULAR; SUBCUTANEOUS EVERY 5 MIN PRN
Status: DISCONTINUED | OUTPATIENT
Start: 2025-02-27 | End: 2025-02-27 | Stop reason: HOSPADM

## 2025-02-27 RX ADMIN — SODIUM CHLORIDE 1000 MG: 9 INJECTION, SOLUTION INTRAVENOUS at 11:49

## 2025-02-27 RX ADMIN — SODIUM CHLORIDE 1200 MG: 9 INJECTION, SOLUTION INTRAVENOUS at 10:52

## 2025-02-27 RX ADMIN — SODIUM CHLORIDE 250 ML: 0.9 INJECTION, SOLUTION INTRAVENOUS at 10:44

## 2025-02-27 ASSESSMENT — PAIN SCALES - GENERAL: PAINLEVEL_OUTOF10: MODERATE PAIN (5)

## 2025-02-27 NOTE — PROGRESS NOTES
[facility]: Cancer Care Initial Note                                    Discussion with Patient:                                                      Patient comes in today for labs, office visit and to start treatment with Tecentriq and bevacizumab for her recently diagnosis of hepatocellular carcinoma.     Medication understanding/side effect: Tecentriq IV, bevacizumab IV    Education concerns: Patient and her  asked appropriate questions and seemed to understand the information given to them.  Chemotherapy folder given today.    eSym: Patient declined.  Infusion nurse notified.     Goals          General    Maintain ability to perform ADLs without difficulty           Assessment:                                                      Initial  Current living arrangement:: I live in a private home with family  Type of residence:: Private home - stairs  Informal Support system:: Significant other, Other (sister in law)  Equipment Currently Used at Home: none  Bed or wheelchair confined:: No  Mobility Status: Independent  Transportation means:: Regular car  Medication adherence problem (GOAL):: No  Knowledgeable about how to use meds:: Yes  Medication side effects suspected:: Yes  Referrals Placed: None  Type Advanced Care Plans/Directives: Other (will bring in her health care directive that she has at home)  Advanced Care Plan/Directive Status: Declined Further Information  Patient Reported Pain?: No    Plan of Care Education Review:   Assessment completed with:: Patient, Spouse or significant other    Plan of Care Education   Yearly learning assessment completed?: Yes (see Education tab)  Diagnosis:: hepatocellular carcinoma  Does patient understand diagnosis?: Yes  Tx plan/regimen:: tecentriq and bevacizumab every 3 weeks  Does patient understand treatment plan/regimen?: Yes  Preparing for treatment:: Reviewed treatment preparation information with patient (vascular access, day of chemo, visitor  policy, what to bring, etc.)  Vascular access education provided for:: Peripheral IV  Side effect education:: Immune-mediated effects, Fatigue, Lab value monitoring (anemia, neutropenia, thrombocytopenia), Mylosuppression, Nausea/Vomiting, Endocrine effects  Safety/self care at home reviewed with patient:: Yes  Coping - concerns/fears reviewed with patient:: Yes  Plan of Care:: LISA follow-up appointment, Lab appointment, Imaging, MD follow-up appointment, Treatment schedule  When to call provider:: Uncontrolled nausea/vomiting, Bleeding, Increased shortness of breath, New/worsening pain, Shaking chills, Temperature >100.4F, Uncontrolled diarrhea/constipation  Reasons for deferring treatment reviewed with patient:: Yes    Evaluation of Learning  Patient Education Provided: Yes  Readiness:: Acceptance  Method:: Booklet/Handout, Explanation, Literature  Response:: Verbalizes understanding           Intervention/Education provided during outreach:                                                       I will call patient early next week to check in and see how she is doing.  I will also check the status of the foundation 1 testing that was sent off on 2/17/2025.    Patient to follow up as scheduled at next appt.  Patient to call/[employer] In[employer] message with updates.  Confirmed patient has clinic and triage numbers.    Signature:  Alysia Ngo RN

## 2025-02-27 NOTE — PROGRESS NOTES
"Oncology Rooming Note    February 27, 2025 9:14 AM   Liz Pollard is a 74 year old female who presents for:    Chief Complaint   Patient presents with    Oncology Clinic Visit     Hepatocellular carcinoma (H)        Initial Vitals: BP (!) 167/71   Pulse 72   Temp 98.6  F (37  C)   Resp 18   Ht 1.562 m (5' 1.5\")   Wt 67.2 kg (148 lb 3.2 oz)   SpO2 100%   BMI 27.55 kg/m   Estimated body mass index is 27.55 kg/m  as calculated from the following:    Height as of this encounter: 1.562 m (5' 1.5\").    Weight as of this encounter: 67.2 kg (148 lb 3.2 oz). Body surface area is 1.71 meters squared.  Moderate Pain (5) Comment: Data Unavailable   No LMP recorded. Patient has had a hysterectomy.  Allergies reviewed: Yes  Medications reviewed: Yes    Medications: Medication refills not needed today.  Pharmacy name entered into "[employer]: [id number] [place], [place] - [address]    Frailty Screening:   Is the patient here for a new oncology consult visit in cancer care? 2. No    PHQ9:  Did this patient require a PHQ9?: No      Clinical concerns: New Start      Bharati Wang LPN             "

## 2025-02-27 NOTE — PROGRESS NOTES
[facility] Hematology and Oncology Progress Note    Patient: Liz Pollard  MRN: [id number]  Date of Service: Feb 27, 2025          Reason for Visit    Chief Complaint   Patient presents with    Oncology Clinic Visit     Hepatocellular carcinoma (H)          Assessment and Plan     Cancer Staging   No matching staging information was found for the patient.    Hepatocellular carcinoma, metastatic disease with adrenal mets: pt is here today to start palliative treatment with Atezolizumab and Avastin.  We talked about the plan of care which will be giving these medications once every 3 weeks.  We will follow tumor marker, AFP as well as do imaging along the way to evaluate response.  I did tell her we will likely do a scan after 3-4 doses.  We talked at length today about the plan and expected side effects. She met with RNCC to get education. She gave verbal consent to start today. Risk of immunotherapy include: hepatitis, thyroiditis, colitis, pulmonary toxicity, rash and dry skin, myalgias. We discussed avastin and the risk of hypertension, bleeding/healing complications, Proteinuria. Will be seen in 3 weeks for next labs, treatment.     Hypothyroid: longstanding issue. Is currently on 75mcg synthroid. Her TSH is elevated and T4 is low so we will increase to 100mcg. May get worse with immunotherapy so we will have to monitor closely.     Elevated LFT: present at baseline and from tumor. Will monitor closely with starting Atezolizumab which has risk for liver dysfunction.     ECOG Performance    0 - Independent    Distress Screening (within last 30 days)    1. How concerned are you about your ability to eat? : 0  2. How concerned are you about unintended weight loss or your current weight? : 0  3. How concerned are you about feeling depressed or very sad? : 5  4. How concerned are you about feeling anxious or very scared? : 0  5. Do you struggle with the loss of meaning and daniela in your life? : Not at  all  6. How concerned are you about work and home life issues that may be affected by your cancer? : (!) 10  7. How concerned are you about knowing what resources are available to help you? : 0  8. Do you currently have what you would describe as Spiritism or spiritual struggles?: Not at all       Pain  Pain Score: Moderate Pain (5)    Problem List    Patient Active Problem List   Diagnosis    Trigger Finger Of The Right Ring Finger    Midback Pain    Benign essential microscopic hematuria    Hyperlipidemia LDL goal <130    Chronic hepatitis C without hepatic coma (H)    Other specified hypothyroidism    Type 2 diabetes mellitus with diabetic nephropathy, without long-term current use of insulin (H)    Class 1 obesity due to excess calories without serious comorbidity with body mass index (BMI) of 30.0 to 30.9 in adult    Essential hypertension    De Quervain's Tenosynovitis    Proteinuria    Hiatal Hernia    Hepatic Cyst    Vitamin D deficiency    Age-related osteoporosis without current pathological fracture    Scoliosis    Varicose veins of right lower extremity with pain    Liver fibrosis    Personal history of tobacco use    Lumbar spine pain    Thoracic spine pain    Stage 3b chronic kidney disease (H)    Encounter for screening for other viral diseases    Liver mass    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 Holland    Diagnosis: Had a cellular carcinoma.  Found in the right liver measuring about 8 cm in size.  It was actually found when she was doing a low-dose CT screening for lung cancer. AFP was 32,288  -12/26/24: CT showed no lung cancer issues but indeterminate right suprarenal fossa lesion that was 3.9 cm.  -1/9/25: Went on to have another CT scan of the abdomen and pelvis that shows infiltrative poorly  "defined mass throughout the right lobe of the liver with tumor thrombus within the intrahepatic portal veins in the right and left lobes.  As well as the main portal vein to the level of the portal venous confluence.  Mets to the right adrenal gland partially invades into the IVC.  Favor primary hepatic malignancy, either HCC or call angio.  -2/4/25: Patient had liver biopsy shows hepatocellular carcinoma.  Foundation 1 testing and process.   -2/13/25: Patient had PET scan shows findings suspicious for right hepatic lobe hepatocellular carcinoma with right adrenal gland     Treatment:  -2/27/2025: Patient is here today to start treatment with Atezolizumab and bevacizumab    Interim history:  She is here today to start treatment.  She states that she is anxious and nervous to start but does feel ready and educated.  She states that she is doing okay.  She is having a lot of hip stiffness and joint achiness.  She feels like her weight is stable.  She denies any new bone or back pain issues.      Review of Systems    Pertinent items are noted in HPI.    Past History    Past Medical History:   Diagnosis Date    Diabetes mellitus (H)     Disease of thyroid gland     Hypertension     Infectious viral hepatitis        PHYSICAL EXAM  BP (!) 167/71   Pulse 72   Temp 98.6  F (37  C)   Resp 18   Ht 1.562 m (5' 1.5\")   Wt 67.2 kg (148 lb 3.2 oz)   SpO2 100%   BMI 27.55 kg/m      GENERAL: no acute distress. Cooperative in conversation. Alone in clinic  RESP: Regular respiratory rate. No expiratory wheezes   NEURO: non focal. Alert and oriented x3.   PSYCH: within normal limits. No depression or anxiety.  SKIN: exposed skin is dry intact.     Lab Results    Recent Results (from the past week)   Comprehensive metabolic panel   Result Value Ref Range    Sodium 143 135 - 145 mmol/L    Potassium 4.4 3.4 - 5.3 mmol/L    Carbon Dioxide (CO2) 26 22 - 29 mmol/L    Anion Gap 10 7 - 15 mmol/L    Urea Nitrogen 17.2 8.0 - 23.0 mg/dL "    Creatinine 1.13 (H) 0.51 - 0.95 mg/dL    GFR Estimate 51 (L) >60 mL/min/1.73m2    Calcium 9.5 8.8 - 10.4 mg/dL    Chloride 107 98 - 107 mmol/L    Glucose 141 (H) 70 - 99 mg/dL    Alkaline Phosphatase 347 (H) 40 - 150 U/L     (H) 0 - 45 U/L     (H) 0 - 50 U/L    Protein Total 7.6 6.4 - 8.3 g/dL    Albumin 3.4 (L) 3.5 - 5.2 g/dL    Bilirubin Total 0.8 <=1.2 mg/dL   TSH with free T4 reflex   Result Value Ref Range    TSH 34.59 (H) 0.30 - 4.20 uIU/mL   T4 free   Result Value Ref Range    Free T4 0.78 (L) 0.90 - 1.70 ng/dL       Imaging    PET Oncology (Eyes to Thighs)    Result Date: 2/13/2025  EXAM: PET ONCOLOGY (EYES TO THIGHS) LOCATION: [facility] DATE: 2/13/2025 INDICATION: Initial treatment planning and staging for liver cell carcinoma COMPARISON: CT the abdomen pelvis dated 1/9/2025 CONTRAST: None TECHNIQUE: Serum glucose level 107 mg/dL. One hour post intravenous administration of 10.16mCi F18 FDG, PET imaging was performed from the skull vertex to mid thighs, utilizing attenuation correction with concurrent axial CT and PET/CT image fusion. 10.2  Dose reduction techniques were used. PET/CT FINDINGS: Ill-defined FDG avid lesion involving the posterior right hepatic dome measuring approximately 6.7 x 4.1 x 3.9 cm (max SUV 9 point) with FDG avid lesion involving the right adrenal gland measuring 4.8 x 3.1 cm (Max SUV 7.2) suspicious for right hepatic lobe hepatocellular carcinoma with right adrenal gland metastasis. FDG avid expansile soft tissue involving the portal vein and posterior aspect of the IVC (max SUV 6.7) suspicious for an in situ thrombus, which is better seen on prior CT of the abdomen pelvis dated 1/9/2025 CT FINDINGS: Mild-to-moderate senescent intercranial changes. Postoperative changes of the bilateral lenses. Mild carotid artery bifurcation calcification. Mild coronary artery calcium. Anterior liver parenchyma cyst. Cholecystectomy. Sigmoid  diverticulosis. Small Intra-abdominal ascites. Multilevel degenerative changes of the spine.     IMPRESSION: 1. Findings suspicious for right hepatic lobe hepatocellular carcinoma with right adrenal gland metastasis. 2. FDG avid expansile soft tissue the portal vein and posterior aspect of the IVC suspicious for an in situ thrombus, which is better seen on prior CT of the abdomen pelvis dated 1/9/2025.    US Biopsy Liver    Result Date: 2/4/2025  EXAM: 1. PERCUTANEOUS BIOPSY RIGHT HEPATIC MASS 2. ULTRASOUND GUIDANCE 3. CONSCIOUS SEDATION LOCATION: [facility] DATE: 2/4/2025 INDICATION: Chronic hepatitis C. Infiltrating right hepatic mass extending into portal veins and probable right adrenal metastasis. PROCEDURE: Informed consent obtained. Site marked. Prior images reviewed. Required items made available. Patient identity was confirmed verbally and with arm band. Patient reevaluated immediately before administering sedation. Universal protocol was followed. Time out performed. A limited ultrasound of the abdomen is performed demonstrating an infiltrating isoechoic to slightly hypoechoic right hepatic mass and the overlying skin was prepped and draped in sterile fashion. 15 mL of 1 percent lidocaine was infused into the local soft tissues. Using standard technique and under direct ultrasound guidance, a 17/18 gauge Bard coaxial biopsy needle was used to make a total of 6 core biopsies. Needle tract was plugged with Gelfoam. Tissue was submitted to Pathology. The patient tolerated the procedure well. No complications. SEDATION: Versed 1 mg. Fentanyl fifth mcg. The procedure was performed with administration intravenous conscious sedation with appropriate preoperative, intraoperative, and postoperative evaluation. 15 minutes of supervised face to face conscious sedation time was provided by a radiology nurse under my direct supervision.     IMPRESSION: Ultrasound-guided percutaneous biopsy of  right hepatic mass performed without complication. Final pathology pending. Reference CPT Code: 27891, 34808, 91663     Personally Reviewed the PET scan images.     Total time spent with patient in face to face time, chart review and documentation was 40 minutes.      The longitudinal plan of care for the diagnosis(es)/condition(s) as documented were addressed during this visit. Due to the added complexity in care, I will continue to support Dirk in the subsequent management and with ongoing continuity of care.      Signed by: ELTON Morales CNP

## 2025-02-27 NOTE — PROGRESS NOTES
Infusion Nursing Note:  Liz Pollard presents today for cycle 1 day 1 atezolizumab/bevacizumab.    Patient seen by provider today: Yes: Chanda Henson NP   present during visit today: Not Applicable.    Note: Dirk arrived ambulatory and in stable condition. She was unable to leave a urine sample initially, and Chanda was ok with proceeding but would like her to collect one by the end of her time in clinic. Reviewed each medication with patient prior to administration. PIV placed in right forearm and good blood return noted. Treatment administered per orders. She was able to collect a urine sample prior to leaving today. Will return on 3/19 for next appointment.      Intravenous Access:  Labs drawn without difficulty.  Peripheral IV placed.    Treatment Conditions:  Lab Results   Component Value Date     02/27/2025    POTASSIUM 4.4 02/27/2025    CR 1.13 (H) 02/27/2025    COLT 9.5 02/27/2025    BILITOTAL 0.8 02/27/2025    ALBUMIN 3.4 (L) 02/27/2025     (H) 02/27/2025     (H) 02/27/2025       Results reviewed, labs MET treatment parameters, ok to proceed with treatment per Chanda.      Post Infusion Assessment:  Patient tolerated infusion without incident.  Blood return noted pre and post infusion.  Site patent and intact, free from redness, edema or discomfort.  No evidence of extravasations.  Access discontinued per protocol.       Discharge Plan:   Patient and/or family verbalized understanding of discharge instructions and all questions answered.  AVS to patient via [employer].  Patient will return 3/19 for next appointment.   Patient discharged in stable condition accompanied by: .  Departure Mode: Ambulatory.      Brielle Farmer RN

## 2025-02-27 NOTE — LETTER
"2/27/2025      Liz Pollard  [address]  [place] [address]      Dear Colleague,    Thank you for referring your patient, Liz Pollard, to the [facility]. Please see a copy of my visit note below.    Oncology Rooming Note    February 27, 2025 9:14 AM   Liz Pollard is a 74 year old female who presents for:    Chief Complaint   Patient presents with     Oncology Clinic Visit     Hepatocellular carcinoma (H)        Initial Vitals: BP (!) 167/71   Pulse 72   Temp 98.6  F (37  C)   Resp 18   Ht 1.562 m (5' 1.5\")   Wt 67.2 kg (148 lb 3.2 oz)   SpO2 100%   BMI 27.55 kg/m   Estimated body mass index is 27.55 kg/m  as calculated from the following:    Height as of this encounter: 1.562 m (5' 1.5\").    Weight as of this encounter: 67.2 kg (148 lb 3.2 oz). Body surface area is 1.71 meters squared.  Moderate Pain (5) Comment: Data Unavailable   No LMP recorded. Patient has had a hysterectomy.  Allergies reviewed: Yes  Medications reviewed: Yes    Medications: Medication refills not needed today.  Pharmacy name entered into [employer]: [id number] [address]    Frailty Screening:   Is the patient here for a new oncology consult visit in cancer care? 2. No    PHQ9:  Did this patient require a PHQ9?: No      Clinical concerns: New Start      Bharati Wang LPN               [facility] Hematology and Oncology Progress Note    Patient: Liz Pollard  MRN: [id number]  Date of Service: Feb 27, 2025          Reason for Visit    Chief Complaint   Patient presents with     Oncology Clinic Visit     Hepatocellular carcinoma (H)          Assessment and Plan     Cancer Staging   No matching staging information was found for the patient.    Hepatocellular carcinoma, metastatic disease with adrenal mets: pt is here today to start palliative treatment with Atezolizumab and Avastin.  We talked about the plan of care which will be " giving these medications once every 3 weeks.  We will follow tumor marker, AFP as well as do imaging along the way to evaluate response.  I did tell her we will likely do a scan after 3-4 doses.  We talked at length today about the plan and expected side effects. She met with RNCC to get education. She gave verbal consent to start today. Risk of immunotherapy include: hepatitis, thyroiditis, colitis, pulmonary toxicity, rash and dry skin, myalgias. We discussed avastin and the risk of hypertension, bleeding/healing complications, Proteinuria. Will be seen in 3 weeks for next labs, treatment.     Hypothyroid: longstanding issue. Is currently on 75mcg synthroid. Her TSH is elevated and T4 is low so we will increase to 100mcg. May get worse with immunotherapy so we will have to monitor closely.     Elevated LFT: present at baseline and from tumor. Will monitor closely with starting Atezolizumab which has risk for liver dysfunction.     ECOG Performance    0 - Independent    Distress Screening (within last 30 days)    1. How concerned are you about your ability to eat? : 0  2. How concerned are you about unintended weight loss or your current weight? : 0  3. How concerned are you about feeling depressed or very sad? : 5  4. How concerned are you about feeling anxious or very scared? : 0  5. Do you struggle with the loss of meaning and daniela in your life? : Not at all  6. How concerned are you about work and home life issues that may be affected by your cancer? : (!) 10  7. How concerned are you about knowing what resources are available to help you? : 0  8. Do you currently have what you would describe as Yarsani or spiritual struggles?: Not at all       Pain  Pain Score: Moderate Pain (5)    Problem List    Patient Active Problem List   Diagnosis     Trigger Finger Of The Right Ring Finger     Midback Pain     Benign essential microscopic hematuria     Hyperlipidemia LDL goal <130     Chronic hepatitis C without  hepatic coma (H)     Other specified hypothyroidism     Type 2 diabetes mellitus with diabetic nephropathy, without long-term current use of insulin (H)     Class 1 obesity due to excess calories without serious comorbidity with body mass index (BMI) of 30.0 to 30.9 in adult     Essential hypertension     De Quervain's Tenosynovitis     Proteinuria     Hiatal Hernia     Hepatic Cyst     Vitamin D deficiency     Age-related osteoporosis without current pathological fracture     Scoliosis     Varicose veins of right lower extremity with pain     Liver fibrosis     Personal history of tobacco use     Lumbar spine pain     Thoracic spine pain     Stage 3b chronic kidney disease (H)     Encounter for screening for other viral diseases     Liver mass     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 Holland    Diagnosis: Had a cellular carcinoma.  Found in the right liver measuring about 8 cm in size.  It was actually found when she was doing a low-dose CT screening for lung cancer. AFP was 32,288  -12/26/24: CT showed no lung cancer issues but indeterminate right suprarenal fossa lesion that was 3.9 cm.  -1/9/25: Went on to have another CT scan of the abdomen and pelvis that shows infiltrative poorly defined mass throughout the right lobe of the liver with tumor thrombus within the intrahepatic portal veins in the right and left lobes.  As well as the main portal vein to the level of the portal venous confluence.  Mets to the right adrenal gland partially invades into the IVC.  Favor primary hepatic malignancy, either HCC or call angio.  -2/4/25: Patient had liver biopsy shows hepatocellular carcinoma.  Foundation 1 testing and process.   -2/13/25: Patient had PET scan shows findings suspicious for right hepatic lobe hepatocellular  "carcinoma with right adrenal gland     Treatment:  -2/27/2025: Patient is here today to start treatment with Atezolizumab and bevacizumab    Interim history:  She is here today to start treatment.  She states that she is anxious and nervous to start but does feel ready and educated.  She states that she is doing okay.  She is having a lot of hip stiffness and joint achiness.  She feels like her weight is stable.  She denies any new bone or back pain issues.      Review of Systems    Pertinent items are noted in HPI.    Past History    Past Medical History:   Diagnosis Date     Diabetes mellitus (H)      Disease of thyroid gland      Hypertension      Infectious viral hepatitis        PHYSICAL EXAM  BP (!) 167/71   Pulse 72   Temp 98.6  F (37  C)   Resp 18   Ht 1.562 m (5' 1.5\")   Wt 67.2 kg (148 lb 3.2 oz)   SpO2 100%   BMI 27.55 kg/m      GENERAL: no acute distress. Cooperative in conversation. Alone in clinic  RESP: Regular respiratory rate. No expiratory wheezes   NEURO: non focal. Alert and oriented x3.   PSYCH: within normal limits. No depression or anxiety.  SKIN: exposed skin is dry intact.     Lab Results    Recent Results (from the past week)   Comprehensive metabolic panel   Result Value Ref Range    Sodium 143 135 - 145 mmol/L    Potassium 4.4 3.4 - 5.3 mmol/L    Carbon Dioxide (CO2) 26 22 - 29 mmol/L    Anion Gap 10 7 - 15 mmol/L    Urea Nitrogen 17.2 8.0 - 23.0 mg/dL    Creatinine 1.13 (H) 0.51 - 0.95 mg/dL    GFR Estimate 51 (L) >60 mL/min/1.73m2    Calcium 9.5 8.8 - 10.4 mg/dL    Chloride 107 98 - 107 mmol/L    Glucose 141 (H) 70 - 99 mg/dL    Alkaline Phosphatase 347 (H) 40 - 150 U/L     (H) 0 - 45 U/L     (H) 0 - 50 U/L    Protein Total 7.6 6.4 - 8.3 g/dL    Albumin 3.4 (L) 3.5 - 5.2 g/dL    Bilirubin Total 0.8 <=1.2 mg/dL   TSH with free T4 reflex   Result Value Ref Range    TSH 34.59 (H) 0.30 - 4.20 uIU/mL   T4 free   Result Value Ref Range    Free T4 0.78 (L) 0.90 - 1.70 " ng/dL       Imaging    PET Oncology (Eyes to Thighs)    Result Date: 2/13/2025  EXAM: PET ONCOLOGY (EYES TO THIGHS) LOCATION: [facility] DATE: 2/13/2025 INDICATION: Initial treatment planning and staging for liver cell carcinoma COMPARISON: CT the abdomen pelvis dated 1/9/2025 CONTRAST: None TECHNIQUE: Serum glucose level 107 mg/dL. One hour post intravenous administration of 10.16mCi F18 FDG, PET imaging was performed from the skull vertex to mid thighs, utilizing attenuation correction with concurrent axial CT and PET/CT image fusion. 10.2  Dose reduction techniques were used. PET/CT FINDINGS: Ill-defined FDG avid lesion involving the posterior right hepatic dome measuring approximately 6.7 x 4.1 x 3.9 cm (max SUV 9 point) with FDG avid lesion involving the right adrenal gland measuring 4.8 x 3.1 cm (Max SUV 7.2) suspicious for right hepatic lobe hepatocellular carcinoma with right adrenal gland metastasis. FDG avid expansile soft tissue involving the portal vein and posterior aspect of the IVC (max SUV 6.7) suspicious for an in situ thrombus, which is better seen on prior CT of the abdomen pelvis dated 1/9/2025 CT FINDINGS: Mild-to-moderate senescent intercranial changes. Postoperative changes of the bilateral lenses. Mild carotid artery bifurcation calcification. Mild coronary artery calcium. Anterior liver parenchyma cyst. Cholecystectomy. Sigmoid diverticulosis. Small Intra-abdominal ascites. Multilevel degenerative changes of the spine.     IMPRESSION: 1. Findings suspicious for right hepatic lobe hepatocellular carcinoma with right adrenal gland metastasis. 2. FDG avid expansile soft tissue the portal vein and posterior aspect of the IVC suspicious for an in situ thrombus, which is better seen on prior CT of the abdomen pelvis dated 1/9/2025.    US Biopsy Liver    Result Date: 2/4/2025  EXAM: 1. PERCUTANEOUS BIOPSY RIGHT HEPATIC MASS 2. ULTRASOUND GUIDANCE 3. CONSCIOUS SEDATION  LOCATION: [facility] DATE: 2/4/2025 INDICATION: Chronic hepatitis C. Infiltrating right hepatic mass extending into portal veins and probable right adrenal metastasis. PROCEDURE: Informed consent obtained. Site marked. Prior images reviewed. Required items made available. Patient identity was confirmed verbally and with arm band. Patient reevaluated immediately before administering sedation. Universal protocol was followed. Time out performed. A limited ultrasound of the abdomen is performed demonstrating an infiltrating isoechoic to slightly hypoechoic right hepatic mass and the overlying skin was prepped and draped in sterile fashion. 15 mL of 1 percent lidocaine was infused into the local soft tissues. Using standard technique and under direct ultrasound guidance, a 17/18 gauge Bard coaxial biopsy needle was used to make a total of 6 core biopsies. Needle tract was plugged with Gelfoam. Tissue was submitted to Pathology. The patient tolerated the procedure well. No complications. SEDATION: Versed 1 mg. Fentanyl fifth mcg. The procedure was performed with administration intravenous conscious sedation with appropriate preoperative, intraoperative, and postoperative evaluation. 15 minutes of supervised face to face conscious sedation time was provided by a radiology nurse under my direct supervision.     IMPRESSION: Ultrasound-guided percutaneous biopsy of right hepatic mass performed without complication. Final pathology pending. Reference CPT Code: 74214, 39279, 90848     Personally Reviewed the PET scan images.     Total time spent with patient in face to face time, chart review and documentation was 40 minutes.      The longitudinal plan of care for the diagnosis(es)/condition(s) as documented were addressed during this visit. Due to the added complexity in care, I will continue to support Dirk in the subsequent management and with ongoing continuity of care.      Signed by: Chanda ROBLEDO  ELTON Henson CNP      Again, thank you for allowing me to participate in the care of your patient.        Sincerely,        ELTON Morales CNP    Electronically signed

## 2025-03-03 ENCOUNTER — PATIENT OUTREACH (OUTPATIENT)
Dept: ONCOLOGY | Facility: HOSPITAL | Age: 75
End: 2025-03-03
Payer: COMMERCIAL

## 2025-03-03 LAB — SCANNED LAB RESULT: NORMAL

## 2025-03-03 NOTE — PROGRESS NOTES
[facility]: Cancer Care Follow-Up Note                                    Discussion with Patient:                                                      Call placed to the patient today to check in and see how she is doing after receiving her first infusion of Tecentriq as well as bevacizumab.     Goals          General    Maintain ability to perform ADLs without difficulty             Dates of Treatment:                                                      Infusion given in last 28 days       Administered MAR Action Medication Dose Rate Visit    02/27/2025 10:52 New Bag atezolizumab (TECENTRIQ) 1,200 mg in sodium chloride 0.9 % 130 mL infusion 1,200 mg 130 mL/hr Infusion Therapy Visit on 02/27/2025 in [facility]    02/27/2025 11:49 New Bag bevacizumab-bvzr (ZIRABEV) 1,000 mg in sodium chloride 0.9 % 150 mL infusion 1,000 mg 300 mL/hr Infusion Therapy Visit on 02/27/2025 in [facility]            Assessment:                                                      Patient tells me that she is having some mild ankle swelling.  She does notice that this gets better after she lays down.  I told her to elevate her feet when she is sitting down, make sure she is getting up and moving around each day and she can get some compression stockings if needed.  She states she does have some compression stockings at home so she will try that if she feels it is necessary.  Otherwise no other symptoms to speak of.    Intervention/Education provided during outreach:                                                       Patient will call back if anything else arises otherwise she was told to follow-up at her already scheduled upcoming appointments.    Patient to follow up as scheduled at next appt.   Patient to call/MyChart message with updates.  Confirmed patient has clinic and triage numbers.    Signature:  Alysia Ngo RN

## 2025-03-13 ENCOUNTER — ALLIED HEALTH/NURSE VISIT (OUTPATIENT)
Dept: NURSING | Facility: CLINIC | Age: 75
End: 2025-03-13
Payer: COMMERCIAL

## 2025-03-13 DIAGNOSIS — I10 ESSENTIAL HYPERTENSION: ICD-10-CM

## 2025-03-13 DIAGNOSIS — Z71.89 COMPLEX CARE COORDINATION: Primary | ICD-10-CM

## 2025-03-13 NOTE — LETTER
[facility] CARE COORDINATION  [facility]  March 13, 2025    Liz Pollard  [address]  [place] [address]      Dear Dirk,    I am a clinic care coordinator who works with Alejandro Jones MD with the [facility]. I wanted to thank you for spending the time to talk with me.  Below is a description of clinic care coordination and how I can further assist you.       The clinic care coordination team is made up of a registered nurse, , financial resource worker and community health worker who understand the health care system. The goal of clinic care coordination is to help you manage your health and improve access to the health care system. Our team works alongside your provider to assist you in determining your health and social needs. We can help you obtain health care and community resources, providing you with necessary information and education. We can work with you through any barriers and develop a care plan that helps coordinate and strengthen the communication between you and your care team.  Our services are voluntary and are offered without charge to you personally.    Please feel free to contact me with any questions or concerns regarding care coordination and what we can offer.      We are focused on providing you with the highest-quality healthcare experience possible.    Sincerely,     Tamara Prince, MSW, SW  Social Work Care Coordinator

## 2025-03-13 NOTE — PROGRESS NOTES
Clinic Care Coordination Contact  Clinic Care Coordination Contact  OUTREACH    Referral Information:            Chief Complaint   Patient presents with    Clinic Care Coordination - Initial        Universal Utilization: appropriate  Clinic Utilization  Difficulty keeping appointments:: No  Compliance Concerns: No  No-Show Concerns: No  No PCP office visit in Past Year: No  Utilization      No Show Count (past year)  0             ED Visits  0             Hospital Admissions  1                    Current as of: 3/13/2025  2:11 AM                Clinical Concerns:  Current Medical Concerns:  recent cancer diagnosis    Current Behavioral Concerns: none    Education Provided to patient: CCC education,       Health Maintenance Reviewed: Due/Overdue   Health Maintenance Due   Topic Date Due    A1C  02/26/2025    LIPID  02/26/2025    COVID-19 Vaccine (9 - Moderna risk 2024-25 season) 02/28/2025    MEDICARE ANNUAL WELLNESS VISIT  02/26/2025      Clinical Pathway: None    Medication Management:  Medication review status: Medications reviewed and no changes reported per patient.             Functional Status:  Bed or wheelchair confined:: No  Mobility Status: Independent    Living Situation:  Current living arrangement:: I live in a private home with family  [facility]    Lifestyle & Psychosocial Needs:    Social Drivers of Health     Food Insecurity: Low Risk  (2/26/2024)    Food Insecurity     Within the past 12 months, did you worry that your food would run out before you got money to buy more?: No     Within the past 12 months, did the food you bought just not last and you didn t have money to get more?: No   Depression: Not at risk (1/16/2025)    PHQ-2     PHQ-2 Score: 1   Housing Stability: Low Risk  (2/26/2024)    Housing Stability     Do you have housing? : Yes     Are you worried about losing your housing?: No   Tobacco Use: Medium Risk (2/18/2025)    Patient History     Smoking  Tobacco Use: Former     Smokeless Tobacco Use: Never     Passive Exposure: Never   Financial Resource Strain: Low Risk  (2/26/2024)    Financial Resource Strain     Within the past 12 months, have you or your family members you live with been unable to get utilities (heat, electricity) when it was really needed?: No   Alcohol Use: Not on file   Transportation Needs: Low Risk  (2/26/2024)    Transportation Needs     Within the past 12 months, has lack of transportation kept you from medical appointments, getting your medicines, non-medical meetings or appointments, work, or from getting things that you need?: No   Physical Activity: Unknown (2/26/2024)    Exercise Vital Sign     Days of Exercise per Week: 6 days     Minutes of Exercise per Session: Not on file   Interpersonal Safety: Low Risk  (2/4/2025)    Interpersonal Safety     Do you feel physically and emotionally safe where you currently live?: Yes     Within the past 12 months, have you been hit, slapped, kicked or otherwise physically hurt by someone?: No     Within the past 12 months, have you been humiliated or emotionally abused in other ways by your partner or ex-partner?: No   Stress: Stress Concern Present (2/26/2024)    [employer] - Occupational Stress Questionnaire     Feeling of Stress : To some extent   Social Connections: Unknown (2/26/2024)    Social Connection and Isolation Panel [NHANES]     Frequency of Communication with Friends and Family: Not on file     Frequency of Social Gatherings with Friends and Family: Patient declined     Attends [employer] Services: Not on file     Active Member of Clubs or Organizations: Not on file     Attends Club or Organization Meetings: Not on file     Marital Status: Not on file   Health Literacy: Not on file        Transportation means:: Regular car, Accessible car     Informal Support system:: Significant other, Other (sister in law)             Resources and  Interventions:  Current Resources:      Community Resources: None  Supplies Currently Used at Home: None  Equipment Currently Used at Home: none  Employment Status: retired         Advance Care Plan/Directive  Type Advanced Care Plans/Directives: Other (will bring in her health care directive that she has at home)    Referrals Placed: None        Outreach Frequency: 6 weeks, more frequently as needed  Future Appointments                In 6 days [facility]MO LAB DRAW 1 [facility]    In 6 days Chanda Henson APRN CNP [facility]    In 6 days [facility] CHEMO NURSE; [facility] CHAIR [facility]    In 3 weeks [facility] DRAW 1 [facility]    In 3 weeks Bautista Holland MD [facility]    In 3 weeks [facility] CHEMO NURSE; [facility] CHEMO CHAIR [facility]            Plan: CCSW met with pt to discuss housing options for her spouse. Pt stated she was given 6 months-1 yr to live and she is worried about him. CCSW provided medicare.gov for list of LTC facilities and their ratings. CCSW also printed off listing of assisted living and LTC facilities close to pt's zipcode. CCSW and pt broke down the process of finding somewhere for her spouse, this made pt feel better about the process, and less overwhelming. Pt did not have any other needs at this time. CCSW closed referral, and provided pt CCSW direct number and asked her to call with any questions. Pt was agreeable.     Tamara Prince, MSW, SW  Social Work Care Coordinator

## 2025-03-15 ENCOUNTER — HEALTH MAINTENANCE LETTER (OUTPATIENT)
Age: 75
End: 2025-03-15

## 2025-03-18 RX ORDER — LISINOPRIL 40 MG/1
TABLET ORAL
Qty: 90 TABLET | Refills: 2 | Status: SHIPPED | OUTPATIENT
Start: 2025-03-18

## 2025-03-19 ENCOUNTER — INFUSION THERAPY VISIT (OUTPATIENT)
Dept: INFUSION THERAPY | Facility: HOSPITAL | Age: 75
End: 2025-03-19
Payer: COMMERCIAL

## 2025-03-19 ENCOUNTER — LAB (OUTPATIENT)
Dept: INFUSION THERAPY | Facility: HOSPITAL | Age: 75
End: 2025-03-19
Payer: COMMERCIAL

## 2025-03-19 ENCOUNTER — ONCOLOGY VISIT (OUTPATIENT)
Dept: ONCOLOGY | Facility: HOSPITAL | Age: 75
End: 2025-03-19
Payer: COMMERCIAL

## 2025-03-19 VITALS
BODY MASS INDEX: 25.76 KG/M2 | RESPIRATION RATE: 18 BRPM | OXYGEN SATURATION: 98 % | SYSTOLIC BLOOD PRESSURE: 170 MMHG | TEMPERATURE: 97.8 F | HEART RATE: 59 BPM | WEIGHT: 140 LBS | HEIGHT: 62 IN | DIASTOLIC BLOOD PRESSURE: 60 MMHG

## 2025-03-19 DIAGNOSIS — C22.0 HEPATOCELLULAR CARCINOMA (H): Primary | ICD-10-CM

## 2025-03-19 LAB
AFP SERPL-MCNC: ABNORMAL NG/ML
ALBUMIN SERPL BCG-MCNC: 3.1 G/DL (ref 3.5–5.2)
ALP SERPL-CCNC: 378 U/L (ref 40–150)
ALT SERPL W P-5'-P-CCNC: 112 U/L (ref 0–50)
ANION GAP SERPL CALCULATED.3IONS-SCNC: 8 MMOL/L (ref 7–15)
AST SERPL W P-5'-P-CCNC: 303 U/L (ref 0–45)
BILIRUB SERPL-MCNC: 0.8 MG/DL
BUN SERPL-MCNC: 10.8 MG/DL (ref 8–23)
CALCIUM SERPL-MCNC: 9.8 MG/DL (ref 8.8–10.4)
CHLORIDE SERPL-SCNC: 104 MMOL/L (ref 98–107)
CREAT SERPL-MCNC: 1.1 MG/DL (ref 0.51–0.95)
EGFRCR SERPLBLD CKD-EPI 2021: 53 ML/MIN/1.73M2
GLUCOSE SERPL-MCNC: 109 MG/DL (ref 70–99)
HCO3 SERPL-SCNC: 26 MMOL/L (ref 22–29)
POTASSIUM SERPL-SCNC: 4.3 MMOL/L (ref 3.4–5.3)
PROT SERPL-MCNC: 7.4 G/DL (ref 6.4–8.3)
SODIUM SERPL-SCNC: 138 MMOL/L (ref 135–145)
T4 FREE SERPL-MCNC: 1.13 NG/DL (ref 0.9–1.7)
TSH SERPL DL<=0.005 MIU/L-ACNC: 36.5 UIU/ML (ref 0.3–4.2)

## 2025-03-19 PROCEDURE — 250N000011 HC RX IP 250 OP 636: Mod: JZ | Performed by: INTERNAL MEDICINE

## 2025-03-19 PROCEDURE — 258N000003 HC RX IP 258 OP 636: Performed by: INTERNAL MEDICINE

## 2025-03-19 PROCEDURE — 36415 COLL VENOUS BLD VENIPUNCTURE: CPT

## 2025-03-19 PROCEDURE — 84443 ASSAY THYROID STIM HORMONE: CPT | Performed by: INTERNAL MEDICINE

## 2025-03-19 PROCEDURE — 96413 CHEMO IV INFUSION 1 HR: CPT

## 2025-03-19 PROCEDURE — G0463 HOSPITAL OUTPT CLINIC VISIT: HCPCS | Mod: 25 | Performed by: NURSE PRACTITIONER

## 2025-03-19 PROCEDURE — 80053 COMPREHEN METABOLIC PANEL: CPT | Performed by: INTERNAL MEDICINE

## 2025-03-19 PROCEDURE — 96417 CHEMO IV INFUS EACH ADDL SEQ: CPT

## 2025-03-19 RX ORDER — DIPHENHYDRAMINE HYDROCHLORIDE 50 MG/ML
50 INJECTION, SOLUTION INTRAMUSCULAR; INTRAVENOUS
Status: DISCONTINUED | OUTPATIENT
Start: 2025-03-19 | End: 2025-03-19 | Stop reason: HOSPADM

## 2025-03-19 RX ORDER — EPINEPHRINE 1 MG/ML
0.3 INJECTION, SOLUTION INTRAMUSCULAR; SUBCUTANEOUS EVERY 5 MIN PRN
Status: DISCONTINUED | OUTPATIENT
Start: 2025-03-19 | End: 2025-03-19 | Stop reason: HOSPADM

## 2025-03-19 RX ORDER — MEPERIDINE HYDROCHLORIDE 25 MG/ML
25 INJECTION INTRAMUSCULAR; INTRAVENOUS; SUBCUTANEOUS
Status: DISCONTINUED | OUTPATIENT
Start: 2025-03-19 | End: 2025-03-19 | Stop reason: HOSPADM

## 2025-03-19 RX ORDER — DIPHENHYDRAMINE HYDROCHLORIDE 50 MG/ML
25 INJECTION, SOLUTION INTRAMUSCULAR; INTRAVENOUS
Status: DISCONTINUED | OUTPATIENT
Start: 2025-03-19 | End: 2025-03-19 | Stop reason: HOSPADM

## 2025-03-19 RX ORDER — ALBUTEROL SULFATE 90 UG/1
1-2 INHALANT RESPIRATORY (INHALATION)
Status: DISCONTINUED | OUTPATIENT
Start: 2025-03-19 | End: 2025-03-19 | Stop reason: HOSPADM

## 2025-03-19 RX ORDER — ALBUTEROL SULFATE 0.83 MG/ML
2.5 SOLUTION RESPIRATORY (INHALATION)
Status: DISCONTINUED | OUTPATIENT
Start: 2025-03-19 | End: 2025-03-19 | Stop reason: HOSPADM

## 2025-03-19 RX ORDER — METHYLPREDNISOLONE SODIUM SUCCINATE 40 MG/ML
40 INJECTION INTRAMUSCULAR; INTRAVENOUS
Status: DISCONTINUED | OUTPATIENT
Start: 2025-03-19 | End: 2025-03-19 | Stop reason: HOSPADM

## 2025-03-19 RX ADMIN — SODIUM CHLORIDE 1000 MG: 9 INJECTION, SOLUTION INTRAVENOUS at 15:49

## 2025-03-19 RX ADMIN — ATEZOLIZUMAB 1200 MG: 1200 INJECTION, SOLUTION INTRAVENOUS at 15:16

## 2025-03-19 RX ADMIN — SODIUM CHLORIDE 250 ML: 0.9 INJECTION, SOLUTION INTRAVENOUS at 15:16

## 2025-03-19 ASSESSMENT — PAIN SCALES - GENERAL: PAINLEVEL_OUTOF10: NO PAIN (0)

## 2025-03-19 NOTE — PROGRESS NOTES
Infusion Nursing Note:  Liz H Pollard presents today for D1C2.    Patient seen by provider today: Yes: Chanda Henson   present during visit today: Not Applicable.    Note: pt here for treatment after seeing KE. BP reviewed and ok to proceed today. Pt will follow up with her primary regarding BP meds. Also unable to void today. Pt was given a specimen cup to bring urine sample at next visit. IV removed upon completion and pt aware of treatment plan.      Intravenous Access:  Peripheral IV placed.    Treatment Conditions:  Results reviewed, labs MET treatment parameters, ok to proceed with treatment.      Post Infusion Assessment:  Patient tolerated infusion without incident.       Discharge Plan:   Patient discharged in stable condition accompanied by: friend.  Departure Mode: Ambulatory.      Catina Stahl RN

## 2025-03-19 NOTE — PROGRESS NOTES
[facility] Hematology and Oncology Progress Note    Patient: Liz Pollard  MRN: [id number]  Date of Service: Mar 19, 2025          Reason for Visit    Chief Complaint   Patient presents with    Oncology Clinic Visit     Hepatocellular carcinoma (H)         Assessment and Plan     Cancer Staging   No matching staging information was found for the patient.    Hepatocellular carcinoma, metastatic disease with adrenal mets: pt is here today to continue palliative treatment with Atezolizumab and Avastin.  She will get cycle 2 today. She will return in 3 weeks for cycle 3.  We will follow tumor marker, AFP as well as do imaging along the way to evaluate response.  I did tell her we will likely do a scan after 3-4 doses.  Patient meets all criteria to continue treatment.  Her blood pressure is up a little bit from the Avastin and the function is up a little bit but is stable to where she was at her baseline so we will continue to monitor these closely.    Hypothyroid: longstanding issue.  Previously on 75 mcg and we did increase to 100 mcg 3 weeks ago.  Her TSH is still elevated but since its only been 3 weeks since increasing the dose we will hold off on increasing further.  If she still elevated in 3 weeks when she comes back we will have to increase the Synthroid again.     Elevated LFT: present at baseline and from tumor. Will monitor closely with starting Atezolizumab which has risk for liver dysfunction.  Alk phos has gone from 3 47-3 78, AST from 2 80-3 03, ALT from 136 down to 112.  Bilirubin normal    ECOG Performance    0 - Independent    Distress Screening (within last 30 days)    1. How concerned are you about your ability to eat? : 0  2. How concerned are you about unintended weight loss or your current weight? : 0  3. How concerned are you about feeling depressed or very sad? : 5  4. How concerned are you about feeling anxious or very scared? : 0  5. Do you struggle with the loss of meaning and  daniela in your life? : Not at all  6. How concerned are you about work and home life issues that may be affected by your cancer? : (!) 10  7. How concerned are you about knowing what resources are available to help you? : 0  8. Do you currently have what you would describe as Gnosticism or spiritual struggles?: Not at all       Pain  Pain Score: No Pain (0)    Problem List    Patient Active Problem List   Diagnosis    Trigger Finger Of The Right Ring Finger    Midback Pain    Benign essential microscopic hematuria    Hyperlipidemia LDL goal <130    Chronic hepatitis C without hepatic coma (H)    Other specified hypothyroidism    Type 2 diabetes mellitus with diabetic nephropathy, without long-term current use of insulin (H)    Class 1 obesity due to excess calories without serious comorbidity with body mass index (BMI) of 30.0 to 30.9 in adult    Essential hypertension    De Quervain's Tenosynovitis    Proteinuria    Hiatal Hernia    Hepatic Cyst    Vitamin D deficiency    Age-related osteoporosis without current pathological fracture    Scoliosis    Varicose veins of right lower extremity with pain    Liver fibrosis    Personal history of tobacco use    Lumbar spine pain    Thoracic spine pain    Stage 3b chronic kidney disease (H)    Encounter for screening for other viral diseases    Liver mass    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 Holland    Diagnosis: Had a cellular carcinoma.  Found in the right liver measuring about 8 cm in size.  It was actually found when she was doing a low-dose CT screening for lung cancer. AFP was 32,288  -12/26/24: CT showed no lung cancer issues but indeterminate right suprarenal fossa lesion that was 3.9 cm.  -1/9/25: Went on to have another CT scan of the abdomen and pelvis that shows  "infiltrative poorly defined mass throughout the right lobe of the liver with tumor thrombus within the intrahepatic portal veins in the right and left lobes.  As well as the main portal vein to the level of the portal venous confluence.  Mets to the right adrenal gland partially invades into the IVC.  Favor primary hepatic malignancy, either HCC or call angio.  -2/4/25: Patient had liver biopsy shows hepatocellular carcinoma.  Foundation 1 testing and process.   -2/13/25: Patient had PET scan shows findings suspicious for right hepatic lobe hepatocellular carcinoma with right adrenal gland     Treatment:  -2/27/2025: Atezolizumab and bevacizumab.  Today is Cycle 2    Interim history:  Is here today to continue treatment.  She states that overall she did okay with the treatment but is a little anxious about continuing.  She says she has some low-grade nausea which has been going on even before the treatment.  She has some fatigue.  Intermittent diarrhea.  She feels like it might be related to what she is eating.  Denies any fevers or night sweats.  Denies any infectious complaints.      Review of Systems    Pertinent items are noted in HPI.    Past History    Past Medical History:   Diagnosis Date    Diabetes mellitus (H)     Disease of thyroid gland     Hypertension     Infectious viral hepatitis        PHYSICAL EXAM  BP (!) 170/60   Pulse 59   Temp 97.8  F (36.6  C)   Resp 18   Ht 1.562 m (5' 1.5\")   Wt 63.5 kg (140 lb)   SpO2 98%   BMI 26.02 kg/m      GENERAL: no acute distress. Cooperative in conversation.  Here with family/friends  RESP: Regular respiratory rate. No expiratory wheezes   NEURO: non focal. Alert and oriented x3.   PSYCH: within normal limits. No depression or anxiety.  SKIN: exposed skin is dry intact.     Lab Results    Recent Results (from the past week)   Comprehensive metabolic panel   Result Value Ref Range    Sodium 138 135 - 145 mmol/L    Potassium 4.3 3.4 - 5.3 mmol/L    Carbon " Dioxide (CO2) 26 22 - 29 mmol/L    Anion Gap 8 7 - 15 mmol/L    Urea Nitrogen 10.8 8.0 - 23.0 mg/dL    Creatinine 1.10 (H) 0.51 - 0.95 mg/dL    GFR Estimate 53 (L) >60 mL/min/1.73m2    Calcium 9.8 8.8 - 10.4 mg/dL    Chloride 104 98 - 107 mmol/L    Glucose 109 (H) 70 - 99 mg/dL    Alkaline Phosphatase 378 (H) 40 - 150 U/L     (H) 0 - 45 U/L     (H) 0 - 50 U/L    Protein Total 7.4 6.4 - 8.3 g/dL    Albumin 3.1 (L) 3.5 - 5.2 g/dL    Bilirubin Total 0.8 <=1.2 mg/dL   TSH with free T4 reflex   Result Value Ref Range    TSH 36.50 (H) 0.30 - 4.20 uIU/mL   T4 free   Result Value Ref Range    Free T4 1.13 0.90 - 1.70 ng/dL       Imaging    No results found.      The longitudinal plan of care for the diagnosis(es)/condition(s) as documented were addressed during this visit. Due to the added complexity in care, I will continue to support Dirk in the subsequent management and with ongoing continuity of care.      Signed by: ELTON Morales CNP

## 2025-03-19 NOTE — PROGRESS NOTES
"Oncology Rooming Note    March 19, 2025 1:04 PM   Liz Pollard is a 74 year old female who presents for:    Chief Complaint   Patient presents with    Oncology Clinic Visit     Hepatocellular carcinoma (H)       Initial Vitals: BP (!) 170/60   Pulse 59   Temp 97.8  F (36.6  C)   Resp 18   Ht 1.562 m (5' 1.5\")   Wt 63.5 kg (140 lb)   SpO2 98%   BMI 26.02 kg/m   Estimated body mass index is 26.02 kg/m  as calculated from the following:    Height as of this encounter: 1.562 m (5' 1.5\").    Weight as of this encounter: 63.5 kg (140 lb). Body surface area is 1.66 meters squared.  No Pain (0) Comment: Data Unavailable   No LMP recorded. Patient has had a hysterectomy.  Allergies reviewed: Yes  Medications reviewed: Yes    Medications: Medication refills not needed today.  Pharmacy name entered into [facility]: [id number] [address] [place] [address]    Frailty Screening:   Is the patient here for a new oncology consult visit in cancer care? 2. No    PHQ9:  Did this patient require a PHQ9?: Yes   If the patient required a PHQ9 assessment, did the results require a follow up with the Provider/Nurse?: No      Clinical concerns: Nausea       Bharati Wang LPN             "

## 2025-03-19 NOTE — LETTER
"3/19/2025      Liz Pollard  [address]  [place] [address]      Dear Colleague,    Thank you for referring your patient, Liz Pollard, to the [facility]. Please see a copy of my visit note below.    Oncology Rooming Note    March 19, 2025 1:04 PM   Liz Pollard is a 74 year old female who presents for:    Chief Complaint   Patient presents with     Oncology Clinic Visit     Hepatocellular carcinoma (H)       Initial Vitals: BP (!) 170/60   Pulse 59   Temp 97.8  F (36.6  C)   Resp 18   Ht 1.562 m (5' 1.5\")   Wt 63.5 kg (140 lb)   SpO2 98%   BMI 26.02 kg/m   Estimated body mass index is 26.02 kg/m  as calculated from the following:    Height as of this encounter: 1.562 m (5' 1.5\").    Weight as of this encounter: 63.5 kg (140 lb). Body surface area is 1.66 meters squared.  No Pain (0) Comment: Data Unavailable   No LMP recorded. Patient has had a hysterectomy.  Allergies reviewed: Yes  Medications reviewed: Yes    Medications: Medication refills not needed today.  Pharmacy name entered into frintit: [id number] [address]    Frailty Screening:   Is the patient here for a new oncology consult visit in cancer care? 2. No    PHQ9:  Did this patient require a PHQ9?: Yes   If the patient required a PHQ9 assessment, did the results require a follow up with the Provider/Nurse?: No      Clinical concerns: Nausea       Bharati Wang LPN               [facility] Hematology and Oncology Progress Note    Patient: Liz Pollard  MRN: [id number]  Date of Service: Mar 19, 2025          Reason for Visit    Chief Complaint   Patient presents with     Oncology Clinic Visit     Hepatocellular carcinoma (H)         Assessment and Plan     Cancer Staging   No matching staging information was found for the patient.    Hepatocellular carcinoma, metastatic disease with adrenal mets: pt is here today to continue palliative " treatment with Atezolizumab and Avastin.  She will get cycle 2 today. She will return in 3 weeks for cycle 3.  We will follow tumor marker, AFP as well as do imaging along the way to evaluate response.  I did tell her we will likely do a scan after 3-4 doses.  Patient meets all criteria to continue treatment.  Her blood pressure is up a little bit from the Avastin and the function is up a little bit but is stable to where she was at her baseline so we will continue to monitor these closely.    Hypothyroid: longstanding issue.  Previously on 75 mcg and we did increase to 100 mcg 3 weeks ago.  Her TSH is still elevated but since its only been 3 weeks since increasing the dose we will hold off on increasing further.  If she still elevated in 3 weeks when she comes back we will have to increase the Synthroid again.     Elevated LFT: present at baseline and from tumor. Will monitor closely with starting Atezolizumab which has risk for liver dysfunction.  Alk phos has gone from 3 47-3 78, AST from 2 80-3 03, ALT from 136 down to 112.  Bilirubin normal    ECOG Performance    0 - Independent    Distress Screening (within last 30 days)    1. How concerned are you about your ability to eat? : 0  2. How concerned are you about unintended weight loss or your current weight? : 0  3. How concerned are you about feeling depressed or very sad? : 5  4. How concerned are you about feeling anxious or very scared? : 0  5. Do you struggle with the loss of meaning and daniela in your life? : Not at all  6. How concerned are you about work and home life issues that may be affected by your cancer? : (!) 10  7. How concerned are you about knowing what resources are available to help you? : 0  8. Do you currently have what you would describe as Evangelical or spiritual struggles?: Not at all       Pain  Pain Score: No Pain (0)    Problem List    Patient Active Problem List   Diagnosis     Trigger Finger Of The Right Ring Finger     Midback Pain      Benign essential microscopic hematuria     Hyperlipidemia LDL goal <130     Chronic hepatitis C without hepatic coma (H)     Other specified hypothyroidism     Type 2 diabetes mellitus with diabetic nephropathy, without long-term current use of insulin (H)     Class 1 obesity due to excess calories without serious comorbidity with body mass index (BMI) of 30.0 to 30.9 in adult     Essential hypertension     De Quervain's Tenosynovitis     Proteinuria     Hiatal Hernia     Hepatic Cyst     Vitamin D deficiency     Age-related osteoporosis without current pathological fracture     Scoliosis     Varicose veins of right lower extremity with pain     Liver fibrosis     Personal history of tobacco use     Lumbar spine pain     Thoracic spine pain     Stage 3b chronic kidney disease (H)     Encounter for screening for other viral diseases     Liver mass     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 Holland    Diagnosis: Had a cellular carcinoma.  Found in the right liver measuring about 8 cm in size.  It was actually found when she was doing a low-dose CT screening for lung cancer. AFP was 32,288  -12/26/24: CT showed no lung cancer issues but indeterminate right suprarenal fossa lesion that was 3.9 cm.  -1/9/25: Went on to have another CT scan of the abdomen and pelvis that shows infiltrative poorly defined mass throughout the right lobe of the liver with tumor thrombus within the intrahepatic portal veins in the right and left lobes.  As well as the main portal vein to the level of the portal venous confluence.  Mets to the right adrenal gland partially invades into the IVC.  Favor primary hepatic malignancy, either HCC or call angio.  -2/4/25: Patient had liver biopsy shows hepatocellular carcinoma.  Foundation 1 testing  "and process.   -2/13/25: Patient had PET scan shows findings suspicious for right hepatic lobe hepatocellular carcinoma with right adrenal gland     Treatment:  -2/27/2025: Atezolizumab and bevacizumab.  Today is Cycle 2    Interim history:  Is here today to continue treatment.  She states that overall she did okay with the treatment but is a little anxious about continuing.  She says she has some low-grade nausea which has been going on even before the treatment.  She has some fatigue.  Intermittent diarrhea.  She feels like it might be related to what she is eating.  Denies any fevers or night sweats.  Denies any infectious complaints.      Review of Systems    Pertinent items are noted in HPI.    Past History    Past Medical History:   Diagnosis Date     Diabetes mellitus (H)      Disease of thyroid gland      Hypertension      Infectious viral hepatitis        PHYSICAL EXAM  BP (!) 170/60   Pulse 59   Temp 97.8  F (36.6  C)   Resp 18   Ht 1.562 m (5' 1.5\")   Wt 63.5 kg (140 lb)   SpO2 98%   BMI 26.02 kg/m      GENERAL: no acute distress. Cooperative in conversation.  Here with family/friends  RESP: Regular respiratory rate. No expiratory wheezes   NEURO: non focal. Alert and oriented x3.   PSYCH: within normal limits. No depression or anxiety.  SKIN: exposed skin is dry intact.     Lab Results    Recent Results (from the past week)   Comprehensive metabolic panel   Result Value Ref Range    Sodium 138 135 - 145 mmol/L    Potassium 4.3 3.4 - 5.3 mmol/L    Carbon Dioxide (CO2) 26 22 - 29 mmol/L    Anion Gap 8 7 - 15 mmol/L    Urea Nitrogen 10.8 8.0 - 23.0 mg/dL    Creatinine 1.10 (H) 0.51 - 0.95 mg/dL    GFR Estimate 53 (L) >60 mL/min/1.73m2    Calcium 9.8 8.8 - 10.4 mg/dL    Chloride 104 98 - 107 mmol/L    Glucose 109 (H) 70 - 99 mg/dL    Alkaline Phosphatase 378 (H) 40 - 150 U/L     (H) 0 - 45 U/L     (H) 0 - 50 U/L    Protein Total 7.4 6.4 - 8.3 g/dL    Albumin 3.1 (L) 3.5 - 5.2 g/dL    " Bilirubin Total 0.8 <=1.2 mg/dL   TSH with free T4 reflex   Result Value Ref Range    TSH 36.50 (H) 0.30 - 4.20 uIU/mL   T4 free   Result Value Ref Range    Free T4 1.13 0.90 - 1.70 ng/dL       Imaging    No results found.      The longitudinal plan of care for the diagnosis(es)/condition(s) as documented were addressed during this visit. Due to the added complexity in care, I will continue to support Dirk in the subsequent management and with ongoing continuity of care.      Signed by: ELTON Morales CNP      Again, thank you for allowing me to participate in the care of your patient.        Sincerely,        ELTON Morales CNP    Electronically signed

## 2025-04-06 ENCOUNTER — HEALTH MAINTENANCE LETTER (OUTPATIENT)
Age: 75
End: 2025-04-06

## 2025-04-09 ENCOUNTER — ONCOLOGY VISIT (OUTPATIENT)
Dept: ONCOLOGY | Facility: HOSPITAL | Age: 75
End: 2025-04-09
Payer: COMMERCIAL

## 2025-04-09 ENCOUNTER — INFUSION THERAPY VISIT (OUTPATIENT)
Dept: INFUSION THERAPY | Facility: HOSPITAL | Age: 75
End: 2025-04-09
Payer: COMMERCIAL

## 2025-04-09 ENCOUNTER — LAB (OUTPATIENT)
Dept: INFUSION THERAPY | Facility: HOSPITAL | Age: 75
End: 2025-04-09
Payer: COMMERCIAL

## 2025-04-09 ENCOUNTER — TELEPHONE (OUTPATIENT)
Dept: FAMILY MEDICINE | Facility: CLINIC | Age: 75
End: 2025-04-09

## 2025-04-09 VITALS
TEMPERATURE: 97.9 F | WEIGHT: 138.3 LBS | HEIGHT: 62 IN | DIASTOLIC BLOOD PRESSURE: 65 MMHG | HEART RATE: 58 BPM | BODY MASS INDEX: 25.45 KG/M2 | OXYGEN SATURATION: 99 % | SYSTOLIC BLOOD PRESSURE: 160 MMHG | RESPIRATION RATE: 16 BRPM

## 2025-04-09 VITALS — RESPIRATION RATE: 16 BRPM | SYSTOLIC BLOOD PRESSURE: 220 MMHG | HEART RATE: 60 BPM | DIASTOLIC BLOOD PRESSURE: 94 MMHG

## 2025-04-09 DIAGNOSIS — C22.0 HEPATOCELLULAR CARCINOMA (H): Primary | ICD-10-CM

## 2025-04-09 DIAGNOSIS — Z76.0 ENCOUNTER FOR MEDICATION REFILL: ICD-10-CM

## 2025-04-09 DIAGNOSIS — E89.0 POSTABLATIVE HYPOTHYROIDISM: ICD-10-CM

## 2025-04-09 LAB
AFP SERPL-MCNC: ABNORMAL NG/ML
ALBUMIN SERPL BCG-MCNC: 3.1 G/DL (ref 3.5–5.2)
ALP SERPL-CCNC: 300 U/L (ref 40–150)
ALT SERPL W P-5'-P-CCNC: 92 U/L (ref 0–50)
ANION GAP SERPL CALCULATED.3IONS-SCNC: 11 MMOL/L (ref 7–15)
AST SERPL W P-5'-P-CCNC: 222 U/L (ref 0–45)
BILIRUB SERPL-MCNC: 1.1 MG/DL
BUN SERPL-MCNC: 14.1 MG/DL (ref 8–23)
CALCIUM SERPL-MCNC: 9.9 MG/DL (ref 8.8–10.4)
CHLORIDE SERPL-SCNC: 106 MMOL/L (ref 98–107)
CREAT SERPL-MCNC: 1.22 MG/DL (ref 0.51–0.95)
EGFRCR SERPLBLD CKD-EPI 2021: 47 ML/MIN/1.73M2
GLUCOSE SERPL-MCNC: 90 MG/DL (ref 70–99)
HCO3 SERPL-SCNC: 25 MMOL/L (ref 22–29)
POTASSIUM SERPL-SCNC: 4.3 MMOL/L (ref 3.4–5.3)
PROT SERPL-MCNC: 7.5 G/DL (ref 6.4–8.3)
SODIUM SERPL-SCNC: 142 MMOL/L (ref 135–145)
T4 FREE SERPL-MCNC: 1.05 NG/DL (ref 0.9–1.7)
TSH SERPL DL<=0.005 MIU/L-ACNC: 36.61 UIU/ML (ref 0.3–4.2)

## 2025-04-09 PROCEDURE — 36415 COLL VENOUS BLD VENIPUNCTURE: CPT

## 2025-04-09 PROCEDURE — 258N000003 HC RX IP 258 OP 636: Performed by: INTERNAL MEDICINE

## 2025-04-09 PROCEDURE — 82040 ASSAY OF SERUM ALBUMIN: CPT | Performed by: INTERNAL MEDICINE

## 2025-04-09 PROCEDURE — 82105 ALPHA-FETOPROTEIN SERUM: CPT

## 2025-04-09 PROCEDURE — G0463 HOSPITAL OUTPT CLINIC VISIT: HCPCS | Performed by: INTERNAL MEDICINE

## 2025-04-09 PROCEDURE — 96413 CHEMO IV INFUSION 1 HR: CPT

## 2025-04-09 PROCEDURE — 82947 ASSAY GLUCOSE BLOOD QUANT: CPT | Performed by: INTERNAL MEDICINE

## 2025-04-09 PROCEDURE — 250N000011 HC RX IP 250 OP 636: Mod: JZ | Performed by: INTERNAL MEDICINE

## 2025-04-09 PROCEDURE — 84443 ASSAY THYROID STIM HORMONE: CPT | Performed by: INTERNAL MEDICINE

## 2025-04-09 PROCEDURE — 84439 ASSAY OF FREE THYROXINE: CPT | Performed by: INTERNAL MEDICINE

## 2025-04-09 RX ORDER — LORAZEPAM 2 MG/ML
0.5 INJECTION INTRAMUSCULAR EVERY 4 HOURS PRN
OUTPATIENT
Start: 2025-05-01

## 2025-04-09 RX ORDER — METHYLPREDNISOLONE SODIUM SUCCINATE 40 MG/ML
40 INJECTION INTRAMUSCULAR; INTRAVENOUS
Start: 2025-05-01

## 2025-04-09 RX ORDER — LEVOTHYROXINE SODIUM 25 UG/1
25 TABLET ORAL
Qty: 90 TABLET | Refills: 0 | Status: SHIPPED | OUTPATIENT
Start: 2025-04-09

## 2025-04-09 RX ORDER — ALBUTEROL SULFATE 90 UG/1
1-2 INHALANT RESPIRATORY (INHALATION)
Start: 2025-05-01

## 2025-04-09 RX ORDER — ALBUTEROL SULFATE 90 UG/1
1-2 INHALANT RESPIRATORY (INHALATION)
Status: DISCONTINUED | OUTPATIENT
Start: 2025-04-09 | End: 2025-04-09 | Stop reason: HOSPADM

## 2025-04-09 RX ORDER — ALBUTEROL SULFATE 0.83 MG/ML
2.5 SOLUTION RESPIRATORY (INHALATION)
Status: DISCONTINUED | OUTPATIENT
Start: 2025-04-09 | End: 2025-04-09 | Stop reason: HOSPADM

## 2025-04-09 RX ORDER — EPINEPHRINE 1 MG/ML
0.3 INJECTION, SOLUTION INTRAMUSCULAR; SUBCUTANEOUS EVERY 5 MIN PRN
Status: DISCONTINUED | OUTPATIENT
Start: 2025-04-09 | End: 2025-04-09 | Stop reason: HOSPADM

## 2025-04-09 RX ORDER — MEPERIDINE HYDROCHLORIDE 25 MG/ML
25 INJECTION INTRAMUSCULAR; INTRAVENOUS; SUBCUTANEOUS
OUTPATIENT
Start: 2025-05-01

## 2025-04-09 RX ORDER — DIPHENHYDRAMINE HYDROCHLORIDE 50 MG/ML
50 INJECTION, SOLUTION INTRAMUSCULAR; INTRAVENOUS
Start: 2025-05-01

## 2025-04-09 RX ORDER — LORAZEPAM 2 MG/ML
0.5 INJECTION INTRAMUSCULAR EVERY 4 HOURS PRN
Status: DISCONTINUED | OUTPATIENT
Start: 2025-04-09 | End: 2025-04-09 | Stop reason: HOSPADM

## 2025-04-09 RX ORDER — MEPERIDINE HYDROCHLORIDE 25 MG/ML
25 INJECTION INTRAMUSCULAR; INTRAVENOUS; SUBCUTANEOUS
Status: DISCONTINUED | OUTPATIENT
Start: 2025-04-09 | End: 2025-04-09 | Stop reason: HOSPADM

## 2025-04-09 RX ORDER — DIPHENHYDRAMINE HYDROCHLORIDE 50 MG/ML
25 INJECTION, SOLUTION INTRAMUSCULAR; INTRAVENOUS
Start: 2025-05-01

## 2025-04-09 RX ORDER — METHYLPREDNISOLONE SODIUM SUCCINATE 40 MG/ML
40 INJECTION INTRAMUSCULAR; INTRAVENOUS
Status: DISCONTINUED | OUTPATIENT
Start: 2025-04-09 | End: 2025-04-09 | Stop reason: HOSPADM

## 2025-04-09 RX ORDER — ALBUTEROL SULFATE 0.83 MG/ML
2.5 SOLUTION RESPIRATORY (INHALATION)
OUTPATIENT
Start: 2025-05-01

## 2025-04-09 RX ORDER — EPINEPHRINE 1 MG/ML
0.3 INJECTION, SOLUTION INTRAMUSCULAR; SUBCUTANEOUS EVERY 5 MIN PRN
OUTPATIENT
Start: 2025-05-01

## 2025-04-09 RX ORDER — DIPHENHYDRAMINE HYDROCHLORIDE 50 MG/ML
50 INJECTION, SOLUTION INTRAMUSCULAR; INTRAVENOUS
Status: DISCONTINUED | OUTPATIENT
Start: 2025-04-09 | End: 2025-04-09 | Stop reason: HOSPADM

## 2025-04-09 RX ORDER — DIPHENHYDRAMINE HYDROCHLORIDE 50 MG/ML
25 INJECTION, SOLUTION INTRAMUSCULAR; INTRAVENOUS
Status: DISCONTINUED | OUTPATIENT
Start: 2025-04-09 | End: 2025-04-09 | Stop reason: HOSPADM

## 2025-04-09 RX ADMIN — ATEZOLIZUMAB 1200 MG: 1200 INJECTION, SOLUTION INTRAVENOUS at 15:18

## 2025-04-09 ASSESSMENT — PAIN SCALES - GENERAL: PAINLEVEL_OUTOF10: NO PAIN (0)

## 2025-04-09 NOTE — PROGRESS NOTES
Infusion Nursing Note:  Liz Pollard presents today for C3D1.    Patient seen by provider today: Yes: Dr. Holland   present during visit today: Not Applicable.    Note: pt to infusion after meeting with provider,  BP rechecked since it has been elvated.  BP (!) 220/94   Pulse 60   Resp 16  , pt reports that she has had dizziness that increases when she turns her head to the right and a dull head and neck pain. Pt reports that she is taking her BP medications.  Dr. Holland aware,  Bevacizumab was held today.  Message sent to Dr. Randall, who last prescribed lisinopril on 3/18.  Report given to Charge RN, who assumes care. PIV was placed in left AC.      Intravenous Access:  Peripheral IV placed.    Treatment Conditions:  Lab Results   Component Value Date     04/09/2025    POTASSIUM 4.3 04/09/2025    CR 1.22 (H) 04/09/2025    COLT 9.9 04/09/2025    BILITOTAL 1.1 04/09/2025    ALBUMIN 3.1 (L) 04/09/2025    ALT 92 (H) 04/09/2025     (H) 04/09/2025       Results reviewed, patient unable to void and BP elevated,  Bevacizumab being held today per Dr. Holland.        Phyllis Goddard RN

## 2025-04-09 NOTE — TELEPHONE ENCOUNTER
"I received a staff message from oncology nurse.    Please arrange for pt to be seen at [facility].  Thanks - dlh      Hello,   I just wanted to update you regarding Dirk.  Her BP has consistently been elevated, she is reporting some dizziness that is aggravated when she turns her head to right, dull head and neck pain.  BP (!) 220/94   Pulse 60   Temp 97.9  F (36.6  C)   Resp 16   Ht 1.562 m (5' 1.5\")   Wt 62.7 kg (138 lb 4.8 oz)   SpO2 99%   BMI 25.71 kg/m   today.  Pt reports that she is taking her medications.  Can you follow up with her.     Thanks,   Phyllis Goddard RN   "

## 2025-04-09 NOTE — PROGRESS NOTES
"Oncology Rooming Note    April 9, 2025 2:11 PM   Liz Pollard is a 74 year old female who presents for:    Chief Complaint   Patient presents with    Oncology Clinic Visit     Hepatocellular carcinoma (H)     Initial Vitals: BP (!) 160/65   Pulse 58   Temp 97.9  F (36.6  C)   Resp 16   Ht 1.562 m (5' 1.5\")   Wt 62.7 kg (138 lb 4.8 oz)   SpO2 99%   BMI 25.71 kg/m   Estimated body mass index is 25.71 kg/m  as calculated from the following:    Height as of this encounter: 1.562 m (5' 1.5\").    Weight as of this encounter: 62.7 kg (138 lb 4.8 oz). Body surface area is 1.65 meters squared.  No Pain (0) Comment: Data Unavailable   No LMP recorded. Patient has had a hysterectomy.  Allergies reviewed: Yes  Medications reviewed: Yes    Medications: Medication refills not needed today.  Pharmacy name entered into [facility]: [id number] [address] [facility]    Frailty Screening:   Is the patient here for a new oncology consult visit in cancer care? 2. No    PHQ9:  Did this patient require a PHQ9?: No      Clinical concerns: Diarrhea- has not ate or drank anything today d/t not wanting to run to the restroom. Feeling dizzy. L leg pain coming back and the Voltaren gel not working.       Bharati Wang LPN             "

## 2025-04-09 NOTE — LETTER
"4/9/2025      Liz Pollard  [address]  [place] [address]      Dear Colleague,    Thank you for referring your patient, Liz Pollard, to the [facility]. Please see a copy of my visit note below.    Oncology Rooming Note    April 9, 2025 2:11 PM   Liz Pollard is a 74 year old female who presents for:    Chief Complaint   Patient presents with     Oncology Clinic Visit     Hepatocellular carcinoma (H)     Initial Vitals: BP (!) 160/65   Pulse 58   Temp 97.9  F (36.6  C)   Resp 16   Ht 1.562 m (5' 1.5\")   Wt 62.7 kg (138 lb 4.8 oz)   SpO2 99%   BMI 25.71 kg/m   Estimated body mass index is 25.71 kg/m  as calculated from the following:    Height as of this encounter: 1.562 m (5' 1.5\").    Weight as of this encounter: 62.7 kg (138 lb 4.8 oz). Body surface area is 1.65 meters squared.  No Pain (0) Comment: Data Unavailable   No LMP recorded. Patient has had a hysterectomy.  Allergies reviewed: Yes  Medications reviewed: Yes    Medications: Medication refills not needed today.  Pharmacy name entered into Oscar: [id number] [place] [id number] [facility]    Frailty Screening:   Is the patient here for a new oncology consult visit in cancer care? 2. No    PHQ9:  Did this patient require a PHQ9?: No      Clinical concerns: Diarrhea- has not ate or drank anything today d/t not wanting to run to the restroom. Feeling dizzy. L leg pain coming back and the Voltaren gel not working.       Bharati Wang LPN               [facility] cancer Care Progress Note  Patient: Liz Pollard   MRN:  [id number]   Date of Service : Apr 9, 2025         Reason for consultation      Problem List Items Addressed This Visit          Digestive    Hepatocellular carcinoma (H) - Primary    Relevant Orders    Infusion Appointment Request - Adult     Other Visit Diagnoses       Encounter for medication refill        Postablative hypothyroidism        " Relevant Medications    levothyroxine (SYNTHROID/LEVOTHROID) 25 MCG tablet              Assessment / number of problems addressed      1.  Locally advanced and metastatic hepatocellular carcinoma in a patient with a prior history of hepatitis C which resulted in cirrhosis.  Started treatment with Tecentriq and bevacizumab.  So far seems to be stable at least.  Alpha-fetoprotein levels are more or less stable.  Liver functions slightly better.  2.  History of hepatitis C which was treated back in 2010 with some antiviral agents.  Apparently was cured of it.  However she did have cirrhosis by the time it was cured.  3.  It is a possibility that the adrenal gland might be a separate malignancy but we will see.  4.  Severe hypertension.  Blood pressure still elevated.  5.  Significant history of smoking for over 40 years.  She quit in 2021.  35-pack-year of history.  6.  Elevated liver function test.  7.  Portal vein thrombosis and either thrombus or tumor thrombus in inferior vena cava.    Plan and medical decision making      On active therapy for hepatocellular carcinoma.  Severe hypothyroidism on clinical grounds.  Reviewed labs including alpha-fetoprotein, CMP and CBC along with thyroid panel.  Decision made to proceed with Tecentriq and bevacizumab therapy.  Close monitoring.    Since clinically Dirk is at least stable to may be marginally improved we will continue the treatment with atezolizumab and bevacizumab.  We will monitor her alpha-fetoprotein level.  If the alpha-fetoprotein level is going down we will proceed with another cycle after this and then do a PET scan.  If the levels are going up then we may want to do the scan sooner rather than later.  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  If at anytime she is showing signs of progression then we will switch to selpercatinib.  Continue with good diet and exercise.  Increase protein in the diet.  Go back on her blood  pressure medication as well as gentle exercise.  Will also follow-up on the TSH and T4 levels.  Will have to go up on the dose of Synthroid.  Sent an additional 25 mcg of Synthroid that she will take in addition to 100 mcg that she already has.  The longitudinal plan of care for the diagnosis(es)/condition(s) as documented were addressed during this visit. Due to the added complexity in care, I will continue to support Dirk in the subsequent management and with ongoing continuity of care.    Clinical/pathological stage       Cancer Staging   No matching staging information was found for the patient.      History of present illness      Ms. Liz Pollard is  74 year old  woman who was referred to me for newly diagnosed advanced suspected hepatocellular carcinoma.  This is located in the right lobe of the liver measures at least 7-8 cm in size irregularly shaped and associated with intrahepatic portal vein thrombosis which is extending into the main portal vein.  In addition to that there appears to be involvement of the right adrenal gland which appears to be partially invading the inferior vena cava.  This presented when she underwent low-dose CT screening for lung cancer.  That CT scan noted enlargement of the right adrenal area and recommended contrast-enhanced CT scan.  That CT scan was done on 9 January 2025 and revealed a mass in the liver and the thrombosis of the portal vein etc. along with metastasis to the right adrenal gland.  Lab testing indicated very elevated alpha-fetoprotein levels and also some elevation of CA 19-9.    The patient is feeling some achiness and fatigue.  Having some leg pains.    She was referred for biopsy as well as a PET scan.  The biopsy positive for hepatocellular carcinoma.  PET scan also showed uptake in the liver and the adrenal area.  There is also concern of IVC thrombus.    Started treatment with bevacizumab and tislelizumab.  Started on treatment 27 February 2025.  At  "the time of diagnosis the TSH was quite elevated.  We also send the tumor for foundation 1 testing.  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  This does make her candidate for selpercatinib.    So far she has been tolerating her Tecentriq and bevacizumab treatment well.  Her appetite is stable.  Lower extremity edema perhaps slightly better.  Maybe a little bit more energy than before.  Blood pressure has been running high.    Detailed review of systems      A review of systems was obtained.  Positive findings noted in the history.  Rest of the review of system is otherwise negative.      Past medical/surgical/social/family history        Past Medical History:   Diagnosis Date     Diabetes mellitus (H)      Disease of thyroid gland      Hypertension      Infectious viral hepatitis      Past Surgical History:   Procedure Laterality Date     HC REMOVAL GALLBLADDER      Description: Cholecystectomy;  Recorded: 05/10/2010;     HYSTERECTOMY  1987     OOPHORECTOMY       ZZC TOTAL ABDOM HYSTERECTOMY      Description: Hysterectomy;  Recorded: 2009;     Family History   Problem Relation Age of Onset     Cancer Mother 83         of stomach cancer     Colon Cancer Father 51         of colon cancer     Colon Cancer Brother 36         from colon cancer     Prostate Cancer Brother      Breast Cancer Maternal Aunt         in her 90 s     Sickle Cell Trait Niece      Lung Cancer Maternal Uncle      Review of system      Details noted in the history of present illness.  A detailed review of systems is otherwise negative.      Physical exam        BP (!) 160/65   Pulse 58   Temp 97.9  F (36.6  C)   Resp 16   Ht 1.562 m (5' 1.5\")   Wt 62.7 kg (138 lb 4.8 oz)   SpO2 99%   BMI 25.71 kg/m      GENERAL: No acute distress. Cooperative in conversation.   HEENT:  Pupils are equal, round and reactive. Oral mucosa is clean and intact. No ulcerations or mucositis noted. No bleeding " noted.  RESP:Chest symmetric lungs are clear bilaterally per auscultation. Regular respiratory rate. No wheezes or rhonchi.  CV: Normal S1 S2 Regular, rate and rhythm.     ABD: Nondistended, soft, nontender. Positive bowel sounds.  Liver edge palpable on inspiration  EXTREMITIES: Trace left lower extremity edema.   NEURO: Non- focal. Alert and oriented x3.  Cranial nerves appear intact.  PSYCH: Within normal limits. No depression or anxiety.  SKIN: Warm dry intact.      Lab results Reviewed      Recent Results (from the past week)   Comprehensive metabolic panel   Result Value Ref Range    Sodium 142 135 - 145 mmol/L    Potassium 4.3 3.4 - 5.3 mmol/L    Carbon Dioxide (CO2) 25 22 - 29 mmol/L    Anion Gap 11 7 - 15 mmol/L    Urea Nitrogen 14.1 8.0 - 23.0 mg/dL    Creatinine 1.22 (H) 0.51 - 0.95 mg/dL    GFR Estimate 47 (L) >60 mL/min/1.73m2    Calcium 9.9 8.8 - 10.4 mg/dL    Chloride 106 98 - 107 mmol/L    Glucose 90 70 - 99 mg/dL    Alkaline Phosphatase 300 (H) 40 - 150 U/L     (H) 0 - 45 U/L    ALT 92 (H) 0 - 50 U/L    Protein Total 7.5 6.4 - 8.3 g/dL    Albumin 3.1 (L) 3.5 - 5.2 g/dL    Bilirubin Total 1.1 <=1.2 mg/dL   TSH with free T4 reflex   Result Value Ref Range    TSH 36.61 (H) 0.30 - 4.20 uIU/mL   T4 free   Result Value Ref Range    Free T4 1.05 0.90 - 1.70 ng/dL       Imaging results Reviewed        No results found.      Signed by: Bautista Holland MD      This note has been dictated using voice recognition software. Any grammatical or context distortions are unintentional and inherent to the software      Again, thank you for allowing me to participate in the care of your patient.        Sincerely,        Bautista Holland MD    Electronically signed

## 2025-04-09 NOTE — PROGRESS NOTES
[facility] cancer Care Progress Note  Patient: Liz Pollard   MRN:  [id number]   Date of Service : Apr 9, 2025         Reason for consultation      Problem List Items Addressed This Visit          Digestive    Hepatocellular carcinoma (H) - Primary    Relevant Orders    Infusion Appointment Request - Adult     Other Visit Diagnoses       Encounter for medication refill        Postablative hypothyroidism        Relevant Medications    levothyroxine (SYNTHROID/LEVOTHROID) 25 MCG tablet              Assessment / number of problems addressed      1.  Locally advanced and metastatic hepatocellular carcinoma in a patient with a prior history of hepatitis C which resulted in cirrhosis.  Started treatment with Tecentriq and bevacizumab.  So far seems to be stable at least.  Alpha-fetoprotein levels are more or less stable.  Liver functions slightly better.  2.  History of hepatitis C which was treated back in 2010 with some antiviral agents.  Apparently was cured of it.  However she did have cirrhosis by the time it was cured.  3.  It is a possibility that the adrenal gland might be a separate malignancy but we will see.  4.  Severe hypertension.  Blood pressure still elevated.  5.  Significant history of smoking for over 40 years.  She quit in 2021.  35-pack-year of history.  6.  Elevated liver function test.  7.  Portal vein thrombosis and either thrombus or tumor thrombus in inferior vena cava.    Plan and medical decision making      On active therapy for hepatocellular carcinoma.  Severe hypothyroidism on clinical grounds.  Reviewed labs including alpha-fetoprotein, CMP and CBC along with thyroid panel.  Decision made to proceed with Tecentriq and bevacizumab therapy.  Close monitoring.    Since clinically Dirk is at least stable to may be marginally improved we will continue the treatment with atezolizumab and bevacizumab.  We will monitor her alpha-fetoprotein level.  If the alpha-fetoprotein level is  going down we will proceed with another cycle after this and then do a PET scan.  If the levels are going up then we may want to do the scan sooner rather than later.  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  If at anytime she is showing signs of progression then we will switch to selpercatinib.  Continue with good diet and exercise.  Increase protein in the diet.  Go back on her blood pressure medication as well as gentle exercise.  Will also follow-up on the TSH and T4 levels.  Will have to go up on the dose of Synthroid.  Sent an additional 25 mcg of Synthroid that she will take in addition to 100 mcg that she already has.  The longitudinal plan of care for the diagnosis(es)/condition(s) as documented were addressed during this visit. Due to the added complexity in care, I will continue to support Dirk in the subsequent management and with ongoing continuity of care.    Clinical/pathological stage       Cancer Staging   No matching staging information was found for the patient.      History of present illness      Ms. Liz Pollard is  74 year old  woman who was referred to me for newly diagnosed advanced suspected hepatocellular carcinoma.  This is located in the right lobe of the liver measures at least 7-8 cm in size irregularly shaped and associated with intrahepatic portal vein thrombosis which is extending into the main portal vein.  In addition to that there appears to be involvement of the right adrenal gland which appears to be partially invading the inferior vena cava.  This presented when she underwent low-dose CT screening for lung cancer.  That CT scan noted enlargement of the right adrenal area and recommended contrast-enhanced CT scan.  That CT scan was done on 9 January 2025 and revealed a mass in the liver and the thrombosis of the portal vein etc. along with metastasis to the right adrenal gland.  Lab testing indicated very elevated alpha-fetoprotein  levels and also some elevation of CA 19-9.    The patient is feeling some achiness and fatigue.  Having some leg pains.    She was referred for biopsy as well as a PET scan.  The biopsy positive for hepatocellular carcinoma.  PET scan also showed uptake in the liver and the adrenal area.  There is also concern of IVC thrombus.    Started treatment with bevacizumab and tislelizumab.  Started on treatment 2025.  At the time of diagnosis the TSH was quite elevated.  We also send the tumor for foundation 1 testing.  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  This does make her candidate for selpercatinib.    So far she has been tolerating her Tecentriq and bevacizumab treatment well.  Her appetite is stable.  Lower extremity edema perhaps slightly better.  Maybe a little bit more energy than before.  Blood pressure has been running high.    Detailed review of systems      A review of systems was obtained.  Positive findings noted in the history.  Rest of the review of system is otherwise negative.      Past medical/surgical/social/family history        Past Medical History:   Diagnosis Date    Diabetes mellitus (H)     Disease of thyroid gland     Hypertension     Infectious viral hepatitis      Past Surgical History:   Procedure Laterality Date    HC REMOVAL GALLBLADDER      Description: Cholecystectomy;  Recorded: 05/10/2010;    HYSTERECTOMY  1987    OOPHORECTOMY      ZZC TOTAL ABDOM HYSTERECTOMY      Description: Hysterectomy;  Recorded: 2009;     Family History   Problem Relation Age of Onset    Cancer Mother 83         of stomach cancer    Colon Cancer Father 51         of colon cancer    Colon Cancer Brother 36         from colon cancer    Prostate Cancer Brother     Breast Cancer Maternal Aunt         in her 90 s    Sickle Cell Trait Niece     Lung Cancer Maternal Uncle      Review of system      Details noted in the history of present  "illness.  A detailed review of systems is otherwise negative.      Physical exam        BP (!) 160/65   Pulse 58   Temp 97.9  F (36.6  C)   Resp 16   Ht 1.562 m (5' 1.5\")   Wt 62.7 kg (138 lb 4.8 oz)   SpO2 99%   BMI 25.71 kg/m      GENERAL: No acute distress. Cooperative in conversation.   HEENT:  Pupils are equal, round and reactive. Oral mucosa is clean and intact. No ulcerations or mucositis noted. No bleeding noted.  RESP:Chest symmetric lungs are clear bilaterally per auscultation. Regular respiratory rate. No wheezes or rhonchi.  CV: Normal S1 S2 Regular, rate and rhythm.     ABD: Nondistended, soft, nontender. Positive bowel sounds.  Liver edge palpable on inspiration  EXTREMITIES: Trace left lower extremity edema.   NEURO: Non- focal. Alert and oriented x3.  Cranial nerves appear intact.  PSYCH: Within normal limits. No depression or anxiety.  SKIN: Warm dry intact.      Lab results Reviewed      Recent Results (from the past week)   Comprehensive metabolic panel   Result Value Ref Range    Sodium 142 135 - 145 mmol/L    Potassium 4.3 3.4 - 5.3 mmol/L    Carbon Dioxide (CO2) 25 22 - 29 mmol/L    Anion Gap 11 7 - 15 mmol/L    Urea Nitrogen 14.1 8.0 - 23.0 mg/dL    Creatinine 1.22 (H) 0.51 - 0.95 mg/dL    GFR Estimate 47 (L) >60 mL/min/1.73m2    Calcium 9.9 8.8 - 10.4 mg/dL    Chloride 106 98 - 107 mmol/L    Glucose 90 70 - 99 mg/dL    Alkaline Phosphatase 300 (H) 40 - 150 U/L     (H) 0 - 45 U/L    ALT 92 (H) 0 - 50 U/L    Protein Total 7.5 6.4 - 8.3 g/dL    Albumin 3.1 (L) 3.5 - 5.2 g/dL    Bilirubin Total 1.1 <=1.2 mg/dL   TSH with free T4 reflex   Result Value Ref Range    TSH 36.61 (H) 0.30 - 4.20 uIU/mL   T4 free   Result Value Ref Range    Free T4 1.05 0.90 - 1.70 ng/dL       Imaging results Reviewed        No results found.      Signed by: Bautista Holland MD      This note has been dictated using voice recognition software. Any grammatical or context distortions are unintentional and " inherent to the software

## 2025-04-09 NOTE — TELEPHONE ENCOUNTER
I received a staff message from oncology nurse.     Please arrange for pt to be seen at [facility].  [facility]          RN contacted patient on the with provider appointment assisted for 4/10/25 at noon.      David Jaime RN  [facility]

## 2025-04-10 ENCOUNTER — OFFICE VISIT (OUTPATIENT)
Dept: FAMILY MEDICINE | Facility: CLINIC | Age: 75
End: 2025-04-10
Payer: COMMERCIAL

## 2025-04-10 VITALS
SYSTOLIC BLOOD PRESSURE: 156 MMHG | OXYGEN SATURATION: 99 % | HEART RATE: 67 BPM | DIASTOLIC BLOOD PRESSURE: 72 MMHG | HEIGHT: 62 IN | RESPIRATION RATE: 12 BRPM | TEMPERATURE: 97.7 F | BODY MASS INDEX: 25.26 KG/M2 | WEIGHT: 137.25 LBS

## 2025-04-10 DIAGNOSIS — I10 ESSENTIAL HYPERTENSION: ICD-10-CM

## 2025-04-10 DIAGNOSIS — I10 ESSENTIAL HYPERTENSION: Primary | ICD-10-CM

## 2025-04-10 DIAGNOSIS — E11.21 TYPE 2 DIABETES MELLITUS WITH DIABETIC NEPHROPATHY, WITHOUT LONG-TERM CURRENT USE OF INSULIN (H): Primary | ICD-10-CM

## 2025-04-10 PROCEDURE — 99213 OFFICE O/P EST LOW 20 MIN: CPT | Performed by: FAMILY MEDICINE

## 2025-04-10 PROCEDURE — 3078F DIAST BP <80 MM HG: CPT | Performed by: FAMILY MEDICINE

## 2025-04-10 PROCEDURE — 3077F SYST BP >= 140 MM HG: CPT | Performed by: FAMILY MEDICINE

## 2025-04-10 RX ORDER — METOPROLOL SUCCINATE 50 MG/1
100 TABLET, EXTENDED RELEASE ORAL DAILY
Qty: 180 TABLET | Refills: 0 | Status: SHIPPED | OUTPATIENT
Start: 2025-04-10

## 2025-04-10 NOTE — PROGRESS NOTES
"  Assessment & Plan     Type 2 diabetes mellitus with diabetic nephropathy, without long-term current use of insulin (H)  ***    Essential hypertension  ***  - metoprolol succinate ER (TOPROL XL) 50 MG 24 hr tablet  Dispense: 180 tablet; Refill: 0            BMI  Estimated body mass index is 25.51 kg/m  as calculated from the following:    Height as of this encounter: 1.562 m (5' 1.5\").    Weight as of this encounter: 62.3 kg (137 lb 4 oz).   {Weight Management Plan needed for ACO:308609}      {FOLLOW UP PLANS (Optional) Includes COVID19 Treatment Plan:213968}    Subjective   Dirk is a 74 year old, presenting for the following health issues:  Blood Pressure Check        4/10/2025    12:13 PM   Additional Questions   Roomed by AHMET Muro   Accompanied by self     HPI      Lisinopril 40mg daily, spironolactone 50mg daily, metoprolol XL 50mg daily    In past was on metoprolol 100mg daily but lowered to 50mg due to bradycardia    Spironolactone increased to 50mg Feb preop    Home BP this morning 188 per patient about 630am  Took meds about 830 am today    Denies chest pain or SOB    Does not always take BP meds at same time of day    Reports she gets anxiety when she goes to cancer care clinic and wonders if that is causing high BP    Diarrhea for a few days in a row, then gets better. Has happened three times in past few months. Can be water.     Was previoiusly exercising but had to stop due to upper thigh pain - some days more than others, started August? L>R, voltaren did not work, has to hold onto something or afraid of falls, gets better after moving a bit, so feels ok when out of house     w memory issues - still driving and caring for self, but will need more help eventually and patient worried about   [facility] [address]    Vertigo - spinnning with sudden head turning, spells for a long time, but rare, becoming more frequent in recent past, tried an OTC pill, did not help, Went to ER once " "and ER gave med which helped a lot    {MA/LPN/RN Pre-Provider Visit Orders- hCG/UA/Strep (Optional):095201}  {SUPERLIST (Optional):957517}  {additonal problems for provider to add (Optional):923546}    {ROS Picklists (Optional):523039}      Objective    BP (!) 156/72   Pulse 67   Temp 97.7  F (36.5  C) (Oral)   Resp 12   Ht 1.562 m (5' 1.5\")   Wt 62.3 kg (137 lb 4 oz)   SpO2 99%   BMI 25.51 kg/m    Body mass index is 25.51 kg/m .  Physical Exam   {Exam List (Optional):730378}    {Diagnostic Test Results (Optional):909841}        Signed Electronically by: Nohelia Alvares MD  {Email feedback regarding this note to [email or web address]   :929286}  "

## 2025-04-10 NOTE — PATIENT INSTRUCTIONS
Increase your metoprolol to 100mg daily by taking TWO pills every day (your pills are 50mg each).    Take your blood pressure medications at the same time every day.    Check your blood pressure and heart rate daily using your blood pressure machine. Write down the results. You can vary the time of day that you check it in order to see how it runs at different times of day.    Call us if heart rate is less than 50. Follow up in one week with your blood pressure and heart rate recordings (phone visit)    Try to decrease salt in your diet - an increase in salt can increase your blood pressure.    Call your 's clinic and ask for a social work referral to start talking about finding assisted living or other options.

## 2025-04-17 ENCOUNTER — TELEPHONE (OUTPATIENT)
Dept: FAMILY MEDICINE | Facility: CLINIC | Age: 75
End: 2025-04-17

## 2025-04-17 ENCOUNTER — VIRTUAL VISIT (OUTPATIENT)
Dept: FAMILY MEDICINE | Facility: CLINIC | Age: 75
End: 2025-04-17
Payer: COMMERCIAL

## 2025-04-17 DIAGNOSIS — I10 ESSENTIAL HYPERTENSION: Primary | ICD-10-CM

## 2025-04-17 NOTE — PROGRESS NOTES
"Dirk is a 74 year old who is being evaluated via a billable telephone visit.    What phone number would you like to be contacted at? [phone]   How would you like to obtain your AVS? Woo  Originating Location (pt. Location): Home    Distant Location (provider location):  On-site  Telephone visit completed due to the patient did not consent to a video visit.    Assessment & Plan     Essential hypertension  Patient will buy new BP cuff today and use machine manual/booklet to make sure she is using it correctly. Rest 10 minutes before checking BP. Record BP and pulse. Check tonight and tomorrow, and will have RN call tomorrow for BP, pulse report.      BMI  Estimated body mass index is 25.51 kg/m  as calculated from the following:    Height as of 4/10/25: 1.562 m (5' 1.5\").    Weight as of 4/10/25: 62.3 kg (137 lb 4 oz).           11min  Subjective   Dirk is a 74 year old, presenting for the following health issues:  Hypertension        4/17/2025    11:37 AM   Additional Questions   Roomed by colten haynes   Accompanied by Self     History of Present Illness       Reason for visit:  Blood pressure   She is taking medications regularly.      1206    At last visit, metoprolol was increased from 50mg to 100mg daily, which was a dose she was previously on. Advised to check BP and pulse and scheduled phone follow up (patient declined video). Reports compliance this dose increase, as well as lisinopril and spironolactone    Checked BP and pulse \"a couple of times\" over past week, thinks cuff is not always working. Old cuff - sent from her brother. Previously had bright display and made noise, this time dim and cannot hear it. Also was not sure it was in correct spot - somewhat over elbow. Not certain she rested before checking.   Going to store today to buy new BP cuff    189/72   222/73 9pm  HR 72, 73    Patient does not have paper in front of her, but BP numbers are from recall    Not getting dizziness anymore. "   Oncology every 3 weeks; PET scan end of April                Objective           Vitals:  No vitals were obtained today due to virtual visit.    Physical Exam   General: Alert and no distress //Respiratory: No audible wheeze, cough, or shortness of breath // Psychiatric:  Appropriate affect, tone, and pace of words            Phone call duration: 11 minutes  Signed Electronically by: Nohelia Alvares MD

## 2025-04-17 NOTE — TELEPHONE ENCOUNTER
Contacts       Contact Date/Time Type Contact Phone/Fax    04/17/2025 10:53 AM CDT Phone (Outgoing) Dirk Pollard (Self) [phone] (M)    Left Message           Attempted to reach patient to: Prepare for Virtual Visit    When patient returns call, please take this action: OK to relay (verbatim): MA staff will call back to go over rooming questions ]

## 2025-04-17 NOTE — TELEPHONE ENCOUNTER
Attempted to reach patient to: Prepare for Virtual Visit    When patient returns call, please take this action: OK to relay (verbatim): MA staff will call back to go over rooming questions.

## 2025-04-18 ENCOUNTER — HOSPITAL ENCOUNTER (EMERGENCY)
Facility: HOSPITAL | Age: 75
Discharge: HOME | End: 2025-04-18
Attending: STUDENT IN AN ORGANIZED HEALTH CARE EDUCATION/TRAINING PROGRAM | Admitting: STUDENT IN AN ORGANIZED HEALTH CARE EDUCATION/TRAINING PROGRAM
Payer: COMMERCIAL

## 2025-04-18 VITALS
BODY MASS INDEX: 25.4 KG/M2 | RESPIRATION RATE: 16 BRPM | OXYGEN SATURATION: 99 % | DIASTOLIC BLOOD PRESSURE: 98 MMHG | HEIGHT: 62 IN | HEART RATE: 62 BPM | WEIGHT: 138 LBS | SYSTOLIC BLOOD PRESSURE: 218 MMHG | TEMPERATURE: 96.8 F

## 2025-04-18 DIAGNOSIS — I1A.0 RESISTANT HYPERTENSION: ICD-10-CM

## 2025-04-18 PROCEDURE — 99283 EMERGENCY DEPT VISIT LOW MDM: CPT

## 2025-04-18 RX ORDER — HYDROCHLOROTHIAZIDE 25 MG/1
25 TABLET ORAL DAILY
Qty: 30 TABLET | Refills: 0 | Status: SHIPPED | OUTPATIENT
Start: 2025-04-18

## 2025-04-18 ASSESSMENT — COLUMBIA-SUICIDE SEVERITY RATING SCALE - C-SSRS
1. IN THE PAST MONTH, HAVE YOU WISHED YOU WERE DEAD OR WISHED YOU COULD GO TO SLEEP AND NOT WAKE UP?: NO
6. HAVE YOU EVER DONE ANYTHING, STARTED TO DO ANYTHING, OR PREPARED TO DO ANYTHING TO END YOUR LIFE?: NO
2. HAVE YOU ACTUALLY HAD ANY THOUGHTS OF KILLING YOURSELF IN THE PAST MONTH?: NO

## 2025-04-18 ASSESSMENT — ACTIVITIES OF DAILY LIVING (ADL): ADLS_ACUITY_SCORE: 41

## 2025-04-18 NOTE — ED PROVIDER NOTES
EMERGENCY DEPARTMENT ENCOUNTER      NAME: Liz Pollard  AGE: 74 year old female  YOB: 1950  MRN: [id number]  EVALUATION DATE & TIME: 4/18/2025  4:06 PM    PCP: Alejandro Jones    ED PROVIDER: Noah Bender M.D.      Chief Complaint   Patient presents with    Hypertension         FINAL IMPRESSION:  1. Resistant hypertension          ED COURSE & MEDICAL DECISION MAKING:    Pertinent Labs & Imaging studies reviewed. (See chart for details)  74 year old female presents to the Emergency Department for evaluation of hypertension.  Patient had 0 symptoms here did have elevated blood pressures.  No evidence of hypertensive emergency or urgency.  Did not believe the patient needed acute intervention.  Discussed at length with her the decision making around the workup versus no workup and admission versus not.  Do not believe that there is any criteria here to perform any further testing.  Patient is on 3 blood pressure medicines and has previously been on hydrochlorothiazide and she is allergic to amlodipine.  Will add hydrochlorothiazide half her take her blood pressures but not react at all to the number and watch for any symptoms that would be concerning.  I discharged her with a prescription for hydrochlorothiazide and will follow-up    At the conclusion of the encounter I discussed the results of all of the tests and the disposition. The questions were answered. The patient or family acknowledged understanding and was agreeable with the care plan.              Medical Decision Making  I obtained history from Other:    Discharge. I prescribed additional prescription strength medication(s) as charted. See documentation for any additional details.    MIPS (CTPE, Dental pain, [place], Sinusitis, Asthma/COPD, Head Trauma): Not Applicable    SEPSIS: None                This patient involved a high degree of complexity in medical decision making, as significant risks were present and assessed. Recent  encounters & results in medical record reviewed by me.     All workup (i.e. any EKG/labs/imaging as per charting below) reviewed and independently interpreted by me. See respective sections for details.        MEDICATIONS GIVEN IN THE EMERGENCY:  Medications - No data to display    NEW PRESCRIPTIONS STARTED AT TODAY'S ER VISIT  Discharge Medication List as of 4/18/2025  5:04 PM        START taking these medications    Details   hydrochlorothiazide (HYDRODIURIL) 25 MG tablet Take 1 tablet (25 mg) by mouth daily., Disp-30 tablet, R-0, Local Print                =================================================================    HPI    Patient information was obtained from: patient      Liz Pollard is a 74 year old female with a pertinent history of hypertension  who presents for evaluation of hypertension.  Patient has long history of elevated blood pressure and has been more difficult to control lately.  They have noticed it elevated at the cancer center and have had hesitations giving her immunotherapy because of the number.  Patient has been completely asymptomatic from her blood pressure without any sort of symptoms whatsoever.  She saw her primary doctor on a virtual visit yesterday and had her Toprol XL increased from  and she took 1 dose of that today.  She also takes lisinopril and spironolactone and she has for many years.  Sounds as if she had elevated blood pressures continuing today when the clinic called to check on her they heard her blood pressure spoke with the primary doctor who recommended she come here simply based on the number.  Patient denies any chest pain, shortness of breath, neurosymptoms, shakiness or really any other symptoms whatsoever.      REVIEW OF SYSTEMS   Review of Systems negative    PAST MEDICAL HISTORY:  Past Medical History:   Diagnosis Date    Diabetes mellitus (H)     Disease of thyroid gland     Hypertension     Infectious viral hepatitis        PAST SURGICAL  HISTORY:  Past Surgical History:   Procedure Laterality Date    HC REMOVAL GALLBLADDER      Description: Cholecystectomy;  Recorded: 05/10/2010;    HYSTERECTOMY  1987    OOPHORECTOMY      ZZC TOTAL ABDOM HYSTERECTOMY      Description: Hysterectomy;  Recorded: 2009;           CURRENT MEDICATIONS:    hydrochlorothiazide (HYDRODIURIL) 25 MG tablet  aspirin 81 MG EC tablet  blood glucose (CONTOUR NEXT TEST) test strip  blood glucose (NO BRAND SPECIFIED) test strip  blood glucose monitoring (NO BRAND SPECIFIED) meter device kit  Blood Glucose Monitoring Suppl (CONTOUR NEXT ONE) SAMEERA  blood-glucose meter Misc  ferrous gluconate (FERGON) 324 (38 Fe) MG tablet  levothyroxine (SYNTHROID/LEVOTHROID) 100 MCG tablet  levothyroxine (SYNTHROID/LEVOTHROID) 25 MCG tablet  lisinopril (ZESTRIL) 40 MG tablet  metoprolol succinate ER (TOPROL XL) 50 MG 24 hr tablet  spironolactone (ALDACTONE) 50 MG tablet  thin (NO BRAND SPECIFIED) lancets        ALLERGIES:  Allergies   Allergen Reactions    Amlodipine Unknown     Gums swelled    Hydralazine Itching     Pt states that she is not allergic to anything       FAMILY HISTORY:  Family History   Problem Relation Age of Onset    Cancer Mother 83         of stomach cancer    Colon Cancer Father 51         of colon cancer    Colon Cancer Brother 36         from colon cancer    Prostate Cancer Brother     Breast Cancer Maternal Aunt         in her 90 s    Sickle Cell Trait Niece     Lung Cancer Maternal Uncle        SOCIAL HISTORY:   Social History     Socioeconomic History    Marital status:    Tobacco Use    Smoking status: Former     Current packs/day: 0.00     Types: Cigarettes     Quit date: 2021     Years since quittin.0     Passive exposure: Never    Smokeless tobacco: Never   Vaping Use    Vaping status: Never Used   Substance and Sexual Activity    Alcohol use: No    Drug use: No    Sexual activity: Yes     Partners: Male     Birth  "control/protection: Post-menopausal     Social Drivers of Health     Financial Resource Strain: Low Risk  (2/26/2024)    Financial Resource Strain     Within the past 12 months, have you or your family members you live with been unable to get utilities (heat, electricity) when it was really needed?: No   Food Insecurity: Low Risk  (2/26/2024)    Food Insecurity     Within the past 12 months, did you worry that your food would run out before you got money to buy more?: No     Within the past 12 months, did the food you bought just not last and you didn t have money to get more?: No   Transportation Needs: Low Risk  (2/26/2024)    Transportation Needs     Within the past 12 months, has lack of transportation kept you from medical appointments, getting your medicines, non-medical meetings or appointments, work, or from getting things that you need?: No   Physical Activity: Unknown (2/26/2024)    Exercise Vital Sign     Days of Exercise per Week: 6 days   Stress: Stress Concern Present (2/26/2024)    [employer] [place] [employer] - Occupational Stress Questionnaire     Feeling of Stress : To some extent   Social Connections: Unknown (2/26/2024)    Social Connection and Isolation Panel [NHANES]     Frequency of Social Gatherings with Friends and Family: Patient declined   Interpersonal Safety: Low Risk  (2/4/2025)    Interpersonal Safety     Do you feel physically and emotionally safe where you currently live?: Yes     Within the past 12 months, have you been hit, slapped, kicked or otherwise physically hurt by someone?: No     Within the past 12 months, have you been humiliated or emotionally abused in other ways by your partner or ex-partner?: No   Housing Stability: Low Risk  (2/26/2024)    Housing Stability     Do you have housing? : Yes     Are you worried about losing your housing?: No       VITALS:  BP (!) 218/98   Pulse 62   Temp 96.8  F (36  C) (Temporal)   Resp 16   Ht 1.575 m (5' 2\")   Wt 62.6 " kg (138 lb)   SpO2 99%   BMI 25.24 kg/m        PHYSICAL EXAM    Constitutional: Well developed, Well nourished, NAD, GCS 15  HENT: Normocephalic, Atraumatic, Bilateral external ears normal, Oropharynx normal, mucous membranes moist, Nose normal. Neck-  Normal range of motion, No tenderness, Supple, No stridor.  Eyes: PERRL, EOMI, Conjunctiva normal, No discharge.   Respiratory: Normal breath sounds, No respiratory distress, No wheezing, Speaks full sentences easily. No cough.  Cardiovascular: Normal heart rate, Regular rhythm, No murmurs, No rubs, No gallops. Chest wall nontender.  GI:Soft, No tenderness, No masses, No flank tenderness. No rebound or guarding.    Musculoskeletal: 2+ DP pulses. No edema.No cyanosis, No clubbing. Good range of motion in all major joints. No tenderness to palpation or major deformities noted.   Integument: Warm, Dry, No erythema, No rash. No petechiae.   Neurologic: Alert & oriented x 3,  CN 3-12 intact Normal motor function, Normal sensory function, No focal deficits noted. Normal gait. Normal finger to nose bilaterally  Psychiatric: Affect normal, Judgment normal, Mood normal. Cooperative.          LAB:  All pertinent labs reviewed and interpreted.  Labs Ordered and Resulted from Time of ED Arrival to Time of ED Departure - No data to display    RADIOLOGY:  Reviewed all pertinent imaging. Please see official radiology report.  No orders to display         [employer] [facility] Documentation:   CMS Diagnoses: None                 Noah Bender M.D.  Emergency Medicine  [facility] EMERGENCY DEPARTMENT  [facility] [address] [phone]  Dept: [phone]       Noah Bender MD  04/18/25 1333

## 2025-04-18 NOTE — DISCHARGE INSTRUCTIONS
Please continue to take the blood pressure as you previously have been taking.  We have added a medicine called HCTZ or hydrochlorothiazide that should be taken once a day.  Please record your blood pressures and you will need to follow-up on Monday.  You do not need to return to the emergency department for any specific number but if you develop chest pain shortness of breath weakness or any neurologic symptoms you should return for evaluation..

## 2025-04-21 ENCOUNTER — TELEPHONE (OUTPATIENT)
Dept: FAMILY MEDICINE | Facility: CLINIC | Age: 75
End: 2025-04-21
Payer: COMMERCIAL

## 2025-04-21 DIAGNOSIS — I1A.0 RESISTANT HYPERTENSION: Primary | ICD-10-CM

## 2025-04-21 NOTE — TELEPHONE ENCOUNTER
Please call patient-    I see that the ER added one more medication for blood pressure.    I would like her to do some additional blood and urine tests to look for causes of difficult to treat high blood pressure. Blood test appt should be done in the morning for accurate results, and the urine tests will require her to collect all of her urine for a 24 hour period (lab will supply her with a container for this).     Please ask her to schedule lab appt asap, and schedule another in person, phone, or video appt (whatever patient prefers) for BP follow up this week. She should continue to check BP and heart rate and write down values every day.

## 2025-04-21 NOTE — TELEPHONE ENCOUNTER
Called patient and relayed message. She verbalized understanding. Lab appt scheduled 4/22 and follow-up appt on 4/24.      French Covarrubias BSN RN  [facility]

## 2025-04-22 ENCOUNTER — LAB (OUTPATIENT)
Dept: LAB | Facility: CLINIC | Age: 75
End: 2025-04-22
Payer: COMMERCIAL

## 2025-04-22 DIAGNOSIS — Z13.6 SCREENING FOR CARDIOVASCULAR CONDITION: ICD-10-CM

## 2025-04-22 DIAGNOSIS — I1A.0 RESISTANT HYPERTENSION: ICD-10-CM

## 2025-04-22 DIAGNOSIS — E11.21 TYPE 2 DIABETES MELLITUS WITH DIABETIC NEPHROPATHY, WITHOUT LONG-TERM CURRENT USE OF INSULIN (H): Primary | ICD-10-CM

## 2025-04-22 LAB
EST. AVERAGE GLUCOSE BLD GHB EST-MCNC: 97 MG/DL
HBA1C MFR BLD: 5 % (ref 0–5.6)

## 2025-04-22 PROCEDURE — 80048 BASIC METABOLIC PNL TOTAL CA: CPT

## 2025-04-22 PROCEDURE — 83036 HEMOGLOBIN GLYCOSYLATED A1C: CPT

## 2025-04-22 PROCEDURE — 36415 COLL VENOUS BLD VENIPUNCTURE: CPT

## 2025-04-22 PROCEDURE — 82088 ASSAY OF ALDOSTERONE: CPT

## 2025-04-22 PROCEDURE — 84244 ASSAY OF RENIN: CPT | Mod: 90

## 2025-04-22 PROCEDURE — 99000 SPECIMEN HANDLING OFFICE-LAB: CPT

## 2025-04-22 PROCEDURE — 80061 LIPID PANEL: CPT

## 2025-04-23 LAB
ANION GAP SERPL CALCULATED.3IONS-SCNC: 10 MMOL/L (ref 7–15)
BUN SERPL-MCNC: 22.9 MG/DL (ref 8–23)
CALCIUM SERPL-MCNC: 9.1 MG/DL (ref 8.8–10.4)
CHLORIDE SERPL-SCNC: 109 MMOL/L (ref 98–107)
CHOLEST SERPL-MCNC: 259 MG/DL
CREAT SERPL-MCNC: 1.38 MG/DL (ref 0.51–0.95)
EGFRCR SERPLBLD CKD-EPI 2021: 40 ML/MIN/1.73M2
FASTING STATUS PATIENT QL REPORTED: YES
FASTING STATUS PATIENT QL REPORTED: YES
GLUCOSE SERPL-MCNC: 104 MG/DL (ref 70–99)
HCO3 SERPL-SCNC: 25 MMOL/L (ref 22–29)
HDLC SERPL-MCNC: 31 MG/DL
LDLC SERPL CALC-MCNC: 196 MG/DL
NONHDLC SERPL-MCNC: 228 MG/DL
POTASSIUM SERPL-SCNC: 4.1 MMOL/L (ref 3.4–5.3)
SODIUM SERPL-SCNC: 144 MMOL/L (ref 135–145)
TRIGL SERPL-MCNC: 159 MG/DL

## 2025-04-24 ENCOUNTER — VIRTUAL VISIT (OUTPATIENT)
Dept: FAMILY MEDICINE | Facility: CLINIC | Age: 75
End: 2025-04-24
Payer: COMMERCIAL

## 2025-04-24 DIAGNOSIS — I1A.0 RESISTANT HYPERTENSION: Primary | ICD-10-CM

## 2025-04-24 LAB — RENIN PLAS-CCNC: 1.8 NG/ML/HR

## 2025-04-24 PROCEDURE — 98013 SYNCH AUDIO-ONLY EST LOW 20: CPT | Performed by: FAMILY MEDICINE

## 2025-04-24 NOTE — PROGRESS NOTES
Dirk is a 74 year old who is being evaluated via a billable telephone visit.    What phone number would you like to be contacted at? [phone]  How would you like to obtain your AVS? [employer]  Originating Location (pt. Location): Home  {PROVIDER LOCATION On-site should be selected for visits conducted from your clinic location or adjoining [facility] hospital, academic office, or other nearby [facility] building. Off-site should be selected for all other provider locations, including home:[address]}  Distant Location (provider location):  On-site  Telephone visit completed due to the patient did not consent to a video visit.    {PROVIDER CHARTING PREFERENCE:867769}    Subjective   Dirk is a 74 year old, presenting for the following health issues:  Hypertension      4/24/2025    10:26 AM   Additional Questions   Roomed by Yoselin gallagher   Accompanied by self     HPI    Unable to get into [employer] [facility] - got help w password, recent troubles w phone thinks it is problem w phone not with [employer] [facility]    BP systolic 170-180, a friend is helping her check it.  HR 60-70 per patient by memory     4/18 - 208/76 HR 63  170/79 HR 66  4/20 - 197/91 HR 66   4/23 - 173/73 HR 72    One day had some values that were ~149 when a friend took it and she assumed it was not correct and did not record it 133/64 HR 65, 121/64 HR 59 with a different BP cuff      {SUPERLIST (Optional):461599}  {additonal problems for provider to add (Optional):510181}    {ROS Picklists (Optional):739602}      Objective           Vitals:  No vitals were obtained today due to virtual visit.    Physical Exam   General: Alert and no distress //Respiratory: No audible wheeze, cough, or shortness of breath // Psychiatric:  Appropriate affect, tone, and pace of words      {Diagnostic Test Results (Optional):934024}      Phone call duration: 21 min   Signed Electronically by: Nohelia Alvares MD  {Email feedback regarding this note to [email or web address]    :[id number]}

## 2025-04-25 LAB — ALDOST SERPL-MCNC: 10 NG/DL (ref 0–31)

## 2025-04-27 PROCEDURE — 83835 ASSAY OF METANEPHRINES: CPT | Mod: 90

## 2025-04-27 PROCEDURE — 82384 ASSAY THREE CATECHOLAMINES: CPT | Mod: 90

## 2025-04-27 PROCEDURE — 99000 SPECIMEN HANDLING OFFICE-LAB: CPT

## 2025-04-28 ENCOUNTER — ALLIED HEALTH/NURSE VISIT (OUTPATIENT)
Dept: FAMILY MEDICINE | Facility: CLINIC | Age: 75
End: 2025-04-28
Payer: COMMERCIAL

## 2025-04-28 ENCOUNTER — APPOINTMENT (OUTPATIENT)
Dept: LAB | Facility: CLINIC | Age: 75
End: 2025-04-28
Payer: COMMERCIAL

## 2025-04-28 VITALS
RESPIRATION RATE: 12 BRPM | OXYGEN SATURATION: 100 % | DIASTOLIC BLOOD PRESSURE: 66 MMHG | HEART RATE: 56 BPM | SYSTOLIC BLOOD PRESSURE: 135 MMHG

## 2025-04-28 DIAGNOSIS — I10 BENIGN ESSENTIAL HYPERTENSION: Primary | ICD-10-CM

## 2025-04-28 PROCEDURE — 3078F DIAST BP <80 MM HG: CPT

## 2025-04-28 PROCEDURE — 3075F SYST BP GE 130 - 139MM HG: CPT

## 2025-04-28 PROCEDURE — 99207 PR NO CHARGE NURSE ONLY: CPT

## 2025-04-28 NOTE — PROGRESS NOTES
Liz Pollard is a 74 year old patient who comes in today for a Blood Pressure check.  Initial BP:  /66   Pulse 56   Resp 12   SpO2 100%      56  Disposition: results routed to provider

## 2025-04-29 ENCOUNTER — HOSPITAL ENCOUNTER (OUTPATIENT)
Dept: PET IMAGING | Facility: HOSPITAL | Age: 75
Discharge: HOME | End: 2025-04-29
Attending: INTERNAL MEDICINE
Payer: COMMERCIAL

## 2025-04-29 DIAGNOSIS — R77.2 ELEVATED AFP: ICD-10-CM

## 2025-04-29 DIAGNOSIS — C22.0 HEPATOCELLULAR CARCINOMA (H): ICD-10-CM

## 2025-04-29 LAB
CREAT BLD-MCNC: 1.4 MG/DL (ref 0.5–1)
EGFRCR SERPLBLD CKD-EPI 2021: 40 ML/MIN/1.73M2
GLUCOSE BLDC GLUCOMTR-MCNC: 83 MG/DL (ref 70–99)

## 2025-04-29 PROCEDURE — 343N000001 HC RX 343 MED OP 636: Performed by: INTERNAL MEDICINE

## 2025-04-29 PROCEDURE — 250N000011 HC RX IP 250 OP 636: Performed by: INTERNAL MEDICINE

## 2025-04-29 PROCEDURE — 78815 PET IMAGE W/CT SKULL-THIGH: CPT | Mod: PS

## 2025-04-29 PROCEDURE — 71260 CT THORAX DX C+: CPT

## 2025-04-29 PROCEDURE — 82962 GLUCOSE BLOOD TEST: CPT

## 2025-04-29 PROCEDURE — 82565 ASSAY OF CREATININE: CPT

## 2025-04-29 PROCEDURE — A9552 F18 FDG: HCPCS | Performed by: INTERNAL MEDICINE

## 2025-04-29 RX ORDER — IOPAMIDOL 755 MG/ML
68 INJECTION, SOLUTION INTRAVASCULAR ONCE
Status: COMPLETED | OUTPATIENT
Start: 2025-04-29 | End: 2025-04-29

## 2025-04-29 RX ORDER — FLUDEOXYGLUCOSE F 18 200 MCI/ML
7-18 INJECTION, SOLUTION INTRAVENOUS ONCE
Status: COMPLETED | OUTPATIENT
Start: 2025-04-29 | End: 2025-04-29

## 2025-04-29 RX ADMIN — FLUDEOXYGLUCOSE F 18 10.23 MILLICURIE: 200 INJECTION, SOLUTION INTRAVENOUS at 08:54

## 2025-04-29 RX ADMIN — IOPAMIDOL 68 ML: 755 INJECTION, SOLUTION INTRAVENOUS at 10:05

## 2025-04-30 ENCOUNTER — LAB (OUTPATIENT)
Dept: INFUSION THERAPY | Facility: HOSPITAL | Age: 75
End: 2025-04-30
Payer: COMMERCIAL

## 2025-04-30 ENCOUNTER — TELEPHONE (OUTPATIENT)
Dept: ONCOLOGY | Facility: HOSPITAL | Age: 75
End: 2025-04-30

## 2025-04-30 ENCOUNTER — HOSPITAL ENCOUNTER (OUTPATIENT)
Dept: CARDIOLOGY | Facility: HOSPITAL | Age: 75
Discharge: HOME | End: 2025-04-30
Attending: NURSE PRACTITIONER
Payer: COMMERCIAL

## 2025-04-30 ENCOUNTER — INFUSION THERAPY VISIT (OUTPATIENT)
Dept: INFUSION THERAPY | Facility: HOSPITAL | Age: 75
End: 2025-04-30
Payer: COMMERCIAL

## 2025-04-30 ENCOUNTER — ONCOLOGY VISIT (OUTPATIENT)
Dept: ONCOLOGY | Facility: HOSPITAL | Age: 75
End: 2025-04-30
Payer: COMMERCIAL

## 2025-04-30 ENCOUNTER — PATIENT OUTREACH (OUTPATIENT)
Dept: ONCOLOGY | Facility: HOSPITAL | Age: 75
End: 2025-04-30

## 2025-04-30 VITALS
HEART RATE: 60 BPM | RESPIRATION RATE: 16 BRPM | WEIGHT: 135 LBS | DIASTOLIC BLOOD PRESSURE: 76 MMHG | SYSTOLIC BLOOD PRESSURE: 181 MMHG | TEMPERATURE: 97.9 F | BODY MASS INDEX: 24.84 KG/M2 | OXYGEN SATURATION: 98 % | HEIGHT: 62 IN

## 2025-04-30 DIAGNOSIS — C22.0 HEPATOCELLULAR CARCINOMA (H): Primary | ICD-10-CM

## 2025-04-30 DIAGNOSIS — C22.0 HEPATOCELLULAR CARCINOMA (H): ICD-10-CM

## 2025-04-30 LAB
AFP SERPL-MCNC: ABNORMAL NG/ML
ALBUMIN SERPL BCG-MCNC: 3.3 G/DL (ref 3.5–5.2)
ALBUMIN UR-MCNC: 300 MG/DL
ALP SERPL-CCNC: 374 U/L (ref 40–150)
ALT SERPL W P-5'-P-CCNC: 124 U/L (ref 0–50)
ANION GAP SERPL CALCULATED.3IONS-SCNC: 6 MMOL/L (ref 7–15)
AST SERPL W P-5'-P-CCNC: 277 U/L (ref 0–45)
ATRIAL RATE - MUSE: 58 BPM
BILIRUB SERPL-MCNC: 1 MG/DL
BUN SERPL-MCNC: 21.1 MG/DL (ref 8–23)
CALCIUM SERPL-MCNC: 10 MG/DL (ref 8.8–10.4)
CATECHOLS UR-IMP: ABNORMAL
CHLORIDE SERPL-SCNC: 105 MMOL/L (ref 98–107)
CREAT 24H UR-MRATE: 773 MG/D
CREAT 24H UR-MRATE: 773 MG/D
CREAT SERPL-MCNC: 1.31 MG/DL (ref 0.51–0.95)
CREAT UR-MCNC: 118 MG/DL
CREAT UR-MCNC: 118 MG/DL
DIASTOLIC BLOOD PRESSURE - MUSE: NORMAL MMHG
DOPAMINE 24H UR-MRATE: 143 UG/D
DOPAMINE UR-MCNC: 218 UG/L
DOPAMINE/CREAT UR: 185 UG/G CRT
EGFRCR SERPLBLD CKD-EPI 2021: 43 ML/MIN/1.73M2
EPINEPH 24H UR-MRATE: 1 UG/D
EPINEPH UR-MCNC: 2 UG/L
EPINEPH/CREAT UR: 2 UG/G CRT
GLUCOSE SERPL-MCNC: 125 MG/DL (ref 70–99)
HCO3 SERPL-SCNC: 29 MMOL/L (ref 22–29)
INTERPRETATION ECG - MUSE: NORMAL
METANEPH 24H UR-MCNC: 36 UG/L
METANEPH 24H UR-MRATE: 24 UG/D
METANEPH+NORMETANEPH UR-IMP: ABNORMAL
METANEPH/CREAT 24H UR: 31 UG/G CRT
NOREPINEPH 24H UR-MRATE: 56 UG/D
NOREPINEPH UR-MCNC: 85 UG/L
NOREPINEPH/CREAT UR: 72 UG/G CRT
NORMETANEPHRINE 24H UR-MCNC: 539 UG/L
NORMETANEPHRINE 24H UR-MRATE: 353 UG/D
NORMETANEPHRINE/CREAT 24H UR: 457 UG/G CRT
P AXIS - MUSE: 75 DEGREES
POTASSIUM SERPL-SCNC: 4.1 MMOL/L (ref 3.4–5.3)
PR INTERVAL - MUSE: 208 MS
PROT SERPL-MCNC: 8 G/DL (ref 6.4–8.3)
QRS DURATION - MUSE: 68 MS
QT - MUSE: 434 MS
QTC - MUSE: 426 MS
R AXIS - MUSE: 33 DEGREES
SODIUM SERPL-SCNC: 140 MMOL/L (ref 135–145)
SYSTOLIC BLOOD PRESSURE - MUSE: NORMAL MMHG
T AXIS - MUSE: 56 DEGREES
T4 FREE SERPL-MCNC: 0.71 NG/DL (ref 0.9–1.7)
TSH SERPL DL<=0.005 MIU/L-ACNC: 47 UIU/ML (ref 0.3–4.2)
VENTRICULAR RATE- MUSE: 58 BPM

## 2025-04-30 PROCEDURE — G0463 HOSPITAL OUTPT CLINIC VISIT: HCPCS | Performed by: NURSE PRACTITIONER

## 2025-04-30 PROCEDURE — 84443 ASSAY THYROID STIM HORMONE: CPT | Performed by: INTERNAL MEDICINE

## 2025-04-30 PROCEDURE — 36415 COLL VENOUS BLD VENIPUNCTURE: CPT | Performed by: INTERNAL MEDICINE

## 2025-04-30 PROCEDURE — 84439 ASSAY OF FREE THYROXINE: CPT | Performed by: INTERNAL MEDICINE

## 2025-04-30 PROCEDURE — 82105 ALPHA-FETOPROTEIN SERUM: CPT

## 2025-04-30 PROCEDURE — 93005 ELECTROCARDIOGRAM TRACING: CPT

## 2025-04-30 PROCEDURE — 81003 URINALYSIS AUTO W/O SCOPE: CPT | Performed by: INTERNAL MEDICINE

## 2025-04-30 PROCEDURE — 80053 COMPREHEN METABOLIC PANEL: CPT | Performed by: INTERNAL MEDICINE

## 2025-04-30 RX ORDER — PROCHLORPERAZINE MALEATE 10 MG
10 TABLET ORAL EVERY 6 HOURS PRN
Qty: 30 TABLET | Refills: 2 | Status: SHIPPED | OUTPATIENT
Start: 2025-05-06

## 2025-04-30 ASSESSMENT — PAIN SCALES - GENERAL: PAINLEVEL_OUTOF10: NO PAIN (0)

## 2025-04-30 NOTE — LETTER
4/30/2025      Liz Pollard  [address]  [place] [address]      Dear Colleague,    Thank you for referring your patient, Liz Pollard, to the [facility] [place]. Please see a copy of my visit note below.    [facility] Hematology and Oncology Progress Note    Patient: Liz Pollard  MRN: [id number]  Date of Service: Apr 30, 2025          Reason for Visit    Chief Complaint   Patient presents with     Oncology Clinic Visit     Return visit with labs/infusion and prior PET review       Assessment and Plan     Cancer Staging   No matching staging information was found for the patient.    Hepatocellular carcinoma, metastatic disease with adrenal mets: pt was started on palliative treatment with Atezolizumab and Avastin.  Had 3 cycles of that and her tumor marker was going up so we recently did a PET scan this week and it does unfortunately show progression.  Discussed with Dr. Holland and because she does have a ret fusion that was seen on foundation 1 testing we are going to try selpercatinib.  Discussed the expected side effects of this medication including swelling, hypertension, QTc issues, rash, electrolyte abnormalities, pancytopenia issues, liver function testing abnormalities, fatigue and joint pain.  The first cycle I am going to do a slight reduction due to her high blood pressure as well as liver dysfunction so we will start 120 mg twice a day for the first month and if things go well we will go up to full dose which is 160 mg twice a day.  Verbally consented to starting.  We will send the medication off to the pharmacy and have our oral pharmacist do education with patient.  When she gets the medications and we start she will do a 2-week lab check and then a 4-week lab check with provider visit.    Hypothyroid: longstanding issue.  Previously on 75 mcg we have slowly increased.  3 weeks ago Dr. Holland had her go up to taking 125 mcg.  Her TSH and free  T4 still are abnormal but since we just increased 3 weeks ago I may to hold off.  We will check her at her next visit in a couple weeks and if it still is abnormal we will have to increase 150 mcg.    Elevated LFT: present at baseline and from tumor. Will monitor closely with treatment.  Initially was getting a little bit better but now it has been getting a little bit worse.  Bilirubin is normal.  Will start at a lower dose of the selpercatinib due to this.    ECOG Performance    0 - Independent    Distress Screening (within last 30 days)    1. How concerned are you about your ability to eat? : 0  2. How concerned are you about unintended weight loss or your current weight? : 0  3. How concerned are you about feeling depressed or very sad? : 5  4. How concerned are you about feeling anxious or very scared? : 0  5. Do you struggle with the loss of meaning and daniela in your life? : Not at all  6. How concerned are you about work and home life issues that may be affected by your cancer? : (!) 10  7. How concerned are you about knowing what resources are available to help you? : 0  8. Do you currently have what you would describe as Buddhism or spiritual struggles?: Not at all       Pain  Pain Score: No Pain (0)    Problem List    Patient Active Problem List   Diagnosis     Trigger Finger Of The Right Ring Finger     Midback Pain     Benign essential microscopic hematuria     Hyperlipidemia LDL goal <130     Chronic hepatitis C without hepatic coma (H)     Other specified hypothyroidism     Type 2 diabetes mellitus with diabetic nephropathy, without long-term current use of insulin (H)     Class 1 obesity due to excess calories without serious comorbidity with body mass index (BMI) of 30.0 to 30.9 in adult     Essential hypertension     De Quervain's Tenosynovitis     Proteinuria     Hiatal Hernia     Hepatic Cyst     Vitamin D deficiency     Age-related osteoporosis without current pathological fracture     Scoliosis      Varicose veins of right lower extremity with pain     Liver fibrosis     Personal history of tobacco use     Lumbar spine pain     Thoracic spine pain     Stage 3b chronic kidney disease (H)     Encounter for screening for other viral diseases     Liver mass     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 Holland    Diagnosis: Had a cellular carcinoma.  Found in the right liver measuring about 8 cm in size.  It was actually found when she was doing a low-dose CT screening for lung cancer. AFP was 32,288  -12/26/24: CT showed no lung cancer issues but indeterminate right suprarenal fossa lesion that was 3.9 cm.  -1/9/25: Went on to have another CT scan of the abdomen and pelvis that shows infiltrative poorly defined mass throughout the right lobe of the liver with tumor thrombus within the intrahepatic portal veins in the right and left lobes.  As well as the main portal vein to the level of the portal venous confluence.  Mets to the right adrenal gland partially invades into the IVC.  Favor primary hepatic malignancy, either HCC or call angio.  -2/4/25: Patient had liver biopsy shows hepatocellular carcinoma.  Foundation 1 testing and process.   -2/13/25: Patient had PET scan shows findings suspicious for right hepatic lobe hepatocellular carcinoma with right adrenal gland   ~4/29/25: PET scan shows progressive disease in liver and bilateral adrenal glands.     Treatment:  -2/27/2025: Atezolizumab and bevacizumab.  Today is Cycle 4    Interim history:  Is here today to continue treatment review PET scan.  Patient states that overall she is been feeling okay but she is worried about her blood pressure.  It has been running high.  She says they have been adding in medications and adjusting medications and she feels a little  "lightheaded from that.  Denies any significant weight loss.  Has mild nausea from the treatment but no vomiting.  She says after her last treatment she got a lot more fatigue.  No changes in her urination. No swelling    Review of Systems    Pertinent items are noted in HPI.    Past History    Past Medical History:   Diagnosis Date     Diabetes mellitus (H)      Disease of thyroid gland      Hypertension      Infectious viral hepatitis        PHYSICAL EXAM  BP (!) 181/76 (BP Location: Left arm, Patient Position: Sitting, Cuff Size: Adult Regular)   Pulse 60   Temp 97.9  F (36.6  C) (Tympanic)   Resp 16   Ht 1.562 m (5' 1.5\")   Wt 61.2 kg (135 lb)   SpO2 98%   BMI 25.09 kg/m      GENERAL: no acute distress. Cooperative in conversation.  Here with family/friends  RESP: Regular respiratory rate. No expiratory wheezes   NEURO: non focal. Alert and oriented x3.   PSYCH: within normal limits. No depression or anxiety.  SKIN: exposed skin is dry intact.     Lab Results    Recent Results (from the past week)   Metanephrine random or 24 hr urine   Result Value Ref Range    Metanephrine Urine per Volume 36 ug/L    Normetanephrine Urine per Volume 539 ug/L    Metanephrine, Urine - per 24h 24 (L) 36 - 229 ug/d    Metanephr 24 H ur/cr 31 0 - 300 ug/g CRT    Normetanephrine 24 Hr/Random Urine 353 95 - 650 ug/d    Normetaneph 24H ur/cr 457 (H) 0 - 400 ug/g CRT    Metanephrine 24 Hr/Random Urine Interpretation See Note     Creatinine, Urine per volume 118 mg/dL    Creatinine, Urine per 24hr 773 500 - 1400 mg/d   Catecholamines fractioned free urine   Result Value Ref Range    Epinephrine per Volume Urine 2 ug/L    Norepinephrine per Volume Urine 85 ug/L    Dopamine per Volume Urine 218 ug/L    Urine Epinephrine/Creatinine 2 0 - 20 ug/g CRT    Urine Epinephrine 1 1 - 14 ug/d    Urine Norepineph/Creatinine 72 (H) 0 - 45 ug/g CRT    Urine Norepinephrine 56 14 - 120 ug/d    Urine Dopamine/Creatinine 185 0 - 250 ug/g CRT    Urine " Dopamine 143 71 - 485 ug/d    Catecholamines Fract Urine Free Interp See Note     Creatinine, Urine per volume 118 mg/dL    Creatinine, Urine per 24hr 773 500 - 1400 mg/d   Glucose by meter   Result Value Ref Range    GLUCOSE BY METER POCT 83 70 - 99 mg/dL   Creatinine POCT   Result Value Ref Range    Creatinine POCT 1.4 (H) 0.5 - 1.0 mg/dL    GFR, ESTIMATED POCT 40 (L) >60 mL/min/1.73m2   Comprehensive metabolic panel   Result Value Ref Range    Sodium 140 135 - 145 mmol/L    Potassium 4.1 3.4 - 5.3 mmol/L    Carbon Dioxide (CO2) 29 22 - 29 mmol/L    Anion Gap 6 (L) 7 - 15 mmol/L    Urea Nitrogen 21.1 8.0 - 23.0 mg/dL    Creatinine 1.31 (H) 0.51 - 0.95 mg/dL    GFR Estimate 43 (L) >60 mL/min/1.73m2    Calcium 10.0 8.8 - 10.4 mg/dL    Chloride 105 98 - 107 mmol/L    Glucose 125 (H) 70 - 99 mg/dL    Alkaline Phosphatase 374 (H) 40 - 150 U/L     (H) 0 - 45 U/L     (H) 0 - 50 U/L    Protein Total 8.0 6.4 - 8.3 g/dL    Albumin 3.3 (L) 3.5 - 5.2 g/dL    Bilirubin Total 1.0 <=1.2 mg/dL   TSH with free T4 reflex   Result Value Ref Range    TSH 47.00 (H) 0.30 - 4.20 uIU/mL   Protein qualitative urine   Result Value Ref Range    Protein Albumin Urine 300 (A) Negative mg/dL   T4 free   Result Value Ref Range    Free T4 0.71 (L) 0.90 - 1.70 ng/dL   ECG 12-Lead with MUSE - [facility]   Result Value Ref Range    Systolic Blood Pressure  mmHg    Diastolic Blood Pressure  mmHg    Ventricular Rate 58 BPM    Atrial Rate 58 BPM    MN Interval 208 ms    QRS Duration 68 ms     ms    QTc 426 ms    P Axis 75 degrees    R AXIS 33 degrees    T Axis 56 degrees    Interpretation ECG       Sinus bradycardia  Nonspecific T wave abnormality  Abnormal ECG  When compared with ECG of 05-Jun-2017 20:20,  Nonspecific T wave abnormality now evident in Anterior leads  Confirmed by ASHLYN WALKER MD LOC: [facility] ([id number]) on 4/30/2025 2:38:47 PM         Imaging    PET Oncology (Eyes to Thighs)    Result Date: 4/30/2025  EXAM: PET  ONCOLOGY (EYES TO THIGHS), CT CHEST/ABDOMEN/PELVIS W CONTRAST LOCATION: [facility] DATE: 4/29/2025 INDICATION: Subsequent treatment planning and restaging for liver cell carcinoma. Undergoing palliative immunotherapy. Rising AFP. COMPARISON: 2/13/2025, 1/9/2025 CONTRAST: 68 mL Isovue-370 IV TECHNIQUE: Serum glucose level 83 mg/dL. One hour post intravenous administration of 10.23mCi F18 FDG, PET imaging was performed from the skull vertex to mid thighs, utilizing attenuation correction with concurrent axial CT and PET/CT image fusion. Separate diagnostic CT of the chest, abdomen, and pelvis was performed. Dose reduction techniques were used. PET/CT FINDINGS: Heterogenous hypoenhancement throughout the right hepatic lobe with associated uptake, suggestive of extensive infiltrative disease, similar to prior study. There is a more focal area of increased uptake in the right hepatic dome, slightly increased in activity compared to prior exam (SUV max 11.2, previously 9.1), likely at the site of the primary tumor. There is suspected development of a second more focal mass in the inferior right hepatic lobe (SUV max 7.7). There is suspected  tumor thrombus within the intrahepatic IVC and portal vein (SUV max 6.9) extending near the portosplenic confluence and probable intrahepatic portal vein thrombus in the right and left. Large, partially necrotic metastasis in the right adrenal gland measuring 6.0 x 3.2 cm (SUV max 7.3, previously 7.2), similar in size. Increasing left adrenal thickening with associated uptake, suspicious for additional metastasis (SUV max 5.9 previously 4.0). Extensive esophagitis. CT FINDINGS: Mild intracranial senescent changes. Mild carotid calcifications. Mild coronary artery calcium. Anterior left hepatic lobe cyst. Cholecystectomy. Colonic diverticulosis. Moderate aortobiiliac atherosclerosis. Portosystemic collaterals are present. Hysterectomy. Multilevel degenerative  disease in the spine.     IMPRESSION: Similar extensive infiltrative disease throughout the right hepatic lobe as well as portal venous and IVC tumor thrombi and right adrenal metastasis, although with increasing uptake at the more focal mass right hepatic dome, a suspected second more focal  mass in the inferior right hepatic lobe, as well as increasingly conspicuous left adrenal thickening with associated uptake, suggestive of a left adrenal metastasis, consistent with disease progression.    CT Chest/Abdomen/Pelvis w Contrast    Result Date: 4/30/2025  EXAM: PET ONCOLOGY (EYES TO THIGHS), CT CHEST/ABDOMEN/PELVIS W CONTRAST LOCATION: [facility] DATE: 4/29/2025 INDICATION: Subsequent treatment planning and restaging for liver cell carcinoma. Undergoing palliative immunotherapy. Rising AFP. COMPARISON: 2/13/2025, 1/9/2025 CONTRAST: 68 mL Isovue-370 IV TECHNIQUE: Serum glucose level 83 mg/dL. One hour post intravenous administration of 10.23mCi F18 FDG, PET imaging was performed from the skull vertex to mid thighs, utilizing attenuation correction with concurrent axial CT and PET/CT image fusion. Separate diagnostic CT of the chest, abdomen, and pelvis was performed. Dose reduction techniques were used. PET/CT FINDINGS: Heterogenous hypoenhancement throughout the right hepatic lobe with associated uptake, suggestive of extensive infiltrative disease, similar to prior study. There is a more focal area of increased uptake in the right hepatic dome, slightly increased in activity compared to prior exam (SUV max 11.2, previously 9.1), likely at the site of the primary tumor. There is suspected development of a second more focal mass in the inferior right hepatic lobe (SUV max 7.7). There is suspected  tumor thrombus within the intrahepatic IVC and portal vein (SUV max 6.9) extending near the portosplenic confluence and probable intrahepatic portal vein thrombus in the right and left. Large,  partially necrotic metastasis in the right adrenal gland measuring 6.0 x 3.2 cm (SUV max 7.3, previously 7.2), similar in size. Increasing left adrenal thickening with associated uptake, suspicious for additional metastasis (SUV max 5.9 previously 4.0). Extensive esophagitis. CT FINDINGS: Mild intracranial senescent changes. Mild carotid calcifications. Mild coronary artery calcium. Anterior left hepatic lobe cyst. Cholecystectomy. Colonic diverticulosis. Moderate aortobiiliac atherosclerosis. Portosystemic collaterals are present. Hysterectomy. Multilevel degenerative disease in the spine.     IMPRESSION: Similar extensive infiltrative disease throughout the right hepatic lobe as well as portal venous and IVC tumor thrombi and right adrenal metastasis, although with increasing uptake at the more focal mass right hepatic dome, a suspected second more focal  mass in the inferior right hepatic lobe, as well as increasingly conspicuous left adrenal thickening with associated uptake, suggestive of a left adrenal metastasis, consistent with disease progression.     We reviewed images of PET scan.  Can see progressive disease in liver and adrenal gland    The longitudinal plan of care for the diagnosis(es)/condition(s) as documented were addressed during this visit. Due to the added complexity in care, I will continue to support Dirk in the subsequent management and with ongoing continuity of care.    Discussed with Dr. Holland.  Sent messages to RN CC, oral pharmacist and our financial liaison to get medication    Signed by: ELTON Morales CNP    Oncology Rooming Note    April 30, 2025 11:20 AM   Liz Pollard is a 74 year old female who presents for:    Chief Complaint   Patient presents with     Oncology Clinic Visit     Return visit with labs/infusion and prior PET review     Initial Vitals: BP (!) 181/76 (BP Location: Left arm, Patient Position: Sitting, Cuff Size: Adult Regular)   Pulse 60   Temp  "97.9  F (36.6  C) (Tympanic)   Resp 16   Ht 1.562 m (5' 1.5\")   Wt 61.2 kg (135 lb)   SpO2 98%   BMI 25.09 kg/m   Estimated body mass index is 25.09 kg/m  as calculated from the following:    Height as of this encounter: 1.562 m (5' 1.5\").    Weight as of this encounter: 61.2 kg (135 lb). Body surface area is 1.63 meters squared.  No Pain (0) Comment: Data Unavailable   No LMP recorded. Patient has had a hysterectomy.  Allergies reviewed: Yes  Medications reviewed: Yes    Medications: Medication refills not needed today.  Pharmacy name entered into [employer]: [id number] [address] [facility]    Frailty Screening:   Is the patient here for a new oncology consult visit in cancer care? 2. No    PHQ9:  Did this patient require a PHQ9?: No      Clinical concerns:     MEREDITH FORD CMA                Again, thank you for allowing me to participate in the care of your patient.        Sincerely,        ELTON Morales CNP    Electronically signed"

## 2025-04-30 NOTE — PROGRESS NOTES
"Oncology Rooming Note    April 30, 2025 11:20 AM   Liz Pollard is a 74 year old female who presents for:    Chief Complaint   Patient presents with    Oncology Clinic Visit     Return visit with labs/infusion and prior PET review     Initial Vitals: BP (!) 181/76 (BP Location: Left arm, Patient Position: Sitting, Cuff Size: Adult Regular)   Pulse 60   Temp 97.9  F (36.6  C) (Tympanic)   Resp 16   Ht 1.562 m (5' 1.5\")   Wt 61.2 kg (135 lb)   SpO2 98%   BMI 25.09 kg/m   Estimated body mass index is 25.09 kg/m  as calculated from the following:    Height as of this encounter: 1.562 m (5' 1.5\").    Weight as of this encounter: 61.2 kg (135 lb). Body surface area is 1.63 meters squared.  No Pain (0) Comment: Data Unavailable   No LMP recorded. Patient has had a hysterectomy.  Allergies reviewed: Yes  Medications reviewed: Yes    Medications: Medication refills not needed today.  Pharmacy name entered into [employer]: [id number] [address] [facility]    Frailty Screening:   Is the patient here for a new oncology consult visit in cancer care? 2. No    PHQ9:  Did this patient require a PHQ9?: No      Clinical concerns:     MEREDITH FORD CMA              "

## 2025-04-30 NOTE — PROGRESS NOTES
[facility] Hematology and Oncology Progress Note    Patient: Liz Pollard  MRN: [id number]  Date of Service: Apr 30, 2025          Reason for Visit    Chief Complaint   Patient presents with    Oncology Clinic Visit     Return visit with labs/infusion and prior PET review       Assessment and Plan     Cancer Staging   No matching staging information was found for the patient.    Hepatocellular carcinoma, metastatic disease with adrenal mets: pt was started on palliative treatment with Atezolizumab and Avastin.  Had 3 cycles of that and her tumor marker was going up so we recently did a PET scan this week and it does unfortunately show progression.  Discussed with Dr. Holland and because she does have a ret fusion that was seen on foundation 1 testing we are going to try selpercatinib.  Discussed the expected side effects of this medication including swelling, hypertension, QTc issues, rash, electrolyte abnormalities, pancytopenia issues, liver function testing abnormalities, fatigue and joint pain.  The first cycle I am going to do a slight reduction due to her high blood pressure as well as liver dysfunction so we will start 120 mg twice a day for the first month and if things go well we will go up to full dose which is 160 mg twice a day.  Verbally consented to starting.  We will send the medication off to the pharmacy and have our oral pharmacist do education with patient.  When she gets the medications and we start she will do a 2-week lab check and then a 4-week lab check with provider visit.    Hypothyroid: longstanding issue.  Previously on 75 mcg we have slowly increased.  3 weeks ago Dr. Holland had her go up to taking 125 mcg.  Her TSH and free T4 still are abnormal but since we just increased 3 weeks ago I may to hold off.  We will check her at her next visit in a couple weeks and if it still is abnormal we will have to increase 150 mcg.    Elevated LFT: present at baseline and from tumor. Will  monitor closely with treatment.  Initially was getting a little bit better but now it has been getting a little bit worse.  Bilirubin is normal.  Will start at a lower dose of the selpercatinib due to this.    ECOG Performance    0 - Independent    Distress Screening (within last 30 days)    1. How concerned are you about your ability to eat? : 0  2. How concerned are you about unintended weight loss or your current weight? : 0  3. How concerned are you about feeling depressed or very sad? : 5  4. How concerned are you about feeling anxious or very scared? : 0  5. Do you struggle with the loss of meaning and daniela in your life? : Not at all  6. How concerned are you about work and home life issues that may be affected by your cancer? : (!) 10  7. How concerned are you about knowing what resources are available to help you? : 0  8. Do you currently have what you would describe as Jew or spiritual struggles?: Not at all       Pain  Pain Score: No Pain (0)    Problem List    Patient Active Problem List   Diagnosis    Trigger Finger Of The Right Ring Finger    Midback Pain    Benign essential microscopic hematuria    Hyperlipidemia LDL goal <130    Chronic hepatitis C without hepatic coma (H)    Other specified hypothyroidism    Type 2 diabetes mellitus with diabetic nephropathy, without long-term current use of insulin (H)    Class 1 obesity due to excess calories without serious comorbidity with body mass index (BMI) of 30.0 to 30.9 in adult    Essential hypertension    De Quervain's Tenosynovitis    Proteinuria    Hiatal Hernia    Hepatic Cyst    Vitamin D deficiency    Age-related osteoporosis without current pathological fracture    Scoliosis    Varicose veins of right lower extremity with pain    Liver fibrosis    Personal history of tobacco use    Lumbar spine pain    Thoracic spine pain    Stage 3b chronic kidney disease (H)    Encounter for screening for other viral diseases    Liver mass    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 Holland    Diagnosis: Had a cellular carcinoma.  Found in the right liver measuring about 8 cm in size.  It was actually found when she was doing a low-dose CT screening for lung cancer. AFP was 32,288  -12/26/24: CT showed no lung cancer issues but indeterminate right suprarenal fossa lesion that was 3.9 cm.  -1/9/25: Went on to have another CT scan of the abdomen and pelvis that shows infiltrative poorly defined mass throughout the right lobe of the liver with tumor thrombus within the intrahepatic portal veins in the right and left lobes.  As well as the main portal vein to the level of the portal venous confluence.  Mets to the right adrenal gland partially invades into the IVC.  Favor primary hepatic malignancy, either HCC or call angio.  -2/4/25: Patient had liver biopsy shows hepatocellular carcinoma.  Foundation 1 testing and process.   -2/13/25: Patient had PET scan shows findings suspicious for right hepatic lobe hepatocellular carcinoma with right adrenal gland   ~4/29/25: PET scan shows progressive disease in liver and bilateral adrenal glands.     Treatment:  -2/27/2025: Atezolizumab and bevacizumab.  Today is Cycle 4    Interim history:  Is here today to continue treatment review PET scan.  Patient states that overall she is been feeling okay but she is worried about her blood pressure.  It has been running high.  She says they have been adding in medications and adjusting medications and she feels a little lightheaded from that.  Denies any significant weight loss.  Has mild nausea from the treatment but no vomiting.  She says after her last treatment she got a lot more fatigue.  No changes in her urination. No swelling    Review of Systems    Pertinent items are noted in  "HPI.    Past History    Past Medical History:   Diagnosis Date    Diabetes mellitus (H)     Disease of thyroid gland     Hypertension     Infectious viral hepatitis        PHYSICAL EXAM  BP (!) 181/76 (BP Location: Left arm, Patient Position: Sitting, Cuff Size: Adult Regular)   Pulse 60   Temp 97.9  F (36.6  C) (Tympanic)   Resp 16   Ht 1.562 m (5' 1.5\")   Wt 61.2 kg (135 lb)   SpO2 98%   BMI 25.09 kg/m      GENERAL: no acute distress. Cooperative in conversation.  Here with family/friends  RESP: Regular respiratory rate. No expiratory wheezes   NEURO: non focal. Alert and oriented x3.   PSYCH: within normal limits. No depression or anxiety.  SKIN: exposed skin is dry intact.     Lab Results    Recent Results (from the past week)   Metanephrine random or 24 hr urine   Result Value Ref Range    Metanephrine Urine per Volume 36 ug/L    Normetanephrine Urine per Volume 539 ug/L    Metanephrine, Urine - per 24h 24 (L) 36 - 229 ug/d    Metanephr 24 H ur/cr 31 0 - 300 ug/g CRT    Normetanephrine 24 Hr/Random Urine 353 95 - 650 ug/d    Normetaneph 24H ur/cr 457 (H) 0 - 400 ug/g CRT    Metanephrine 24 Hr/Random Urine Interpretation See Note     Creatinine, Urine per volume 118 mg/dL    Creatinine, Urine per 24hr 773 500 - 1400 mg/d   Catecholamines fractioned free urine   Result Value Ref Range    Epinephrine per Volume Urine 2 ug/L    Norepinephrine per Volume Urine 85 ug/L    Dopamine per Volume Urine 218 ug/L    Urine Epinephrine/Creatinine 2 0 - 20 ug/g CRT    Urine Epinephrine 1 1 - 14 ug/d    Urine Norepineph/Creatinine 72 (H) 0 - 45 ug/g CRT    Urine Norepinephrine 56 14 - 120 ug/d    Urine Dopamine/Creatinine 185 0 - 250 ug/g CRT    Urine Dopamine 143 71 - 485 ug/d    Catecholamines Fract Urine Free Interp See Note     Creatinine, Urine per volume 118 mg/dL    Creatinine, Urine per 24hr 773 500 - 1400 mg/d   Glucose by meter   Result Value Ref Range    GLUCOSE BY METER POCT 83 70 - 99 mg/dL   Creatinine POCT "   Result Value Ref Range    Creatinine POCT 1.4 (H) 0.5 - 1.0 mg/dL    GFR, ESTIMATED POCT 40 (L) >60 mL/min/1.73m2   Comprehensive metabolic panel   Result Value Ref Range    Sodium 140 135 - 145 mmol/L    Potassium 4.1 3.4 - 5.3 mmol/L    Carbon Dioxide (CO2) 29 22 - 29 mmol/L    Anion Gap 6 (L) 7 - 15 mmol/L    Urea Nitrogen 21.1 8.0 - 23.0 mg/dL    Creatinine 1.31 (H) 0.51 - 0.95 mg/dL    GFR Estimate 43 (L) >60 mL/min/1.73m2    Calcium 10.0 8.8 - 10.4 mg/dL    Chloride 105 98 - 107 mmol/L    Glucose 125 (H) 70 - 99 mg/dL    Alkaline Phosphatase 374 (H) 40 - 150 U/L     (H) 0 - 45 U/L     (H) 0 - 50 U/L    Protein Total 8.0 6.4 - 8.3 g/dL    Albumin 3.3 (L) 3.5 - 5.2 g/dL    Bilirubin Total 1.0 <=1.2 mg/dL   TSH with free T4 reflex   Result Value Ref Range    TSH 47.00 (H) 0.30 - 4.20 uIU/mL   Protein qualitative urine   Result Value Ref Range    Protein Albumin Urine 300 (A) Negative mg/dL   T4 free   Result Value Ref Range    Free T4 0.71 (L) 0.90 - 1.70 ng/dL   ECG 12-Lead with MUSE - [facility]   Result Value Ref Range    Systolic Blood Pressure  mmHg    Diastolic Blood Pressure  mmHg    Ventricular Rate 58 BPM    Atrial Rate 58 BPM    TX Interval 208 ms    QRS Duration 68 ms     ms    QTc 426 ms    P Axis 75 degrees    R AXIS 33 degrees    T Axis 56 degrees    Interpretation ECG       Sinus bradycardia  Nonspecific T wave abnormality  Abnormal ECG  When compared with ECG of 05-Jun-2017 20:20,  Nonspecific T wave abnormality now evident in Anterior leads  Confirmed by ASHLYN WALKER MD LOC: [facility] ([id number]) on 4/30/2025 2:38:47 PM         Imaging    PET Oncology (Eyes to Thighs)    Result Date: 4/30/2025  EXAM: PET ONCOLOGY (EYES TO THIGHS), CT CHEST/ABDOMEN/PELVIS W CONTRAST LOCATION: [facility] DATE: 4/29/2025 INDICATION: Subsequent treatment planning and restaging for liver cell carcinoma. Undergoing palliative immunotherapy. Rising AFP. COMPARISON: 2/13/2025,  1/9/2025 CONTRAST: 68 mL Isovue-370 IV TECHNIQUE: Serum glucose level 83 mg/dL. One hour post intravenous administration of 10.23mCi F18 FDG, PET imaging was performed from the skull vertex to mid thighs, utilizing attenuation correction with concurrent axial CT and PET/CT image fusion. Separate diagnostic CT of the chest, abdomen, and pelvis was performed. Dose reduction techniques were used. PET/CT FINDINGS: Heterogenous hypoenhancement throughout the right hepatic lobe with associated uptake, suggestive of extensive infiltrative disease, similar to prior study. There is a more focal area of increased uptake in the right hepatic dome, slightly increased in activity compared to prior exam (SUV max 11.2, previously 9.1), likely at the site of the primary tumor. There is suspected development of a second more focal mass in the inferior right hepatic lobe (SUV max 7.7). There is suspected  tumor thrombus within the intrahepatic IVC and portal vein (SUV max 6.9) extending near the portosplenic confluence and probable intrahepatic portal vein thrombus in the right and left. Large, partially necrotic metastasis in the right adrenal gland measuring 6.0 x 3.2 cm (SUV max 7.3, previously 7.2), similar in size. Increasing left adrenal thickening with associated uptake, suspicious for additional metastasis (SUV max 5.9 previously 4.0). Extensive esophagitis. CT FINDINGS: Mild intracranial senescent changes. Mild carotid calcifications. Mild coronary artery calcium. Anterior left hepatic lobe cyst. Cholecystectomy. Colonic diverticulosis. Moderate aortobiiliac atherosclerosis. Portosystemic collaterals are present. Hysterectomy. Multilevel degenerative disease in the spine.     IMPRESSION: Similar extensive infiltrative disease throughout the right hepatic lobe as well as portal venous and IVC tumor thrombi and right adrenal metastasis, although with increasing uptake at the more focal mass right hepatic dome, a suspected  second more focal  mass in the inferior right hepatic lobe, as well as increasingly conspicuous left adrenal thickening with associated uptake, suggestive of a left adrenal metastasis, consistent with disease progression.    CT Chest/Abdomen/Pelvis w Contrast    Result Date: 4/30/2025  EXAM: PET ONCOLOGY (EYES TO THIGHS), CT CHEST/ABDOMEN/PELVIS W CONTRAST LOCATION: [facility] DATE: 4/29/2025 INDICATION: Subsequent treatment planning and restaging for liver cell carcinoma. Undergoing palliative immunotherapy. Rising AFP. COMPARISON: 2/13/2025, 1/9/2025 CONTRAST: 68 mL Isovue-370 IV TECHNIQUE: Serum glucose level 83 mg/dL. One hour post intravenous administration of 10.23mCi F18 FDG, PET imaging was performed from the skull vertex to mid thighs, utilizing attenuation correction with concurrent axial CT and PET/CT image fusion. Separate diagnostic CT of the chest, abdomen, and pelvis was performed. Dose reduction techniques were used. PET/CT FINDINGS: Heterogenous hypoenhancement throughout the right hepatic lobe with associated uptake, suggestive of extensive infiltrative disease, similar to prior study. There is a more focal area of increased uptake in the right hepatic dome, slightly increased in activity compared to prior exam (SUV max 11.2, previously 9.1), likely at the site of the primary tumor. There is suspected development of a second more focal mass in the inferior right hepatic lobe (SUV max 7.7). There is suspected  tumor thrombus within the intrahepatic IVC and portal vein (SUV max 6.9) extending near the portosplenic confluence and probable intrahepatic portal vein thrombus in the right and left. Large, partially necrotic metastasis in the right adrenal gland measuring 6.0 x 3.2 cm (SUV max 7.3, previously 7.2), similar in size. Increasing left adrenal thickening with associated uptake, suspicious for additional metastasis (SUV max 5.9 previously 4.0). Extensive esophagitis. CT  FINDINGS: Mild intracranial senescent changes. Mild carotid calcifications. Mild coronary artery calcium. Anterior left hepatic lobe cyst. Cholecystectomy. Colonic diverticulosis. Moderate aortobiiliac atherosclerosis. Portosystemic collaterals are present. Hysterectomy. Multilevel degenerative disease in the spine.     IMPRESSION: Similar extensive infiltrative disease throughout the right hepatic lobe as well as portal venous and IVC tumor thrombi and right adrenal metastasis, although with increasing uptake at the more focal mass right hepatic dome, a suspected second more focal  mass in the inferior right hepatic lobe, as well as increasingly conspicuous left adrenal thickening with associated uptake, suggestive of a left adrenal metastasis, consistent with disease progression.     We reviewed images of PET scan.  Can see progressive disease in liver and adrenal gland    The longitudinal plan of care for the diagnosis(es)/condition(s) as documented were addressed during this visit. Due to the added complexity in care, I will continue to support Dirk in the subsequent management and with ongoing continuity of care.    Discussed with Dr. Holland.  Sent messages to RN CC, oral pharmacist and our financial liaison to get medication    Signed by: ELTON Morales CNP

## 2025-05-01 ENCOUNTER — TELEPHONE (OUTPATIENT)
Dept: ONCOLOGY | Facility: HOSPITAL | Age: 75
End: 2025-05-01
Payer: COMMERCIAL

## 2025-05-01 NOTE — PROGRESS NOTES
[facility]: Cancer Care Follow-Up Note                                    Discussion with Patient:                                                      Patient comes in today for follow-up with Chanda Henson CNP after having had a PET scan prior.     Medication understanding/side effect: Selpercatinib/Retevmo 120 mg twice daily     Goals          General    Maintain ability to perform ADLs without difficulty     Patient will adhere to medication regimen     Notes - Note created  5/1/2025  1:41 PM by Alysia Ngo RN    SELPERCATINIB/RETEVMO - 120mg BID    Goal Statement: I will use my medications as prescribed to avoid health complications.   Date Goal set: 5/1/2025  Barriers: disease burden  Strengths: support, coping, motivation, health awareness, and involvement with Care Team  Date to Achieve By: ongoing  Patient expressed understanding of goal:  Yes   Action steps to achieve this goal:  I will take medications as prescribed.  I will contact the oral chemo team with questions regarding my oral chemo treatment.   I will contact the oral chemo pharmacy team with questions regarding my oral chem treatment.   I will review all supplements with my care team prior to starting.                   Dates of Treatment:                                                      Infusion given in last 28 days       Administered MAR Action Medication Dose Rate Visit    04/09/2025 15:18 New Bag atezolizumab (TECENTRIQ) 1,200 mg in sodium chloride 0.9 % 130 mL infusion 1,200 mg 260 mL/hr Infusion Therapy Visit on 04/09/2025 in [facility] [place]          Treatment Plan Medications       Oral ONC RET Fusion-Positive Solid Tumors - Selpercatinib       Take Home Medications       Medication Sig Start/End Day/Cycle Status    selpercatinib (RETEVMO) 120 MG tablet Take 1 tablet (120 mg) by mouth 2 times daily 5/7/2025 to 6/6/2025 Day 1, Cycle 1 - 5/7/2025 Released                            Assessment:                                                       Unfortunately patient's scan shows that her cancer is progressing.  Chanda spoke with Dr Holland who states that we should start the patient on selpercatinib/Retevmo 120 mg twice daily with no break.    Patient is having some high blood pressures which she is working with her primary care provider on and is recently made some medication changes.  She was on Avastin therapy previously which we do think was contributing somewhat to the higher blood pressures.    Intervention/Education provided during outreach:                                                       The oral chemotherapy pharmacy team have reached out to her and on the teach.  They are going to contact her on Monday to check in and see how her blood pressure is.  It sounds like she should be receiving the selpercatinib in the next day or 2.  Once we know when she is starting, we would like her to come in for a 2 week lab check and 4 week lab and provider visit.     Patient to follow up as scheduled at next appt  Patient to call/Tumrit message with updates  Confirmed patient has clinic and triage numbers    Signature:  Alysia Ngo RN

## 2025-05-01 NOTE — TELEPHONE ENCOUNTER
"Oral Chemotherapy Monitoring Program    Lab Monitoring Plan  Duration of monitoring - 12 months   Provider - 2 week lab check and 4 week lab and provider visit. check BPS   CMP/Mg - baseline; q 2 weeks x 3 mos, then monthly  TSH: basline, monthly for 4 months then quarterly   BP/EKG: baseline monthly then quarterly   Subjective/Objective:  Liz Pollard is a 74 year old female contacted by phone for an initial visit for oral chemotherapy education (selpercatinib)         5/1/2025    10:00 AM   ORAL CHEMOTHERAPY   Assessment Type New Teach   Diagnosis Code Hepatocellular Cancer   Providers Dr. Holland   Clinic Name/Location [facility]   Drug Name Retevmo (selpercatinib)   Dose 120 mg   Current Schedule BID   Cycle Details Continuous         Vitals:  BP:   BP Readings from Last 1 Encounters:   04/30/25 (!) 181/76     Wt Readings from Last 1 Encounters:   04/30/25 61.2 kg (135 lb)     Estimated body surface area is 1.63 meters squared as calculated from the following:    Height as of 4/30/25: 1.562 m (5' 1.5\").    Weight as of 4/30/25: 61.2 kg (135 lb).    Labs:  _  Result Component Current Result Ref Range   Sodium 140 (4/30/2025) 135 - 145 mmol/L     _  Result Component Current Result Ref Range   Potassium 4.1 (4/30/2025) 3.4 - 5.3 mmol/L     _  Result Component Current Result Ref Range   Calcium 10.0 (4/30/2025) 8.8 - 10.4 mg/dL     No results found for Mag within last 30 days.     No results found for Phos within last 30 days.     _  Result Component Current Result Ref Range   Albumin 3.3 (L) (4/30/2025) 3.5 - 5.2 g/dL     _  Result Component Current Result Ref Range   Urea Nitrogen 21.1 (4/30/2025) 8.0 - 23.0 mg/dL     _  Result Component Current Result Ref Range   Creatinine 1.31 (H) (4/30/2025) 0.51 - 0.95 mg/dL     _  Result Component Current Result Ref Range    (H) (4/30/2025) 0 - 45 U/L     _  Result Component Current Result Ref Range    (H) (4/30/2025) 0 - 50 U/L     _  Result Component " Current Result Ref Range   Bilirubin Total 1.0 (4/30/2025) <=1.2 mg/dL     No results found for WBC within last 30 days.     No results found for HGB within last 30 days.     No results found for PLT within last 30 days.     No results found for ANC within last 30 days.     No results found for ANC within last 30 days.        Assessment:  Patient is appropriate to start therapy.    Plan:  Basic chemotherapy teaching was reviewed with the patient including indication, start date of therapy, dose, administration, adverse effects, missed doses, food and drug interactions, monitoring, side effect management, office contact information, and safe handling. Written materials were provided and all questions answered.    Follow-Up:  5/5: to ensure blood pressure is okay prior to starting medication   5/19: labs - not scheduled yet and complete an initial assessment      Camilla Brown RPH

## 2025-05-01 NOTE — TELEPHONE ENCOUNTER
Prior Authorization Approval    Medication: RETEVMO 120 MG PO TABS  Authorization Effective Date: 1/30/2025  Authorization Expiration Date: 4/30/2026  Approved Dose/Quantity: 60/30  Reference #: [id number]   Insurance Company: [employer] [place] - Phone [phone] Fax [phone]  Expected CoPay: $ 1,847.22  CoPay Card Available:      Financial Assistance Needed: [facility] [place] [facility] approved  Which Pharmacy is filling the prescription: [facility] - [place], [place] - [address]  Pharmacy Notified: Yes  Patient Notified: Yes

## 2025-05-01 NOTE — TELEPHONE ENCOUNTER
CHICA APPROVED    Medication: RETEVMO 120 MG PO TABS  Amount: $ 5,000  Foundation Name: [facility] Phone: [phone]  Member ID: [id number]  BIN: [id number]  PCN: [id number]  Group: [id number]  Foundation Effective Date: 11/2/2024  Foundation Expiration Date: 5/1/2026  Patient Notified: Yes

## 2025-05-05 NOTE — TELEPHONE ENCOUNTER
Refill Approved    Rx renewed per Medication Renewal Policy. Medication was last renewed on 4/7/2020.    Angie Rowe, [facility] Triage/Med Refill 2/12/2021     Requested Prescriptions   Pending Prescriptions Disp Refills     blood glucose test (CONTOUR NEXT TEST STRIPS) strips [Pharmacy Med Name: CONTOUR NEXT TEST STRIP] 300 strip 3     Sig: TEST AS DIRECTED 2 TO 3 TIMES DAILY       Diabetic Supplies Refill Protocol Passed - 2/11/2021  2:36 PM        Passed - Visit with PCP or prescribing provider visit in last 6 months     Last office visit with prescriber/PCP: 1/21/2020 Yamila Paniagua MD OR same dept: 2/20/2020 Alejandro Jones MD OR same specialty: 2/20/2020 Alejandro Jones MD  Last physical: 9/29/2020 Last MTM visit: Visit date not found   Next visit within 3 mo: Visit date not found  Next physical within 3 mo: Visit date not found  Prescriber OR PCP: Yamila Paniagua MD  Last diagnosis associated with med order: 1. Diabetes mellitus without complication (H)  - CONTOUR NEXT TEST STRIPS strips [Pharmacy Med Name: CONTOUR NEXT TEST STRIP]; TEST AS DIRECTED 2 TO 3 TIMES DAILY  Dispense: 300 strip; Refill: 3    If protocol passes may refill for 12 months if within 3 months of last provider visit (or a total of 15 months).             Passed - A1C in last 6 months     Hemoglobin A1c   Date Value Ref Range Status   09/30/2020 5.3 <=5.6 % Final     Comment:     Normal <5.7% Prediabete 5.7-6.4% Diabletes 6.5% or higher - adopted from ADA consensus guidelines                              
2

## 2025-05-07 ENCOUNTER — RESULTS FOLLOW-UP (OUTPATIENT)
Dept: FAMILY MEDICINE | Facility: CLINIC | Age: 75
End: 2025-05-07

## 2025-05-07 ENCOUNTER — TELEPHONE (OUTPATIENT)
Dept: ONCOLOGY | Facility: HOSPITAL | Age: 75
End: 2025-05-07
Payer: COMMERCIAL

## 2025-05-07 DIAGNOSIS — N18.32 STAGE 3B CHRONIC KIDNEY DISEASE (H): ICD-10-CM

## 2025-05-07 DIAGNOSIS — I1A.0 RESISTANT HYPERTENSION: Primary | ICD-10-CM

## 2025-05-07 NOTE — TELEPHONE ENCOUNTER
Result Note  Please call with results-  Urine tests are normal and do not show a cause for her difficult to control blood pressure. I have referred to a kidney specialist to help with her blood pressure management, but it will probably take some time to get in. Schedule a follow up visit at [facility] for HTN.   Renin activity; Aldosterone; Metanephrine random or 24 hr urine; Catecholamines fractioned free urine; Basic metabolic panel  (Ca, Cl, CO2, Creat, Gluc, K, Na, BUN)      Called patient in attempt to relay provider test result message above. No answer. Message left on  with clinic number provided.    David Jaime RN  [facility]

## 2025-05-07 NOTE — LETTER
May 7, 2025      Dirk Pollard  [address]   [place] [address]        Dear ,    We are writing to inform you of your test results.    {results letter list:[id number]}    Resulted Orders   Renin activity   Result Value Ref Range    Renin Activity 1.8 ng/mL/hr      Comment:      INTERPRETIVE INFORMATION: Renin Activity    Adult, Normal sodium diet:    Supine ................. 0.2-1.6 ng/mL/hr    Upright ................ 0.5-4.0 ng/mL/hr    Children, Normal sodium diet, Supine:    Pickett (1-7 days) ..... 2.0-35.0 ng/mL/hr    Cord blood ............. 4.0-32.0 ng/mL/hr    1-12 mos ............... 2.4-37.0 ng/mL/hr    13 mos-3 yrs ........... 1.7-11.2 ng/mL/hr    4-5 yrs ................ 1.0- 6.5 ng/mL/hr    6-10 yrs ............... 0.5- 5.9 ng/mL/hr    11-15 yrs .............. 0.5- 3.3 ng/mL/hr    Children, normal sodium diet, Upright:    0-3 yrs ................ Not Available    4-5 yrs ................ Less than or equal to 15 ng/mL/hr    6-10 yrs ............... Less than or equal to 17 ng/mL/hr    11-15 yrs .............. Less than or equal to 16 ng/mL/hr    Plasma renin activity measures enzyme ability to convert   angiotensinogen to angiotensin I and is limited by the   availability of angiotensinogen. Plasma renin activity is   not an accurate indicator  of enzyme activity when   angiotensinogen is decreased.    This test was developed and its performance characteristics   determined by [facility]. It has not been cleared or   approved by the US Food and Drug Administration. This test   was performed in a CLIA certified laboratory and is   intended for clinical purposes.  Performed By: [facility]  [address] [place] [id number]  : Agustin Rojas MD, PhD  CLIA Number: [id number]   Aldosterone   Result Value Ref Range    Aldosterone 10.0 0.0 - 31.0 ng/dL      Comment:      For screening of primary aldosteronism a positive test result is defined by  most experts as: Plasma renin activity (PRA) <1.0 ng/mL/hour AND plasma aldosterone concentration (PAC) >=10 ng/dL OR aldosterone to renin ratio >=20 with PAC >=10 ng/dL   Metanephrine random or 24 hr urine   Result Value Ref Range    Metanephrine Urine per Volume 36 ug/L    Normetanephrine Urine per Volume 539 ug/L    Metanephrine, Urine - per 24h 24 (L) 36 - 229 ug/d    Metanephr 24 H ur/cr 31 0 - 300 ug/g CRT    Normetanephrine 24 Hr/Random Urine 353 95 - 650 ug/d    Normetaneph 24H ur/cr 457 (H) 0 - 400 ug/g CRT    Metanephrine 24 Hr/Random Urine Interpretation See Note       Comment:      TEST INFORMATION: Metanephrines Fractionated, Urine    Smaller increases in metanephrine and/or normetanephrine   concentrations (less than two times the upper reference   limit) usually are the result of physiological stimuli,   drugs, or improper specimen collection. Essential   hypertension is often associated with slight elevations   (metanephrine less than 400 ug/d and normetanephrine less   than 900 ug/d). Elevated concentrations may be due to   intense physical activity, life-threatening illness, and   drug interferences.     Significant elevation of one or both metanephrines (three   or more times the upper reference limit) is associated with   an increased probability of a neuroendocrine tumor.    Access complete set of age- and/or gender-specific   reference intervals for this test in the [facility]   Test Directory ([employer]).    This test was developed and its performance characteristics   determined by [facility]. It has not been cleared or   approved by the  US Food and Drug Administration. This test   was performed in a CLIA certified laboratory and is   intended for clinical purposes.    Creatinine, Urine per volume 118 mg/dL    Creatinine, Urine per 24hr 773 500 - 1400 mg/d      Comment:      Performed By: [facility]  [address] [place] [id number]  :  Agustin Rojas MD, PhD  [facility] Number: [id number]   Catecholamines fractioned free urine   Result Value Ref Range    Epinephrine per Volume Urine 2 ug/L    Norepinephrine per Volume Urine 85 ug/L    Dopamine per Volume Urine 218 ug/L    Urine Epinephrine/Creatinine 2 0 - 20 ug/g CRT    Urine Epinephrine 1 1 - 14 ug/d      Comment:      REFERENCE INTERVAL: Epinephrine, Urine - ug/d    Access complete set of age- and/or gender-specific   reference intervals for this test in the [facility]   Test Directory ([email or web address]).    Urine Norepineph/Creatinine 72 (H) 0 - 45 ug/g CRT    Urine Norepinephrine 56 14 - 120 ug/d      Comment:      REFERENCE INTERVAL: Norepinephrine, Urine - ug/d    Access complete set of age- and/or gender-specific   reference intervals for this test in the [facility]   Test Directory ([email or web address]).    Urine Dopamine/Creatinine 185 0 - 250 ug/g CRT    Urine Dopamine 143 71 - 485 ug/d      Comment:      REFERENCE INTERVAL: Dopamine, Urine - ug/d    Access complete set of age- and/or gender-specific   reference intervals for this test in the [facility]   Test Directory ([email or web address]).    Catecholamines Fract Urine Free Interp See Note       Comment:      TEST INFORMATION: Catecholamines Fractionated, Urine Free    Smaller increases in catecholamine concentrations (less   than two times the upper limit) usually are the result of   physiological stimuli, drugs, or improper specimen   collection. Significant elevation of one or more   catecholamines (three or more times the upper reference   limit) is associated with an increased probability of a   neuroendocrine tumor.    Access complete set of age- and/or gender-specific   reference intervals for this test in the [facility]   Test Directory ([employer]).    This test was developed and its performance characteristics   determined by [facility]. It has not been cleared or   approved by the US Food and Drug Administration.  This test   was performed in a CLIA certified laboratory and is   intended for clinical purposes.    Creatinine, Urine per volume 118 mg/dL    Creatinine, Urine per 24hr 773 500 - 1400 mg/d      Comment:      Performed By: [facility]  [address] [place] [address]  : Agustin Rojas MD, PhD  CLIA Number: [id number]   Basic metabolic panel  (Ca, Cl, CO2, Creat, Gluc, K, Na, BUN)   Result Value Ref Range    Sodium 144 135 - 145 mmol/L    Potassium 4.1 3.4 - 5.3 mmol/L    Chloride 109 (H) 98 - 107 mmol/L    Carbon Dioxide (CO2) 25 22 - 29 mmol/L    Anion Gap 10 7 - 15 mmol/L    Urea Nitrogen 22.9 8.0 - 23.0 mg/dL    Creatinine 1.38 (H) 0.51 - 0.95 mg/dL    GFR Estimate 40 (L) >60 mL/min/1.73m2      Comment:      eGFR calculated using 2021 CKD-EPI equation.    Calcium 9.1 8.8 - 10.4 mg/dL    Glucose 104 (H) 70 - 99 mg/dL    Patient Fasting > 8hrs? Yes        If you have any questions or concerns, please call the clinic at the number listed above.       Sincerely,      Nohelia Alvares MD    Electronically signed

## 2025-05-07 NOTE — ORAL ONC MGMT
Oral Chemotherapy Monitoring Program   Left Voicemail    Attempted to contact patient today for follow up regarding oral chemotherapy, selpercatinib, for assessment. No answer. Left voicemail for patient to call us back at [phone] when able. No medication name was left.    Bharati Rojo PharmD  Oral Chemotherapy Pharmacist  [facility] Phone: [phone]  In Basket Pools:   [facility] ORAL CHEMOTHERAPY PHARMACIST   [facility] ORAL CHEMOTHERAPY PHARMACIST

## 2025-05-08 ENCOUNTER — TELEPHONE (OUTPATIENT)
Dept: ONCOLOGY | Facility: HOSPITAL | Age: 75
End: 2025-05-08
Payer: COMMERCIAL

## 2025-05-08 ENCOUNTER — PATIENT OUTREACH (OUTPATIENT)
Dept: CARE COORDINATION | Facility: CLINIC | Age: 75
End: 2025-05-08
Payer: COMMERCIAL

## 2025-05-08 NOTE — ORAL ONC MGMT
Oral Chemotherapy Monitoring Program   Spoke briefly with Dirk. She started the selpercatinib yesterday 5/7. She took her BP today and it was 139/78 so better than previously. She will continue to take her BP once or twice daily and call us if it increases.     We will call Dirk next week to check in.    Sent IB to care team to get her scheduled for labs and provider follow up.    Bharati Rojo, Luz  Oral Chemotherapy Pharmacist  [facility] [place] Phone: [phone]  In Basket Pools:   [facility] ORAL CHEMOTHERAPY PHARMACIST   [facility] ORAL CHEMOTHERAPY PHARMACIST'

## 2025-05-12 ENCOUNTER — PATIENT OUTREACH (OUTPATIENT)
Dept: CARE COORDINATION | Facility: CLINIC | Age: 75
End: 2025-05-12
Payer: COMMERCIAL

## 2025-05-14 ENCOUNTER — TELEPHONE (OUTPATIENT)
Dept: ONCOLOGY | Facility: HOSPITAL | Age: 75
End: 2025-05-14
Payer: COMMERCIAL

## 2025-05-14 NOTE — ORAL ONC MGMT
"Oral Chemotherapy Monitoring Program   Attempted to contact patient today for follow up regarding oral chemotherapy, selpercatinib, for assessment. No answer. Unable to leave VM. Will try again.    Mercedes Bustos, PharmD, BCOP  Oral Chemotherapy Pharmacist  [facility] [place] Phone: [phone]  In Basket Pools: \"[facility] ORAL CHEMOTHERAPY PHARMACIST\" or \"[facility] ORAL CHEMOTHERAPY PHARMACIST\"  May 14, 2025       "

## 2025-05-19 ENCOUNTER — TELEPHONE (OUTPATIENT)
Dept: ONCOLOGY | Facility: HOSPITAL | Age: 75
End: 2025-05-19
Payer: COMMERCIAL

## 2025-05-19 NOTE — ORAL ONC MGMT
Oral Chemotherapy Monitoring Program   Left Voicemail    Attempted to contact patient today for follow up regarding oral chemotherapy, selpercatinib, for assessment. No answer. Unable to leave voicemail as her VM box was full. Will try again after her labs on Wednesday 5/21.    Bharati Rojo, PharmD  Oral Chemotherapy Pharmacist  [facility] [place] Phone: [phone]  In Basket Pools:   [facility] ORAL CHEMOTHERAPY PHARMACIST   [facility] ORAL CHEMOTHERAPY PHARMACIST

## 2025-05-20 ENCOUNTER — TELEPHONE (OUTPATIENT)
Dept: FAMILY MEDICINE | Facility: CLINIC | Age: 75
End: 2025-05-20
Payer: COMMERCIAL

## 2025-05-20 NOTE — TELEPHONE ENCOUNTER
Nohelia Alvares MD  [facility] [place]  Please help schedule with nephrology and also with PCP Dr. Jones for HTN. Thank you, JF

## 2025-05-21 ENCOUNTER — LAB (OUTPATIENT)
Dept: INFUSION THERAPY | Facility: HOSPITAL | Age: 75
End: 2025-05-21
Payer: COMMERCIAL

## 2025-05-21 ENCOUNTER — TELEPHONE (OUTPATIENT)
Dept: ONCOLOGY | Facility: HOSPITAL | Age: 75
End: 2025-05-21

## 2025-05-21 DIAGNOSIS — C22.0 HEPATOCELLULAR CARCINOMA (H): Primary | ICD-10-CM

## 2025-05-21 LAB
ALBUMIN SERPL BCG-MCNC: 2.9 G/DL (ref 3.5–5.2)
ALP SERPL-CCNC: 339 U/L (ref 40–150)
ALT SERPL W P-5'-P-CCNC: 114 U/L (ref 0–50)
ANION GAP SERPL CALCULATED.3IONS-SCNC: 11 MMOL/L (ref 7–15)
AST SERPL W P-5'-P-CCNC: 245 U/L (ref 0–45)
BILIRUB SERPL-MCNC: 1 MG/DL
BUN SERPL-MCNC: 19.6 MG/DL (ref 8–23)
CALCIUM SERPL-MCNC: 9.3 MG/DL (ref 8.8–10.4)
CHLORIDE SERPL-SCNC: 107 MMOL/L (ref 98–107)
CREAT SERPL-MCNC: 1.47 MG/DL (ref 0.51–0.95)
EGFRCR SERPLBLD CKD-EPI 2021: 38 ML/MIN/1.73M2
GLUCOSE SERPL-MCNC: 89 MG/DL (ref 70–99)
HCO3 SERPL-SCNC: 25 MMOL/L (ref 22–29)
MAGNESIUM SERPL-MCNC: 1.6 MG/DL (ref 1.7–2.3)
POTASSIUM SERPL-SCNC: 4 MMOL/L (ref 3.4–5.3)
PROT SERPL-MCNC: 7.6 G/DL (ref 6.4–8.3)
SODIUM SERPL-SCNC: 143 MMOL/L (ref 135–145)

## 2025-05-21 PROCEDURE — 36415 COLL VENOUS BLD VENIPUNCTURE: CPT

## 2025-05-21 PROCEDURE — 83735 ASSAY OF MAGNESIUM: CPT

## 2025-05-21 PROCEDURE — 84155 ASSAY OF PROTEIN SERUM: CPT

## 2025-05-21 NOTE — ORAL ONC MGMT
Oral Chemotherapy Monitoring Program  Lab Follow Up    Patient currently on selpercatinib therapy for HCC.    Reviewed lab results from 5/21/25.    No concerning abnormalities. LFT's starting to go down, Mg a little low - will recheck in two weeks.     Assessment & Plan:  Continue selpercatinib  Attempted to contact patient today. No answer. Left voicemail for patient to call us back at [phone] when able. No medication name was left. Did let her know her labs looked stable. Also will send [employer] message.    Follow-Up:  We will review labs and provider note on 6/4    Bharati Rojo, Luz  Oral Chemotherapy Pharmacist  [facility] [place] Phone: [phone]  In Basket Pools:   [facility] ORAL CHEMOTHERAPY PHARMACIST   [facility] ORAL CHEMOTHERAPY PHARMACIST

## 2025-05-28 DIAGNOSIS — Z76.0 ENCOUNTER FOR MEDICATION REFILL: ICD-10-CM

## 2025-05-29 DIAGNOSIS — I10 ESSENTIAL HYPERTENSION: Primary | ICD-10-CM

## 2025-05-29 RX ORDER — LEVOTHYROXINE SODIUM 100 UG/1
100 TABLET ORAL DAILY
Qty: 90 TABLET | Refills: 1 | Status: SHIPPED | OUTPATIENT
Start: 2025-05-29

## 2025-05-29 NOTE — TELEPHONE ENCOUNTER
Medication Question or Refill        What medication are you calling about (include dose and sig)?: hydrochlorothiazide (HYDRODIURIL) 25 MG tablet     Preferred Pharmacy:   [facility]/pharmacy #[id number] [facility] [address] [address]  Phone: [phone] Fax: [phone]        Controlled Substance Agreement on file:   CSA -- Patient Level:    CSA: None found at the patient level.       Who prescribed the medication?: Cancer clinic    Do you need a refill? Yes, pt called stated that she needed a refill on medication as she is completely out. She stated that she was prescribed medication by the cancer doctor who only prescribed her 30 day supply. Pt is requesting for 90 day supply going forward if possible. Please look into this request and advise.  Please call pt back if you have any questions.  Thanks!    When did you use the medication last? N/A    Patient offered an appointment? No    Do you have any questions or concerns?  No      Could we send this information to you in [place] or would you prefer to receive a phone call?:   Patient would prefer a phone call   Okay to leave a detailed message?: Yes at Cell number on file:    Telephone Information:   Mobile [phone]

## 2025-05-30 NOTE — TELEPHONE ENCOUNTER
Chart reviewed, it appears Hydrochlorothiazide 25 mg tablet was prescribed at [facility] on 4/18/2025. Only 30-day supply was prescribed.    Will route message to Dr. Jones to review and advise.    SUSAN PyleN, RN, PHN   [employer]

## 2025-06-01 RX ORDER — HYDROCHLOROTHIAZIDE 25 MG/1
25 TABLET ORAL DAILY
Qty: 90 TABLET | Refills: 3 | Status: ON HOLD | OUTPATIENT
Start: 2025-06-01

## 2025-06-02 ENCOUNTER — HOSPITAL ENCOUNTER (INPATIENT)
Facility: HOSPITAL | Age: 75
End: 2025-06-02
Payer: COMMERCIAL

## 2025-06-02 ENCOUNTER — TELEPHONE (OUTPATIENT)
Dept: ONCOLOGY | Facility: HOSPITAL | Age: 75
End: 2025-06-02
Payer: COMMERCIAL

## 2025-06-02 ENCOUNTER — APPOINTMENT (OUTPATIENT)
Dept: CT IMAGING | Facility: HOSPITAL | Age: 75
End: 2025-06-02
Payer: COMMERCIAL

## 2025-06-02 ENCOUNTER — APPOINTMENT (OUTPATIENT)
Dept: MRI IMAGING | Facility: HOSPITAL | Age: 75
End: 2025-06-02
Payer: COMMERCIAL

## 2025-06-02 ENCOUNTER — VIRTUAL VISIT (OUTPATIENT)
Dept: ONCOLOGY | Facility: HOSPITAL | Age: 75
End: 2025-06-02
Payer: COMMERCIAL

## 2025-06-02 VITALS — SYSTOLIC BLOOD PRESSURE: 153 MMHG | DIASTOLIC BLOOD PRESSURE: 95 MMHG

## 2025-06-02 DIAGNOSIS — R11.0 NAUSEA: ICD-10-CM

## 2025-06-02 DIAGNOSIS — C22.0 HEPATOCELLULAR CARCINOMA (H): Primary | ICD-10-CM

## 2025-06-02 DIAGNOSIS — R47.89 WORD FINDING DIFFICULTY: ICD-10-CM

## 2025-06-02 DIAGNOSIS — K59.00 CONSTIPATION, UNSPECIFIED CONSTIPATION TYPE: ICD-10-CM

## 2025-06-02 DIAGNOSIS — K21.00 GASTROESOPHAGEAL REFLUX DISEASE WITH ESOPHAGITIS, UNSPECIFIED WHETHER HEMORRHAGE: ICD-10-CM

## 2025-06-02 DIAGNOSIS — R52 PAIN: ICD-10-CM

## 2025-06-02 DIAGNOSIS — C22.8: ICD-10-CM

## 2025-06-02 DIAGNOSIS — N17.9 AKI (ACUTE KIDNEY INJURY): ICD-10-CM

## 2025-06-02 DIAGNOSIS — Z79.899 ENCOUNTER FOR MEDICATION MANAGEMENT: ICD-10-CM

## 2025-06-02 DIAGNOSIS — G93.40 ENCEPHALOPATHY, UNSPECIFIED TYPE: ICD-10-CM

## 2025-06-02 DIAGNOSIS — R77.2 ELEVATED AFP: ICD-10-CM

## 2025-06-02 DIAGNOSIS — C79.70: ICD-10-CM

## 2025-06-02 DIAGNOSIS — R53.83 LETHARGY: ICD-10-CM

## 2025-06-02 DIAGNOSIS — R41.82 ALTERED MENTAL STATUS, UNSPECIFIED ALTERED MENTAL STATUS TYPE: ICD-10-CM

## 2025-06-02 DIAGNOSIS — E86.0 DEHYDRATION: ICD-10-CM

## 2025-06-02 DIAGNOSIS — E03.9 HYPOTHYROIDISM, UNSPECIFIED TYPE: ICD-10-CM

## 2025-06-02 DIAGNOSIS — F41.9 ANXIETY: ICD-10-CM

## 2025-06-02 DIAGNOSIS — R26.89 IMPAIRED GAIT AND MOBILITY: ICD-10-CM

## 2025-06-02 DIAGNOSIS — K11.7 DISTURBANCE OF SALIVARY SECRETION: ICD-10-CM

## 2025-06-02 DIAGNOSIS — I10 ESSENTIAL HYPERTENSION: ICD-10-CM

## 2025-06-02 DIAGNOSIS — Z71.89 OTHER SPECIFIED COUNSELING: Chronic | ICD-10-CM

## 2025-06-02 PROBLEM — N30.00 ACUTE CYSTITIS WITHOUT HEMATURIA: Status: ACTIVE | Noted: 2025-06-02

## 2025-06-02 PROBLEM — R62.7 FAILURE TO THRIVE IN ADULT: Status: ACTIVE | Noted: 2025-06-02

## 2025-06-02 PROBLEM — K76.82 HEPATIC ENCEPHALOPATHY (H): Status: ACTIVE | Noted: 2025-06-02

## 2025-06-02 LAB
ALBUMIN SERPL BCG-MCNC: 2.6 G/DL (ref 3.5–5.2)
ALBUMIN UR-MCNC: 300 MG/DL
ALP SERPL-CCNC: 258 U/L (ref 40–150)
ALT SERPL W P-5'-P-CCNC: 129 U/L (ref 0–50)
AMMONIA PLAS-SCNC: 73 UMOL/L (ref 11–51)
ANION GAP SERPL CALCULATED.3IONS-SCNC: 14 MMOL/L (ref 7–15)
APPEARANCE UR: ABNORMAL
AST SERPL W P-5'-P-CCNC: 250 U/L (ref 0–45)
BASOPHILS # BLD AUTO: 0.1 10E3/UL (ref 0–0.2)
BASOPHILS NFR BLD AUTO: 2 %
BILIRUB DIRECT SERPL-MCNC: 0.71 MG/DL (ref 0–0.3)
BILIRUB SERPL-MCNC: 1.7 MG/DL
BILIRUB UR QL STRIP: ABNORMAL
BUN SERPL-MCNC: 33.4 MG/DL (ref 8–23)
CALCIUM SERPL-MCNC: 8.4 MG/DL (ref 8.8–10.4)
CHLORIDE SERPL-SCNC: 108 MMOL/L (ref 98–107)
COLOR UR AUTO: ABNORMAL
CREAT SERPL-MCNC: 2.5 MG/DL (ref 0.51–0.95)
EGFRCR SERPLBLD CKD-EPI 2021: 20 ML/MIN/1.73M2
EOSINOPHIL # BLD AUTO: 0 10E3/UL (ref 0–0.7)
EOSINOPHIL NFR BLD AUTO: 1 %
ERYTHROCYTE [DISTWIDTH] IN BLOOD BY AUTOMATED COUNT: 21.2 % (ref 10–15)
GLUCOSE BLDC GLUCOMTR-MCNC: 104 MG/DL (ref 70–99)
GLUCOSE SERPL-MCNC: 116 MG/DL (ref 70–99)
GLUCOSE UR STRIP-MCNC: NEGATIVE MG/DL
HCO3 SERPL-SCNC: 20 MMOL/L (ref 22–29)
HCT VFR BLD AUTO: 50.2 % (ref 35–47)
HGB BLD-MCNC: 15.7 G/DL (ref 11.7–15.7)
HGB UR QL STRIP: ABNORMAL
HYALINE CASTS: 25 /LPF
IMM GRANULOCYTES # BLD: 0 10E3/UL
IMM GRANULOCYTES NFR BLD: 0 %
INR PPP: 1.12 (ref 0.85–1.15)
KETONES UR STRIP-MCNC: NEGATIVE MG/DL
LEUKOCYTE ESTERASE UR QL STRIP: NEGATIVE
LYMPHOCYTES # BLD AUTO: 0.6 10E3/UL (ref 0.8–5.3)
LYMPHOCYTES NFR BLD AUTO: 20 %
MCH RBC QN AUTO: 26.7 PG (ref 26.5–33)
MCHC RBC AUTO-ENTMCNC: 31.3 G/DL (ref 31.5–36.5)
MCV RBC AUTO: 85 FL (ref 78–100)
MONOCYTES # BLD AUTO: 0.1 10E3/UL (ref 0–1.3)
MONOCYTES NFR BLD AUTO: 4 %
MUCOUS THREADS #/AREA URNS LPF: PRESENT /LPF
NEUTROPHILS # BLD AUTO: 2.3 10E3/UL (ref 1.6–8.3)
NEUTROPHILS NFR BLD AUTO: 73 %
NITRATE UR QL: NEGATIVE
NRBC # BLD AUTO: 0 10E3/UL
NRBC BLD AUTO-RTO: 0 /100
PH UR STRIP: 5.5 [PH] (ref 5–7)
PLATELET # BLD AUTO: 73 10E3/UL (ref 150–450)
POTASSIUM SERPL-SCNC: 4.8 MMOL/L (ref 3.4–5.3)
PROT SERPL-MCNC: 6.7 G/DL (ref 6.4–8.3)
PROTHROMBIN TIME: 14.6 SECONDS (ref 11.8–14.8)
RBC # BLD AUTO: 5.88 10E6/UL (ref 3.8–5.2)
RBC URINE: 3 /HPF
SODIUM SERPL-SCNC: 142 MMOL/L (ref 135–145)
SP GR UR STRIP: 1.02 (ref 1–1.03)
SQUAMOUS EPITHELIAL: 1 /HPF
T4 FREE SERPL-MCNC: 0.37 NG/DL (ref 0.9–1.7)
TRANSITIONAL EPI: <1 /HPF
TSH SERPL DL<=0.005 MIU/L-ACNC: 81.31 UIU/ML (ref 0.3–4.2)
UROBILINOGEN UR STRIP-MCNC: 6 MG/DL
WBC # BLD AUTO: 3.1 10E3/UL (ref 4–11)
WBC URINE: 6 /HPF

## 2025-06-02 PROCEDURE — 82310 ASSAY OF CALCIUM: CPT

## 2025-06-02 PROCEDURE — 36415 COLL VENOUS BLD VENIPUNCTURE: CPT

## 2025-06-02 PROCEDURE — 84443 ASSAY THYROID STIM HORMONE: CPT

## 2025-06-02 PROCEDURE — 120N000001 HC R&B MED SURG/OB

## 2025-06-02 PROCEDURE — 98007 SYNCH AUDIO-VIDEO EST HI 40: CPT | Performed by: INTERNAL MEDICINE

## 2025-06-02 PROCEDURE — 82140 ASSAY OF AMMONIA: CPT

## 2025-06-02 PROCEDURE — 70450 CT HEAD/BRAIN W/O DYE: CPT

## 2025-06-02 PROCEDURE — 250N000013 HC RX MED GY IP 250 OP 250 PS 637: Performed by: HOSPITALIST

## 2025-06-02 PROCEDURE — 99285 EMERGENCY DEPT VISIT HI MDM: CPT | Mod: 25

## 2025-06-02 PROCEDURE — 70553 MRI BRAIN STEM W/O & W/DYE: CPT

## 2025-06-02 PROCEDURE — A9585 GADOBUTROL INJECTION: HCPCS

## 2025-06-02 PROCEDURE — G2211 COMPLEX E/M VISIT ADD ON: HCPCS | Performed by: INTERNAL MEDICINE

## 2025-06-02 PROCEDURE — 96360 HYDRATION IV INFUSION INIT: CPT

## 2025-06-02 PROCEDURE — 255N000002 HC RX 255 OP 636

## 2025-06-02 PROCEDURE — 81001 URINALYSIS AUTO W/SCOPE: CPT

## 2025-06-02 PROCEDURE — 82248 BILIRUBIN DIRECT: CPT

## 2025-06-02 PROCEDURE — 96361 HYDRATE IV INFUSION ADD-ON: CPT

## 2025-06-02 PROCEDURE — 3080F DIAST BP >= 90 MM HG: CPT | Mod: 95 | Performed by: INTERNAL MEDICINE

## 2025-06-02 PROCEDURE — 1126F AMNT PAIN NOTED NONE PRSNT: CPT | Mod: 95 | Performed by: INTERNAL MEDICINE

## 2025-06-02 PROCEDURE — 85610 PROTHROMBIN TIME: CPT

## 2025-06-02 PROCEDURE — 82962 GLUCOSE BLOOD TEST: CPT

## 2025-06-02 PROCEDURE — 3077F SYST BP >= 140 MM HG: CPT | Mod: 95 | Performed by: INTERNAL MEDICINE

## 2025-06-02 PROCEDURE — 258N000003 HC RX IP 258 OP 636: Performed by: HOSPITALIST

## 2025-06-02 PROCEDURE — 258N000003 HC RX IP 258 OP 636

## 2025-06-02 PROCEDURE — 82435 ASSAY OF BLOOD CHLORIDE: CPT

## 2025-06-02 PROCEDURE — 99223 1ST HOSP IP/OBS HIGH 75: CPT | Performed by: HOSPITALIST

## 2025-06-02 PROCEDURE — 84439 ASSAY OF FREE THYROXINE: CPT

## 2025-06-02 PROCEDURE — 250N000013 HC RX MED GY IP 250 OP 250 PS 637

## 2025-06-02 PROCEDURE — 250N000011 HC RX IP 250 OP 636: Performed by: HOSPITALIST

## 2025-06-02 PROCEDURE — 85004 AUTOMATED DIFF WBC COUNT: CPT

## 2025-06-02 RX ORDER — LEVOTHYROXINE SODIUM 100 UG/1
100 TABLET ORAL DAILY
Status: DISCONTINUED | OUTPATIENT
Start: 2025-06-03 | End: 2025-06-02

## 2025-06-02 RX ORDER — DEXTROSE MONOHYDRATE 25 G/50ML
25-50 INJECTION, SOLUTION INTRAVENOUS
Status: ACTIVE | OUTPATIENT
Start: 2025-06-02

## 2025-06-02 RX ORDER — ONDANSETRON 4 MG/1
4 TABLET, ORALLY DISINTEGRATING ORAL EVERY 6 HOURS PRN
Status: ACTIVE | OUTPATIENT
Start: 2025-06-02

## 2025-06-02 RX ORDER — ONDANSETRON 2 MG/ML
4 INJECTION INTRAMUSCULAR; INTRAVENOUS EVERY 6 HOURS PRN
Status: DISPENSED | OUTPATIENT
Start: 2025-06-02

## 2025-06-02 RX ORDER — SODIUM CHLORIDE 9 MG/ML
INJECTION, SOLUTION INTRAVENOUS CONTINUOUS
Status: DISCONTINUED | OUTPATIENT
Start: 2025-06-02 | End: 2025-06-03

## 2025-06-02 RX ORDER — CALCIUM CARBONATE 500 MG/1
1000 TABLET, CHEWABLE ORAL 4 TIMES DAILY PRN
Status: DISPENSED | OUTPATIENT
Start: 2025-06-02

## 2025-06-02 RX ORDER — FERROUS GLUCONATE 324(38)MG
324 TABLET ORAL EVERY OTHER DAY
Status: DISPENSED | OUTPATIENT
Start: 2025-06-03

## 2025-06-02 RX ORDER — GADOBUTROL 604.72 MG/ML
6 INJECTION INTRAVENOUS ONCE
Status: COMPLETED | OUTPATIENT
Start: 2025-06-02 | End: 2025-06-02

## 2025-06-02 RX ORDER — ENOXAPARIN SODIUM 100 MG/ML
30 INJECTION SUBCUTANEOUS EVERY 24 HOURS
Status: DISPENSED | OUTPATIENT
Start: 2025-06-02

## 2025-06-02 RX ORDER — ACETAMINOPHEN 325 MG/1
650 TABLET ORAL EVERY 4 HOURS PRN
Status: DISCONTINUED | OUTPATIENT
Start: 2025-06-02 | End: 2025-06-02

## 2025-06-02 RX ORDER — ASPIRIN 81 MG/1
81 TABLET ORAL DAILY
Status: DISPENSED | OUTPATIENT
Start: 2025-06-03

## 2025-06-02 RX ORDER — CEFTRIAXONE 2 G/1
2 INJECTION, POWDER, FOR SOLUTION INTRAMUSCULAR; INTRAVENOUS EVERY 24 HOURS
Status: DISCONTINUED | OUTPATIENT
Start: 2025-06-02 | End: 2025-06-03

## 2025-06-02 RX ORDER — LIDOCAINE 40 MG/G
CREAM TOPICAL
Status: ACTIVE | OUTPATIENT
Start: 2025-06-02

## 2025-06-02 RX ORDER — NICOTINE POLACRILEX 4 MG
15-30 LOZENGE BUCCAL
Status: ACTIVE | OUTPATIENT
Start: 2025-06-02

## 2025-06-02 RX ORDER — ACETAMINOPHEN 650 MG/1
650 SUPPOSITORY RECTAL EVERY 4 HOURS PRN
Status: DISCONTINUED | OUTPATIENT
Start: 2025-06-02 | End: 2025-06-02

## 2025-06-02 RX ORDER — LEVOTHYROXINE SODIUM 25 UG/1
25 TABLET ORAL
Status: DISCONTINUED | OUTPATIENT
Start: 2025-06-03 | End: 2025-06-02

## 2025-06-02 RX ORDER — AMOXICILLIN 250 MG
1 CAPSULE ORAL 2 TIMES DAILY PRN
Status: ACTIVE | OUTPATIENT
Start: 2025-06-02

## 2025-06-02 RX ORDER — AMOXICILLIN 250 MG
2 CAPSULE ORAL 2 TIMES DAILY PRN
Status: ACTIVE | OUTPATIENT
Start: 2025-06-02

## 2025-06-02 RX ORDER — LACTULOSE 10 G/15ML
30 SOLUTION ORAL 2 TIMES DAILY
Status: COMPLETED | OUTPATIENT
Start: 2025-06-02 | End: 2025-06-03

## 2025-06-02 RX ORDER — LEVOTHYROXINE SODIUM 100 UG/1
100 TABLET ORAL ONCE
Status: COMPLETED | OUTPATIENT
Start: 2025-06-02 | End: 2025-06-02

## 2025-06-02 RX ADMIN — LACTULOSE SOLUTION USP, 10 G/15 ML 30 G: 10 SOLUTION ORAL; RECTAL at 23:51

## 2025-06-02 RX ADMIN — LEVOTHYROXINE SODIUM 100 MCG: 100 TABLET ORAL at 21:12

## 2025-06-02 RX ADMIN — SODIUM CHLORIDE 1000 ML: 0.9 INJECTION, SOLUTION INTRAVENOUS at 19:03

## 2025-06-02 RX ADMIN — CEFTRIAXONE SODIUM 2 G: 2 INJECTION, POWDER, FOR SOLUTION INTRAMUSCULAR; INTRAVENOUS at 22:53

## 2025-06-02 RX ADMIN — SODIUM CHLORIDE 100 ML/HR: 0.9 INJECTION, SOLUTION INTRAVENOUS at 22:56

## 2025-06-02 RX ADMIN — GADOBUTROL 6 ML: 604.72 INJECTION INTRAVENOUS at 22:15

## 2025-06-02 RX ADMIN — ENOXAPARIN SODIUM 30 MG: 30 INJECTION SUBCUTANEOUS at 22:54

## 2025-06-02 ASSESSMENT — PAIN SCALES - GENERAL: PAINLEVEL_OUTOF10: NO PAIN (0)

## 2025-06-02 ASSESSMENT — ACTIVITIES OF DAILY LIVING (ADL)
ADLS_ACUITY_SCORE: 41

## 2025-06-02 NOTE — ED TRIAGE NOTES
Pt brought in by her sister in law, Destini, from home where she lives with her , who has Alzheimer's. Pt currently has stage 4 liver cancer and is undergoing chemo. Pt's  called sister in law and stated that the patient was unresponsive. When she woke up, pt was confused and having difficulty finding words. She was also responding inappropriately. Pt has not been seen by anyone other than , who is poor historian, since Thursday. In triage, pt is still having difficulty finding words and has some inappropriate responses. Neuro assessment called in triage, no stroke code called.      Triage Assessment (Adult)       Row Name 06/02/25 1733          Triage Assessment    Airway WDL WDL        Respiratory WDL    Respiratory WDL WDL        Skin Circulation/Temperature WDL    Skin Circulation/Temperature WDL WDL        Cardiac WDL    Cardiac WDL WDL        Peripheral/Neurovascular WDL    Peripheral Neurovascular WDL WDL        Cognitive/Neuro/Behavioral WDL    Cognitive/Neuro/Behavioral WDL level of consciousness     Level of Consciousness lethargic

## 2025-06-02 NOTE — PROGRESS NOTES
Virtual Visit Details    Type of service:  Video Visit   Video Start Time: 1601  Video End Time:  1626    Originating Location (pt. Location): Home    Distant Location (provider location):  On-site  Platform used for Video Visit: Paula Holland MD on 6/2/2025 at 3:09 PM  [facility] cancer Care Progress Note  Patient: Liz Pollard   MRN:  [id number]   Date of Service : Jun 2, 2025         Reason for consultation      Problem List Items Addressed This Visit          Digestive    Hepatocellular carcinoma (H) - Primary       Other    Lethargy    Elevated AFP           Assessment / number of problems addressed      1.  Locally advanced and metastatic hepatocellular carcinoma in a patient with a prior history of hepatitis C which resulted in cirrhosis.  Started treatment with Tecentriq and bevacizumab.  By April 2025 it was evident that she was progressing.  PET scan confirmed it.  Started on selpercatinib.  She started that on 7 May 2025.  Has taken about 40 pills which would be only 20 days worth.  So there has been some confusion on the part of the patient about how much she is supposed to take.  She is supposed to take 60 mg twice a day.  2.  History of hepatitis C which was treated back in 2010 with some antiviral agents.  Apparently was cured of it.  However she did have cirrhosis by the time it was cured.  3.  It is a possibility that the adrenal gland might be a separate malignancy but we will see.  4.  Extreme lethargy.  The patient appears incoherent and not able to track very well.  Appears somewhat encephalopathic.  5.  Significant history of smoking for over 40 years.  She quit in 2021.  35-pack-year of history.  6.  Elevated liver function test.  7.  Portal vein thrombosis and either thrombus or tumor thrombus in inferior vena cava.    Plan and medical decision making      Patient on active targeted therapy for advanced hepatocellular carcinoma.  Appears lethargic and  encephalopathic.  I reviewed the labs from her previous visits.  Reviewed notes from pharmacy.  Independent historian account obtained from her sister-in-law.  Based on her acuteness of the finding recommended admission to the hospital.    Recommend that she be brought to the emergency room for full evaluation.  She may be dehydrated or encephalopathic.  After she is admitted we will see what is going on and then recommend continuation of therapy or not.  The longitudinal plan of care for the diagnosis(es)/condition(s) as documented were addressed during this visit. Due to the added complexity in care, I will continue to support Dirk in the subsequent management and with ongoing continuity of care.   Clinical/pathological stage       Cancer Staging   No matching staging information was found for the patient.      History of present illness      Ms. Liz Pollard is  74 year old  woman with advanced suspected hepatocellular carcinoma.  This is located in the right lobe of the liver measures at least 7-8 cm in size irregularly shaped and associated with intrahepatic portal vein thrombosis which is extending into the main portal vein.  In addition to that there appears to be involvement of the right adrenal gland which appears to be partially invading the inferior vena cava.  This presented when she underwent low-dose CT screening for lung cancer.  That CT scan noted enlargement of the right adrenal area and recommended contrast-enhanced CT scan.  That CT scan was done on 9 January 2025 and revealed a mass in the liver and the thrombosis of the portal vein etc. along with metastasis to the right adrenal gland.  Lab testing indicated very elevated alpha-fetoprotein levels and also some elevation of CA 19-9.    She was referred for biopsy as well as a PET scan.  The biopsy performed on 4th February 2025 positive for hepatocellular carcinoma.  PET scan also showed uptake in the liver and the adrenal area.  There is  also concern of IVC thrombus.    Started treatment with bevacizumab and atezolizumab.  Started on treatment 2025.  At the time of diagnosis the TSH was quite elevated.  We also send the tumor for foundation 1 testing.  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  This does make her candidate for selpercatinib.    She tolerated her Tecentriq and bevacizumab treatment well.  She initially had some response to the treatment by decreasing her alpha-fetoprotein from 39,000 down to 33,000.  However by early April it was evident that the alpha-fetoprotein was going up again.  And then progression was confirmed on a PET scan done in the end of 2025.    After that the patient was started on selpercatinib.  She started that on 7 May 2025.  She has not been doing quite well.  We are doing a video visit today because she was unable to come to the clinic.  Her sister-in-law Destini was helping her today and found her to be very weak and unable to get up in the morning.  At the time of my video visit the patient still appeared quite weak.    Detailed review of systems      A review of systems was obtained.  Positive findings noted in the history.  Rest of the review of system is otherwise negative.      Past medical/surgical/social/family history        Past Medical History:   Diagnosis Date    Diabetes mellitus (H)     Disease of thyroid gland     Hypertension     Infectious viral hepatitis      Past Surgical History:   Procedure Laterality Date    HC REMOVAL GALLBLADDER      Description: Cholecystectomy;  Recorded: 05/10/2010;    HYSTERECTOMY  1987    OOPHORECTOMY      ZZC TOTAL ABDOM HYSTERECTOMY      Description: Hysterectomy;  Recorded: 2009;     Family History   Problem Relation Age of Onset    Cancer Mother 83         of stomach cancer    Colon Cancer Father 51         of colon cancer    Colon Cancer Brother 36         from colon cancer    Prostate Cancer  Brother     Breast Cancer Maternal Aunt         in her 90 s    Sickle Cell Trait Niece     Lung Cancer Maternal Uncle      Review of system      Details noted in the history of present illness.  A detailed review of systems is otherwise negative.      Physical exam        BP (!) 153/95 (Patient Position: Sitting, Cuff Size: Adult Regular)     GENERAL: no acute distress. Cooperative in conversation.   HEENT: Facial symmetry preserved.  Oral mucosa is moist and intact.  NECK: No visible thyromegaly.  No deformity.  RESP: Regular respiratory rate. No stridor.  No coughing.  EXTREMITIES: No visible upper extremity edema.   NEURO: non focal. Alert and oriented x3.  Cranial nerves II through XI appear intact clinically.  PSYCH: within normal limits.   SKIN: Facial skin appears warm dry intact       Lab results Reviewed      Recent Results (from the past week)   Basic metabolic panel   Result Value Ref Range    Sodium 142 135 - 145 mmol/L    Potassium 4.8 3.4 - 5.3 mmol/L    Chloride 108 (H) 98 - 107 mmol/L    Carbon Dioxide (CO2) 20 (L) 22 - 29 mmol/L    Anion Gap 14 7 - 15 mmol/L    Urea Nitrogen 33.4 (H) 8.0 - 23.0 mg/dL    Creatinine 2.50 (H) 0.51 - 0.95 mg/dL    GFR Estimate 20 (L) >60 mL/min/1.73m2    Calcium 8.4 (L) 8.8 - 10.4 mg/dL    Glucose 116 (H) 70 - 99 mg/dL   Hepatic function panel   Result Value Ref Range    Protein Total 6.7 6.4 - 8.3 g/dL    Albumin 2.6 (L) 3.5 - 5.2 g/dL    Bilirubin Total 1.7 (H) <=1.2 mg/dL    Alkaline Phosphatase 258 (H) 40 - 150 U/L     (H) 0 - 45 U/L     (H) 0 - 50 U/L    Bilirubin Direct 0.71 (H) 0.00 - 0.30 mg/dL   TSH with free T4 reflex   Result Value Ref Range    TSH 81.31 (H) 0.30 - 4.20 uIU/mL   INR   Result Value Ref Range    INR 1.12 0.85 - 1.15    PT 14.6 11.8 - 14.8 Seconds   CBC with platelets and differential   Result Value Ref Range    WBC Count 3.1 (L) 4.0 - 11.0 10e3/uL    RBC Count 5.88 (H) 3.80 - 5.20 10e6/uL    Hemoglobin 15.7 11.7 - 15.7 g/dL     Hematocrit 50.2 (H) 35.0 - 47.0 %    MCV 85 78 - 100 fL    MCH 26.7 26.5 - 33.0 pg    MCHC 31.3 (L) 31.5 - 36.5 g/dL    RDW 21.2 (H) 10.0 - 15.0 %    Platelet Count 73 (L) 150 - 450 10e3/uL    % Neutrophils 73 %    % Lymphocytes 20 %    % Monocytes 4 %    % Eosinophils 1 %    % Basophils 2 %    % Immature Granulocytes 0 %    NRBCs per 100 WBC 0 <1 /100    Absolute Neutrophils 2.3 1.6 - 8.3 10e3/uL    Absolute Lymphocytes 0.6 (L) 0.8 - 5.3 10e3/uL    Absolute Monocytes 0.1 0.0 - 1.3 10e3/uL    Absolute Eosinophils 0.0 0.0 - 0.7 10e3/uL    Absolute Basophils 0.1 0.0 - 0.2 10e3/uL    Absolute Immature Granulocytes 0.0 <=0.4 10e3/uL    Absolute NRBCs 0.0 10e3/uL   T4 free   Result Value Ref Range    Free T4 0.37 (L) 0.90 - 1.70 ng/dL   Ammonia   Result Value Ref Range    Ammonia 73 (H) 11 - 51 umol/L   UA with Microscopic reflex to Culture    Specimen: Urine, Catheter   Result Value Ref Range    Color Urine Charlette (A) Colorless, Straw, Light Yellow, Yellow    Appearance Urine Turbid (A) Clear    Glucose Urine Negative Negative mg/dL    Bilirubin Urine 0.5 mg/dL (A) Negative    Ketones Urine Negative Negative mg/dL    Specific Gravity Urine 1.024 1.001 - 1.030    Blood Urine 0.5 mg/dL (A) Negative    pH Urine 5.5 5.0 - 7.0    Protein Albumin Urine 300 (A) Negative mg/dL    Urobilinogen Urine 6.0 (A) Normal mg/dL    Nitrite Urine Negative Negative    Leukocyte Esterase Urine Negative Negative    Mucus Urine Present (A) None Seen /LPF    RBC Urine 3 (H) <=2 /HPF    WBC Urine 6 (H) <=5 /HPF    Squamous Epithelials Urine 1 <=1 /HPF    Transitional Epithelials Urine <1 <=1 /HPF    Hyaline Casts Urine 25 (H) <=2 /LPF   Glucose by meter   Result Value Ref Range    GLUCOSE BY METER POCT 104 (H) 70 - 99 mg/dL   Ammonia   Result Value Ref Range    Ammonia 83 (H) 11 - 51 umol/L   Magnesium   Result Value Ref Range    Magnesium 1.6 (L) 1.7 - 2.3 mg/dL   Lactic Acid Whole Blood w/ 1x repeat in 2 hrs when >2   Result Value Ref Range     Lactic Acid, Initial 2.6 (H) 0.7 - 2.0 mmol/L   Basic metabolic panel   Result Value Ref Range    Sodium 145 135 - 145 mmol/L    Potassium 4.8 3.4 - 5.3 mmol/L    Chloride 114 (H) 98 - 107 mmol/L    Carbon Dioxide (CO2) 19 (L) 22 - 29 mmol/L    Anion Gap 12 7 - 15 mmol/L    Urea Nitrogen 33.9 (H) 8.0 - 23.0 mg/dL    Creatinine 2.43 (H) 0.51 - 0.95 mg/dL    GFR Estimate 21 (L) >60 mL/min/1.73m2    Calcium 7.6 (L) 8.8 - 10.4 mg/dL    Glucose 102 (H) 70 - 99 mg/dL   Hepatic function panel   Result Value Ref Range    Protein Total 6.0 (L) 6.4 - 8.3 g/dL    Albumin 2.4 (L) 3.5 - 5.2 g/dL    Bilirubin Total 1.3 (H) <=1.2 mg/dL    Alkaline Phosphatase 230 (H) 40 - 150 U/L     (H) 0 - 45 U/L     (H) 0 - 50 U/L    Bilirubin Direct 0.66 (H) 0.00 - 0.30 mg/dL   Glucose by meter   Result Value Ref Range    GLUCOSE BY METER POCT 90 70 - 99 mg/dL   Lactic acid whole blood   Result Value Ref Range    Lactic Acid 3.7 (H) 0.7 - 2.0 mmol/L   Glucose by meter   Result Value Ref Range    GLUCOSE BY METER POCT 127 (H) 70 - 99 mg/dL   Glucose by meter   Result Value Ref Range    GLUCOSE BY METER POCT 135 (H) 70 - 99 mg/dL       Imaging results Reviewed        MR Brain w/o & w Contrast  Result Date: 6/2/2025  EXAM: MR BRAIN W/O and W CONTRAST LOCATION: [facility] DATE: 6/2/2025 INDICATION: Speech finding difficulties, evaluate for stroke COMPARISON: Head CT obtained earlier today CONTRAST: 6 mL Gadavist TECHNIQUE: Routine multiplanar multisequence head MRI without and with intravenous contrast. FINDINGS: Somewhat motion degraded examination. INTRACRANIAL CONTENTS: No acute or subacute infarct. No mass, acute hemorrhage, or extra-axial fluid collections. Scattered nonspecific foci of T2/FLAIR hyperintense signal in the cerebral white matter. There is a linear area of T2/FLAIR hyperintensity involving the white matter in the anterior right centrum semiovale (images 19 and 20 series 17) which appears to  be oriented perpendicular to the ventricular system. Small rounded focus of T2/FLAIR hyperintensity involving the cortex and subcortical white matter in the medial left occipital parietal region (image 18 series 17). At least 3 additional small focal areas of T2/FLAIR hyperintensity involving the anterior lateral right occipital lobe (image 13 series 17), medial right occipital parietal region (image 19 series 17), and superior left frontal lobe along the superior frontal gyrus (image 20 series 17). Normal ventricles and sulci. Normal position of the cerebellar tonsils. No pathologic contrast enhancement. SELLA: No abnormality accounting for technique. OSSEOUS STRUCTURES/SOFT TISSUES: Normal marrow signal. The major intracranial vascular flow voids are maintained. ORBITS: Prior bilateral cataract surgery. Visualized portions of the orbits are otherwise unremarkable. SINUSES/MASTOIDS: No paranasal sinus mucosal disease. No middle ear or mastoid effusion.     IMPRESSION: 1.  No acute intracranial process. 2.  Scattered nonspecific foci of T2/FLAIR hyperintensity in the cerebral white matter. The linear area of T2/FLAIR hyperintensity in the anterior right centrum semiovale oriented perpendicular to the ventricular system could be seen with demyelinating disease. No evidence of abnormal enhancement to suggest active demyelination. 3.  Small rounded focus of T2/FLAIR hyperintensity involving the cortex and subcortical white matter in the medial left occipital parietal region could be related to old infarct or demyelinating lesion. Low-grade glioma is not excluded. Recommend 3 month  follow-up MRI of the brain without and with contrast to document stability. 4.  Additional small focal areas of T2/FLAIR hyperintensity in the right occipital lobe, right occipital parietal region, and left frontal lobe are nonspecific and may be due to small vessel ischemic disease or old infarcts.     CT Head w/o Contrast  Result Date:  6/2/2025  EXAM: CT HEAD W/O CONTRAST LOCATION: [facility] DATE: 6/2/2025 INDICATION: Altered mental status, known liver cancer, evaluate for metastasis COMPARISON: None. TECHNIQUE: Routine CT Head without IV contrast. Multiplanar reformats. Dose reduction techniques were used. FINDINGS: INTRACRANIAL CONTENTS: No intracranial hemorrhage, extraaxial collection, or mass effect.  No CT evidence of acute infarct. Mild presumed chronic small vessel ischemic changes. Normal ventricles and sulci. VISUALIZED ORBITS/SINUSES/MASTOIDS: Prior bilateral cataract surgery. Visualized portions of the orbits are otherwise unremarkable. No paranasal sinus mucosal disease. No middle ear or mastoid effusion. BONES/SOFT TISSUES: No acute abnormality.     IMPRESSION: 1.  No evidence of acute intracranial abnormality. 2.  No findings to suggest intracranial metastatic disease on this noncontrast head CT. Consider brain MRI with and without contrast to better evaluate for intracranial metastases.        Signed by: Bautista Holland MD      This note has been dictated using voice recognition software. Any grammatical or context distortions are unintentional and inherent to the software

## 2025-06-02 NOTE — TELEPHONE ENCOUNTER
"Received a phone call today from Dirk' sister in law Destini. She is calling to request a referral for hospice. She states Dirk is not doing well and this morning she had an episode of being \"non responsive.\" Per Destini, she was able to get Dirk up to eat and drink and she did get better with time. She states Dirk has been very weak and they are needing more help at home with her. She states Dirk'  has Alzheimer's disease so it has been tricky to help them both.     She was unaware that Dirk' appointment was moved to today. She would like to talk with Dr. Holland but doesn't thinks he will have the time to get Dirk dressed and into the clinic. She will drive back to Dirk' house to be there to assist with a virtual visit. [facility] was notified of the plan and will discuss with Dr. Holland and JOE Bauer.     Nandini Barrios RN on 6/2/2025 at 3:24 PM      "

## 2025-06-02 NOTE — ED PROVIDER NOTES
EMERGENCY DEPARTMENT ENCOUNTER      NAME: Liz Pollard  AGE: 74 year old female  YOB: 1950  MRN: [id number]  EVALUATION DATE & TIME: No admission date for patient encounter.    PCP: Alejandro Jones    ED PROVIDER: David Quijano MD    FINAL IMPRESSION:  1. MITESH (acute kidney injury)    2. Dehydration    3. Word finding difficulty    4. Altered mental status, unspecified altered mental status type        ED COURSE & MEDICAL DECISION MAKING:    Pertinent Labs & Imaging studies reviewed. (See chart for details)  74 year old female presents to the Emergency Department for evaluation of altered mental status.  Differential diagnosis considered Alcohol intoxication, alcohol withdrawal, electrolyte disturbance, hepatic encephalopathy, hypertensive encephalopathy, hypoglycemia, hyperglycemia, opioid overdose, uremia, traumatic blood loss, toxic ingestion, brain tumor, thyrotoxicosis, sepsis, polypharmacy, psychiatric disturbance, seizure, ischemic stroke, hemorrhagic stroke.     Triage Note: Pt brought in by her sister in law, Destini, from home where she lives with her , who has Alzheimer's. Pt currently has stage 4 liver cancer and is undergoing chemo. Pt's  called sister in law and stated that the patient was unresponsive. When she woke up, pt was confused and having difficulty finding words. She was also responding inappropriately. Pt has not been seen by anyone other than , who is poor historian, since Thursday. In triage, pt is still having difficulty finding words and has some inappropriate responses. Neuro assessment called in triage, no stroke code called.             ED Course as of 06/04/25 1453   Mon Jun 02, 202502, 2025 1740 Called to triage to perform a neuro evaluation on the patient. No stroke code was called.  Unclear last known normal.  She apparently was having word finding difficulties.  On my assessment she has NIH of 0 though is mildly confused.  Given the unknown last  known normal I do not believe she is a TNK candidate.  No focal neurologic deficits.  Speaking clearly.  No word finding difficulties.  Believe this is more likely metabolic.  Will initiate large metabolic workup including a head CT given that she has stage IV liver cancer and could potentially have metastasis.  Vital signs reassuring outside blood pressure of 188 systolic.   No code stroke called.   1853 Basic metabolic panel(!)  UK MITESH with elevated BUN, concern for dehydration.  Will give IV fluids.   1854 Hepatic function panel(!)  Hypoalbuminemia likely secondary to liver cancer otherwise active at baseline for unknown liver cancer   1854 CBC with platelets differential(!)   1854 TSH with free T4 reflex(!)  Elevated, will reflex to T4.  Does take levothyroxine however may be due to medication noncompliance   1924 T4 free(!)  Hypothyroid   2028 CT Head w/o Contrast  IMPRESSION:  1.  No evidence of acute intracranial abnormality.  2.  No findings to suggest intracranial metastatic disease on this noncontrast head CT. Consider brain MRI with and without contrast to better evaluate for intracranial metastases.     2116 I spoke to the admitting hospitalist, Dr. Londono.  Admitted for stroke workup as well as treatment of hypothyroidism and possible hepatic encephalopathy so favor hepatic encephalopathy less likely though patient does have elevated ammonia.  Still pending this is communicated with Dr. Londono.       Not Applicable    I discussed ED course, work up and treatments with the hospitalist who agrees with admission.     At the conclusion of the encounter I discussed the results of the tests and the disposition. The questions were answered. The patient or family acknowledged understanding and was agreeable with the care plan.     MEDICATIONS GIVEN IN THE EMERGENCY:  Medications   aspirin EC tablet 81 mg (has no administration in time range)   ferrous gluconate (FERGON) tablet 324 mg (has no administration in  time range)   levothyroxine (SYNTHROID/LEVOTHROID) tablet 100 mcg (has no administration in time range)   levothyroxine (SYNTHROID/LEVOTHROID) tablet 25 mcg (has no administration in time range)   selpercatinib (RETEVMO) tablet 120 mg (has no administration in time range)   lidocaine 1 % 0.1-1 mL (has no administration in time range)   lidocaine (LMX4) cream (has no administration in time range)   sodium chloride (PF) 0.9% PF flush 3 mL (has no administration in time range)   sodium chloride (PF) 0.9% PF flush 3 mL (has no administration in time range)   senna-docusate (SENOKOT-S/PERICOLACE) 8.6-50 MG per tablet 1 tablet (has no administration in time range)     Or   senna-docusate (SENOKOT-S/PERICOLACE) 8.6-50 MG per tablet 2 tablet (has no administration in time range)   calcium carbonate (TUMS) chewable tablet 1,000 mg (has no administration in time range)   enoxaparin ANTICOAGULANT (LOVENOX) injection 30 mg (has no administration in time range)   sodium chloride 0.9 % infusion (has no administration in time range)   melatonin tablet 1 mg (has no administration in time range)   ondansetron (ZOFRAN ODT) ODT tab 4 mg (has no administration in time range)     Or   ondansetron (ZOFRAN) injection 4 mg (has no administration in time range)   lactulose (CHRONULAC) solution 30 g (has no administration in time range)   cefTRIAXone (ROCEPHIN) 2 g vial to attach to  ml bag for ADULTS or NS 50 ml bag for PEDS (has no administration in time range)   glucose gel 15-30 g (has no administration in time range)     Or   dextrose 50 % injection 25-50 mL (has no administration in time range)     Or   glucagon injection 1 mg (has no administration in time range)   insulin aspart (NovoLOG) injection (RAPID ACTING) (has no administration in time range)   insulin aspart (NovoLOG) injection (RAPID ACTING) (has no administration in time range)   sodium chloride 0.9% BOLUS 1,000 mL ( Intravenous Rate/Dose Verify 6/2/25 2112)  "  levothyroxine (SYNTHROID/LEVOTHROID) tablet 100 mcg (100 mcg Oral $Given 6/2/25 2112)       NEW PRESCRIPTIONS STARTED AT TODAY'S ER VISIT  New Prescriptions    No medications on file     Modified Medications    No medications on file       =================================================================    HPI    Liz Pollard is a 74 year old female with a pertinent history of hypertension, diabetes mellitus II, thyroid disease, CKD, and hyperlipidemia who presents to this ED by walk-in with her sister-in-law for evaluation of altered mental status.     Per patient's sister-in-law, she received a call from the patient's  (who has a history of Alzheimer's) at 11 AM today stating the patient was more confused than baseline and having difficulty finding words. It is unknown when the patient was last at her baseline. Sister-in-law reports the patient had a similar episode ~2 weeks ago. She also mentions the patient's urine appeared darker in color than normal.     Patient reports increased fatigue. Otherwise, no other symptoms were mentioned.     Denies any fevers, nausea, vomiting, diarrhea, or abdominal pain. No shortness of breath or chest pain.     Patient is otherwise in her normal state of health with no other concerns.     Use of : N/A    PHYSICAL EXAM    BP (!) 141/65   Pulse 80   Temp 98.1  F (36.7  C) (Temporal)   Resp 27   Ht 1.549 m (5' 1\")   Wt 61.2 kg (135 lb)   SpO2 94%   BMI 25.51 kg/m    Constitutional: Comfortable appearing.  Head: Normocephalic, atraumatic, mucous membranes dry, nose normal.   Neck: Supple, gross ROM intact.   Eyes: Pupils mid-range, sclera white.  Respiratory: Clear to auscultation bilaterally, no respiratory distress, no wheezing, speaks full sentences easily.  Cardiovascular: Normal heart rate, regular rhythm, no murmurs. No lower extremity edema.   GI: Soft, no tenderness to deep palpation in all quadrants.  Musculoskeletal: Moving all 4 " extremities intentionally and without pain. No obvious deformity.  Skin: Warm, dry, no rash.  Neurologic: Alert to person and place but not time.  Moving all extremities spontaneously and Sensation.  No ataxia finger-nose-finger.  Speech is clear, no aphasia.  Psychiatric: Affect normal, cooperative.     LAB:  All pertinent labs reviewed and interpreted.  Results for orders placed or performed during the hospital encounter of 06/02/25   CT Head w/o Contrast    Impression    IMPRESSION:  1.  No evidence of acute intracranial abnormality.  2.  No findings to suggest intracranial metastatic disease on this noncontrast head CT. Consider brain MRI with and without contrast to better evaluate for intracranial metastases.   Basic metabolic panel   Result Value Ref Range    Sodium 142 135 - 145 mmol/L    Potassium 4.8 3.4 - 5.3 mmol/L    Chloride 108 (H) 98 - 107 mmol/L    Carbon Dioxide (CO2) 20 (L) 22 - 29 mmol/L    Anion Gap 14 7 - 15 mmol/L    Urea Nitrogen 33.4 (H) 8.0 - 23.0 mg/dL    Creatinine 2.50 (H) 0.51 - 0.95 mg/dL    GFR Estimate 20 (L) >60 mL/min/1.73m2    Calcium 8.4 (L) 8.8 - 10.4 mg/dL    Glucose 116 (H) 70 - 99 mg/dL   Hepatic function panel   Result Value Ref Range    Protein Total 6.7 6.4 - 8.3 g/dL    Albumin 2.6 (L) 3.5 - 5.2 g/dL    Bilirubin Total 1.7 (H) <=1.2 mg/dL    Alkaline Phosphatase 258 (H) 40 - 150 U/L     (H) 0 - 45 U/L     (H) 0 - 50 U/L    Bilirubin Direct 0.71 (H) 0.00 - 0.30 mg/dL   Result Value Ref Range    Ammonia 73 (H) 11 - 51 umol/L   TSH with free T4 reflex   Result Value Ref Range    TSH 81.31 (H) 0.30 - 4.20 uIU/mL   UA with Microscopic reflex to Culture    Specimen: Urine, Catheter   Result Value Ref Range    Color Urine Charlette (A) Colorless, Straw, Light Yellow, Yellow    Appearance Urine Turbid (A) Clear    Glucose Urine Negative Negative mg/dL    Bilirubin Urine 0.5 mg/dL (A) Negative    Ketones Urine Negative Negative mg/dL    Specific Gravity Urine 1.024 1.001  - 1.030    Blood Urine 0.5 mg/dL (A) Negative    pH Urine 5.5 5.0 - 7.0    Protein Albumin Urine 300 (A) Negative mg/dL    Urobilinogen Urine 6.0 (A) Normal mg/dL    Nitrite Urine Negative Negative    Leukocyte Esterase Urine Negative Negative    Mucus Urine Present (A) None Seen /LPF    RBC Urine 3 (H) <=2 /HPF    WBC Urine 6 (H) <=5 /HPF    Squamous Epithelials Urine 1 <=1 /HPF    Transitional Epithelials Urine <1 <=1 /HPF    Hyaline Casts Urine 25 (H) <=2 /LPF   INR   Result Value Ref Range    INR 1.12 0.85 - 1.15    PT 14.6 11.8 - 14.8 Seconds   CBC with platelets and differential   Result Value Ref Range    WBC Count 3.1 (L) 4.0 - 11.0 10e3/uL    RBC Count 5.88 (H) 3.80 - 5.20 10e6/uL    Hemoglobin 15.7 11.7 - 15.7 g/dL    Hematocrit 50.2 (H) 35.0 - 47.0 %    MCV 85 78 - 100 fL    MCH 26.7 26.5 - 33.0 pg    MCHC 31.3 (L) 31.5 - 36.5 g/dL    RDW 21.2 (H) 10.0 - 15.0 %    Platelet Count 73 (L) 150 - 450 10e3/uL    % Neutrophils 73 %    % Lymphocytes 20 %    % Monocytes 4 %    % Eosinophils 1 %    % Basophils 2 %    % Immature Granulocytes 0 %    NRBCs per 100 WBC 0 <1 /100    Absolute Neutrophils 2.3 1.6 - 8.3 10e3/uL    Absolute Lymphocytes 0.6 (L) 0.8 - 5.3 10e3/uL    Absolute Monocytes 0.1 0.0 - 1.3 10e3/uL    Absolute Eosinophils 0.0 0.0 - 0.7 10e3/uL    Absolute Basophils 0.1 0.0 - 0.2 10e3/uL    Absolute Immature Granulocytes 0.0 <=0.4 10e3/uL    Absolute NRBCs 0.0 10e3/uL   Result Value Ref Range    Free T4 0.37 (L) 0.90 - 1.70 ng/dL       RADIOLOGY:  Reviewed all pertinent imaging. Please see official radiology report.  CT Head w/o Contrast   Final Result   IMPRESSION:   1.  No evidence of acute intracranial abnormality.   2.  No findings to suggest intracranial metastatic disease on this noncontrast head CT. Consider brain MRI with and without contrast to better evaluate for intracranial metastases.      MR Brain w/o & w Contrast    (Results Pending)           David Quijano MD  [facility]  [facility] EMERGENCY DEPARTMENT  [facility] [facility] [address]  [phone]   =================================================================    BILLING:  Data  Category 1  Non-ED record review, if applicable. External record reviewed: Reviewed recent office visit on 4/10/2025 at [facility] for blood pressure check.     Clinical information was obtained from an independent historian. History was obtained from: Patient's sister-in-law provided additional history in the HPI.     The following testing was considered but ultimately not selected after discussion with patient/family: N/A     Category 2  My independent interpretation of EKG, rhythm strip, radiology study: Head CT did not reveal large intracranial hemorrhage     Category 3  Discussion of management with other physician/healthcare provider/other source: Spoke to hospitalist regarding patient's ED course and agrees with admission to the hospital        Risk  Prescription medication was considered, but ultimately not given after discussion with patient/family: N/A     Chronic conditions affecting care: Hypertension and Diabetes     Consideration of Admission/Observation: Admitted to hospital       I, Mile Morin, am serving as a scribe to document services personally performed by David Quijano MD based on my observation and the provider's statements to me. I, David Quijano MD, attest that Mile Morni is acting in a scribe capacity, has observed my performance of the services and has documented them in accordance with my direction.     David Quijano MD  06/04/25 3397

## 2025-06-03 ENCOUNTER — APPOINTMENT (OUTPATIENT)
Dept: PHYSICAL THERAPY | Facility: HOSPITAL | Age: 75
End: 2025-06-03
Attending: HOSPITALIST
Payer: COMMERCIAL

## 2025-06-03 ENCOUNTER — APPOINTMENT (OUTPATIENT)
Dept: OCCUPATIONAL THERAPY | Facility: HOSPITAL | Age: 75
End: 2025-06-03
Attending: HOSPITALIST
Payer: COMMERCIAL

## 2025-06-03 ENCOUNTER — APPOINTMENT (OUTPATIENT)
Dept: RADIOLOGY | Facility: HOSPITAL | Age: 75
End: 2025-06-03
Payer: COMMERCIAL

## 2025-06-03 PROBLEM — R53.83 LETHARGY: Status: ACTIVE | Noted: 2025-06-03

## 2025-06-03 PROBLEM — R77.2 ELEVATED AFP: Status: ACTIVE | Noted: 2025-06-03

## 2025-06-03 LAB
ALBUMIN SERPL BCG-MCNC: 2.4 G/DL (ref 3.5–5.2)
ALBUMIN SERPL BCG-MCNC: 2.4 G/DL (ref 3.5–5.2)
ALP SERPL-CCNC: 230 U/L (ref 40–150)
ALP SERPL-CCNC: 230 U/L (ref 40–150)
ALT SERPL W P-5'-P-CCNC: 112 U/L (ref 0–50)
ALT SERPL W P-5'-P-CCNC: 112 U/L (ref 0–50)
AMMONIA PLAS-SCNC: 83 UMOL/L (ref 11–51)
ANION GAP SERPL CALCULATED.3IONS-SCNC: 12 MMOL/L (ref 7–15)
ANION GAP SERPL CALCULATED.3IONS-SCNC: 12 MMOL/L (ref 7–15)
AST SERPL W P-5'-P-CCNC: 323 U/L (ref 0–45)
AST SERPL W P-5'-P-CCNC: 323 U/L (ref 0–45)
ATRIAL RATE - MUSE: 90 BPM
BILIRUB DIRECT SERPL-MCNC: 0.66 MG/DL (ref 0–0.3)
BILIRUB SERPL-MCNC: 1.3 MG/DL
BILIRUB SERPL-MCNC: 1.3 MG/DL
BUN SERPL-MCNC: 33.9 MG/DL (ref 8–23)
BUN SERPL-MCNC: 33.9 MG/DL (ref 8–23)
CALCIUM SERPL-MCNC: 7.6 MG/DL (ref 8.8–10.4)
CALCIUM SERPL-MCNC: 7.6 MG/DL (ref 8.8–10.4)
CHLORIDE SERPL-SCNC: 114 MMOL/L (ref 98–107)
CHLORIDE SERPL-SCNC: 114 MMOL/L (ref 98–107)
CREAT SERPL-MCNC: 2.43 MG/DL (ref 0.51–0.95)
CREAT SERPL-MCNC: 2.43 MG/DL (ref 0.51–0.95)
DIASTOLIC BLOOD PRESSURE - MUSE: NORMAL MMHG
EGFRCR SERPLBLD CKD-EPI 2021: 21 ML/MIN/1.73M2
EGFRCR SERPLBLD CKD-EPI 2021: 21 ML/MIN/1.73M2
ERYTHROCYTE [DISTWIDTH] IN BLOOD BY AUTOMATED COUNT: 20.8 % (ref 10–15)
GLUCOSE BLDC GLUCOMTR-MCNC: 127 MG/DL (ref 70–99)
GLUCOSE BLDC GLUCOMTR-MCNC: 135 MG/DL (ref 70–99)
GLUCOSE BLDC GLUCOMTR-MCNC: 155 MG/DL (ref 70–99)
GLUCOSE BLDC GLUCOMTR-MCNC: 90 MG/DL (ref 70–99)
GLUCOSE SERPL-MCNC: 102 MG/DL (ref 70–99)
GLUCOSE SERPL-MCNC: 102 MG/DL (ref 70–99)
HCO3 SERPL-SCNC: 19 MMOL/L (ref 22–29)
HCO3 SERPL-SCNC: 19 MMOL/L (ref 22–29)
HCT VFR BLD AUTO: 41.1 % (ref 35–47)
HGB BLD-MCNC: 13.2 G/DL (ref 11.7–15.7)
INTERPRETATION ECG - MUSE: NORMAL
LACTATE SERPL-SCNC: 2.6 MMOL/L (ref 0.7–2)
LACTATE SERPL-SCNC: 2.9 MMOL/L (ref 0.7–2)
LACTATE SERPL-SCNC: 3.7 MMOL/L (ref 0.7–2)
MAGNESIUM SERPL-MCNC: 1.6 MG/DL (ref 1.7–2.3)
MCH RBC QN AUTO: 26.6 PG (ref 26.5–33)
MCHC RBC AUTO-ENTMCNC: 32.1 G/DL (ref 31.5–36.5)
MCV RBC AUTO: 83 FL (ref 78–100)
P AXIS - MUSE: 67 DEGREES
PLATELET # BLD AUTO: 56 10E3/UL (ref 150–450)
POTASSIUM SERPL-SCNC: 4.8 MMOL/L (ref 3.4–5.3)
POTASSIUM SERPL-SCNC: 4.8 MMOL/L (ref 3.4–5.3)
PR INTERVAL - MUSE: 192 MS
PROT SERPL-MCNC: 6 G/DL (ref 6.4–8.3)
PROT SERPL-MCNC: 6 G/DL (ref 6.4–8.3)
QRS DURATION - MUSE: 68 MS
QT - MUSE: 388 MS
QTC - MUSE: 474 MS
R AXIS - MUSE: 5 DEGREES
RBC # BLD AUTO: 4.96 10E6/UL (ref 3.8–5.2)
SODIUM SERPL-SCNC: 145 MMOL/L (ref 135–145)
SODIUM SERPL-SCNC: 145 MMOL/L (ref 135–145)
SYSTOLIC BLOOD PRESSURE - MUSE: NORMAL MMHG
T AXIS - MUSE: 73 DEGREES
VENTRICULAR RATE- MUSE: 90 BPM
WBC # BLD AUTO: 3 10E3/UL (ref 4–11)

## 2025-06-03 PROCEDURE — 71045 X-RAY EXAM CHEST 1 VIEW: CPT

## 2025-06-03 PROCEDURE — 83605 ASSAY OF LACTIC ACID: CPT

## 2025-06-03 PROCEDURE — 83735 ASSAY OF MAGNESIUM: CPT | Performed by: HOSPITALIST

## 2025-06-03 PROCEDURE — 99233 SBSQ HOSP IP/OBS HIGH 50: CPT | Performed by: INTERNAL MEDICINE

## 2025-06-03 PROCEDURE — 99222 1ST HOSP IP/OBS MODERATE 55: CPT | Performed by: PSYCHIATRY & NEUROLOGY

## 2025-06-03 PROCEDURE — 36415 COLL VENOUS BLD VENIPUNCTURE: CPT

## 2025-06-03 PROCEDURE — 84155 ASSAY OF PROTEIN SERUM: CPT | Performed by: INTERNAL MEDICINE

## 2025-06-03 PROCEDURE — 250N000013 HC RX MED GY IP 250 OP 250 PS 637: Performed by: HOSPITALIST

## 2025-06-03 PROCEDURE — 85014 HEMATOCRIT: CPT

## 2025-06-03 PROCEDURE — 83605 ASSAY OF LACTIC ACID: CPT | Performed by: HOSPITALIST

## 2025-06-03 PROCEDURE — 80048 BASIC METABOLIC PNL TOTAL CA: CPT | Performed by: INTERNAL MEDICINE

## 2025-06-03 PROCEDURE — 250N000011 HC RX IP 250 OP 636: Mod: JZ | Performed by: INTERNAL MEDICINE

## 2025-06-03 PROCEDURE — 250N000013 HC RX MED GY IP 250 OP 250 PS 637: Performed by: INTERNAL MEDICINE

## 2025-06-03 PROCEDURE — 82947 ASSAY GLUCOSE BLOOD QUANT: CPT | Performed by: INTERNAL MEDICINE

## 2025-06-03 PROCEDURE — 250N000011 HC RX IP 250 OP 636: Performed by: HOSPITALIST

## 2025-06-03 PROCEDURE — 97530 THERAPEUTIC ACTIVITIES: CPT | Mod: GP

## 2025-06-03 PROCEDURE — 97161 PT EVAL LOW COMPLEX 20 MIN: CPT | Mod: GP

## 2025-06-03 PROCEDURE — 99222 1ST HOSP IP/OBS MODERATE 55: CPT | Performed by: INTERNAL MEDICINE

## 2025-06-03 PROCEDURE — 36415 COLL VENOUS BLD VENIPUNCTURE: CPT | Performed by: HOSPITALIST

## 2025-06-03 PROCEDURE — 97166 OT EVAL MOD COMPLEX 45 MIN: CPT | Mod: GO

## 2025-06-03 PROCEDURE — 82140 ASSAY OF AMMONIA: CPT | Performed by: HOSPITALIST

## 2025-06-03 PROCEDURE — 80048 BASIC METABOLIC PNL TOTAL CA: CPT

## 2025-06-03 PROCEDURE — 258N000003 HC RX IP 258 OP 636: Performed by: HOSPITALIST

## 2025-06-03 PROCEDURE — 93005 ELECTROCARDIOGRAM TRACING: CPT

## 2025-06-03 PROCEDURE — 120N000001 HC R&B MED SURG/OB

## 2025-06-03 PROCEDURE — 97535 SELF CARE MNGMENT TRAINING: CPT | Mod: GO

## 2025-06-03 PROCEDURE — 82962 GLUCOSE BLOOD TEST: CPT

## 2025-06-03 PROCEDURE — 258N000002 HC RX IP 258 OP 250: Performed by: INTERNAL MEDICINE

## 2025-06-03 RX ORDER — METOPROLOL SUCCINATE 100 MG/1
100 TABLET, EXTENDED RELEASE ORAL DAILY
Status: DISPENSED | OUTPATIENT
Start: 2025-06-03

## 2025-06-03 RX ORDER — SODIUM CHLORIDE 450 MG/100ML
INJECTION, SOLUTION INTRAVENOUS CONTINUOUS
Status: ACTIVE | OUTPATIENT
Start: 2025-06-03

## 2025-06-03 RX ORDER — HYDRALAZINE HYDROCHLORIDE 20 MG/ML
10 INJECTION INTRAMUSCULAR; INTRAVENOUS EVERY 6 HOURS PRN
Status: DISCONTINUED | OUTPATIENT
Start: 2025-06-03 | End: 2025-06-04

## 2025-06-03 RX ADMIN — METOPROLOL SUCCINATE 100 MG: 50 TABLET, EXTENDED RELEASE ORAL at 16:00

## 2025-06-03 RX ADMIN — LEVOTHYROXINE SODIUM 150 MCG: 0.03 TABLET ORAL at 10:10

## 2025-06-03 RX ADMIN — ENOXAPARIN SODIUM 30 MG: 30 INJECTION SUBCUTANEOUS at 21:32

## 2025-06-03 RX ADMIN — SODIUM CHLORIDE: 0.45 INJECTION, SOLUTION INTRAVENOUS at 16:00

## 2025-06-03 RX ADMIN — HYDRALAZINE HYDROCHLORIDE 10 MG: 20 INJECTION INTRAMUSCULAR; INTRAVENOUS at 17:06

## 2025-06-03 RX ADMIN — LACTULOSE SOLUTION USP, 10 G/15 ML 30 G: 10 SOLUTION ORAL; RECTAL at 21:27

## 2025-06-03 RX ADMIN — FERROUS GLUCONATE 324 MG: 324 TABLET ORAL at 10:10

## 2025-06-03 RX ADMIN — LACTULOSE SOLUTION USP, 10 G/15 ML 30 G: 10 SOLUTION ORAL; RECTAL at 10:10

## 2025-06-03 RX ADMIN — SODIUM CHLORIDE: 0.9 INJECTION, SOLUTION INTRAVENOUS at 09:04

## 2025-06-03 RX ADMIN — ASPIRIN 81 MG: 81 TABLET, COATED ORAL at 10:11

## 2025-06-03 ASSESSMENT — ACTIVITIES OF DAILY LIVING (ADL)
ADLS_ACUITY_SCORE: 41
ADLS_ACUITY_SCORE: 41
ADLS_ACUITY_SCORE: 56
ADLS_ACUITY_SCORE: 41
ADLS_ACUITY_SCORE: 56
ADLS_ACUITY_SCORE: 41
ADLS_ACUITY_SCORE: 41
ADLS_ACUITY_SCORE: 56
ADLS_ACUITY_SCORE: 41
ADLS_ACUITY_SCORE: 56
ADLS_ACUITY_SCORE: 41
ADLS_ACUITY_SCORE: 41
ADLS_ACUITY_SCORE: 56
ADLS_ACUITY_SCORE: 41
ADLS_ACUITY_SCORE: 41
ADLS_ACUITY_SCORE: 56
ADLS_ACUITY_SCORE: 41
ADLS_ACUITY_SCORE: 56
ADLS_ACUITY_SCORE: 56
DEPENDENT_IADLS:: INDEPENDENT

## 2025-06-03 NOTE — PROGRESS NOTES
"   06/03/25 0905   Appointment Info   Signing Clinician's Name / Credentials (PT) Zaira Garcia, PT, DPT   Living Environment   People in Home spouse   Current Living Arrangements house   Home Accessibility stairs to enter home   Number of Stairs, Main Entrance 1   Stair Railings, Main Entrance none   Self-Care   Equipment Currently Used at Home none   Fall history within last six months yes   Number of times patient has fallen within last six months   (unclear #, at least 2)   Activity/Exercise/Self-Care Comment pt reports being independent with functional mobility   General Information   Onset of Illness/Injury or Date of Surgery 06/02/25   Referring Physician Anupam Londono MD   Patient/Family Therapy Goals Statement (PT) Return to Home   Pertinent History of Current Problem (include personal factors and/or comorbidities that impact the POC) Per Chart Review -\"74 year old with Hepatocellular carcinoma, metastatic disease with adrenal mets,  on palliative treatment, hypertension, diabetes mellitus II, thyroid disease, CKD, and hyperlipidemia who presents to this ED by walk-in with her sister-in-law for evaluation of altered mental status, from home where she lives with her , who has Alzheimer's. Pt currently has stage 4 liver cancer and is undergoing chemo. Pt's  called sister in law and stated that the patient was unresponsive. When she woke up, pt was confused and having difficulty finding words. She was also responding inappropriately. Pt has not been seen by anyone other than , who is poor historian, since Thursday. In triage, pt is still having difficulty finding words and has some inappropriate responses. Neuro assessment called in triage, no stroke code called. NIH of 0 though is mildly confused. Given the unknown last known normal I do not believe she is a TNK candidate. No focal neurologic deficits. Speaking clearly. No word finding difficulties.  MITESH with elevated BUN, concern for " "dehydration-Given IV fluids. Elevated TSH with reflex low T4- given Iv synthroid. Does take levothyroxine however may be due to medication noncompliance No evidence of acute intracranial abnormality on CT. Also +UA for UTI, Ammonia also high  MRI with several atypical findings that we will consult Ne patient severely depressed and encephalopathic not able to provide much history but she does endorse to be full code and continue with uology.  Treatment plan will also consult oncology.  \"   Existing Precautions/Restrictions fall   Range of Motion (ROM)   Range of Motion ROM is WFL   Strength (Manual Muscle Testing)   Strength (Manual Muscle Testing) Deficits observed during functional mobility   Bed Mobility   Bed Mobility supine-sit   Supine-Sit Story (Bed Mobility) contact guard   Transfers   Transfers sit-stand transfer   Sit-Stand Transfer   Sit-Stand Story (Transfers) minimum assist (75% patient effort)   Gait/Stairs (Locomotion)   Story Level (Gait) minimum assist (75% patient effort)   Assistive Device (Gait) walker, front-wheeled   Distance in Feet (Gait) 1   Pattern (Gait) swing-to;step-to   Deviations/Abnormal Patterns (Gait) evan decreased;gait speed decreased   Comment, (Gait/Stairs) held stair trial d/t safety   Clinical Impression   Criteria for Skilled Therapeutic Intervention Yes, treatment indicated   PT Diagnosis (PT) Impaired Functional Mobility   Influenced by the following impairments weakness, impaired balance   Functional limitations due to impairments gait, transfers, stairs, bed mobility   Clinical Presentation (PT Evaluation Complexity) stable   Clinical Presentation Rationale pt presents as medically diagnosed   Clinical Decision Making (Complexity) low complexity   Planned Therapy Interventions (PT) balance training;bed mobility training;gait training;home exercise program;neuromuscular re-education;ROM (range of motion);stair training;strengthening;transfer " training;home program guidelines   Risk & Benefits of therapy have been explained evaluation/treatment results reviewed;patient   PT Total Evaluation Time   PT Eval, Low Complexity Minutes (39078) 15   Physical Therapy Goals   PT Frequency 5x/week   PT Predicted Duration/Target Date for Goal Attainment 06/10/25   PT Goals Bed Mobility;Transfers;Gait;Stairs   PT: Bed Mobility Modified independent;Supine to/from sit;Within precautions   PT: Transfers Supervision/stand-by assist;Sit to/from stand;Assistive device;Within precautions   PT: Gait Supervision/stand-by assist;Rolling walker;Within precautions;50 feet   PT: Stairs Minimal assist;1 stair   Interventions   Interventions Quick Adds Therapeutic Activity   Therapeutic Activity   Therapeutic Activities: dynamic activities to improve functional performance Minutes (69005) 23   Symptoms Noted During/After Treatment Fatigue   Treatment Detail/Skilled Intervention Sit to/from stand: cueing for safety/technique/hand placement on stable surface, Min A; pivot transfer bed to/from commode: cueing for safety/technique/FWW management, Min A; standing balance: cueing for safety, SBA to CGA - dependent zoey cares & brief management. lateral stepping: cueing for safety/technique - visual demo to improve technique, CGA to Min A; sit to supine: cueing for safety/technique, Min A; scooting back on bed: cueing for safety/technique, CGA to Min A - use of step stool. Reviewed FWW vs SPC & recommendation to start with FWW for added stability. Increased time for management of lines/tubes to prep for session & following cues for safe mobility   PT Discharge Planning   PT Plan gait with chair follow, strengthening, transfers   PT Discharge Recommendation (DC Rec) Transitional Care Facility   PT Rationale for DC Rec pt requires increased assist with functional mobility. Instability noted with out of bed activity leading to an increased risk for falls. TCU for improving  strength/balance/activity tolerance   PT Brief overview of current status Min A   PT Total Distance Amb During Session (feet) 3   PT Equipment Needed at Discharge walker, rolling  (FWW - may need if d/c home)   Physical Therapy Time and Intention   Timed Code Treatment Minutes 23   Total Session Time (sum of timed and untimed services) 38

## 2025-06-03 NOTE — MEDICATION SCRIBE - ADMISSION MEDICATION HISTORY
Medication Scribe Admission Medication History    Admission medication history is complete. The information provided in this note is only as accurate as the sources available at the time of the update.    Information Source(s): Patient and Family member via in-person    Pertinent Information: medication is co managed with sister in law Destini Pollard ([phone]). Patient reported the only med she took today is her chemo (selpercatinib ) but only one instead of what is ordered ( 2 tablets).     Sister in law reported she can bring the chemo (selpercatinib ) if it is needed.       Changes made to PTA medication list:  Added: None  Deleted:   Prochlorperazine per patient    Changed: None    Allergies reviewed with patient and updates made in EHR: yes    Medication History Completed By: Aklilu Gebreyesus 6/2/2025 9:05 PM    PTA Med List   Medication Sig Note Last Dose/Taking    aspirin 81 MG EC tablet Take 81 mg by mouth daily.  6/1/2025 Morning    ferrous gluconate (FERGON) 324 (38 Fe) MG tablet Take 1 tablet (324 mg) by mouth every other day.  6/1/2025 Morning    hydrochlorothiazide (HYDRODIURIL) 25 MG tablet Take 1 tablet (25 mg) by mouth daily. 6/2/2025: Run out  5/30/2025 Morning    levothyroxine (SYNTHROID/LEVOTHROID) 100 MCG tablet TAKE 1 TABLET BY MOUTH EVERY DAY  6/1/2025 Morning    levothyroxine (SYNTHROID/LEVOTHROID) 25 MCG tablet Take 1 tablet (25 mcg) by mouth every morning (before breakfast).  6/1/2025 Morning    lisinopril (ZESTRIL) 40 MG tablet TAKE 1 TABLET BY MOUTH EVERY DAY  6/1/2025 Morning    metoprolol succinate ER (TOPROL XL) 50 MG 24 hr tablet Take 2 tablets (100 mg) by mouth daily.  6/1/2025 Morning    selpercatinib (RETEVMO) 120 MG tablet Take 1 tablet (120 mg) by mouth 2 times daily  6/2/2025 Noon    spironolactone (ALDACTONE) 50 MG tablet Take 1 tablet (50 mg) by mouth daily.  6/1/2025 Morning

## 2025-06-03 NOTE — PLAN OF CARE
Problem: Delirium  Goal: Improved Attention and Thought Clarity  Outcome: Progressing   Goal Outcome Evaluation:  Denies pain.  Alert and less confused this AM.  Unsure of date, knows it's 2025.  Continues on lactalose.  Has had 3 loose stools.  Bladder scanned for 56cc.  Family at bedside and aware of plan.  Up to commode with assist x1.

## 2025-06-03 NOTE — PLAN OF CARE
Goal Outcome Evaluation:       have help coming up with safe discharge plan for post hospital stay. Current discharge needs are unknown.

## 2025-06-03 NOTE — PROGRESS NOTES
06/03/25 0910   Appointment Info   Signing Clinician's Name / Credentials (OT) Rachele Pollard OTR/L   Living Environment   People in Home spouse   Current Living Arrangements house   Living Environment Comments spouse with notable cognitive deficits during eval. chart indicates spouse has alzheimer's dx   Self-Care   Equipment Currently Used at Home none   Activity/Exercise/Self-Care Comment IND for BADLs   Instrumental Activities of Daily Living (IADL)   IADL Comments shared household tasks with spouse. pt reports she does the meds.   General Information   Onset of Illness/Injury or Date of Surgery 06/02/25   Referring Physician Anupam Londono MD   Patient/Family Therapy Goal Statement (OT) feel better   Additional Occupational Profile Info/Pertinent History of Current Problem PMH Hepatocellular carcinoma, metastatic disease with adrenal mets,  on palliative treatment, hypertension, diabetes mellitus II, thyroid disease, CKD, and hyperlipidemia. presents with AMS   Existing Precautions/Restrictions fall   Limitations/Impairments safety/cognitive   Cognitive Status Examination   Orientation Status person;place  (oriented to month but not year)   Affect/Mental Status (Cognitive) flat/blunted affect   Follows Commands follows one-step commands   Cognitive Status Comments slowed processing, needs reminders that she's seated on the commode when asking to use the bathroom   Pain Assessment   Patient Currently in Pain No   Range of Motion Comprehensive   General Range of Motion bilateral upper extremity ROM WFL   Strength Comprehensive (MMT)   Comment, General Manual Muscle Testing (MMT) Assessment global weakness   Bed Mobility   Bed Mobility supine-sit;sit-supine   Supine-Sit [place] (Bed Mobility) contact guard   Sit-Supine Bartholomew (Bed Mobility) minimum assist (75% patient effort)   Transfers   Transfers sit-stand transfer;toilet transfer;bed-chair transfer   Transfer Skill: Bed to Chair/Chair to Bed    Bed-Chair [place] (Transfers) minimum assist (75% patient effort);verbal cues   Assistive Device (Bed-Chair Transfers) rolling walker   Sit-Stand Transfer   Sit-Stand [place] (Transfers) minimum assist (75% patient effort);verbal cues   Assistive Device (Sit-Stand Transfers) walker, front-wheeled   Toilet Transfer   Type (Toilet Transfer) sit-stand;stand-sit   [place] Level (Toilet Transfer) minimum assist (75% patient effort);verbal cues   Assistive Device (Toilet Transfer) commode chair   Activities of Daily Living   BADL Assessment/Intervention lower body dressing;toileting   Lower Body Dressing Assessment/Training   [place] Level (Lower Body Dressing) maximum assist (25% patient effort)   Toileting   [place] Level (Toileting) maximum assist (25% patient effort)   Clinical Impression   Criteria for Skilled Therapeutic Interventions Met (OT) Yes, treatment indicated   OT Diagnosis dec BADL IND   OT Problem List-Impairments impacting ADL problems related to;activity tolerance impaired;balance;cognition;mobility;strength   Assessment of Occupational Performance 5 or more Performance Deficits   Identified Performance Deficits dressing, toileting, bathing, household mobility, functional transfers, household management, meal prep, community mobility   Planned Therapy Interventions (OT) ADL retraining;IADL retraining;balance training;bed mobility training;cognition;strengthening;transfer training;home program guidelines;progressive activity/exercise   Clinical Decision Making Complexity (OT) detailed assessment/moderate complexity   Risk & Benefits of therapy have been explained evaluation/treatment results reviewed;participants included;patient   OT Total Evaluation Time   OT Eval, Moderate Complexity Minutes (20062) 10   OT Goals   Therapy Frequency (OT) 5 times/week   OT Predicted Duration/Target Date for Goal Attainment 06/10/25   OT Goals Lower Body Dressing;Toilet  Transfer/Toileting;Cognition   OT: Lower Body Dressing Modified independent   OT: Toilet Transfer/Toileting Modified independent;toilet transfer;cleaning and garment management   OT: Cognitive Patient/caregiver will verbalize understanding of cognitive assessment results/recommendations as needed for safe discharge planning   Self-Care/Home Management   Self-Care/Home Mgmt/ADL, Compensatory, Meal Prep Minutes (81841) 25   Symptoms Noted During/After Treatment (Meal Preparation/Planning Training) fatigue   Treatment Detail/Skilled Intervention Extensive time for pt to follow directions, needing repeated cues and educ for hand placement but unable to increase IND level beyond min A with adjustments. Also increased time needed for session for environmental and line management. Facilitated toileting and changing brief to promote increased IND. Additional transfer from commode as pt reports not being finished. Attempts pericares but requires max A. Pivots from commode to bed with min A. step stool used once seated at EOB to scoot further onto bed. Positioned for comfort and oriented to call light.   OT Discharge Planning   OT Plan track cog, transfers, LB dressing, toileting   OT Discharge Recommendation (DC Rec) Transitional Care Facility   OT Rationale for DC Rec requires assist for all BADLs and cognitive supervision. spouse has alzheimer's and limited other local support available.   OT Brief overview of current status min A   OT Total Distance Amb During Session (feet) 0   Total Session Time   Timed Code Treatment Minutes 25   Total Session Time (sum of timed and untimed services) 35

## 2025-06-03 NOTE — PROGRESS NOTES
[facility]    PROGRESS NOTE - Hospitalist Service    ASSESSMENT AND PLAN     Principal Problem:    Metastatic hepatocellular carcinoma to adrenal gland (H)  Active Problems:    Chronic hepatitis C without hepatic coma (H)    Class 1 obesity due to excess calories without serious comorbidity with body mass index (BMI) of 30.0 to 30.9 in adult    Essential hypertension    Stage 3b chronic kidney disease (H)    Hepatic encephalopathy (H)    Dehydration    Hypothyroid    IKER (acute kidney injury)    Word finding difficulty    Altered mental status, unspecified altered mental status type    Acute cystitis without hematuria    Failure to thrive in adult    Liz Pollard is a 74 year old female with Hepatocellular carcinoma, metastatic disease with adrenal mets,  on palliative treatment, hypertension, diabetes mellitus II, thyroid disease, CKD, and hyperlipidemia who presents to this ED with her sister-in-law for evaluation of altered mental status, from home where she lives with her , who has Alzheimer's. Pt currently has stage 4 liver cancer and is undergoing chemo. Pt's  called sister in law and stated that the patient was unresponsive. When she woke up, pt was confused and having difficulty finding words. Presented to the ED.      Altered mental status, unspecified altered mental status type  Chronic hepatitis C without hepatic coma (H)  Iker on Stage 3b chronic kidney disease (H)  Hepatic encephalopathy (H)  Dehydration with  IKER (acute kidney injury)  AMS- Probable differential diagnosis are Infections,malignant ( from hx of carcinoma), Metabolic/Electrolyte abnormalities or even components of psychological depression and/ or functional. Improving with IV fluids  UA not suspicious for UTI  Ammonia also high-lactulose initiated  TSH elevated and low free t4.  Resume levothyroxine at higher dose.  Follow-up in 4 to 6 weeks with PCP  No evidence of acute intracranial  abnormality on CT-MRI brain pending.  MRI with possible demyelinating disease  Neurology recommending follow-up MRI in 3-month  MITESH with elevated BUN-improving with IV fluid  Neurology evaluated with no further workup planned.       CKD at baseline Likely due to DM/HTN combination.  MITESH likley Prerenal-   Baseline creatinine appears to be 1.1-1.2.  Currently creatinine 2.5  - Monitor trend  - Avoid nephrotoxic meds      Hepatocellular carcinoma, metastatic disease with adrenal mets: pt was started on palliative treatment with Atezolizumab and Avastin.    Found in the right liver measuring about 8 cm in size.  It was actually found when she was doing a low-dose CT screening for lung cancer. AFP was 32,288  -12/26/24: CT showed no lung cancer issues but indeterminate right suprarenal fossa lesion that was 3.9 cm.  -1/9/25: Went on to have another CT scan of the abdomen and pelvis that shows infiltrative poorly defined mass throughout the right lobe of the liver with tumor thrombus within the intrahepatic portal veins in the right and left lobes.  As well as the main portal vein to the level of the portal venous confluence.  Mets to the right adrenal gland partially invades into the IVC.  Favor primary hepatic malignancy, either HCC or call angio.  -2/4/25: Patient had liver biopsy shows hepatocellular carcinoma.  Foundation 1 testing and process.   -2/13/25: Patient had PET scan shows findings suspicious for right hepatic lobe hepatocellular carcinoma with right adrenal gland   ~4/29/25: PET scan shows progressive disease in liver and bilateral adrenal glands.       Hypothyroid: longstanding issue.  Synthroid increased to 150 mcg.     Elevated LFT: present at baseline and from tumor. Will monitor closely with treatment     Barriers to discharge: Improvement in MITESH, IV fluids overnight, improvement in mentation, TCU placement    Anticipated length of stay: 1 to 2 days    Medically Ready for Discharge: Anticipated  "Tomorrow    Clinically Significant Risk Factors Present on Admission          # Hyperchloremia: Highest Cl = 114 mmol/L in last 2 days, will monitor as appropriate        # Hypomagnesemia: Lowest Mg = 1.6 mg/dL in last 2 days, will replace as needed   # Hypoalbuminemia: Lowest albumin = 2.4 g/dL at 6/3/2025  7:25 AM, will monitor as appropriate   # Drug Induced Platelet Defect: home medication list includes an antiplatelet medication  # Acute Kidney Injury, unspecified: based on a >150% or 0.3 mg/dL increase in last creatinine compared to past 90 day average, will monitor renal function  # Hypertension: Noted on problem list           # Overweight: Estimated body mass index is 25.51 kg/m  as calculated from the following:    Height as of this encounter: 1.549 m (5' 1\").    Weight as of this encounter: 61.2 kg (135 lb).       # Financial/Environmental Concerns: none             Subjective:  Patient interview questions appropriately.  Some slowed mentation.  Sister-in-law and  at bedside.   is concerned about tremors that have been present for a long time.  Patient can follow-up as an outpatient for this.  Sister-in-law notes that patient's mentation is nearly back to baseline.    PHYSICAL EXAM  Temp:  [98.1  F (36.7  C)-98.2  F (36.8  C)] 98.2  F (36.8  C)  Pulse:  [73-89] 79  Resp:  [11-27] 16  BP: (128-197)/(61-95) 164/70  SpO2:  [92 %-100 %] 98 %  Wt Readings from Last 1 Encounters:   06/02/25 61.2 kg (135 lb)       Intake/Output Summary (Last 24 hours) at 6/3/2025 1245  Last data filed at 6/3/2025 1000  Gross per 24 hour   Intake 2206.66 ml   Output --   Net 2206.66 ml      Body mass index is 25.51 kg/m .    GENRL: Alert and answering questions appropriately. Not in acute distress. Lying in bed   CHEST: Breathing easily   HEART: Regular rate   EXTRM: + pedal edema  NEURO: Tremors. Normal mentation  PSYCH: Flat affect and mood.   INTGM: No skin rash    Medical Decision Making       55 MINUTES SPENT " BY ME on the date of service doing chart review, history, exam, documentation & further activities per the note.      PERTINENT LABS/IMAGING:  Results for orders placed or performed during the hospital encounter of 06/02/25   CT Head w/o Contrast    Impression    IMPRESSION:  1.  No evidence of acute intracranial abnormality.  2.  No findings to suggest intracranial metastatic disease on this noncontrast head CT. Consider brain MRI with and without contrast to better evaluate for intracranial metastases.   MR Brain w/o & w Contrast    Impression    IMPRESSION:  1.  No acute intracranial process.  2.  Scattered nonspecific foci of T2/FLAIR hyperintensity in the cerebral white matter. The linear area of T2/FLAIR hyperintensity in the anterior right centrum semiovale oriented perpendicular to the ventricular system could be seen with demyelinating   disease. No evidence of abnormal enhancement to suggest active demyelination.  3.  Small rounded focus of T2/FLAIR hyperintensity involving the cortex and subcortical white matter in the medial left occipital parietal region could be related to old infarct or demyelinating lesion. Low-grade glioma is not excluded. Recommend 3 month   follow-up MRI of the brain without and with contrast to document stability.  4.  Additional small focal areas of T2/FLAIR hyperintensity in the right occipital lobe, right occipital parietal region, and left frontal lobe are nonspecific and may be due to small vessel ischemic disease or old infarcts.       Most Recent 3 CBC's:  Recent Labs   Lab Test 06/02/25  1805 02/17/25  1022 01/13/25  1132   WBC 3.1* 7.3 7.2   HGB 15.7 8.6* 9.6*   MCV 85 89 89   PLT 73* 218 204     Most Recent 3 BMP's:  Recent Labs   Lab Test 06/03/25  0801 06/03/25  0725 06/02/25  2258 06/02/25  1805 05/21/25  1348   NA  --  145  --  142 143   POTASSIUM  --  4.8  --  4.8 4.0   CHLORIDE  --  114*  --  108* 107   CO2  --  19*  --  20* 25   BUN  --  33.9*  --  33.4* 19.6  "  CR  --  2.43*  --  2.50* 1.47*   ANIONGAP  --  12  --  14 11   COLT  --  7.6*  --  8.4* 9.3   GLC 90 102* 104* 116* 89     Most Recent 2 LFT's:  Recent Labs   Lab Test 06/03/25  0725 06/02/25  1805   * 250*   * 129*   ALKPHOS 230* 258*   BILITOTAL 1.3* 1.7*       Recent Labs   Lab Test 04/22/25  0906   CHOL 259*   HDL 31*   *   TRIG 159*     Recent Labs   Lab Test 04/22/25  0906 02/26/24  1323 04/13/23  1158   * 77 59     Recent Labs   Lab Test 06/03/25  0801 06/03/25  0725   NA  --  145   POTASSIUM  --  4.8   CHLORIDE  --  114*   CO2  --  19*   GLC 90 102*   BUN  --  33.9*   CR  --  2.43*   GFRESTIMATED  --  21*   COLT  --  7.6*     Recent Labs   Lab Test 04/22/25  0906 08/26/24  1423 02/26/24  1323   A1C 5.0 4.9 4.9     Recent Labs   Lab Test 06/02/25  1805 02/17/25  1022 01/13/25  1132   HGB 15.7 8.6* 9.6*     Recent Labs   Lab Test 05/05/21  1550   TROPONINI <0.01     No results for input(s): \"BNP\", \"NTBNPI\", \"NTBNP\" in the last 02333 hours.  Recent Labs   Lab Test 06/02/25  1805   TSH 81.31*     Recent Labs   Lab Test 06/02/25  1805 02/04/25  0939 01/13/25  1132   INR 1.12 1.07 1.04       Phyllis Hong, DO  Hospitalist Service  [facility]      "

## 2025-06-03 NOTE — PROVIDER NOTIFICATION
Notified Dr. Hong regarding elevated lactic acid 3.7 and pt's chart triggering sepsis protocol. Dr. Hong paged back and stated no need to recheck lactic acid or blood cultures as pt has metastatic liver disease.

## 2025-06-03 NOTE — PROGRESS NOTES
Vocera message sent to house officer regarding high blood pressure, audible crackles and shivers with no fever.     Lorin Molina RN

## 2025-06-03 NOTE — CONSULTS
[facility]  [place]    Liz Pollard MRN# [id number]   Age: 74 year old YOB: 1950               Assessment and Plan:      Altered mental status likely due to elevated ammonia, dehydration  Metastatic hepatocellular carcinoma  Abnormal brain MRI     The altered mental status is likely due to metabolic issues as noted.  She clearly has improved significantly overnight.  No new workup to recommend in terms of mental status issues.    MRI shows a couple of juxtacortical T2 signal hyperintensities, the radiologist suggests the possibility of demyelination.  In any case these spots do not enhance and are not an acute representation of anything.  If these are demyelinating lesions, they are not acute, they are not contributing to her current presentation and could represent events from the distant past.  I would not pursue MS treatment at this age.      3-month follow-up MRI with and without contrast is recommended to assess for any change that might warrant further investigation and I think that is the most reasonable next step.    No new workup to recommend otherwise from my perspective.  I will sign off, please call if questions.            Chief Complaint/HPI:     This patient is a 74-year-old woman seen for neurologic evaluation today.  She was admitted last evening with confusion and word finding difficulty.  She has history of metastatic hepatocellular carcinoma.  Initial workup shows some renal insufficiency and elevated ammonia level.  Her sister and  are with us here at the bedside and reports significant improvement since she was brought in last evening.  MRI was performed, see description above.            Past Medical History:    has a past medical history of Diabetes mellitus (H), Disease of thyroid gland, Hypertension, and Infectious viral hepatitis.          Past Surgical History:    has a past surgical history that includes TOTAL ABDOM HYSTERECTOMY; REMOVAL  GALLBLADDER; Hysterectomy (1987); and Oophorectomy.          Social History:     Social History     Tobacco Use    Smoking status: Former     Current packs/day: 0.00     Types: Cigarettes     Quit date: 2021     Years since quittin.1     Passive exposure: Never    Smokeless tobacco: Never   Substance Use Topics    Alcohol use: No             Family History:     Family History   Problem Relation Age of Onset    Cancer Mother 83         of stomach cancer    Colon Cancer Father 51         of colon cancer    Colon Cancer Brother 36         from colon cancer    Prostate Cancer Brother     Breast Cancer Maternal Aunt         in her 90 s    Sickle Cell Trait Niece     Lung Cancer Maternal Uncle                 Allergies:     Allergies   Allergen Reactions    Amlodipine Unknown     Gums swelled    Hydralazine Itching     Pt states that she is not allergic to anything             Medications:     Current Facility-Administered Medications:     aspirin EC tablet 81 mg, 81 mg, Oral, Daily, Anupam Londono MD, 81 mg at 25 1011    calcium carbonate (TUMS) chewable tablet 1,000 mg, 1,000 mg, Oral, 4x Daily PRN, Anupam Londono MD    cefTRIAXone (ROCEPHIN) 2 g vial to attach to  ml bag for ADULTS or NS 50 ml bag for PEDS, 2 g, Intravenous, Q24H, Anupam Londono MD, Stopped at 25 2352    glucose gel 15-30 g, 15-30 g, Oral, Q15 Min PRN **OR** dextrose 50 % injection 25-50 mL, 25-50 mL, Intravenous, Q15 Min PRN **OR** glucagon injection 1 mg, 1 mg, Subcutaneous, Q15 Min PRN, Anupam Londono MD    enoxaparin ANTICOAGULANT (LOVENOX) injection 30 mg, 30 mg, Subcutaneous, Q24H, Anupam Londono MD, 30 mg at 25 2254    ferrous gluconate (FERGON) tablet 324 mg, 324 mg, Oral, Every Other Day, Anupam Londono MD, 324 mg at 25 1010    insulin aspart (NovoLOG) injection (RAPID ACTING), 1-7 Units, Subcutaneous, TID AC, Anupam Londono MD    insulin aspart (NovoLOG) injection (RAPID ACTING),  1-5 Units, Subcutaneous, At Bedtime, Anupam Londono MD    lactulose (CHRONULAC) solution 30 g, 30 g, Oral, BID, Anupam Londono MD, 30 g at 06/03/25 1010    levothyroxine (SYNTHROID/LEVOTHROID) tablet 150 mcg, 150 mcg, Oral, Daily, Anupam Londono MD, 150 mcg at 06/03/25 1010    lidocaine (LMX4) cream, , Topical, Q1H PRN, Anupam Londono MD    lidocaine 1 % 0.1-1 mL, 0.1-1 mL, Other, Q1H PRN, Anupam Londono MD    melatonin tablet 1 mg, 1 mg, Oral, At Bedtime PRN, Anupam Londono MD    ondansetron (ZOFRAN ODT) ODT tab 4 mg, 4 mg, Oral, Q6H PRN **OR** ondansetron (ZOFRAN) injection 4 mg, 4 mg, Intravenous, Q6H PRN, Anupam Londono MD    selpercatinib (RETEVMO) tablet 120 mg, 120 mg, Oral, BID, Anupam Londono MD, 120 mg at 06/03/25 1023    senna-docusate (SENOKOT-S/PERICOLACE) 8.6-50 MG per tablet 1 tablet, 1 tablet, Oral, BID PRN **OR** senna-docusate (SENOKOT-S/PERICOLACE) 8.6-50 MG per tablet 2 tablet, 2 tablet, Oral, BID PRN, Anupam Londono MD    sodium chloride (PF) 0.9% PF flush 3 mL, 3 mL, Intracatheter, Q8H REYNALDO, Anupam Londono MD    sodium chloride (PF) 0.9% PF flush 3 mL, 3 mL, Intracatheter, q1 min prn, Anupam Londono MD    sodium chloride 0.9 % infusion, , Intravenous, Continuous, Anupam Londono MD, Last Rate: 100 mL/hr at 06/03/25 0904, New Bag at 06/03/25 0904    Current Outpatient Medications:     aspirin 81 MG EC tablet, Take 81 mg by mouth daily., Disp: , Rfl:     ferrous gluconate (FERGON) 324 (38 Fe) MG tablet, Take 1 tablet (324 mg) by mouth every other day., Disp: , Rfl:     hydrochlorothiazide (HYDRODIURIL) 25 MG tablet, Take 1 tablet (25 mg) by mouth daily., Disp: 90 tablet, Rfl: 3    levothyroxine (SYNTHROID/LEVOTHROID) 100 MCG tablet, TAKE 1 TABLET BY MOUTH EVERY DAY, Disp: 90 tablet, Rfl: 1    levothyroxine (SYNTHROID/LEVOTHROID) 25 MCG tablet, Take 1 tablet (25 mcg) by mouth every morning (before breakfast)., Disp: 90 tablet, Rfl: 0    lisinopril (ZESTRIL) 40 MG tablet, TAKE 1 TABLET BY  "MOUTH EVERY DAY, Disp: 90 tablet, Rfl: 2    metoprolol succinate ER (TOPROL XL) 50 MG 24 hr tablet, Take 2 tablets (100 mg) by mouth daily., Disp: 180 tablet, Rfl: 0    selpercatinib (RETEVMO) 120 MG tablet, Take 1 tablet (120 mg) by mouth 2 times daily, Disp: 60 tablet, Rfl: 0    spironolactone (ALDACTONE) 50 MG tablet, Take 1 tablet (50 mg) by mouth daily., Disp: 90 tablet, Rfl: 3    blood glucose (CONTOUR NEXT TEST) test strip, USE 1 STRIP EVERY DAY, Disp: 100 strip, Rfl: 2    blood glucose (NO BRAND SPECIFIED) test strip, Use to test blood sugar 1 times daily or as directed. To accompany: Blood Glucose Monitor Brands: per insurance., Disp: 100 strip, Rfl: 3    blood glucose monitoring (NO BRAND SPECIFIED) meter device kit, Use to test blood sugar 1 times daily or as directed. Preferred blood glucose meter OR supplies to accompany: Blood Glucose Monitor Brands: per insurance., Disp: 1 kit, Rfl: 0    Blood Glucose Monitoring Suppl (CONTOUR NEXT ONE) SAMEERA, PLEASE SEE ATTACHED FOR DETAILED DIRECTIONS, Disp: , Rfl:     blood-glucose meter Misc, [BLOOD-GLUCOSE METER MISC] 1 glucometer device  - any brand covered by insurance (contour covered by insurance? ), Disp: 1 each, Rfl: 0    thin (NO BRAND SPECIFIED) lancets, Use with lanceting device. To accompany: Blood Glucose Monitor Brands: per insurance., Disp: 100 each, Rfl: 3              Physical Exam:      Vitals: BP (!) 164/70 (BP Location: Right arm, Patient Position: Semi-Zelaya's)   Pulse 79   Temp 98.2  F (36.8  C) (Oral)   Resp 16   Ht 1.549 m (5' 1\")   Wt 61.2 kg (135 lb)   SpO2 98%   BMI 25.51 kg/m    BMI= Body mass index is 25.51 kg/m .     Patient is alert and in no acute distress. Neck was supple, no carotid bruits, thyromegaly, lymphadenopathy or JVD noted.   Neurological Exam:   Mental status: Patient is awake and alert  She knows Celestina's and the year but not the date  She is able to remember June after I tell her, but not the date (third)  She " does fine on naming and repeating  She had a little difficulty following complex commands such as for finger-nose-finger testing but ultimately did get it right.  She can calculate nickels in $1 without difficulty  Speech is clear and fluent    CN II: Visual fields are full to confrontation.  PERRLA.   CN III, IV, VI: EOMI.    CN VII: Face is symmetric with normal eye closure and smile.   CN VII: Hearing is normal to conversation   CN IX, X: Palate elevates symmetrically. Phonation is normal.    CN XI: Head turning and shoulder shrug are intact   CN XII: Tongue is midline with normal movements and no atrophy or fasciculations.   Motor: Muscle bulk and tone are normal. No pronator drift. Strength is 5/5 distally, mild proximal weakness in all 4 extremities   Reflexes: Reflexes are symmetric, diminished throughout. Plantar responses are flexor.   Sensory: Light touch, pinprick, and vibration sense are intact bilaterally.    Coordination: Rapid alternating movements and fine finger movements are intact okay, there is slight postural tremor but no intention tremor and no resting tremor  Tone is normal   Gait: gait testing is deferred for safety due to ongoing medical issues      Miller Lynch MD       More than 60 minutes spent reviewing records and results, evaluating patient, discussing with pt and family and on documentation

## 2025-06-03 NOTE — SIGNIFICANT EVENT
"Significant Event Note    Time of event: 6:38 PM Melissa 3, 2025    Description of event:  Paged by nursing about /130, tachycardia to 106. Had already given PO metoprolol and prn hydralazine.     Assessed patient at bedside.     Patient reported feeling more tremulous than usual. Was feeling cold though denied any fevers. Reported trouble catching her breath. No chest pain. No other new symptoms.     BP (!) 217/93   Pulse 104   Temp 98.1  F (36.7  C) (Oral)   Resp (!) 37   Ht 1.549 m (5' 1\")   Wt 61.2 kg (135 lb)   SpO2 98%   BMI 25.51 kg/m      Intake/Output Summary (Last 24 hours) at 6/3/2025 2258  Last data filed at 6/3/2025 1945  Gross per 24 hour   Intake 3278.66 ml   Output --   Net 3278.66 ml      Constitutional: awake, alert, cooperative, visibly tremulous with cathi hugger in place  Respiratory: Slight crackles throughout; no wheezing  Cardiovascular: Tachycardic, regular rhythm  Neurologic: Awake, alert, oriented to name, place and time.  Cranial nerves II-XII are grossly intact.    New hypertension, tachycardia, and tremors. Patient initially found with AMS -- day team considered infectious etiology vs malignancy effect vs other. Has been started on lactulose for hepatic encephalopathy. Oriented on neurologic exam. Considered rigors and sepsis picture so will obtain labs to further evaluate. Also considered pulmonary edema with IV fluids running due to MITESH so will obtain chest xray. Tachycardia appears regular on tele and with exam though will obtain EKG to confirm. Also considered PE though no hypoxia and tachycardia improving. Has been getting Lovenox.     Plan:  - EKG  - CBC  - CMP  - lactic acid  - chest xray  - pause IV fluids for now    Will consider need for further testing or PE/DVT evaluation pending above labs.     Vitals improved to below with cathi hugger. Reviewed labs, EKG, and chest xray. No significant changes from prior. Will continue to monitor.   BP (!) 155/69 (BP Location: " "Right arm)   Pulse 75   Temp 99.1  F (37.3  C) (Axillary)   Resp 22   Ht 1.549 m (5' 1\")   Wt 61.2 kg (135 lb)   SpO2 100%   BMI 25.51 kg/m      Discussed with: patient's family/emergency contact and bedside nurse    Carlene Lin MD    "

## 2025-06-03 NOTE — CONSULTS
[facility] Inpatient Consult Note    Patient: Liz Pollard  MRN: [id number]  Date of Service: 6/3/2025      Reason for Visit    I was consulted by Dr. Davis ref. provider found  regarding history of hepatocellular carcinoma      ECOG Performance Status: 2          ______________________________________________________________________________            History Of Present Illness  Ms. Liz Pollard is a very pleasant 74 year old black female who is well-known to the oncology practice with a history of hepatocellular carcinoma.  She was initially treated with atezolizumab and Avastin however after 3 cycles her tumor marker was going up and a PET scan had shown progression she was then suggested to start selpercatinib as she had the RET fusion.  She just started that pill on 2025 and had been taking it.  She was however brought to the ER yesterday by her sister-in-law for evaluation of altered mental status.  She was found to have acute kidney injury and an elevated ammonia level and was thought to be in hepatic encephalopathy.  Oncology has been asked to see the patient because of her history of hepatocellular carcinoma.      Review of systems.  Apart from describing in history, the remainder of comprehensive ROS was negative.      Past History  Past Medical History:   Diagnosis Date    Diabetes mellitus (H)     Disease of thyroid gland     Hypertension     Infectious viral hepatitis      Past Surgical History:   Procedure Laterality Date    HC REMOVAL GALLBLADDER      Description: Cholecystectomy;  Recorded: 05/10/2010;    HYSTERECTOMY  1987    OOPHORECTOMY      ZZC TOTAL ABDOM HYSTERECTOMY      Description: Hysterectomy;  Recorded: 2009;     Family History   Problem Relation Age of Onset    Cancer Mother 83         of stomach cancer    Colon Cancer Father 51         of colon cancer    Colon Cancer Brother 36         from colon cancer    Prostate  Cancer Brother     Breast Cancer Maternal Aunt         in her 90 s    Sickle Cell Trait Niece     Lung Cancer Maternal Uncle      Social History     Socioeconomic History    Marital status:    Tobacco Use    Smoking status: Former     Current packs/day: 0.00     Types: Cigarettes     Quit date: 2021     Years since quittin.1     Passive exposure: Never    Smokeless tobacco: Never   Vaping Use    Vaping status: Never Used   Substance and Sexual Activity    Alcohol use: No    Drug use: No    Sexual activity: Yes     Partners: Male     Birth control/protection: Post-menopausal     Social Drivers of Health     Financial Resource Strain: Low Risk  (2024)    Financial Resource Strain     Within the past 12 months, have you or your family members you live with been unable to get utilities (heat, electricity) when it was really needed?: No   Food Insecurity: Low Risk  (2024)    Food Insecurity     Within the past 12 months, did you worry that your food would run out before you got money to buy more?: No     Within the past 12 months, did the food you bought just not last and you didn t have money to get more?: No   Transportation Needs: Low Risk  (2024)    Transportation Needs     Within the past 12 months, has lack of transportation kept you from medical appointments, getting your medicines, non-medical meetings or appointments, work, or from getting things that you need?: No   Physical Activity: Unknown (2024)    Exercise Vital Sign     Days of Exercise per Week: 6 days   Stress: Stress Concern Present (2024)    [employer] - Occupational Stress Questionnaire     Feeling of Stress : To some extent   Social Connections: Unknown (2024)    Social Connection and Isolation Panel [NHANES]     Frequency of Social Gatherings with Friends and Family: Patient declined   Interpersonal Safety: Low Risk  (2025)    Interpersonal Safety     Do you feel  "physically and emotionally safe where you currently live?: Yes     Within the past 12 months, have you been hit, slapped, kicked or otherwise physically hurt by someone?: No     Within the past 12 months, have you been humiliated or emotionally abused in other ways by your partner or ex-partner?: No   Housing Stability: Low Risk  (2/26/2024)    Housing Stability     Do you have housing? : Yes     Are you worried about losing your housing?: No       Allergies    Allergies   Allergen Reactions    Amlodipine Unknown     Gums swelled    Hydralazine Itching     Pt states that she is not allergic to anything          Physical Exam    BP (!) 175/86 (BP Location: Left arm)   Pulse 87   Temp 98.1  F (36.7  C) (Oral)   Resp 16   Ht 1.549 m (5' 1\")   Wt 61.2 kg (135 lb)   SpO2 96%   BMI 25.51 kg/m        General: alert, awake, not in acute distress  HEENT: Head: Normal, normocephalic, atraumatic.  Eye: Normal external eye, conjunctiva, lids cornea, WILLIAM.  Nose: Normal external nose, mucus membranes and septum.  Pharynx: Normal buccal mucosa. Normal pharynx.  Neck / Thyroid: Supple, no masses, nodes, nodules or enlargement.  Lymphatics: No abnormally enlarged lymph nodes.  Chest: Normal chest wall and respirations. Clear to auscultation.  Heart: S1 S2 RRR, no murmur.   Abdomen: abdomen is soft without significant tenderness, masses, organomegaly or guarding  Extremities: normal strength, tone, and muscle mass  Skin: normal. no rash or abnormalities  CNS: non focal.      Lab Results  Recent Results (from the past 24 hours)   Basic metabolic panel    Collection Time: 06/02/25  6:05 PM   Result Value Ref Range    Sodium 142 135 - 145 mmol/L    Potassium 4.8 3.4 - 5.3 mmol/L    Chloride 108 (H) 98 - 107 mmol/L    Carbon Dioxide (CO2) 20 (L) 22 - 29 mmol/L    Anion Gap 14 7 - 15 mmol/L    Urea Nitrogen 33.4 (H) 8.0 - 23.0 mg/dL    Creatinine 2.50 (H) 0.51 - 0.95 mg/dL    GFR Estimate 20 (L) >60 mL/min/1.73m2    Calcium 8.4 " (L) 8.8 - 10.4 mg/dL    Glucose 116 (H) 70 - 99 mg/dL   Hepatic function panel    Collection Time: 06/02/25  6:05 PM   Result Value Ref Range    Protein Total 6.7 6.4 - 8.3 g/dL    Albumin 2.6 (L) 3.5 - 5.2 g/dL    Bilirubin Total 1.7 (H) <=1.2 mg/dL    Alkaline Phosphatase 258 (H) 40 - 150 U/L     (H) 0 - 45 U/L     (H) 0 - 50 U/L    Bilirubin Direct 0.71 (H) 0.00 - 0.30 mg/dL   TSH with free T4 reflex    Collection Time: 06/02/25  6:05 PM   Result Value Ref Range    TSH 81.31 (H) 0.30 - 4.20 uIU/mL   INR    Collection Time: 06/02/25  6:05 PM   Result Value Ref Range    INR 1.12 0.85 - 1.15    PT 14.6 11.8 - 14.8 Seconds   CBC with platelets and differential    Collection Time: 06/02/25  6:05 PM   Result Value Ref Range    WBC Count 3.1 (L) 4.0 - 11.0 10e3/uL    RBC Count 5.88 (H) 3.80 - 5.20 10e6/uL    Hemoglobin 15.7 11.7 - 15.7 g/dL    Hematocrit 50.2 (H) 35.0 - 47.0 %    MCV 85 78 - 100 fL    MCH 26.7 26.5 - 33.0 pg    MCHC 31.3 (L) 31.5 - 36.5 g/dL    RDW 21.2 (H) 10.0 - 15.0 %    Platelet Count 73 (L) 150 - 450 10e3/uL    % Neutrophils 73 %    % Lymphocytes 20 %    % Monocytes 4 %    % Eosinophils 1 %    % Basophils 2 %    % Immature Granulocytes 0 %    NRBCs per 100 WBC 0 <1 /100    Absolute Neutrophils 2.3 1.6 - 8.3 10e3/uL    Absolute Lymphocytes 0.6 (L) 0.8 - 5.3 10e3/uL    Absolute Monocytes 0.1 0.0 - 1.3 10e3/uL    Absolute Eosinophils 0.0 0.0 - 0.7 10e3/uL    Absolute Basophils 0.1 0.0 - 0.2 10e3/uL    Absolute Immature Granulocytes 0.0 <=0.4 10e3/uL    Absolute NRBCs 0.0 10e3/uL   T4 free    Collection Time: 06/02/25  6:05 PM   Result Value Ref Range    Free T4 0.37 (L) 0.90 - 1.70 ng/dL   Ammonia    Collection Time: 06/02/25  6:16 PM   Result Value Ref Range    Ammonia 73 (H) 11 - 51 umol/L   UA with Microscopic reflex to Culture    Collection Time: 06/02/25  8:06 PM    Specimen: Urine, Catheter   Result Value Ref Range    Color Urine Charlette (A) Colorless, Straw, Light Yellow, Yellow     Appearance Urine Turbid (A) Clear    Glucose Urine Negative Negative mg/dL    Bilirubin Urine 0.5 mg/dL (A) Negative    Ketones Urine Negative Negative mg/dL    Specific Gravity Urine 1.024 1.001 - 1.030    Blood Urine 0.5 mg/dL (A) Negative    pH Urine 5.5 5.0 - 7.0    Protein Albumin Urine 300 (A) Negative mg/dL    Urobilinogen Urine 6.0 (A) Normal mg/dL    Nitrite Urine Negative Negative    Leukocyte Esterase Urine Negative Negative    Mucus Urine Present (A) None Seen /LPF    RBC Urine 3 (H) <=2 /HPF    WBC Urine 6 (H) <=5 /HPF    Squamous Epithelials Urine 1 <=1 /HPF    Transitional Epithelials Urine <1 <=1 /HPF    Hyaline Casts Urine 25 (H) <=2 /LPF   Glucose by meter    Collection Time: 06/02/25 10:58 PM   Result Value Ref Range    GLUCOSE BY METER POCT 104 (H) 70 - 99 mg/dL   Ammonia    Collection Time: 06/03/25  7:25 AM   Result Value Ref Range    Ammonia 83 (H) 11 - 51 umol/L   Magnesium    Collection Time: 06/03/25  7:25 AM   Result Value Ref Range    Magnesium 1.6 (L) 1.7 - 2.3 mg/dL   Lactic Acid Whole Blood w/ 1x repeat in 2 hrs when >2    Collection Time: 06/03/25  7:25 AM   Result Value Ref Range    Lactic Acid, Initial 2.6 (H) 0.7 - 2.0 mmol/L   Basic metabolic panel    Collection Time: 06/03/25  7:25 AM   Result Value Ref Range    Sodium 145 135 - 145 mmol/L    Potassium 4.8 3.4 - 5.3 mmol/L    Chloride 114 (H) 98 - 107 mmol/L    Carbon Dioxide (CO2) 19 (L) 22 - 29 mmol/L    Anion Gap 12 7 - 15 mmol/L    Urea Nitrogen 33.9 (H) 8.0 - 23.0 mg/dL    Creatinine 2.43 (H) 0.51 - 0.95 mg/dL    GFR Estimate 21 (L) >60 mL/min/1.73m2    Calcium 7.6 (L) 8.8 - 10.4 mg/dL    Glucose 102 (H) 70 - 99 mg/dL   Hepatic function panel    Collection Time: 06/03/25  7:25 AM   Result Value Ref Range    Protein Total 6.0 (L) 6.4 - 8.3 g/dL    Albumin 2.4 (L) 3.5 - 5.2 g/dL    Bilirubin Total 1.3 (H) <=1.2 mg/dL    Alkaline Phosphatase 230 (H) 40 - 150 U/L     (H) 0 - 45 U/L     (H) 0 - 50 U/L    Bilirubin  Direct 0.66 (H) 0.00 - 0.30 mg/dL   Glucose by meter    Collection Time: 06/03/25  8:01 AM   Result Value Ref Range    GLUCOSE BY METER POCT 90 70 - 99 mg/dL   Lactic acid whole blood    Collection Time: 06/03/25 11:02 AM   Result Value Ref Range    Lactic Acid 3.7 (H) 0.7 - 2.0 mmol/L   Glucose by meter    Collection Time: 06/03/25 12:31 PM   Result Value Ref Range    GLUCOSE BY METER POCT 127 (H) 70 - 99 mg/dL        Imaging Results    MR Brain w/o & w Contrast  Result Date: 6/2/2025  EXAM: MR BRAIN W/O and W CONTRAST LOCATION: [facility] DATE: 6/2/2025 INDICATION: Speech finding difficulties, evaluate for stroke COMPARISON: Head CT obtained earlier today CONTRAST: 6 mL Gadavist TECHNIQUE: Routine multiplanar multisequence head MRI without and with intravenous contrast. FINDINGS: Somewhat motion degraded examination. INTRACRANIAL CONTENTS: No acute or subacute infarct. No mass, acute hemorrhage, or extra-axial fluid collections. Scattered nonspecific foci of T2/FLAIR hyperintense signal in the cerebral white matter. There is a linear area of T2/FLAIR hyperintensity involving the white matter in the anterior right centrum semiovale (images 19 and 20 series 17) which appears to be oriented perpendicular to the ventricular system. Small rounded focus of T2/FLAIR hyperintensity involving the cortex and subcortical white matter in the medial left occipital parietal region (image 18 series 17). At least 3 additional small focal areas of T2/FLAIR hyperintensity involving the anterior lateral right occipital lobe (image 13 series 17), medial right occipital parietal region (image 19 series 17), and superior left frontal lobe along the superior frontal gyrus (image 20 series 17). Normal ventricles and sulci. Normal position of the cerebellar tonsils. No pathologic contrast enhancement. SELLA: No abnormality accounting for technique. OSSEOUS STRUCTURES/SOFT TISSUES: Normal marrow signal. The major  intracranial vascular flow voids are maintained. ORBITS: Prior bilateral cataract surgery. Visualized portions of the orbits are otherwise unremarkable. SINUSES/MASTOIDS: No paranasal sinus mucosal disease. No middle ear or mastoid effusion.     IMPRESSION: 1.  No acute intracranial process. 2.  Scattered nonspecific foci of T2/FLAIR hyperintensity in the cerebral white matter. The linear area of T2/FLAIR hyperintensity in the anterior right centrum semiovale oriented perpendicular to the ventricular system could be seen with demyelinating disease. No evidence of abnormal enhancement to suggest active demyelination. 3.  Small rounded focus of T2/FLAIR hyperintensity involving the cortex and subcortical white matter in the medial left occipital parietal region could be related to old infarct or demyelinating lesion. Low-grade glioma is not excluded. Recommend 3 month  follow-up MRI of the brain without and with contrast to document stability. 4.  Additional small focal areas of T2/FLAIR hyperintensity in the right occipital lobe, right occipital parietal region, and left frontal lobe are nonspecific and may be due to small vessel ischemic disease or old infarcts.     CT Head w/o Contrast  Result Date: 6/2/2025  EXAM: CT HEAD W/O CONTRAST LOCATION: [facility] DATE: 6/2/2025 INDICATION: Altered mental status, known liver cancer, evaluate for metastasis COMPARISON: None. TECHNIQUE: Routine CT Head without IV contrast. Multiplanar reformats. Dose reduction techniques were used. FINDINGS: INTRACRANIAL CONTENTS: No intracranial hemorrhage, extraaxial collection, or mass effect.  No CT evidence of acute infarct. Mild presumed chronic small vessel ischemic changes. Normal ventricles and sulci. VISUALIZED ORBITS/SINUSES/MASTOIDS: Prior bilateral cataract surgery. Visualized portions of the orbits are otherwise unremarkable. No paranasal sinus mucosal disease. No middle ear or mastoid effusion.  BONES/SOFT TISSUES: No acute abnormality.     IMPRESSION: 1.  No evidence of acute intracranial abnormality. 2.  No findings to suggest intracranial metastatic disease on this noncontrast head CT. Consider brain MRI with and without contrast to better evaluate for intracranial metastases.       Assessment and Plan    Metastatic hepatocellular carcinoma  Patient has a diagnosis of metastatic hepatocellular carcinoma.  She had recently progressed and had been started on selpercatinib because of the presence of a RET fusion.  It is too early to decide whether the medicine is working or not but overall she may be deteriorating.  I will plan to hold this medication while she is in the hospital and will make a decision on restarting based on how things evolve over the next few days to weeks.  Please feel free to reach out to oncology if there are any other oncology related questions.  Patient should have follow-up with primary oncologist within a week of discharge.  Altered mental status  This is most likely secondary to her hepatic encephalopathy which may have been triggered by an infection.  She has been started on broad-spectrum antibiotic.  Treatment will be as per the medical team.  She also seems to be developing hepatorenal syndrome as her kidney function is worse than her baseline.  All these issues will continue to be treated by the medical team/nephrology.  Signed by: Owen To MD

## 2025-06-03 NOTE — CONSULTS
Care Management Initial Consult    General Information  Assessment completed with: Patient Dirk and her  Barry  Type of CM/SW Visit: Initial Assessment    Primary Care Provider verified and updated as needed: Yes - current PCP is Dr. Jones  Readmission within the last 30 days: no previous admission in last 30 days      Reason for Consult: other (see comments) (elevated risk score)  Advance Care Planning: Advance Care Planning Reviewed: other (see comments)   ---- Has stage 4 cancer - no ACP documents on file. Given some mild confusion right now, I did not discuss this but would be helpful to discuss once mentation improves back to baseline.       Communication Assessment  Patient's communication style: spoken language (English or Bilingual)             Cognitive  Cognitive/Neuro/Behavioral: awake, speech and thought process seems a bit slow but able to answer questions appropriately.                 Living Environment:   People in home: farida Reddy  Current living Arrangements: house      Able to return to prior arrangements: other (see comments) (TBD)       Family/Social Support:  Care provided by: self  Provides care for: spouse -- sister in law is currently staying at their house to help take care of farida Reddy.  Marital Status:   Support system: Limited to, Farida Reddy       Description of Support System: Supportive, Involved    Support Assessment: Caregiver difficulty providing physical care/safety    Current Resources:   Patient receiving home care services: No        Community Resources: None  Equipment currently used at home: none  Supplies currently used at home: None    Employment/Financial:  Employment Status: retired     Employment/ Comments: no  background  Financial Concerns: none           Does the patient's insurance plan have a 3 day qualifying hospital stay waiver?  Yes     Which insurance plan 3 day waiver is available? Alternative insurance waiver    Will  the waiver be used for post-acute placement? Undetermined at this time    Lifestyle & Psychosocial Needs:  Social Drivers of Health     Food Insecurity: Low Risk  (2/26/2024)    Food Insecurity     Within the past 12 months, did you worry that your food would run out before you got money to buy more?: No     Within the past 12 months, did the food you bought just not last and you didn t have money to get more?: No   Depression: Not at risk (1/16/2025)    PHQ-2     PHQ-2 Score: 1   Housing Stability: Low Risk  (2/26/2024)    Housing Stability     Do you have housing? : Yes     Are you worried about losing your housing?: No   Tobacco Use: Medium Risk (6/2/2025)    Patient History     Smoking Tobacco Use: Former     Smokeless Tobacco Use: Never     Passive Exposure: Never   Financial Resource Strain: Low Risk  (2/26/2024)    Financial Resource Strain     Within the past 12 months, have you or your family members you live with been unable to get utilities (heat, electricity) when it was really needed?: No   Alcohol Use: Not on file   Transportation Needs: Low Risk  (2/26/2024)    Transportation Needs     Within the past 12 months, has lack of transportation kept you from medical appointments, getting your medicines, non-medical meetings or appointments, work, or from getting things that you need?: No   Physical Activity: Unknown (2/26/2024)    Exercise Vital Sign     Days of Exercise per Week: 6 days     Minutes of Exercise per Session: Not on file   Interpersonal Safety: Low Risk  (2/4/2025)    Interpersonal Safety     Do you feel physically and emotionally safe where you currently live?: Yes     Within the past 12 months, have you been hit, slapped, kicked or otherwise physically hurt by someone?: No     Within the past 12 months, have you been humiliated or emotionally abused in other ways by your partner or ex-partner?: No   Stress: Stress Concern Present (2/26/2024)    [employer] -  Occupational Stress Questionnaire     Feeling of Stress : To some extent   Social Connections: Unknown (2/26/2024)    Social Connection and Isolation Panel [NHANES]     Frequency of Communication with Friends and Family: Not on file     Frequency of Social Gatherings with Friends and Family: Patient declined     Attends Yazidi Services: Not on file     Active Member of Clubs or Organizations: Not on file     Attends Club or Organization Meetings: Not on file     Marital Status: Not on file   Health Literacy: Not on file       Functional Status:  Prior to admission patient needed assistance:   Dependent ADLs:: Ambulation-no assistive device  Dependent IADLs:: Independent       Mental Health Status:  Mental Health Status: No Current Concerns       Chemical Dependency Status:  Chemical Dependency Status: No Current Concerns             Values/Beliefs:  Spiritual, Cultural Beliefs, Yazidi Practices, Values that affect care:                 Discussed  Partnership in Safe Discharge Planning  document with patient/family: No    Additional Information:  Met with Dirk and her  Barry in the ED. Dirk is awake, able to engage in conversation but I do note a slower thought and speech process. She was able to answer questions to complete this assessment. Per chart review, her  has Alzheimer's - I notice a bit of a cognitive delay when visiting with him; however, he is calm and appears very supportive and loving to Dirk when I'm in the room.     Dirk and her  live in a house in [place] - prior to this admission, she was independent in ADLs and IADLs. Still driving though she says they don't drive far. It's unclear to me how much assistance her  needs. I inquired about him being at home without her and she said her sister in law is here to help out.     They do not have any children and it sounds like they have limited local support. The sister in Law (Sunday) who is here now is from [place]. It  sounds like they've been managing at home but I anticipate they will need/could benefit from more support in their home.      Dirk has stage 4 liver cancer and is currently undergoing chemo. There are no ACP documents on file. I did not discuss this topic with them at this visit as I feel it should be a topic discussed once Dirk' mentation has improved back to baseline. I believe designating a healthcare agent should be addressed given her 's Alzheimer's and no children.       Next Steps:   PT and OT are consulted - will wait for their recommendations then help develop discharge plan.   Once mentation clears, discuss advanced care planning documents.       Alysia Garner RN  [facility] ED Care Manager  Desk Phone #: [phone]  Cell #: [phone]

## 2025-06-03 NOTE — H&P
[facility]    History and Physical - Hospitalist Service       Date of Admission:  6/2/2025    Assessment & Plan      Liz Pollard is a 74 year old female admitted on 6/2/2025.     Principal Problem:    Metastatic hepatocellular carcinoma to adrenal gland (H)  Active Problems:    Chronic hepatitis C without hepatic coma (H)    Class 1 obesity due to excess calories without serious comorbidity with body mass index (BMI) of 30.0 to 30.9 in adult    Essential hypertension    Stage 3b chronic kidney disease (H)    Hepatic encephalopathy (H)    Dehydration    Hypothyroid    MITESH (acute kidney injury)    Word finding difficulty    Altered mental status, unspecified altered mental status type    Acute cystitis without hematuria    Failure to thrive in adult      74 year old with Hepatocellular carcinoma, metastatic disease with adrenal mets,  on palliative treatment, hypertension, diabetes mellitus II, thyroid disease, CKD, and hyperlipidemia who presents to this ED by walk-in with her sister-in-law for evaluation of altered mental status, from home where she lives with her , who has Alzheimer's. Pt currently has stage 4 liver cancer and is undergoing chemo. Pt's  called sister in law and stated that the patient was unresponsive. When she woke up, pt was confused and having difficulty finding words. She was also responding inappropriately. Pt has not been seen by anyone other than , who is poor historian, since Thursday. In triage, pt is still having difficulty finding words and has some inappropriate responses. Neuro assessment called in triage, no stroke code called. NIH of 0 though is mildly confused. Given the unknown last known normal I do not believe she is a TNK candidate. No focal neurologic deficits. Speaking clearly. No word finding difficulties.  MITESH with elevated BUN, concern for dehydration-Given IV fluids. Elevated TSH with reflex low T4- given Iv synthroid.  Does take levothyroxine however may be due to medication noncompliance No evidence of acute intracranial abnormality on CT. Also +UA for UTI, Ammonia also high  MRI with several atypical findings that we will consult Ne patient severely depressed and encephalopathic not able to provide much history but she does endorse to be full code and continue with uology.  Treatment plan will also consult oncology.        Altered mental status, unspecified altered mental status type    Chronic hepatitis C without hepatic coma (H)    Iker on Stage 3b chronic kidney disease (H)    Hepatic encephalopathy (H)    Dehydration with  IKER (acute kidney injury)  AMS- Probable differential diagnosis are Infections,malignant ( from hx of carcinoma), Metabolic/Electrolyte abnormalities or even components of psychological depression and/ or functional.   Also +UA for UTI, Ammonia also high, low free t4  No evidence of acute intracranial abnormality on CT-MRI brain pending    IKER with elevated BUN, concern for dehydration-Given IV fluids. Elevated TSH with reflex low T4- given Iv synthroid. Does take levothyroxine however may be due to medication noncompliance - wa son 125 mcg may need 150 mcg daily  -IV rocephin for UTI pending ciultures  -Lactulose BID for ammonia elevation will trend ammonia as  Request consultation to neurology-appreciated assistance and recommendations.    -mood is flat with severe depression possible may need SSRI as well        CKD at baseline Likely due to DM/HTN combination.  IKER likley Prerenal-   - Monitor trend  - Avoid nephrotoxic meds      Hepatocellular carcinoma, metastatic disease with adrenal mets: pt was started on palliative treatment with Atezolizumab and Avastin.    Found in the right liver measuring about 8 cm in size.  It was actually found when she was doing a low-dose CT screening for lung cancer. AFP was 32,288  -12/26/24: CT showed no lung cancer issues but indeterminate right suprarenal fossa  lesion that was 3.9 cm.  -1/9/25: Went on to have another CT scan of the abdomen and pelvis that shows infiltrative poorly defined mass throughout the right lobe of the liver with tumor thrombus within the intrahepatic portal veins in the right and left lobes.  As well as the main portal vein to the level of the portal venous confluence.  Mets to the right adrenal gland partially invades into the IVC.  Favor primary hepatic malignancy, either HCC or call angio.  -2/4/25: Patient had liver biopsy shows hepatocellular carcinoma.  Foundation 1 testing and process.   -2/13/25: Patient had PET scan shows findings suspicious for right hepatic lobe hepatocellular carcinoma with right adrenal gland   ~4/29/25: PET scan shows progressive disease in liver and bilateral adrenal glands.         Hypothyroid: longstanding issue.  Synthroid increased to 150 mcg.     Elevated LFT: present at baseline and from tumor. Will monitor closely with treatment    Hold outside hospital medication pending a confirmed pharmacy history and further reconciliation.  Initial orders were placed.  Workup and follow-up labs has been placed.   Current problem list also has been updated.  Request consultation to neurology-appreciated assistance and recommendations.    Anticipated length of stay of more than 2 midnights of hospitalization depending on progression, planning,findings,further testing or treatment needs. Services are medically necessary for evaluation and treatment of the acute & on chronic superimposed conditions if applicable with the treatment plan as outlined above.    More than 50% of time spent in Counselling & coordination of care.  Disposition:   Pending on further clinical progression, further evaluation findings and recommendations.          Diet: Combination Diet Regular Diet Adult    DVT Prophylaxis: Pneumatic Compression Devices  Moreno Catheter: Not present  Lines: None     Cardiac Monitoring: ACTIVE order. Indication: QTc  "prolonging medication (48 hours)  Code Status: Full Code      Clinically Significant Risk Factors Present on Admission          # Hyperchloremia: Highest Cl = 108 mmol/L in last 2 days, will monitor as appropriate          # Hypoalbuminemia: Lowest albumin = 2.6 g/dL at 6/2/2025  6:05 PM, will monitor as appropriate   # Drug Induced Platelet Defect: home medication list includes an antiplatelet medication    # Acute Kidney Injury, unspecified: based on a >150% or 0.3 mg/dL increase in last creatinine compared to past 90 day average, will monitor renal function  # Hypertension: Noted on problem list           # Overweight: Estimated body mass index is 25.51 kg/m  as calculated from the following:    Height as of this encounter: 1.549 m (5' 1\").    Weight as of this encounter: 61.2 kg (135 lb).              Disposition Plan     Medically Ready for Discharge: Anticipated in 2-4 Days           Anupam Londono MD  Hospitalist Service  [facility]  Securely message with [employer] (more info)  Text page via [employer] Paging/Directory     ______________________________________________________________________    Chief Complaint   AMS    History is obtained from the patient, electronic health record, and emergency department physician    History of Present Illness   Liz Pollard is a 74 year old female with Hepatocellular carcinoma, metastatic disease with adrenal mets,  on palliative treatment, hypertension, diabetes mellitus II, thyroid disease, CKD, and hyperlipidemia who presents to this ED by walk-in with her sister-in-law for evaluation of altered mental status, from home where she lives with her , who has Alzheimer's. Pt currently has stage 4 liver cancer and is undergoing chemo. Pt's  called sister in law and stated that the patient was unresponsive. When she woke up, pt was confused and having difficulty finding words. She was also responding inappropriately. Pt has not been seen " by anyone other than , who is poor historian, since Thursday. In triage, pt is still having difficulty finding words and has some inappropriate responses. Neuro assessment called in triage, no stroke code called. NIH of 0 though is mildly confused. Given the unknown last known normal I do not believe she is a TNK candidate. No focal neurologic deficits. Speaking clearly. No word finding difficulties. UK MITESH with elevated BUN, concern for dehydration-Given IV fluids. Elevated TSH reflex low T4. Does take levothyroxine however may be due to medication noncompliance No evidence of acute intracranial abnormality on CT.       Past Medical History    Past Medical History:   Diagnosis Date    Diabetes mellitus (H)     Disease of thyroid gland     Hypertension     Infectious viral hepatitis        Past Surgical History   Past Surgical History:   Procedure Laterality Date    HC REMOVAL GALLBLADDER      Description: Cholecystectomy;  Recorded: 05/10/2010;    HYSTERECTOMY  01/01/1987    OOPHORECTOMY      ZZC TOTAL ABDOM HYSTERECTOMY      Description: Hysterectomy;  Recorded: 06/16/2009;       Prior to Admission Medications   Prior to Admission Medications   Prescriptions Last Dose Informant Patient Reported? Taking?   Blood Glucose Monitoring Suppl (CONTOUR NEXT ONE) SAMEERA   Yes No   Sig: PLEASE SEE ATTACHED FOR DETAILED DIRECTIONS   aspirin 81 MG EC tablet 6/1/2025 Morning  Yes Yes   Sig: Take 81 mg by mouth daily.   blood glucose (CONTOUR NEXT TEST) test strip   No No   Sig: USE 1 STRIP EVERY DAY   blood glucose (NO BRAND SPECIFIED) test strip   No No   Sig: Use to test blood sugar 1 times daily or as directed. To accompany: Blood Glucose Monitor Brands: per insurance.   blood glucose monitoring (NO BRAND SPECIFIED) meter device kit   No No   Sig: Use to test blood sugar 1 times daily or as directed. Preferred blood glucose meter OR supplies to accompany: Blood Glucose Monitor Brands: per insurance.   blood-glucose  meter Misc   No No   Sig: [BLOOD-GLUCOSE METER MISC] 1 glucometer device  - any brand covered by insurance (contour covered by insurance? )   ferrous gluconate (FERGON) 324 (38 Fe) MG tablet 2025 Morning  Yes Yes   Sig: Take 1 tablet (324 mg) by mouth every other day.   hydrochlorothiazide (HYDRODIURIL) 25 MG tablet 2025 Morning  No Yes   Sig: Take 1 tablet (25 mg) by mouth daily.   levothyroxine (SYNTHROID/LEVOTHROID) 100 MCG tablet 2025 Morning  No Yes   Sig: TAKE 1 TABLET BY MOUTH EVERY DAY   levothyroxine (SYNTHROID/LEVOTHROID) 25 MCG tablet 2025 Morning  No Yes   Sig: Take 1 tablet (25 mcg) by mouth every morning (before breakfast).   lisinopril (ZESTRIL) 40 MG tablet 2025 Morning  No Yes   Sig: TAKE 1 TABLET BY MOUTH EVERY DAY   metoprolol succinate ER (TOPROL XL) 50 MG 24 hr tablet 2025 Morning  No Yes   Sig: Take 2 tablets (100 mg) by mouth daily.   selpercatinib (RETEVMO) 120 MG tablet 2025 Noon  No Yes   Sig: Take 1 tablet (120 mg) by mouth 2 times daily   spironolactone (ALDACTONE) 50 MG tablet 2025 Morning  No Yes   Sig: Take 1 tablet (50 mg) by mouth daily.   thin (NO BRAND SPECIFIED) lancets   No No   Sig: Use with lanceting device. To accompany: Blood Glucose Monitor Brands: per insurance.      Facility-Administered Medications: None        Review of Systems    The 5 point Review of Systems is negative other than noted in the HPI or here although limited due to patient's lethargy.  Denies any chest pain shortness of breath-    Social History   I have reviewed this patient's social history and updated it with pertinent information if needed.  Social History     Tobacco Use    Smoking status: Former     Current packs/day: 0.00     Types: Cigarettes     Quit date: 2021     Years since quittin.1     Passive exposure: Never    Smokeless tobacco: Never   Vaping Use    Vaping status: Never Used   Substance Use Topics    Alcohol use: No    Drug use: No         Family  History   I have reviewed this patient's family history and updated it with pertinent information if needed.  Family History   Problem Relation Age of Onset    Cancer Mother 83         of stomach cancer    Colon Cancer Father 51         of colon cancer    Colon Cancer Brother 36         from colon cancer    Prostate Cancer Brother     Breast Cancer Maternal Aunt         in her 90 s    Sickle Cell Trait Niece     Lung Cancer Maternal Uncle          Allergies   Allergies   Allergen Reactions    Amlodipine Unknown     Gums swelled    Hydralazine Itching     Pt states that she is not allergic to anything        Physical Exam   Vital Signs: Temp: 98.1  F (36.7  C) Temp src: Temporal BP: 137/65 Pulse: 76   Resp: 22 SpO2: 96 % O2 Device: None (Room air)    Weight: 135 lbs 0 oz    Arousable lethargic, vital stable,  Vision Baseline  Neck supple  Oral mucosa moist  bilateral air entry heard, scattered rhonchi  S1-S2 normal  Abdomen is soft no tenderness  Extremities are fully mobile and there is no visible swelling noted  No skin cyanosis  Neurologically-lethargic but no gross deficits  Psych-mood is flat with severe depression possible may need SSRI as well      Medical Decision Making       75 MINUTES SPENT BY ME on the date of service doing chart review, history, exam, documentation & further activities per the note.      Data     I have personally reviewed the following data over the past 24 hrs:    3.1 (L)  \   15.7   / 73 (L)     142 108 (H) 33.4 (H) /  104 (H)   4.8 20 (L) 2.50 (H) \     ALT: 129 (H) AST: 250 (H) AP: 258 (H) TBILI: 1.7 (H)   ALB: 2.6 (L) TOT PROTEIN: 6.7 LIPASE: N/A     TSH: 81.31 (H) T4: 0.37 (L) A1C: N/A     INR:  1.12 PTT:  N/A   D-dimer:  N/A Fibrinogen:  N/A       Imaging results reviewed over the past 24 hrs:   Recent Results (from the past 24 hours)   CT Head w/o Contrast    Narrative    EXAM: CT HEAD W/O CONTRAST  LOCATION: [facility]  DATE:  6/2/2025    INDICATION: Altered mental status, known liver cancer, evaluate for metastasis  COMPARISON: None.  TECHNIQUE: Routine CT Head without IV contrast. Multiplanar reformats. Dose reduction techniques were used.    FINDINGS:  INTRACRANIAL CONTENTS: No intracranial hemorrhage, extraaxial collection, or mass effect.  No CT evidence of acute infarct. Mild presumed chronic small vessel ischemic changes. Normal ventricles and sulci.     VISUALIZED ORBITS/SINUSES/MASTOIDS: Prior bilateral cataract surgery. Visualized portions of the orbits are otherwise unremarkable. No paranasal sinus mucosal disease. No middle ear or mastoid effusion.    BONES/SOFT TISSUES: No acute abnormality.      Impression    IMPRESSION:  1.  No evidence of acute intracranial abnormality.  2.  No findings to suggest intracranial metastatic disease on this noncontrast head CT. Consider brain MRI with and without contrast to better evaluate for intracranial metastases.   MR Brain w/o & w Contrast    Narrative    EXAM: MR BRAIN W/O and W CONTRAST  LOCATION: [facility]  DATE: 6/2/2025    INDICATION: Speech finding difficulties, evaluate for stroke  COMPARISON: Head CT obtained earlier today  CONTRAST: 6 mL Gadavist  TECHNIQUE: Routine multiplanar multisequence head MRI without and with intravenous contrast.    FINDINGS:  Somewhat motion degraded examination.    INTRACRANIAL CONTENTS: No acute or subacute infarct. No mass, acute hemorrhage, or extra-axial fluid collections. Scattered nonspecific foci of T2/FLAIR hyperintense signal in the cerebral white matter. There is a linear area of T2/FLAIR hyperintensity   involving the white matter in the anterior right centrum semiovale (images 19 and 20 series 17) which appears to be oriented perpendicular to the ventricular system. Small rounded focus of T2/FLAIR hyperintensity involving the cortex and subcortical   white matter in the medial left occipital parietal region  (image 18 series 17). At least 3 additional small focal areas of T2/FLAIR hyperintensity involving the anterior lateral right occipital lobe (image 13 series 17), medial right occipital parietal   region (image 19 series 17), and superior left frontal lobe along the superior frontal gyrus (image 20 series 17). Normal ventricles and sulci. Normal position of the cerebellar tonsils. No pathologic contrast enhancement.    SELLA: No abnormality accounting for technique.    OSSEOUS STRUCTURES/SOFT TISSUES: Normal marrow signal. The major intracranial vascular flow voids are maintained.     ORBITS: Prior bilateral cataract surgery. Visualized portions of the orbits are otherwise unremarkable.     SINUSES/MASTOIDS: No paranasal sinus mucosal disease. No middle ear or mastoid effusion.       Impression    IMPRESSION:  1.  No acute intracranial process.  2.  Scattered nonspecific foci of T2/FLAIR hyperintensity in the cerebral white matter. The linear area of T2/FLAIR hyperintensity in the anterior right centrum semiovale oriented perpendicular to the ventricular system could be seen with demyelinating   disease. No evidence of abnormal enhancement to suggest active demyelination.  3.  Small rounded focus of T2/FLAIR hyperintensity involving the cortex and subcortical white matter in the medial left occipital parietal region could be related to old infarct or demyelinating lesion. Low-grade glioma is not excluded. Recommend 3 month   follow-up MRI of the brain without and with contrast to document stability.  4.  Additional small focal areas of T2/FLAIR hyperintensity in the right occipital lobe, right occipital parietal region, and left frontal lobe are nonspecific and may be due to small vessel ischemic disease or old infarcts.

## 2025-06-04 LAB
ALBUMIN SERPL BCG-MCNC: 2.2 G/DL (ref 3.5–5.2)
ALP SERPL-CCNC: 247 U/L (ref 40–150)
ALT SERPL W P-5'-P-CCNC: 98 U/L (ref 0–50)
ANION GAP SERPL CALCULATED.3IONS-SCNC: 9 MMOL/L (ref 7–15)
AST SERPL W P-5'-P-CCNC: 333 U/L (ref 0–45)
BASOPHILS # BLD AUTO: 0 10E3/UL (ref 0–0.2)
BASOPHILS NFR BLD AUTO: 1 %
BILIRUB DIRECT SERPL-MCNC: 0.53 MG/DL (ref 0–0.3)
BILIRUB SERPL-MCNC: 1 MG/DL
BUN SERPL-MCNC: 29.3 MG/DL (ref 8–23)
CALCIUM SERPL-MCNC: 7.6 MG/DL (ref 8.8–10.4)
CHLORIDE SERPL-SCNC: 114 MMOL/L (ref 98–107)
CREAT SERPL-MCNC: 1.94 MG/DL (ref 0.51–0.95)
EGFRCR SERPLBLD CKD-EPI 2021: 27 ML/MIN/1.73M2
EOSINOPHIL # BLD AUTO: 0 10E3/UL (ref 0–0.7)
EOSINOPHIL NFR BLD AUTO: 1 %
ERYTHROCYTE [DISTWIDTH] IN BLOOD BY AUTOMATED COUNT: 20.6 % (ref 10–15)
GLUCOSE BLDC GLUCOMTR-MCNC: 106 MG/DL (ref 70–99)
GLUCOSE BLDC GLUCOMTR-MCNC: 107 MG/DL (ref 70–99)
GLUCOSE BLDC GLUCOMTR-MCNC: 107 MG/DL (ref 70–99)
GLUCOSE BLDC GLUCOMTR-MCNC: 111 MG/DL (ref 70–99)
GLUCOSE BLDC GLUCOMTR-MCNC: 148 MG/DL (ref 70–99)
GLUCOSE SERPL-MCNC: 105 MG/DL (ref 70–99)
HCO3 SERPL-SCNC: 19 MMOL/L (ref 22–29)
HCT VFR BLD AUTO: 38 % (ref 35–47)
HGB BLD-MCNC: 12.3 G/DL (ref 11.7–15.7)
IMM GRANULOCYTES # BLD: 0 10E3/UL
IMM GRANULOCYTES NFR BLD: 1 %
LYMPHOCYTES # BLD AUTO: 0.4 10E3/UL (ref 0.8–5.3)
LYMPHOCYTES NFR BLD AUTO: 20 %
MCH RBC QN AUTO: 26.9 PG (ref 26.5–33)
MCHC RBC AUTO-ENTMCNC: 32.4 G/DL (ref 31.5–36.5)
MCV RBC AUTO: 83 FL (ref 78–100)
MONOCYTES # BLD AUTO: 0.2 10E3/UL (ref 0–1.3)
MONOCYTES NFR BLD AUTO: 7 %
NEUTROPHILS # BLD AUTO: 1.5 10E3/UL (ref 1.6–8.3)
NEUTROPHILS NFR BLD AUTO: 69 %
NRBC # BLD AUTO: 0 10E3/UL
NRBC BLD AUTO-RTO: 0 /100
PLATELET # BLD AUTO: 44 10E3/UL (ref 150–450)
POTASSIUM SERPL-SCNC: 3.8 MMOL/L (ref 3.4–5.3)
PROT SERPL-MCNC: 5.5 G/DL (ref 6.4–8.3)
RBC # BLD AUTO: 4.57 10E6/UL (ref 3.8–5.2)
SODIUM SERPL-SCNC: 142 MMOL/L (ref 135–145)
WBC # BLD AUTO: 2.2 10E3/UL (ref 4–11)

## 2025-06-04 PROCEDURE — 250N000011 HC RX IP 250 OP 636: Performed by: HOSPITALIST

## 2025-06-04 PROCEDURE — 82248 BILIRUBIN DIRECT: CPT | Performed by: INTERNAL MEDICINE

## 2025-06-04 PROCEDURE — 99233 SBSQ HOSP IP/OBS HIGH 50: CPT | Performed by: INTERNAL MEDICINE

## 2025-06-04 PROCEDURE — 80053 COMPREHEN METABOLIC PANEL: CPT | Performed by: INTERNAL MEDICINE

## 2025-06-04 PROCEDURE — 85004 AUTOMATED DIFF WBC COUNT: CPT | Performed by: INTERNAL MEDICINE

## 2025-06-04 PROCEDURE — 36415 COLL VENOUS BLD VENIPUNCTURE: CPT | Performed by: INTERNAL MEDICINE

## 2025-06-04 PROCEDURE — 120N000001 HC R&B MED SURG/OB

## 2025-06-04 PROCEDURE — 250N000011 HC RX IP 250 OP 636: Mod: JZ | Performed by: INTERNAL MEDICINE

## 2025-06-04 PROCEDURE — 250N000013 HC RX MED GY IP 250 OP 250 PS 637: Performed by: INTERNAL MEDICINE

## 2025-06-04 PROCEDURE — 250N000013 HC RX MED GY IP 250 OP 250 PS 637: Performed by: HOSPITALIST

## 2025-06-04 PROCEDURE — 85014 HEMATOCRIT: CPT | Performed by: INTERNAL MEDICINE

## 2025-06-04 PROCEDURE — 250N000012 HC RX MED GY IP 250 OP 636 PS 637: Performed by: HOSPITALIST

## 2025-06-04 RX ORDER — AMLODIPINE BESYLATE 5 MG/1
10 TABLET ORAL DAILY
Status: DISPENSED | OUTPATIENT
Start: 2025-06-04

## 2025-06-04 RX ORDER — HYDRALAZINE HYDROCHLORIDE 20 MG/ML
20 INJECTION INTRAMUSCULAR; INTRAVENOUS EVERY 6 HOURS PRN
Status: ACTIVE | OUTPATIENT
Start: 2025-06-04

## 2025-06-04 RX ORDER — HYDRALAZINE HYDROCHLORIDE 20 MG/ML
10 INJECTION INTRAMUSCULAR; INTRAVENOUS ONCE
Status: COMPLETED | OUTPATIENT
Start: 2025-06-04 | End: 2025-06-04

## 2025-06-04 RX ADMIN — HYDRALAZINE HYDROCHLORIDE 10 MG: 20 INJECTION INTRAMUSCULAR; INTRAVENOUS at 05:02

## 2025-06-04 RX ADMIN — ONDANSETRON 4 MG: 2 INJECTION, SOLUTION INTRAMUSCULAR; INTRAVENOUS at 18:16

## 2025-06-04 RX ADMIN — METOPROLOL SUCCINATE 100 MG: 50 TABLET, EXTENDED RELEASE ORAL at 08:41

## 2025-06-04 RX ADMIN — HYDRALAZINE HYDROCHLORIDE 10 MG: 20 INJECTION INTRAMUSCULAR; INTRAVENOUS at 15:15

## 2025-06-04 RX ADMIN — HYDRALAZINE HYDROCHLORIDE 10 MG: 20 INJECTION INTRAMUSCULAR; INTRAVENOUS at 11:32

## 2025-06-04 RX ADMIN — ASPIRIN 81 MG: 81 TABLET, COATED ORAL at 08:41

## 2025-06-04 RX ADMIN — INSULIN ASPART 1 UNITS: 100 INJECTION, SOLUTION INTRAVENOUS; SUBCUTANEOUS at 18:16

## 2025-06-04 RX ADMIN — LEVOTHYROXINE SODIUM 150 MCG: 0.03 TABLET ORAL at 08:41

## 2025-06-04 RX ADMIN — AMLODIPINE BESYLATE 10 MG: 5 TABLET ORAL at 13:17

## 2025-06-04 RX ADMIN — CALCIUM CARBONATE (ANTACID) CHEW TAB 500 MG 1000 MG: 500 CHEW TAB at 18:16

## 2025-06-04 ASSESSMENT — ACTIVITIES OF DAILY LIVING (ADL)
ADLS_ACUITY_SCORE: 52
ADLS_ACUITY_SCORE: 65
ADLS_ACUITY_SCORE: 57
ADLS_ACUITY_SCORE: 65
ADLS_ACUITY_SCORE: 48
ADLS_ACUITY_SCORE: 52
ADLS_ACUITY_SCORE: 50
ADLS_ACUITY_SCORE: 65
ADLS_ACUITY_SCORE: 52
ADLS_ACUITY_SCORE: 65
ADLS_ACUITY_SCORE: 52
ADLS_ACUITY_SCORE: 52
ADLS_ACUITY_SCORE: 65
ADLS_ACUITY_SCORE: 52
ADLS_ACUITY_SCORE: 65
ADLS_ACUITY_SCORE: 50
ADLS_ACUITY_SCORE: 65
ADLS_ACUITY_SCORE: 65
ADLS_ACUITY_SCORE: 57
ADLS_ACUITY_SCORE: 52
ADLS_ACUITY_SCORE: 50

## 2025-06-04 NOTE — PROVIDER NOTIFICATION
PROVIDER NOTIFICATION    Reason for communication:  High BP after prn hydralazine 205/80    Team member name: Dr Hong    Team member role: [place]    Method of Communication:  Vocera onsule    Response:  New orders

## 2025-06-04 NOTE — PLAN OF CARE
Problem: Hypertension Acute  Goal: Blood Pressure Within Desired Range  Outcome: Not Progressing   Goal Outcome Evaluation:  Patient disoriented to time and place.  Assist of 1 with transfers.  Denies pain.  Patient SBP > Bp 196/ 84 after intervention Bp up to 205/80   Amlodipine given recheck 193/83 recheck  IV hydralazine given recheck 175/81.   MD notified ordered amlodipine and make changes to prn hydralazine .   Loose Bm X2.  Voided X2 Bladder Scan of 249.  Will continue to monitor.

## 2025-06-04 NOTE — PLAN OF CARE
NURSING PROGRESS NOTE  Shift Summary      Date: June 4, 2025   [id number]  Neuro/Musculoskeletal:  Oriented x person place and time but unsure of situation. Waxes/wanes. Slow to respond at times. Purposeful movement to all extremities. Cognition slowly improving. Lactulose administered on Haritha shift.   Cardiac:  On TELE monitoring: NSR. BP elevated, PRN hydralazine administered as ordered. Denied and offered no c/o CP. CP.    Respiratory:  LS: diminished throughout but absent of any adventitious sounds. Continued on RA. SpO2: 97%. Absent of SOB.   GI/:  BS: hyperactive. Last BM: 6/4 r/t lactulose. Voiding spontaneously without difficulty.   Diet/Appetite:  Tolerating a regular diet.   Activity:  A1 w/ FWW and gaitbelt.   Pain:  Denied and offered no c/o pain.   Skin:  Abrasions/ shearing noted to bilateral buttocks, Mepilex applied.   LDAs + Drips/IVF:  PIV x1 to RE and PIV x1 to LUE: SL.   Protocols/Labs:  Not on any electrolyte replacement protocols     Pertinent Shift Updates:  Bed alarm on at all times. Pt can be impulsive and has difficulty following simple commands at times.       Derrick Marquez, TAWNY        Goal Outcome Evaluation:    Problem: Adult Inpatient Plan of Care  Goal: Optimal Comfort and Wellbeing  Outcome: Progressing  Intervention: Provide Person-Centered Care  Recent Flowsheet Documentation  Taken 6/4/2025 0200 by Derrick Marquez, RN  Trust Relationship/Rapport:   care explained   choices provided   emotional support provided   empathic listening provided   questions answered   questions encouraged   reassurance provided   thoughts/feelings acknowledged     Problem: Delirium  Goal: Improved Behavioral Control  Outcome: Progressing  Intervention: Minimize Safety Risk  Recent Flowsheet Documentation  Taken 6/4/2025 0200 by Derrick Marquez, RN  Enhanced Safety Measures:   pain management   patient/family teach back on injury risk  Trust Relationship/Rapport:   care explained   choices provided    emotional support provided   empathic listening provided   questions answered   questions encouraged   reassurance provided   thoughts/feelings acknowledged  Goal: Improved Attention and Thought Clarity  Outcome: Progressing  Goal: Improved Sleep  Outcome: Progressing

## 2025-06-04 NOTE — PROGRESS NOTES
[facility]    PROGRESS NOTE - Hospitalist Service    ASSESSMENT AND PLAN     Principal Problem:    Metastatic hepatocellular carcinoma to adrenal gland (H)  Active Problems:    Chronic hepatitis C without hepatic coma (H)    Class 1 obesity due to excess calories without serious comorbidity with body mass index (BMI) of 30.0 to 30.9 in adult    Essential hypertension    Stage 3b chronic kidney disease (H)    Hepatic encephalopathy (H)    Dehydration    Hypothyroid    IKER (acute kidney injury)    Word finding difficulty    Altered mental status, unspecified altered mental status type    Acute cystitis without hematuria    Failure to thrive in adult    Liz Pollard is a 74 year old female with Hepatocellular carcinoma, metastatic disease with adrenal mets,  on palliative treatment, hypertension, diabetes mellitus II, thyroid disease, CKD, and hyperlipidemia who presents to this ED with her sister-in-law for evaluation of altered mental status, from home where she lives with her , who has Alzheimer's. Pt currently has stage 4 liver cancer and is undergoing chemo. Pt's  called sister in law and stated that the patient was unresponsive. When she woke up, pt was confused and having difficulty finding words. Presented to the ED with AMS and dehydration.       Altered mental status, unspecified altered mental status type  Chronic hepatitis C without hepatic coma (H)  Iker on Stage 3b chronic kidney disease (H)  Hepatic encephalopathy (H)  Dehydration with  IKER (acute kidney injury)  Possible decline in the setting of hepatocellular carcinoma, failure to thrive and poor p.o. intake  UA not suspicious for UTI  Ammonia also high-lactulose initiated  TSH elevated and low free t4.  Resume levothyroxine at higher dose.  Follow-up in 4 to 6 weeks with PCP  No evidence of acute intracranial abnormality on CT-.  MRI with possible demyelinating disease  Neurology recommending follow-up MRI  in 3-month  MITESH with elevated BUN-improving with IV fluid  Neurology evaluated with no further workup planned.    Concern for patient's safety at home.  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  OT-slums  Discussed with care management safe discharge plan     CKD at baseline Likely due to DM/HTN combination.  MITESH likley Prerenal-   Baseline creatinine appears to be 1.1-1.2.  Currently creatinine 2.5  - Monitor trend  - Avoid nephrotoxic meds      Hepatocellular carcinoma, metastatic disease with adrenal mets: pt was started on palliative treatment with Atezolizumab and Avastin.    Found in the right liver measuring about 8 cm in size.  It was actually found when she was doing a low-dose CT screening for lung cancer. AFP was 32,288  -12/26/24: CT showed no lung cancer issues but indeterminate right suprarenal fossa lesion that was 3.9 cm.  -1/9/25: Went on to have another CT scan of the abdomen and pelvis that shows infiltrative poorly defined mass throughout the right lobe of the liver with tumor thrombus within the intrahepatic portal veins in the right and left lobes.  As well as the main portal vein to the level of the portal venous confluence.  Mets to the right adrenal gland partially invades into the IVC.  Favor primary hepatic malignancy, either HCC or call angio.  -2/4/25: Patient had liver biopsy shows hepatocellular carcinoma.  Foundation 1 testing and process.   -2/13/25: Patient had PET scan shows findings suspicious for right hepatic lobe hepatocellular carcinoma with right adrenal gland   ~4/29/25: PET scan shows progressive disease in liver and bilateral adrenal glands.     Uncontrolled hypertension  Home metoprolol resumed  Hydrochlorothiazide and losartan held due to MITESH  Trial amlodipine.  Hydralazine as needed      Hypothyroid: longstanding issue.  Synthroid increased to 150 mcg.     Elevated LFT: present at baseline and from tumor. Will monitor closely with treatment    "  Barriers to discharge: Safe discharge plan.  improvement in MITESH     Anticipated length of stay: 1 to 2 days    Medically Ready for Discharge: Anticipated in 2-4 Days    Clinically Significant Risk Factors          # Hyperchloremia: Highest Cl = 114 mmol/L in last 2 days, will monitor as appropriate        # Hypomagnesemia: Lowest Mg = 1.6 mg/dL in last 2 days, will replace as needed   # Hypoalbuminemia: Lowest albumin = 2.2 g/dL at 6/4/2025  5:31 AM, will monitor as appropriate   # Thrombocytopenia: Lowest platelets = 44 in last 2 days, will monitor for bleeding   # Hypertension: Noted on problem list            # Overweight: Estimated body mass index is 25.51 kg/m  as calculated from the following:    Height as of this encounter: 1.549 m (5' 1\").    Weight as of this encounter: 61.2 kg (135 lb)., PRESENT ON ADMISSION     # Financial/Environmental Concerns: none             Subjective:  Patient seems quite confused about why she is in the hospital.  She does answer some questions appropriately but does not quite remember what has happened since being here.  I think there is concern for safety at home as she is the primary caregiver to her  with dementia.  PT is recommended TCU for this patient.  Long-term care might be a better option.  Consideration for palliative as well.    PHYSICAL EXAM  Temp:  [97.4  F (36.3  C)-99.1  F (37.3  C)] 97.4  F (36.3  C)  Pulse:  [] 68  Resp:  [16-49] 20  BP: (155-248)/() 205/80  SpO2:  [96 %-100 %] 97 %  Wt Readings from Last 1 Encounters:   06/02/25 61.2 kg (135 lb)       Intake/Output Summary (Last 24 hours) at 6/4/2025 1318  Last data filed at 6/4/2025 1300  Gross per 24 hour   Intake 2371.25 ml   Output --   Net 2371.25 ml      Body mass index is 25.51 kg/m .    GENRL: Alert and answering questions appropriately. Not in acute distress. Lying in bed   CHEST: Breathing easily   HEART: Regular rate   EXTRM: + pedal edema  NEURO: Slow mentation  PSYCH: Flat " affect and mood.   INTGM: No skin rash    Medical Decision Making       55 MINUTES SPENT BY ME on the date of service doing chart review, history, exam, documentation & further activities per the note.      PERTINENT LABS/IMAGING:  Results for orders placed or performed during the hospital encounter of 06/02/25   CT Head w/o Contrast    Impression    IMPRESSION:  1.  No evidence of acute intracranial abnormality.  2.  No findings to suggest intracranial metastatic disease on this noncontrast head CT. Consider brain MRI with and without contrast to better evaluate for intracranial metastases.   MR Brain w/o & w Contrast    Impression    IMPRESSION:  1.  No acute intracranial process.  2.  Scattered nonspecific foci of T2/FLAIR hyperintensity in the cerebral white matter. The linear area of T2/FLAIR hyperintensity in the anterior right centrum semiovale oriented perpendicular to the ventricular system could be seen with demyelinating   disease. No evidence of abnormal enhancement to suggest active demyelination.  3.  Small rounded focus of T2/FLAIR hyperintensity involving the cortex and subcortical white matter in the medial left occipital parietal region could be related to old infarct or demyelinating lesion. Low-grade glioma is not excluded. Recommend 3 month   follow-up MRI of the brain without and with contrast to document stability.  4.  Additional small focal areas of T2/FLAIR hyperintensity in the right occipital lobe, right occipital parietal region, and left frontal lobe are nonspecific and may be due to small vessel ischemic disease or old infarcts.     XR Chest Port 1 View    Impression    IMPRESSION: Negative chest.     Most Recent 3 CBC's:  Recent Labs   Lab Test 06/04/25  0531 06/03/25  1857 06/02/25  1805   WBC 2.2* 3.0* 3.1*   HGB 12.3 13.2 15.7   MCV 83 83 85   PLT 44* 56* 73*     Most Recent 3 BMP's:  Recent Labs   Lab Test 06/04/25  1145 06/04/25  0820 06/04/25  0531 06/03/25  0801 06/03/25  0725  "06/02/25  2258 06/02/25  1805   NA  --   --  142  --  145  145  --  142   POTASSIUM  --   --  3.8  --  4.8  4.8  --  4.8   CHLORIDE  --   --  114*  --  114*  114*  --  108*   CO2  --   --  19*  --  19*  19*  --  20*   BUN  --   --  29.3*  --  33.9*  33.9*  --  33.4*   CR  --   --  1.94*  --  2.43*  2.43*  --  2.50*   ANIONGAP  --   --  9  --  12  12  --  14   COLT  --   --  7.6*  --  7.6*  7.6*  --  8.4*   * 107* 105*   < > 102*  102*   < > 116*    < > = values in this interval not displayed.     Most Recent 2 LFT's:  Recent Labs   Lab Test 06/04/25  0531 06/03/25  0725   * 323*  323*   ALT 98* 112*  112*   ALKPHOS 247* 230*  230*   BILITOTAL 1.0 1.3*  1.3*       Recent Labs   Lab Test 04/22/25  0906   CHOL 259*   HDL 31*   *   TRIG 159*     Recent Labs   Lab Test 04/22/25  0906 02/26/24  1323 04/13/23  1158   * 77 59     Recent Labs   Lab Test 06/04/25  1145 06/04/25  0820 06/04/25  0531   NA  --   --  142   POTASSIUM  --   --  3.8   CHLORIDE  --   --  114*   CO2  --   --  19*   *   < > 105*   BUN  --   --  29.3*   CR  --   --  1.94*   GFRESTIMATED  --   --  27*   COLT  --   --  7.6*    < > = values in this interval not displayed.     Recent Labs   Lab Test 04/22/25  0906 08/26/24  1423 02/26/24  1323   A1C 5.0 4.9 4.9     Recent Labs   Lab Test 06/04/25  0531 06/03/25  1857 06/02/25  1805   HGB 12.3 13.2 15.7     Recent Labs   Lab Test 05/05/21  1550   TROPONINI <0.01     No results for input(s): \"BNP\", \"NTBNPI\", \"NTBNP\" in the last [id number] hours.  Recent Labs   Lab Test 06/02/25  1805   TSH 81.31*     Recent Labs   Lab Test 06/02/25  1805 02/04/25  0939 01/13/25  1132   INR 1.12 1.07 1.04       Phyllis Hong, DO  Hospitalist Service  [facility]      "

## 2025-06-04 NOTE — PROGRESS NOTES
Dual Skin Assessment Note:    Patient admitted from ED from to P2.     Comprehensive skin inspection completed by myself and Milady Villarreal RN.     Abnormal skin assessment findings: Shearing noted on R and L buttock, scratch on R shin, L elbow scab, L elbow small scab. Generalized dry ashy skin.    LDA Initiated for skin breakdown/non-blanchable redness: Yes    Provider notified: Yes, via sticky note    If yes, WOC Consult order obtained: No will obtain order for day shift    Thad Vela RN 10:54 PM

## 2025-06-04 NOTE — PROGRESS NOTES
Care Management Follow Up    Length of Stay (days): 2    Expected Discharge Date: 06/05/2025    Anticipated Discharge Plan:       Transportation: TBD    PT Recommendations: Transitional Care Facility  OT Recommendations:  Transitional Care Facility     Barriers to Discharge: medical stability, placement    Prior Living Situation: house with spouse    Discussed  Partnership in Safe Discharge Planning  document with patient/family: No     Handoff Completed: No, handoff not indicated or clinically appropriate    Patient/Spokesperson Updated: No    Additional Information:  RNCM rcv'd update from provider, expressing concern regarding pt and her 's living situation. Provider stated this pt is the primary caregiver for her spouse who has dementia. Destini has stage 4 liver cancer. Provider recommends pt and her spouse to go into LTC.    RNCM spoke with sister in-law Destini (lives in [place]), she stated spouse still does pretty well during the day.  Destini stated pt's spouse still drives locally (grocery store), Destini said he does get worse/ confused more so in the evenings.     Destini stated they have been looking at LTC/senior living options and have visited 5 facilities. Destini also stated Dirk started getting nervous about facility options due to cost. RNCM reviewed the financial piece with Destini and she is very understanding and accepting of the idea.     Destini has agreed to support pt and spouse in the process of finding a more suitable living situation.     Provided contact information for [facility].     Next Steps: f/up w/ Destini to provide guidance and support with [facility] search.    Justina Gonzales RN     Refer to 6/3 [facility] consult for further details of situation.

## 2025-06-04 NOTE — PROGRESS NOTES
Assumed cares for pt from [id number].     Pt is alert and oriented x4, sleeping between cares. BP elevated, scheduled metoprolol and PRN IV hydralazine given with not much improvement. Pt also noted to have shivers and tachypnea as well as labored breathing. Resident paged and assessment pt. IVF paused per resident orders. Labs, ECG and chest XR ordered and done. No significant results yet, Dr. Lin updated and aware. Pt was provided with cathi hugger,  warm blankets were not enough. Pt's shivers improved after cathi hugger and breathing also improved. BP also slightly improved, last one 198/87. Resident updated and was okay with that BP for now. Pt had room available in 202. P2 RN Thad called and updated.

## 2025-06-05 ENCOUNTER — APPOINTMENT (OUTPATIENT)
Dept: OCCUPATIONAL THERAPY | Facility: HOSPITAL | Age: 75
End: 2025-06-05
Attending: INTERNAL MEDICINE
Payer: COMMERCIAL

## 2025-06-05 ENCOUNTER — APPOINTMENT (OUTPATIENT)
Dept: ULTRASOUND IMAGING | Facility: HOSPITAL | Age: 75
End: 2025-06-05
Attending: INTERNAL MEDICINE
Payer: COMMERCIAL

## 2025-06-05 LAB
ALBUMIN SERPL BCG-MCNC: 2.4 G/DL (ref 3.5–5.2)
ALBUMIN SERPL BCG-MCNC: 2.4 G/DL (ref 3.5–5.2)
ALBUMIN UR-MCNC: 100 MG/DL
ALP SERPL-CCNC: 257 U/L (ref 40–150)
ALP SERPL-CCNC: 257 U/L (ref 40–150)
ALT SERPL W P-5'-P-CCNC: 97 U/L (ref 0–50)
ALT SERPL W P-5'-P-CCNC: 97 U/L (ref 0–50)
AMMONIA PLAS-SCNC: 42 UMOL/L (ref 11–51)
ANION GAP SERPL CALCULATED.3IONS-SCNC: 10 MMOL/L (ref 7–15)
ANION GAP SERPL CALCULATED.3IONS-SCNC: 9 MMOL/L (ref 7–15)
APPEARANCE UR: CLEAR
AST SERPL W P-5'-P-CCNC: 270 U/L (ref 0–45)
AST SERPL W P-5'-P-CCNC: 270 U/L (ref 0–45)
BACTERIA #/AREA URNS HPF: ABNORMAL /HPF
BASOPHILS # BLD AUTO: 0 10E3/UL (ref 0–0.2)
BASOPHILS NFR BLD AUTO: 1 %
BILIRUB DIRECT SERPL-MCNC: 0.53 MG/DL (ref 0–0.3)
BILIRUB SERPL-MCNC: 1 MG/DL
BILIRUB SERPL-MCNC: 1 MG/DL
BILIRUB UR QL STRIP: NEGATIVE
BUN SERPL-MCNC: 34.9 MG/DL (ref 8–23)
BUN SERPL-MCNC: 35.6 MG/DL (ref 8–23)
CALCIUM SERPL-MCNC: 8.6 MG/DL (ref 8.8–10.4)
CALCIUM SERPL-MCNC: 8.6 MG/DL (ref 8.8–10.4)
CHLORIDE SERPL-SCNC: 110 MMOL/L (ref 98–107)
CHLORIDE SERPL-SCNC: 111 MMOL/L (ref 98–107)
COLOR UR AUTO: YELLOW
CREAT SERPL-MCNC: 2.19 MG/DL (ref 0.51–0.95)
CREAT SERPL-MCNC: 2.25 MG/DL (ref 0.51–0.95)
EGFRCR SERPLBLD CKD-EPI 2021: 23 ML/MIN/1.73M2
EGFRCR SERPLBLD CKD-EPI 2021: 23 ML/MIN/1.73M2
EOSINOPHIL # BLD AUTO: 0.1 10E3/UL (ref 0–0.7)
EOSINOPHIL NFR BLD AUTO: 2 %
ERYTHROCYTE [DISTWIDTH] IN BLOOD BY AUTOMATED COUNT: 21 % (ref 10–15)
ERYTHROCYTE [DISTWIDTH] IN BLOOD BY AUTOMATED COUNT: 21.2 % (ref 10–15)
GLUCOSE BLDC GLUCOMTR-MCNC: 103 MG/DL (ref 70–99)
GLUCOSE BLDC GLUCOMTR-MCNC: 124 MG/DL (ref 70–99)
GLUCOSE BLDC GLUCOMTR-MCNC: 133 MG/DL (ref 70–99)
GLUCOSE BLDC GLUCOMTR-MCNC: 152 MG/DL (ref 70–99)
GLUCOSE SERPL-MCNC: 102 MG/DL (ref 70–99)
GLUCOSE SERPL-MCNC: 103 MG/DL (ref 70–99)
GLUCOSE UR STRIP-MCNC: NEGATIVE MG/DL
GRANULAR CAST: 8 /LPF (ref ?–1)
HCO3 SERPL-SCNC: 19 MMOL/L (ref 22–29)
HCO3 SERPL-SCNC: 19 MMOL/L (ref 22–29)
HCT VFR BLD AUTO: 42.2 % (ref 35–47)
HCT VFR BLD AUTO: 42.7 % (ref 35–47)
HGB BLD-MCNC: 13.3 G/DL (ref 11.7–15.7)
HGB BLD-MCNC: 13.5 G/DL (ref 11.7–15.7)
HGB UR QL STRIP: ABNORMAL
HOLD SPECIMEN: NORMAL
HYALINE CASTS: 7 /LPF
IMM GRANULOCYTES # BLD: 0 10E3/UL
IMM GRANULOCYTES NFR BLD: 1 %
KETONES UR STRIP-MCNC: NEGATIVE MG/DL
LEUKOCYTE ESTERASE UR QL STRIP: NEGATIVE
LYMPHOCYTES # BLD AUTO: 0.5 10E3/UL (ref 0.8–5.3)
LYMPHOCYTES NFR BLD AUTO: 15 %
MAGNESIUM SERPL-MCNC: 1.7 MG/DL (ref 1.7–2.3)
MCH RBC QN AUTO: 26.4 PG (ref 26.5–33)
MCH RBC QN AUTO: 26.5 PG (ref 26.5–33)
MCHC RBC AUTO-ENTMCNC: 31.5 G/DL (ref 31.5–36.5)
MCHC RBC AUTO-ENTMCNC: 31.6 G/DL (ref 31.5–36.5)
MCV RBC AUTO: 84 FL (ref 78–100)
MCV RBC AUTO: 84 FL (ref 78–100)
MONOCYTES # BLD AUTO: 0.2 10E3/UL (ref 0–1.3)
MONOCYTES NFR BLD AUTO: 5 %
MUCOUS THREADS #/AREA URNS LPF: PRESENT /LPF
NEUTROPHILS # BLD AUTO: 2.3 10E3/UL (ref 1.6–8.3)
NEUTROPHILS NFR BLD AUTO: 77 %
NITRATE UR QL: NEGATIVE
NRBC # BLD AUTO: 0 10E3/UL
NRBC BLD AUTO-RTO: 0 /100
PH UR STRIP: 5.5 [PH] (ref 5–7)
PHOSPHATE SERPL-MCNC: 2.9 MG/DL (ref 2.5–4.5)
PLATELET # BLD AUTO: 44 10E3/UL (ref 150–450)
PLATELET # BLD AUTO: 56 10E3/UL (ref 150–450)
POTASSIUM SERPL-SCNC: 3.9 MMOL/L (ref 3.4–5.3)
POTASSIUM SERPL-SCNC: 4.1 MMOL/L (ref 3.4–5.3)
PROT SERPL-MCNC: 6.1 G/DL (ref 6.4–8.3)
PROT SERPL-MCNC: 6.1 G/DL (ref 6.4–8.3)
RBC # BLD AUTO: 5.03 10E6/UL (ref 3.8–5.2)
RBC # BLD AUTO: 5.1 10E6/UL (ref 3.8–5.2)
RBC URINE: 1 /HPF
SODIUM SERPL-SCNC: 139 MMOL/L (ref 135–145)
SODIUM SERPL-SCNC: 139 MMOL/L (ref 135–145)
SODIUM UR-SCNC: 37 MMOL/L
SP GR UR STRIP: 1.02 (ref 1–1.03)
SQUAMOUS EPITHELIAL: <1 /HPF
UROBILINOGEN UR STRIP-MCNC: NORMAL MG/DL
WBC # BLD AUTO: 3 10E3/UL (ref 4–11)
WBC # BLD AUTO: 3.4 10E3/UL (ref 4–11)
WBC URINE: 2 /HPF

## 2025-06-05 PROCEDURE — 84100 ASSAY OF PHOSPHORUS: CPT | Performed by: INTERNAL MEDICINE

## 2025-06-05 PROCEDURE — 82610 CYSTATIN C: CPT | Performed by: INTERNAL MEDICINE

## 2025-06-05 PROCEDURE — 84300 ASSAY OF URINE SODIUM: CPT | Performed by: INTERNAL MEDICINE

## 2025-06-05 PROCEDURE — 84133 ASSAY OF URINE POTASSIUM: CPT | Performed by: INTERNAL MEDICINE

## 2025-06-05 PROCEDURE — 81001 URINALYSIS AUTO W/SCOPE: CPT | Performed by: INTERNAL MEDICINE

## 2025-06-05 PROCEDURE — 250N000013 HC RX MED GY IP 250 OP 250 PS 637: Performed by: INTERNAL MEDICINE

## 2025-06-05 PROCEDURE — 82140 ASSAY OF AMMONIA: CPT | Performed by: INTERNAL MEDICINE

## 2025-06-05 PROCEDURE — 120N000001 HC R&B MED SURG/OB

## 2025-06-05 PROCEDURE — 80048 BASIC METABOLIC PNL TOTAL CA: CPT | Performed by: INTERNAL MEDICINE

## 2025-06-05 PROCEDURE — 99222 1ST HOSP IP/OBS MODERATE 55: CPT | Performed by: INTERNAL MEDICINE

## 2025-06-05 PROCEDURE — 82040 ASSAY OF SERUM ALBUMIN: CPT | Performed by: INTERNAL MEDICINE

## 2025-06-05 PROCEDURE — 99233 SBSQ HOSP IP/OBS HIGH 50: CPT | Performed by: INTERNAL MEDICINE

## 2025-06-05 PROCEDURE — 97129 THER IVNTJ 1ST 15 MIN: CPT | Mod: GO

## 2025-06-05 PROCEDURE — 99222 1ST HOSP IP/OBS MODERATE 55: CPT | Performed by: CLINICAL NURSE SPECIALIST

## 2025-06-05 PROCEDURE — 97530 THERAPEUTIC ACTIVITIES: CPT | Mod: GO

## 2025-06-05 PROCEDURE — 80053 COMPREHEN METABOLIC PANEL: CPT | Performed by: INTERNAL MEDICINE

## 2025-06-05 PROCEDURE — 97130 THER IVNTJ EA ADDL 15 MIN: CPT | Mod: GO

## 2025-06-05 PROCEDURE — 82247 BILIRUBIN TOTAL: CPT | Performed by: INTERNAL MEDICINE

## 2025-06-05 PROCEDURE — 83735 ASSAY OF MAGNESIUM: CPT | Performed by: INTERNAL MEDICINE

## 2025-06-05 PROCEDURE — 36415 COLL VENOUS BLD VENIPUNCTURE: CPT | Performed by: INTERNAL MEDICINE

## 2025-06-05 PROCEDURE — 85014 HEMATOCRIT: CPT | Performed by: INTERNAL MEDICINE

## 2025-06-05 PROCEDURE — 82436 ASSAY OF URINE CHLORIDE: CPT | Performed by: INTERNAL MEDICINE

## 2025-06-05 PROCEDURE — 93975 VASCULAR STUDY: CPT

## 2025-06-05 PROCEDURE — 250N000013 HC RX MED GY IP 250 OP 250 PS 637: Performed by: HOSPITALIST

## 2025-06-05 PROCEDURE — 85025 COMPLETE CBC W/AUTO DIFF WBC: CPT | Performed by: INTERNAL MEDICINE

## 2025-06-05 PROCEDURE — 82310 ASSAY OF CALCIUM: CPT | Performed by: INTERNAL MEDICINE

## 2025-06-05 RX ADMIN — AMLODIPINE BESYLATE 10 MG: 5 TABLET ORAL at 08:27

## 2025-06-05 RX ADMIN — ASPIRIN 81 MG: 81 TABLET, COATED ORAL at 08:27

## 2025-06-05 RX ADMIN — FERROUS GLUCONATE 324 MG: 324 TABLET ORAL at 08:27

## 2025-06-05 RX ADMIN — METOPROLOL SUCCINATE 100 MG: 50 TABLET, EXTENDED RELEASE ORAL at 08:27

## 2025-06-05 RX ADMIN — LEVOTHYROXINE SODIUM 150 MCG: 0.03 TABLET ORAL at 08:27

## 2025-06-05 RX ADMIN — CALCIUM CARBONATE (ANTACID) CHEW TAB 500 MG 1000 MG: 500 CHEW TAB at 21:02

## 2025-06-05 ASSESSMENT — ACTIVITIES OF DAILY LIVING (ADL)
ADLS_ACUITY_SCORE: 54
ADLS_ACUITY_SCORE: 52
ADLS_ACUITY_SCORE: 52
ADLS_ACUITY_SCORE: 50
ADLS_ACUITY_SCORE: 50
ADLS_ACUITY_SCORE: 54
ADLS_ACUITY_SCORE: 52
ADLS_ACUITY_SCORE: 52
ADLS_ACUITY_SCORE: 54
ADLS_ACUITY_SCORE: 50
ADLS_ACUITY_SCORE: 54
ADLS_ACUITY_SCORE: 54
ADLS_ACUITY_SCORE: 52
ADLS_ACUITY_SCORE: 50
ADLS_ACUITY_SCORE: 50
ADLS_ACUITY_SCORE: 52
ADLS_ACUITY_SCORE: 50
ADLS_ACUITY_SCORE: 54
ADLS_ACUITY_SCORE: 54

## 2025-06-05 NOTE — CONSULTS
RENAL CONSULT NOTE    REQUESTING PHYSICIAN: MITESH with CKD    REASON FOR CONSULT: Laura Cisneros MD     ASSESSMENT/PLAN:    MITESH with baseline CKD 3: Patient was previously on Tecentriq can cause AIN and bevacizumab can cause AIN/TMA which could cause then switch to selpercatinib and it can be causing false elevation in creatinine because of affecting creatinine transporter and renal tubules.  Also with underlying liver issue, concerning for HRS.  Also with hemodynamic changes, it is from either prerenal versus tubular injury is possible.  We will start with initial urine testing and imaging to see further evaluation.  Strict I's and O's  Renally dosing medication  Avoid nephrotoxic medication  No indication for renal replacement therapy.  Depending on her prognosis from underlying malignancy, we might need to support her with renal replacement therapy if oncologist desired.  Follow-up Cystatin C level and GFR.    Electrolytes: Sodium 139, potassium 4.1.  Monitor.    BMD: Calcium 8.6 albumin 2.4 when corrected calcium 9.8.  Phosphorus 2.9.    Acid-base: Bicarb 19 and stable.  Monitor for now.    BP/volume: Blood pressure in the higher side and will monitor for now.    HPI: 74 years old female with history of locally advanced metastatic hepatocellular carcinoma sent in by oncologist for altered mental status.  Nephrology consulted for worsening kidney function.  Patient has baseline creatinine 1.1-1.7 and on the day of admission, it was 2.5 and then trending down to 1.9 and back up to 2.2 again today.  Nephrology consulted for further management.  Patient was previously treated with Tecentriq and bevacizumab and then started on selpercatinib May 30549.  He never seen nephrologist in the past.  Patient denies any history of kidney stone.  Stated she could be able to empty the bladder well.  Denies any family history of kidney disease.  She smoked long time ago unknown duration and amount.  She took naproxen  regularly in the past.  Denies any chest pain, shortness of breath.  Appetite is okay.  Her mind has been still off sometimes.      REVIEW OF SYSTEMS: a 12 point review of systems was reviewed and negative other than noted in the HPI above.      Past Medical History:   Diagnosis Date    Diabetes mellitus (H)     Disease of thyroid gland     Hypertension     Infectious viral hepatitis        Current Facility-Administered Medications   Medication Dose Route Frequency Provider Last Rate Last Admin    amLODIPine (NORVASC) tablet 10 mg  10 mg Oral Daily Phyllis Hong DO   10 mg at 06/05/25 0827    aspirin EC tablet 81 mg  81 mg Oral Daily Anupam Londono MD   81 mg at 06/05/25 0827    calcium carbonate (TUMS) chewable tablet 1,000 mg  1,000 mg Oral 4x Daily PRN Anupam Londono MD   1,000 mg at 06/04/25 1816    glucose gel 15-30 g  15-30 g Oral Q15 Min PRN Anupam Londono MD        Or    dextrose 50 % injection 25-50 mL  25-50 mL Intravenous Q15 Min PRN Anupam Londono MD        Or    glucagon injection 1 mg  1 mg Subcutaneous Q15 Min PRN Anupam Londono MD        [Held by provider] enoxaparin ANTICOAGULANT (LOVENOX) injection 30 mg  30 mg Subcutaneous Q24H Anupam Londono MD   30 mg at 06/03/25 2132    ferrous gluconate (FERGON) tablet 324 mg  324 mg Oral Every Other Day Anupam Londono MD   324 mg at 06/05/25 0827    hydrALAZINE (APRESOLINE) injection 20 mg  20 mg Intravenous Q6H PRN Phyllis Hong DO        insulin aspart (NovoLOG) injection (RAPID ACTING)  1-7 Units Subcutaneous TID AC Anupam Londono MD   1 Units at 06/04/25 1816    insulin aspart (NovoLOG) injection (RAPID ACTING)  1-5 Units Subcutaneous At Bedtime Anupam Londono MD        levothyroxine (SYNTHROID/LEVOTHROID) tablet 150 mcg  150 mcg Oral Daily Anupam Londono MD   150 mcg at 06/05/25 0827    lidocaine (LMX4) cream   Topical Q1H PRN Anupam Londono MD        lidocaine 1 % 0.1-1 mL  0.1-1 mL Other Q1H PRN Anupam Londono MD         melatonin tablet 1 mg  1 mg Oral At Bedtime PRN Anupam Londono MD        metoprolol succinate ER (TOPROL XL) 24 hr tablet 100 mg  100 mg Oral Daily Phyllis Hong DO   100 mg at 25 0827    ondansetron (ZOFRAN ODT) ODT tab 4 mg  4 mg Oral Q6H PRN Anupam Londono MD        Or    ondansetron (ZOFRAN) injection 4 mg  4 mg Intravenous Q6H PRN Anupam Londono MD   4 mg at 25 1816    selpercatinib (RETEVMO) tablet 120 mg  120 mg Oral BID Anupam Londono MD   120 mg at 25 0831    senna-docusate (SENOKOT-S/PERICOLACE) 8.6-50 MG per tablet 1 tablet  1 tablet Oral BID PRN Anupam Londono MD        Or    senna-docusate (SENOKOT-S/PERICOLACE) 8.6-50 MG per tablet 2 tablet  2 tablet Oral BID PRN nAupam Londono MD        sodium chloride (PF) 0.9% PF flush 3 mL  3 mL Intracatheter Q8H REYNALDO Anupam Londono MD   3 mL at 25 0829    sodium chloride (PF) 0.9% PF flush 3 mL  3 mL Intracatheter q1 min prn Anupam Londono MD        [Held by provider] sodium chloride 0.45% infusion   Intravenous Continuous Phyllis Hong DO   Stopped at 25 1300       No current outpatient medications on file.      ALLERGIES/SENSITIVITIES:  Allergies   Allergen Reactions    Amlodipine Unknown     Gums swelled    Hydralazine Itching     Pt states that she is not allergic to anything     Social History     Tobacco Use    Smoking status: Former     Current packs/day: 0.00     Types: Cigarettes     Quit date: 2021     Years since quittin.1     Passive exposure: Never    Smokeless tobacco: Never   Vaping Use    Vaping status: Never Used   Substance Use Topics    Alcohol use: No    Drug use: No     I have reviewed this patient's family history and updated it with pertinent information if needed.  Family History   Problem Relation Age of Onset    Cancer Mother 83         of stomach cancer    Colon Cancer Father 51         of colon cancer    Colon Cancer Brother 36         from colon  cancer    Prostate Cancer Brother     Breast Cancer Maternal Aunt         in her 90 s    Sickle Cell Trait Niece     Lung Cancer Maternal Uncle          PHYSICAL EXAM:  Physical Exam   Temp: 97.7  F (36.5  C) Temp src: Axillary BP: (!) 165/70 Pulse: 62   Resp: 20 SpO2: 97 % O2 Device: None (Room air)    Vitals:    06/02/25 1733   Weight: 61.2 kg (135 lb)     Vital Signs with Ranges  Temp:  [97.3  F (36.3  C)-98.4  F (36.9  C)] 97.7  F (36.5  C)  Pulse:  [57-69] 62  Resp:  [20] 20  BP: (124-205)/(58-93) 165/70  SpO2:  [94 %-98 %] 97 %  I/O last 3 completed shifts:  In: 519.25 [P.O.:220; I.V.:299.25]  Out: 800 [Emesis/NG output:800]    @TMAXR(24)@    Patient Vitals for the past 72 hrs:   Weight   06/02/25 1733 61.2 kg (135 lb)       General - A & O x 3, NAD  HEENT - PERRLA, no scleral icterus  Neck - no carotid bruits, no JVD  Respiratory - Lungs CTA bilat without wheeze, rhonchi, rales  Cardiovascular - AP RRR without murmur  Abdomen - soft, BS present, no bruits, no fluid wave  Extremities - well perfused, no edema  Integumentary - intact, good turgor, no rash/lesions  Neurologic - grossly intact  Psych:  Judgement intact, affect WNL  : No Moreno's catheter    Laboratory:     Recent Labs   Lab 06/05/25  0815 06/05/25  0625 06/04/25  0531 06/03/25  1857 06/02/25  1805   WBC 3.4* 3.0* 2.2* 3.0* 3.1*   RBC 5.03 5.10 4.57 4.96 5.88*   HGB 13.3 13.5 12.3 13.2 15.7   HCT 42.2 42.7 38.0 41.1 50.2*   PLT 56* 44* 44* 56* 73*       Basic Metabolic Panel:  Recent Labs   Lab 06/05/25  0815 06/05/25  0809 06/05/25  0625 06/04/25  2048 06/04/25  1649 06/04/25  1145 06/04/25  0820 06/04/25  0531 06/03/25  0801 06/03/25  0725 06/02/25 2258 06/02/25  1805     --  139  --   --   --   --  142  --  145  145  --  142   POTASSIUM 4.1  --  3.9  --   --   --   --  3.8  --  4.8  4.8  --  4.8   CHLORIDE 110*  --  111*  --   --   --   --  114*  --  114*  114*  --  108*   CO2 19*  --  19*  --   --   --   --  19*  --  19*  19*  --   20*   BUN 34.9*  --  35.6*  --   --   --   --  29.3*  --  33.9*  33.9*  --  33.4*   CR 2.25*  --  2.19*  --   --   --   --  1.94*  --  2.43*  2.43*  --  2.50*   * 103* 103* 111* 148* 106*   < > 105*   < > 102*  102*   < > 116*   COLT 8.6*  --  8.6*  --   --   --   --  7.6*  --  7.6*  7.6*  --  8.4*    < > = values in this interval not displayed.       INR  Recent Labs   Lab 06/02/25  1805   INR 1.12       Recent Labs   Lab Test 06/05/25  0815 06/05/25  0625   POTASSIUM 4.1 3.9   CHLORIDE 110* 111*   BUN 34.9* 35.6*      Recent Labs   Lab Test 06/05/25  0815 06/04/25  0531 06/03/25  0725 06/02/25 2006 05/21/25  1348 04/30/25  1107   ALBUMIN 2.4*  2.4* 2.2*   < >  --    < > 3.3*   BILITOTAL 1.0  1.0 1.0   < >  --    < > 1.0   ALT 97*  97* 98*   < >  --    < > 124*   *  270* 333*   < >  --    < > 277*   PROTEIN  --   --   --  300*  --  300*    < > = values in this interval not displayed.       Personally reviewed today's laboratory studies      Thank you for involving us in the care of this patient. We will continue to follow along with you.      Teo Flor MD  [facility]  [phone]

## 2025-06-05 NOTE — PLAN OF CARE
PRN zofran given for emesis - x1 100 mL undigested food and x1 unmeasured output. Partial bed bath and gown change, with full linen change following emesis. PRN tums given for heartburn. Patient reports resolution of symptoms and feeling better. Family visited at  bedside. Patient declined dinner - snack given with bedtime medication. Void x2 this shift. Regular diet. PO fluid intake encouraged. R and L PIVs patent and SL between use. Mepilex in place bilaterally on buttocks for shear injuries - no changes with assessment. BG monitored and sliding scale per orders. BP monitored - no PRNs needed. Denies pain. Resting between cares with even chest rise - call light in reach. BA on for safety. Assist x1.       Problem: Acute Kidney Injury/Impairment  Goal: Improved Oral Intake  Outcome: Not Progressing     Problem: Adult Inpatient Plan of Care  Goal: Plan of Care Review  Outcome: Progressing  Flowsheets (Taken 6/4/2025 1948)  Plan of Care Reviewed With: patient     Problem: Skin Injury Risk Increased  Goal: Skin Health and Integrity  Outcome: Progressing  Intervention: Optimize Skin Protection  Recent Flowsheet Documentation  Taken 6/4/2025 1540 by Nadine Couch, RN  Activity Management: activity adjusted per tolerance  Head of Bed (HOB) Positioning: HOB at 20-30 degrees     Problem: Comorbidity Management  Goal: Blood Glucose Levels Within Targeted Range  Outcome: Progressing  Intervention: Monitor and Manage Glycemia  Recent Flowsheet Documentation  Taken 6/4/2025 1540 by Nadine Couch, RN  Medication Review/Management:   medications reviewed   high-risk medications identified  Goal: Blood Pressure in Desired Range  Outcome: Progressing  Intervention: Maintain Blood Pressure Management  Recent Flowsheet Documentation  Taken 6/4/2025 1540 by Nadine Couch RN  Medication Review/Management:   medications reviewed   high-risk medications identified     Problem: Hypertension Acute  Goal: Blood Pressure Within  Desired Range  Outcome: Progressing  Intervention: Normalize Blood Pressure  Recent Flowsheet Documentation  Taken 6/4/2025 1540 by Nadine Couch, RN  Medication Review/Management:   medications reviewed   high-risk medications identified

## 2025-06-05 NOTE — PROGRESS NOTES
[facility]    Medicine Progress Note - Hospitalist Service    Date of Admission:  6/2/2025    Assessment & Plan   74 year old female with Hepatocellular carcinoma, metastatic disease with adrenal mets,  on palliative treatment, hypertension, diabetes mellitus II, thyroid disease, CKD, and hyperlipidemia who presents to this ED with her sister-in-law for evaluation of altered mental status, from home where she lives with her , who has Alzheimer's. Pt currently has stage 4 liver cancer and is undergoing chemo. Pt's  called sister in law and stated that the patient was unresponsive. When she woke up, pt was confused and having difficulty finding words. Presented to the ED with AMS and dehydration.       1.Altered mental status, unspecified altered mental status type  -Chronic hepatitis C without hepatic coma (H)  -Iker on Stage 3b chronic kidney disease (H)  -Hepatic encephalopathy (H)  -Dehydration with  IKER (acute kidney injury)  Possible decline in the setting of hepatocellular carcinoma, failure to thrive and poor p.o. intake  UA not suspicious for UTI  Ammonia also high-lactulose initiated--now improved exam and ammonia  TSH elevated and low free t4.  Resume levothyroxine at higher dose.  Follow-up TSH labs in 4 to 6 weeks with PCP  No evidence of acute intracranial abnormality on CT-.  MRI with possible demyelinating disease  Neurology recommending follow-up MRI in 3-month  IKER with elevated BUN-improving with IV fluid--now off   Concern for patient's safety at home.  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  OT-slums  Discussed with care management safe discharge plan--still need this     2.CKD at baseline Likely due to DM/HTN combination.  IKER luba Prerenal-   Baseline creatinine appears to be 1.1-1.2.  Currently creatinine 2.25  - Monitor trend  - Avoid nephrotoxic meds  -I did ask renal to see here due to my concern of risk hepatorenal syndrome with  liver disease      3.Hepatocellular carcinoma, metastatic disease with adrenal mets: pt was started on palliative treatment with Atezolizumab and Avastin.    Found in the right liver measuring about 8 cm in size.  It was actually found when she was doing a low-dose CT screening for lung cancer. AFP was 32,288  -12/26/24: CT showed no lung cancer issues but indeterminate right suprarenal fossa lesion that was 3.9 cm.  -1/9/25: Went on to have another CT scan of the abdomen and pelvis that shows infiltrative poorly defined mass throughout the right lobe of the liver with tumor thrombus within the intrahepatic portal veins in the right and left lobes.  As well as the main portal vein to the level of the portal venous confluence.  Mets to the right adrenal gland partially invades into the IVC.  Favor primary hepatic malignancy, either HCC or call angio.  -2/4/25: Patient had liver biopsy shows hepatocellular carcinoma.  Foundation 1 testing and process.   -2/13/25: Patient had PET scan shows findings suspicious for right hepatic lobe hepatocellular carcinoma with right adrenal gland   ~4/29/25: PET scan shows progressive disease in liver and bilateral adrenal glands.  -with ongoing elevated LFT's will ask GI to see      4.Uncontrolled hypertension  Home metoprolol resumed  Hydrochlorothiazide and losartan held due to MITESH  Trial amlodipine.  Hydralazine as needed      5.Hypothyroid: longstanding issue.  Synthroid increased to 150 mcg.Will need labs again in 4-6 weeks     6.Elevated LFT: present at baseline and from tumor. Will monitor closely with treatment  -will ask gi to weigh in on this too     7.Thrombocytopenia  -from treatment and liver dx  -trend  -lowest 44-->today 56    --I did call Destini her sister in law--updated and she agreed to [facility] consult for goals, she also is going to reach out to [facility] friend to consider info. She is trying to find AL for Dirk and Dirk , Barry who has dementia  "            Diet: Combination Diet Regular Diet Adult  Snacks/Supplements Adult: Glucerna; Between Meals  Snacks/Supplements Adult: Ensure Max Protein (bariatric); Between Meals    DVT Prophylaxis: hold with low plt lovenox, so add scd  [place] Catheter: Not present  Lines: None     Cardiac Monitoring: ACTIVE order. Indication: liver pt  Code Status: Full Code      Clinically Significant Risk Factors          # Hyperchloremia: Highest Cl = 114 mmol/L in last 2 days, will monitor as appropriate          # Hypoalbuminemia: Lowest albumin = 2.2 g/dL at 6/4/2025  5:31 AM, will monitor as appropriate   # Thrombocytopenia: Lowest platelets = 44 in last 2 days, will monitor for bleeding  # Acute Kidney Injury, unspecified: based on a >150% or 0.3 mg/dL increase in last creatinine compared to past 90 day average, will monitor renal function  # Hypertension: Noted on problem list            # Overweight: Estimated body mass index is 25.51 kg/m  as calculated from the following:    Height as of this encounter: 1.549 m (5' 1\").    Weight as of this encounter: 61.2 kg (135 lb)., PRESENT ON ADMISSION  # Severe Malnutrition: based on nutrition assessment and treatment provided per dietitian's recommendations., PRESENT ON ADMISSION   # Financial/Environmental Concerns: none         Social Drivers of Health    Tobacco Use: Medium Risk (6/2/2025)    Patient History     Smoking Tobacco Use: Former     Smokeless Tobacco Use: Never     Passive Exposure: Never   Physical Activity: Unknown (2/26/2024)    Exercise Vital Sign     Days of Exercise per Week: 6 days   Interpersonal Safety: High Risk (6/4/2025)    Interpersonal Safety     Do you feel physically and emotionally safe where you currently live?: Yes     Within the past 12 months, have you been hit, slapped, kicked or otherwise physically hurt by someone?: No     Within the past 12 months, have you been humiliated or emotionally abused in other ways by your partner or ex-partner?: " Yes   Stress: Stress Concern Present (2/26/2024)    [employer] [place] [employer] - Occupational Stress Questionnaire     Feeling of Stress : To some extent   Social Connections: Unknown (2/26/2024)    Social Connection and Isolation Panel [NHANES]     Frequency of Social Gatherings with Friends and Family: Patient declined          Disposition Plan     Medically Ready for Discharge: Anticipated in 2-4 Days             Laura Cisneros MD  Hospitalist Service  [facility]  Securely message with Apptive (more info)  Text page via [employer] Paging/Directory   ______________________________________________________________________    Interval History   She was up in chair  Notes she is slowly feeling more herself  Not much pain  Not much appetite here  Had some stools    Physical Exam   Vital Signs: Temp: 98.2  F (36.8  C) Temp src: Oral BP: 131/60 Pulse: 65   Resp: 18 SpO2: 99 % O2 Device: None (Room air)    Weight: 135 lbs 0 oz    Constitutional: awake, fatigued, alert, cooperative, and no apparent distress  Eyes: sclera clear  Respiratory: no increased work of breathing, good air exchange, no retractions, and clear to auscultation  Cardiovascular: regular rate and rhythm and normal S1 and S2  GI: normal bowel sounds, soft, non-distended, and non-tender  Skin: no bruising or bleeding  Musculoskeletal: no lower extremity pitting edema present  Neurologic: Mental Status Exam:  Level of Alertness:   awake  Neuropsychiatric: General: normal, calm, and normal eye contact    Medical Decision Making       55 MINUTES SPENT BY ME on the date of service doing chart review, history, exam, documentation & further activities per the note.      Data     I have personally reviewed the following data over the past 24 hrs:    3.4 (L)  \   13.3   / 56 (L)     139 110 (H) 34.9 (H) /  124 (H)   4.1 19 (L) 2.25 (H) \     ALT: 97 (H); 97 (H) AST: 270 (H); 270 (H) AP: 257 (H); 257 (H) TBILI: 1.0; 1.0   ALB:  2.4 (L); 2.4 (L) TOT PROTEIN: 6.1 (L); 6.1 (L) LIPASE: N/A       Imaging results reviewed over the past 24 hrs:   No results found for this or any previous visit (from the past 24 hours).

## 2025-06-05 NOTE — PLAN OF CARE
Problem: Delirium  Goal: Improved Attention and Thought Clarity  Outcome: Progressing     Problem: Comorbidity Management  Goal: Blood Glucose Levels Within Targeted Range  Am 102,Noon 124 and evening 133  WNL   Goal: Blood Pressure in Desired Range  Outcome: Progressing  Intervention: Maintain Blood Pressure Management  WNL     Problem: Acute Kidney Injury/Impairment  Goal: Fluid and Electrolyte Balance  Outcome: Progressing  Goal: Improved Oral Intake  Outcome: Progressing  Goal: Effective Renal Function  Outcome: Progressing  Intervention: Monitor and Support Renal Function  Recent Flowsheet Documentation  Taken 6/5/2025 0844 by Minda Castillo RN  Medication Review/Management: medications reviewed     Problem: Hypertension Acute  Goal: Blood Pressure Within Desired Range  Outcome: Progressing  Intervention: Normalize Blood Pressure  Recent Flowsheet Documentation  Taken 6/5/2025 0844 by Minda Castillo RN  Medication Review/Management: medications reviewed   Goal Outcome Evaluation:  Patient disoriented to place and situation  Denies pain  Bp 165/70 after morning bp meds /60.  Up in the chair for breakfast.  Ambulate to the bathroom with assist of 1.  Loose bm x2  Bladder scan of 296 straight cath 400 mls  for random urine sample of Sodium, Potassium, and chloride Per nephrologist order.  Npo after breakfast for renal us at 1600 .  Patient picked up in wheelchair for renal US at 1620 and returned at 1700.  Tia hugger in place to keep warm.  Will continue to monitor.

## 2025-06-05 NOTE — PLAN OF CARE
"Goal Outcome Evaluation:             VSS Monitoring for htn, Denies pain/nausea.  Disorientated to time and situation.  Up to br sba .  IV SL. Uneventful shift.  Care plan reviewed.   Problem: Adult Inpatient Plan of Care  Goal: Plan of Care Review  Description: The Plan of Care Review/Shift note should be completed every shift.  The Outcome Evaluation is a brief statement about your assessment that the patient is improving, declining, or no change.  This information will be displayed automatically on your shift  note.  Outcome: Progressing  Goal: Patient-Specific Goal (Individualized)  Description: You can add care plan individualizations to a care plan. Examples of Individualization might be:  \"Parent requests to be called daily at 9am for status\", \"I have a hard time hearing out of my right ear\", or \"Do not touch me to wake me up as it startles  me\".  Outcome: Progressing  Goal: Absence of Hospital-Acquired Illness or Injury  Outcome: Progressing  Intervention: Identify and Manage Fall Risk  Recent Flowsheet Documentation  Taken 6/5/2025 0000 by Destini Avery RN  Safety Promotion/Fall Prevention:   activity supervised   mobility aid in reach   lighting adjusted   nonskid shoes/slippers when out of bed  Intervention: Prevent Skin Injury  Recent Flowsheet Documentation  Taken 6/5/2025 0000 by Destini Avery RN  Body Position: position changed independently  Goal: Optimal Comfort and Wellbeing  Outcome: Progressing  Goal: Readiness for Transition of Care  Outcome: Progressing     Problem: Delirium  Goal: Optimal Coping  Outcome: Progressing  Goal: Improved Behavioral Control  Outcome: Progressing  Goal: Improved Attention and Thought Clarity  Outcome: Progressing  Goal: Improved Sleep  Outcome: Progressing     Problem: Skin Injury Risk Increased  Goal: Skin Health and Integrity  Outcome: Progressing  Intervention: Optimize Skin Protection  Recent Flowsheet Documentation  Taken 6/5/2025 0000 by " Destini Avery, RN  Activity Management: activity adjusted per tolerance     Problem: Comorbidity Management  Goal: Blood Glucose Levels Within Targeted Range  Outcome: Progressing  Goal: Blood Pressure in Desired Range  Outcome: Progressing     Problem: Oncology Care  Goal: Effective Coping  Outcome: Progressing  Goal: Improved Activity Tolerance  Outcome: Progressing  Intervention: Promote Improved Energy  Recent Flowsheet Documentation  Taken 6/5/2025 0000 by Destini Avery RN  Activity Management: activity adjusted per tolerance  Goal: Optimal Oral Intake  Outcome: Progressing  Goal: Improved Oral Mucous Membrane Integrity  Outcome: Progressing  Goal: Optimal Pain Control and Function  Outcome: Progressing     Problem: Acute Kidney Injury/Impairment  Goal: Fluid and Electrolyte Balance  Outcome: Progressing  Goal: Improved Oral Intake  Outcome: Progressing  Goal: Effective Renal Function  Outcome: Progressing     Problem: Hypertension Acute  Goal: Blood Pressure Within Desired Range  Outcome: Progressing

## 2025-06-05 NOTE — PROGRESS NOTES
"CLINICAL NUTRITION SERVICES - ASSESSMENT NOTE    RECOMMENDATIONS FOR MDs/PROVIDERS TO ORDER:    Registered Dietitian Interventions:  Discussed elevated protein needs VS lack of protein intake long-term  Utilize supplements to assist: Glucerna-Sb BID, and Ensure Max-Ch once/d, these sent at snack times, altogether offering pt 590 kcal and 50g protein /d    Future/Additional Recommendations:     REASON FOR ASSESSMENT  MST 2 (reported: 2-13# wt loss [1], decreased appetite [1])    74 year old female with Hepatocellular carcinoma, metastatic disease with adrenal mets,  on palliative treatment, hypertension, diabetes mellitus II, thyroid disease, CKD, and hyperlipidemia who presents to this ED with her sister-in-law for evaluation of altered mental status... stage 4 liver cancer and is undergoing chemo.    INFORMATION OBTAINED  PO PTA steady, but reduced over long term.  More recently has started to eat whatever she wants, including eating out more often.  Used to ride bike every day but unable to since perhaps Sept 2024 when got sick. Commented on loss of muscle mass in legs.  Was never much of a meat eater, recently even less so. Feels doing OK with other foods though. Can eat eggs OK.  Like, and used to drink Strawberry Glucerna during wt loss attempt years ago. Agreeable to receive here. Prefer strawb, nereida OK, and agreeable to trial Ensure Max for higher protein content (this not avail strawb)    CURRENT NUTRITION ORDERS  Diet: Reg    CURRENT INTAKE/TOLERANCE  100% BRK this morning = 512 kcal, 15g pro    LABS  Nutrition-relevant labs: Reviewed    MEDICATIONS  Nutrition-relevant medications: Reviewed    ANTHROPOMETRICS  Height: 154.9 cm (5' 1\")  Admission Weight: 61.2 kg (135 lb) (06/02/25 1733)   Most Recent Weight: 61.2 kg (135 lb) (06/02/25 1733)  IBW: 47.8 kg  BMI: Body mass index is 25.51 kg/m .     Weight History:   06/02/25 : 61.2 kg (135 lb)  04/09/25 : 62.7 kg (138 lb 4.8 oz)  03/19/25 : 63.5 kg (140 " lb)  -3.6% w/i 3m  02/18/25 : 67.2 kg (148 lb 0.6 oz)  02/03/25 : 67.4 kg (148 lb 8 oz)  01/29/25 : 66.3 kg (146 lb 3.2 oz) -7.7% w/i 6m  11/18/24 : 64 kg (141 lb 1.9 oz)  02/26/24 : 62.7 kg (138 lb 4 oz)  ...  08/14/23 : 65.4 kg (144 lb 1.3 oz)  06/20/23 : 65.3 kg (144 lb)  -6.2% w/i 1y    Dosing Weight: actual wt    ASSESSED NUTRITION NEEDS  Estimated Energy Needs: 1490 kcals/day ([place] Jeor*1.4)  Justification: Increased needs  Estimated Protein Needs: 86 grams protein/day (kg*1.4)  Justification: Increased needs  Estimated Fluid Needs: 1500 mL/day (1 mL/kcal)  Justification: Maintenance    SYSTEM AND PHYSICAL FINDINGS    GI symptoms: emesis - x1 100 mL undigested food and x1 unmeasured output (RN note PM 6/4)... better today /pt  Skin/wounds: Reviewed    MALNUTRITION  % Intake: < 75% for >/= 3 months (moderate)  % Weight Loss: Weight loss does not meet criteria   Subcutaneous Fat Loss: Triceps moderate  Muscle Loss: Thigh (quadriceps): Severe  Fluid Accumulation/Edema: None noted  Malnutrition Diagnosis: Severe malnutrition in the context of chronic illness  Malnutrition Present on Admission: Yes    NUTRITION DIAGNOSIS  Severe malnutrition in the context of chronic illness    INTERVENTIONS  Discussed elevated protein needs VS lack of protein intake long-term  Utilize supplements to assist: Glucerna-Sb BID, and Ensure Max-Ch once/d, these sent at snack times, altogether offering pt 590 kcal and 50g protein /d    GOALS  Patient to consume % of nutritionally adequate meal trays TID, or the equivalent with supplements/snacks.     MONITORING/EVALUATION  Progress toward goals will be monitored and evaluated per policy.

## 2025-06-05 NOTE — CONSULTS
GI CONSULT NOTE      Name: Liz Pollard  Medical Record #: [id number]  YOB: 1950  Date of Admission: 6/2/2025  Date/Time: 6/5/2025/11:51 AM     CHIEF COMPLAINT: Elevated LFTs    HISTORY OF PRESENT ILLNESS: This is a 74 year old year old Black or  patient seen at the request of Dr. Cisneros with a history of diabetes, hypertension, chronic kidney disease, hypothyroid, prior hepatitis C infection which was treated, cirrhosis, and metastatic hepatocellular carcinoma.  She was admitted 3 days ago with mental status changes which are now improved.  She was briefly on lactulose but this has been stopped.  No obvious infection to trigger encephalopathy.    She has been following with oncology for treatment of her hepatocellular carcinoma and was previously treated with Tecentriq and bevacizumab and then started on selpercatinib last month due to progressive disease seen on imaging.    Currently she is feeling overall improved but still with mild confusion.  She is having difficulty with urination.  Was having frequent loose stools with 2 charted so far today.  She reports having intermittent loose stools prior to admission as well.  She is up-to-date on colonoscopy and most recent EGD was done February 2025 (indication rule out varices, history HCC and portal vein thrombus) and this noted mild reflux esophagitis but no varices.    She was treated for hepatitis C in the 1990s but did not tolerate medication.  In 2015 hepatitis C was successfully treated using Viekira Adelso and ribavirin.  Ongoing HCC monitoring was recommended due to cirrhosis, but monitoring was not pursued.  She denies any recent use of alcohol, and she drank socially many years ago.    PAST MEDICAL HISTORY:  Past Medical History:   Diagnosis Date    Diabetes mellitus (H)     Disease of thyroid gland     Hypertension     Infectious viral hepatitis        FAMILY HISTORY:  Family History   Problem Relation Age of Onset     Cancer Mother 83         of stomach cancer    Colon Cancer Father 51         of colon cancer    Colon Cancer Brother 36         from colon cancer    Prostate Cancer Brother     Breast Cancer Maternal Aunt         in her 90 s    Sickle Cell Trait Niece     Lung Cancer Maternal Uncle        SOCIAL HISTORY:  Social History     Socioeconomic History    Marital status:      Spouse name: Not on file    Number of children: Not on file    Years of education: Not on file    Highest education level: Not on file   Occupational History    Not on file   Tobacco Use    Smoking status: Former     Current packs/day: 0.00     Types: Cigarettes     Quit date: 2021     Years since quittin.1     Passive exposure: Never    Smokeless tobacco: Never   Vaping Use    Vaping status: Never Used   Substance and Sexual Activity    Alcohol use: No    Drug use: No    Sexual activity: Yes     Partners: Male     Birth control/protection: Post-menopausal   Other Topics Concern    Not on file   Social History Narrative    Not on file     Social Drivers of Health     Financial Resource Strain: Low Risk  (2025)    Financial Resource Strain     Within the past 12 months, have you or your family members you live with been unable to get utilities (heat, electricity) when it was really needed?: No   Food Insecurity: Low Risk  (2025)    Food Insecurity     Within the past 12 months, did you worry that your food would run out before you got money to buy more?: No     Within the past 12 months, did the food you bought just not last and you didn t have money to get more?: No   Transportation Needs: Low Risk  (2025)    Transportation Needs     Within the past 12 months, has lack of transportation kept you from medical appointments, getting your medicines, non-medical meetings or appointments, work, or from getting things that you need?: No   Physical Activity: Unknown (2024)    Exercise Vital Sign     Days of  Exercise per Week: 6 days     Minutes of Exercise per Session: Not on file   Stress: Stress Concern Present (2/26/2024)    [employer] - Occupational Stress Questionnaire     Feeling of Stress : To some extent   Social Connections: Unknown (2/26/2024)    Social Connection and Isolation Panel [NHANES]     Frequency of Communication with Friends and Family: Not on file     Frequency of Social Gatherings with Friends and Family: Patient declined     Attends Orthodoxy Services: Not on file     Active Member of Clubs or Organizations: Not on file     Attends Club or Organization Meetings: Not on file     Marital Status: Not on file   Interpersonal Safety: High Risk (6/4/2025)    Interpersonal Safety     Do you feel physically and emotionally safe where you currently live?: Yes     Within the past 12 months, have you been hit, slapped, kicked or otherwise physically hurt by someone?: No     Within the past 12 months, have you been humiliated or emotionally abused in other ways by your partner or ex-partner?: Yes   Housing Stability: Low Risk  (6/4/2025)    Housing Stability     Do you have housing? : Yes     Are you worried about losing your housing?: No       MEDICATIONS PRIOR TO ADMISSION:   Medications Prior to Admission   Medication Sig Dispense Refill Last Dose/Taking    aspirin 81 MG EC tablet Take 81 mg by mouth daily.   6/1/2025 Morning    ferrous gluconate (FERGON) 324 (38 Fe) MG tablet Take 1 tablet (324 mg) by mouth every other day.   6/1/2025 Morning    hydrochlorothiazide (HYDRODIURIL) 25 MG tablet Take 1 tablet (25 mg) by mouth daily. 90 tablet 3 5/30/2025 Morning    levothyroxine (SYNTHROID/LEVOTHROID) 100 MCG tablet TAKE 1 TABLET BY MOUTH EVERY DAY 90 tablet 1 6/1/2025 Morning    levothyroxine (SYNTHROID/LEVOTHROID) 25 MCG tablet Take 1 tablet (25 mcg) by mouth every morning (before breakfast). 90 tablet 0 6/1/2025 Morning    lisinopril (ZESTRIL) 40 MG tablet TAKE 1 TABLET BY  "MOUTH EVERY DAY 90 tablet 2 6/1/2025 Morning    metoprolol succinate ER (TOPROL XL) 50 MG 24 hr tablet Take 2 tablets (100 mg) by mouth daily. 180 tablet 0 6/1/2025 Morning    selpercatinib (RETEVMO) 120 MG tablet Take 1 tablet (120 mg) by mouth 2 times daily 60 tablet 0 6/2/2025 Noon    spironolactone (ALDACTONE) 50 MG tablet Take 1 tablet (50 mg) by mouth daily. 90 tablet 3 6/1/2025 Morning    blood glucose (CONTOUR NEXT TEST) test strip USE 1 STRIP EVERY  strip 2     blood glucose (NO BRAND SPECIFIED) test strip Use to test blood sugar 1 times daily or as directed. To accompany: Blood Glucose Monitor Brands: per insurance. 100 strip 3     blood glucose monitoring (NO BRAND SPECIFIED) meter device kit Use to test blood sugar 1 times daily or as directed. Preferred blood glucose meter OR supplies to accompany: Blood Glucose Monitor Brands: per insurance. 1 kit 0     Blood Glucose Monitoring Suppl (CONTOUR NEXT ONE) SAMEERA PLEASE SEE ATTACHED FOR DETAILED DIRECTIONS       blood-glucose meter Misc [BLOOD-GLUCOSE METER MISC] 1 glucometer device  - any brand covered by insurance (contour covered by insurance? ) 1 each 0     thin (NO BRAND SPECIFIED) lancets Use with lanceting device. To accompany: Blood Glucose Monitor Brands: per insurance. 100 each 3           ALLERGIES: Amlodipine and Hydralazine    REVIEW OF SYSTEMS (ROS): Complete review of systems negative other than listed in HPI.    PHYSICAL EXAM:    BP (!) 165/70 (BP Location: Left arm)   Pulse 62   Temp 97.7  F (36.5  C) (Axillary)   Resp 20   Ht 1.549 m (5' 1\")   Wt 61.2 kg (135 lb)   SpO2 97%   BMI 25.51 kg/m      GENERAL: Pleasant, no obvious distress    SKIN: No jaundice    NECK: Supple without adenopathy    EYES: No scleral icterus    LUNGS: Clear to auscultation bilaterally    HEART: Regular rate and rhythm, S1 and S2 present, no lower extremity edema    ABDOMEN: Soft, non-distended, non-tender, no guarding/rebound/mass, bowel sounds normal, " no obvious organomegaly    MUSKULOSKELETAL:  Warm and well perfused, moves all extremities well    NEUROLOGIC: Alert and oriented    PSYCHIATRIC: Normal affect    LAB DATA:  Recent Labs   Lab Test 06/05/25  0815 06/05/25  0625 06/04/25  0531 06/03/25  1857 06/02/25  1805 02/17/25  1022 02/04/25  0939 01/13/25  1132   WBC 3.4* 3.0* 2.2*   < > 3.1*   < >  --  7.2   HGB 13.3 13.5 12.3   < > 15.7   < >  --  9.6*   MCV 84 84 83   < > 85   < >  --  89   PLT 56* 44* 44*   < > 73*   < >  --  204   INR  --   --   --   --  1.12  --  1.07 1.04    < > = values in this interval not displayed.     Recent Labs   Lab Test 06/05/25  0815 06/05/25  0809 06/05/25  0625 06/04/25  0820 06/04/25  0531     --  139  --  142   POTASSIUM 4.1  --  3.9  --  3.8   CHLORIDE 110*  --  111*  --  114*   CO2 19*  --  19*  --  19*   BUN 34.9*  --  35.6*  --  29.3*   CR 2.25*  --  2.19*  --  1.94*   ANIONGAP 10  --  9  --  9   COLT 8.6*  --  8.6*  --  7.6*   * 103* 103*   < > 105*    < > = values in this interval not displayed.     Recent Labs   Lab Test 06/05/25  0815 06/04/25  0531 06/03/25  0725 06/02/25  2006 05/21/25  1348 04/30/25  1107 03/19/25  1244 02/27/25  0852   ALBUMIN 2.4*  2.4* 2.2* 2.4*  2.4*  --    < > 3.3*   < > 3.4*   BILITOTAL 1.0  1.0 1.0 1.3*  1.3*  --    < > 1.0   < > 0.8   ALT 97*  97* 98* 112*  112*  --    < > 124*   < > 136*   *  270* 333* 323*  323*  --    < > 277*   < > 280*   ALKPHOS 257*  257* 247* 230*  230*  --    < > 374*   < > 347*   PROTEIN  --   --   --  300*  --  300*  --  100*    < > = values in this interval not displayed.       IMAGING:  XR Chest Port 1 View  Result Date: 6/3/2025  EXAM: XR CHEST PORT 1 VIEW LOCATION: [facility] DATE: 6/3/2025 INDICATION: tachypnea, crackles on exam COMPARISON: 4/29/2025     IMPRESSION: Negative chest.    MR Brain w/o & w Contrast  Result Date: 6/2/2025  EXAM: MR BRAIN W/O and W CONTRAST LOCATION: [facility]  HOSPITAL DATE: 6/2/2025 INDICATION: Speech finding difficulties, evaluate for stroke COMPARISON: Head CT obtained earlier today CONTRAST: 6 mL Gadavist TECHNIQUE: Routine multiplanar multisequence head MRI without and with intravenous contrast. FINDINGS: Somewhat motion degraded examination. INTRACRANIAL CONTENTS: No acute or subacute infarct. No mass, acute hemorrhage, or extra-axial fluid collections. Scattered nonspecific foci of T2/FLAIR hyperintense signal in the cerebral white matter. There is a linear area of T2/FLAIR hyperintensity involving the white matter in the anterior right centrum semiovale (images 19 and 20 series 17) which appears to be oriented perpendicular to the ventricular system. Small rounded focus of T2/FLAIR hyperintensity involving the cortex and subcortical white matter in the medial left occipital parietal region (image 18 series 17). At least 3 additional small focal areas of T2/FLAIR hyperintensity involving the anterior lateral right occipital lobe (image 13 series 17), medial right occipital parietal region (image 19 series 17), and superior left frontal lobe along the superior frontal gyrus (image 20 series 17). Normal ventricles and sulci. Normal position of the cerebellar tonsils. No pathologic contrast enhancement. SELLA: No abnormality accounting for technique. OSSEOUS STRUCTURES/SOFT TISSUES: Normal marrow signal. The major intracranial vascular flow voids are maintained. ORBITS: Prior bilateral cataract surgery. Visualized portions of the orbits are otherwise unremarkable. SINUSES/MASTOIDS: No paranasal sinus mucosal disease. No middle ear or mastoid effusion.     IMPRESSION: 1.  No acute intracranial process. 2.  Scattered nonspecific foci of T2/FLAIR hyperintensity in the cerebral white matter. The linear area of T2/FLAIR hyperintensity in the anterior right centrum semiovale oriented perpendicular to the ventricular system could be seen with demyelinating disease. No  evidence of abnormal enhancement to suggest active demyelination. 3.  Small rounded focus of T2/FLAIR hyperintensity involving the cortex and subcortical white matter in the medial left occipital parietal region could be related to old infarct or demyelinating lesion. Low-grade glioma is not excluded. Recommend 3 month  follow-up MRI of the brain without and with contrast to document stability. 4.  Additional small focal areas of T2/FLAIR hyperintensity in the right occipital lobe, right occipital parietal region, and left frontal lobe are nonspecific and may be due to small vessel ischemic disease or old infarcts.     CT Head w/o Contrast  Result Date: 6/2/2025  EXAM: CT HEAD W/O CONTRAST LOCATION: [facility] DATE: 6/2/2025 INDICATION: Altered mental status, known liver cancer, evaluate for metastasis COMPARISON: None. TECHNIQUE: Routine CT Head without IV contrast. Multiplanar reformats. Dose reduction techniques were used. FINDINGS: INTRACRANIAL CONTENTS: No intracranial hemorrhage, extraaxial collection, or mass effect.  No CT evidence of acute infarct. Mild presumed chronic small vessel ischemic changes. Normal ventricles and sulci. VISUALIZED ORBITS/SINUSES/MASTOIDS: Prior bilateral cataract surgery. Visualized portions of the orbits are otherwise unremarkable. No paranasal sinus mucosal disease. No middle ear or mastoid effusion. BONES/SOFT TISSUES: No acute abnormality.     IMPRESSION: 1.  No evidence of acute intracranial abnormality. 2.  No findings to suggest intracranial metastatic disease on this noncontrast head CT. Consider brain MRI with and without contrast to better evaluate for intracranial metastases.    EXAM: PET ONCOLOGY (EYES TO THIGHS), CT CHEST/ABDOMEN/PELVIS W CONTRAST  LOCATION: [facility]  DATE: 4/29/2025     INDICATION: Subsequent treatment planning and restaging for liver cell carcinoma. Undergoing palliative immunotherapy. Rising  AFP.  COMPARISON: 2/13/2025, 1/9/2025  CONTRAST: 68 mL Isovue-370 IV  TECHNIQUE: Serum glucose level 83 mg/dL. One hour post intravenous administration of 10.23mCi F18 FDG, PET imaging was performed from the skull vertex to mid thighs, utilizing attenuation correction with concurrent axial CT and PET/CT image fusion.   Separate diagnostic CT of the chest, abdomen, and pelvis was performed. Dose reduction techniques were used.     PET/CT FINDINGS: Heterogenous hypoenhancement throughout the right hepatic lobe with associated uptake, suggestive of extensive infiltrative disease, similar to prior study. There is a more focal area of increased uptake in the right hepatic dome,   slightly increased in activity compared to prior exam (SUV max 11.2, previously 9.1), likely at the site of the primary tumor. There is suspected development of a second more focal mass in the inferior right hepatic lobe (SUV max 7.7). There is suspected   tumor thrombus within the intrahepatic IVC and portal vein (SUV max 6.9) extending near the portosplenic confluence and probable intrahepatic portal vein thrombus in the right and left. Large, partially necrotic metastasis in the right adrenal gland   measuring 6.0 x 3.2 cm (SUV max 7.3, previously 7.2), similar in size. Increasing left adrenal thickening with associated uptake, suspicious for additional metastasis (SUV max 5.9 previously 4.0).     Extensive esophagitis.     CT FINDINGS: Mild intracranial senescent changes. Mild carotid calcifications. Mild coronary artery calcium. Anterior left hepatic lobe cyst. Cholecystectomy. Colonic diverticulosis. Moderate aortobiiliac atherosclerosis. Portosystemic collaterals are   present. Hysterectomy. Multilevel degenerative disease in the spine.                                                                    IMPRESSION:     Similar extensive infiltrative disease throughout the right hepatic lobe as well as portal venous and IVC tumor thrombi  and right adrenal metastasis, although with increasing uptake at the more focal mass right hepatic dome, a suspected second more focal   mass in the inferior right hepatic lobe, as well as increasingly conspicuous left adrenal thickening with associated uptake, suggestive of a left adrenal metastasis, consistent with disease progression.    ASSESSMENT:  She is a 74 year female with a history of prior hepatitis C (treated in 2015), cirrhosis, and metastatic hepatocellular carcinoma on palliative chemo who was admitted with confusion which has now improved.  We are asked to see her because of elevated LFTs, and renal is seeing her for possible hepatorenal syndrome.  Her LFTs have been elevated chronically and are likely related to tumor burden.  LFTs are stable and at baseline without any clear evidence for biliary obstruction.  There has been recent evidence for progression of disease in her liver and both adrenal glands on imaging at the end of April, and chemo was changed from Tecentriq and bevacizumab to selpercatinib.  Defer cancer treatment to Oncology.    Principal Problem:    Metastatic hepatocellular carcinoma to adrenal gland (H)  Active Problems:    Chronic hepatitis C without hepatic coma (H)    Class 1 obesity due to excess calories without serious comorbidity with body mass index (BMI) of 30.0 to 30.9 in adult    Essential hypertension    Stage 3b chronic kidney disease (H)    Hepatic encephalopathy (H)    Dehydration    Hypothyroid    MITESH (acute kidney injury)    Word finding difficulty    Altered mental status, unspecified altered mental status type    Acute cystitis without hematuria    Failure to thrive in adult    PLAN:  Discussed with Dr. Amaya.  39 minutes of total time was spent providing patient care, including patient evaluation, reviewing documentation/test results, and .    Continue supportive cares.  Defer cancer treatment to Oncology.  Renal work up and treatment per  Renal.  Nothing further planned per GI in the hospital and we'll sign off.                                                 Oliva Page PA-C  Thank you for the opportunity to participate in the care of this patient.   Please feel free to call me with any questions or concerns.  Phone number [phone].                    CC: [facility] [address], [place]

## 2025-06-05 NOTE — CONSULTS
Palliative Care Consultation Note  [facility]      Patient: Liz Pollard  Date of Admission:  6/2/2025    Requesting Clinician / Team: Dr. Cisneros  Reason for consult: Goals of care       Recommendations & Counseling     GOALS OF CARE:   Life-prolonging without limits   Currently wishes to continue all current cares and treatments.  Patient also wishes to discuss goals of care further with her , Barry and sister-in-law, Destini, especially CODE STATUS.  Reviewed role of outpatient palliative care clinic for ongoing goals of care discussion and support.  She would be interested in this.  Referral placed.  Recommend patient following up with MYRNA Salas,  with [facility] oncology.  Last saw patient 2/20/2025.  Referral placed.    ADVANCE CARE PLANNING:  No health care directive on file. Per system policy, Surrogate Decision-makers for Patients With Diminished Decision-making Capacity offers guidance on possible decision-makers.  Barry and sister in law, Destini has been identified as a surrogate decision maker.   Reviewed role of honoring choices document to document medical decision makers and wishes regarding medical care.  She will think about this and may benefit from a blank copy for review and completion.  Will discuss further with her tomorrow.  There is no POLST form on file, defer to patient and/or next of kin for decisions   Code status: Full Code    MEDICAL MANAGEMENT:   We are not actively managing symptoms at this time.    PSYCHOSOCIAL/SPIRITUAL SUPPORT:  Family , Holley, and CONCEPCIONDestini  Teresa community: No Restorationist - declined support. Has support from family.    Palliative Care will continue to follow. Thank you for the consult and allowing us to aid in the care of Liz Pollard.    These recommendations have been discussed with Dr. Cisneros.    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   has Alzheimer's disease and she is his primary caregiver.  , Barry, called her sister-in-law/his sister and stated that patient had been unresponsive.    Patient also with elevated liver function likely secondary to hepatocellular carcinoma and potential side effect of selpercatinib.  Creatinine remains elevated at 2.25.    Today, the patient was seen for:  Introduction to palliative medicine specialty, establish rapport, initial goals of care discussion    History of Present Illness   Met with patient.   Palliative care team helps patients and families dealing with serious, potentially life-limiting illnesses, navigate their care while focusing on the whole person; providing emotional, social and spiritual support.  Palliative care often assists with symptom management, information sharing about what to expect from the illness, available treatment options and what effect those options may have on the disease course, and provide effective communication and caring support.    Prognosis, Goals, & Planning:   Functional Status just prior to this current hospitalization:  Outpatient Palliative Performance Score (PPS) 80%  Some evidence of disease. Full/normal ambulation, self-care & LOC; normal activity/work w/effort; normal or reduced intake.    Prognosis, Goals, and/or Advance Care Planning:  We discussed general treatment options (full/restorative,  "selective/conservatives, and comfort only/hospice).   Discussed what continuing restorative/life-prolonging care entails, including continued (re)admissions to the hospital, continuing with preventative and primary care, seeing disease/organ specific specialty consultations for medical treatments in hopes to prolong life for as long as possible.    Patient understands that disease treatments are palliative in nature and that this is a terminal condition.  She hopes for more time as she is the primary caregiver for her .  Desires ongoing life-prolonging treatment for cancer as long as she is able to tolerate it.  Also in our discussion she told writer that if treatments becoming burdensome or \"not working, what is the point in continuing with that I will.\"  If treatment or interventions would not be beneficial would likely not pursue them.    Code Status was addressed today:   Yes, We discussed potential risks and rationale of attempting cardiac resuscitation, intubation, and mechanical ventilation.  We also discussed probability of survival as well as quality of life implications.  Based on this discussion, patient or surrogate response/decision: Full code    Patient wishes to continue this discussion with her , Barry, and sister-in-law, Destini.  She would also welcome further discussion with them in palliative medicine whether hospitalized or in outpatient clinic.  She is needing time to digest in time to connect with them about CODE STATUS.    Patient's decision making preferences: shared with support from loved ones        Patient has decision-making capacity today for complex decisions: Intact          Coping, Meaning, & Spirituality:   Mood, coping, and/or meaning in the context of serious illness were addressed today: Yes    Social:   Living situation: lives with significant other/spouse -primary caregiver for her  with dementia.  Reports that he has times when he is cognitively intact and " times that he is not.  Important relationships/caregivers: , Barry, sister-in-law, Destini (lives in [place]), and another sister-in-law who lives in [place].  Occupation: not assessed    Medications:  Reviewed this patient's medication profile and medications from this hospitalization. [place] Board of Pharmacy Data Base Reviewed: Yes:   reviewed - no record of controlled substances prescribed..    ROS:  Comprehensive ROS is reviewed and is negative except as here & per HPI:   Only complaint is reflux and heartburn.  Relieved with Tums.  Denied pain, nausea, shortness of breath, headache, paresthesias and other physical complaints.    Physical Exam   Vital Signs with Ranges  Temp:  [97.3  F (36.3  C)-98.4  F (36.9  C)] 98.2  F (36.8  C)  Pulse:  [57-69] 65  Resp:  [18-20] 18  BP: (124-175)/(58-81) 131/60  SpO2:  [94 %-99 %] 99 %  Wt Readings from Last 10 Encounters:   06/02/25 61.2 kg (135 lb)   04/30/25 61.2 kg (135 lb)   04/18/25 62.6 kg (138 lb)   04/10/25 62.3 kg (137 lb 4 oz)   04/09/25 62.7 kg (138 lb 4.8 oz)   03/19/25 63.5 kg (140 lb)   02/27/25 67.2 kg (148 lb 3.2 oz)   02/18/25 67.2 kg (148 lb 0.6 oz)   02/17/25 68.1 kg (150 lb 3.2 oz)   02/03/25 67.4 kg (148 lb 8 oz)     135 lbs 0 oz    PHYSICAL EXAM:  Temp:  [97.3  F (36.3  C)-98.4  F (36.9  C)] 98.2  F (36.8  C)  Pulse:  [57-69] 65  Resp:  [18-20] 18  BP: (124-175)/(58-81) 131/60  SpO2:  [94 %-99 %] 99 %  135 lbs 0 oz    Physical Exam  Vitals and nursing note reviewed.   Constitutional:       General: She is not in acute distress.     Appearance: Normal appearance. She is normal weight.   HENT:      Head: Normocephalic.      Nose: Nose normal.      Mouth/Throat:      Mouth: Mucous membranes are moist.   Cardiovascular:      Rate and Rhythm: Normal rate.   Pulmonary:      Effort: Pulmonary effort is normal. No respiratory distress.   Skin:     General: Skin is warm.   Neurological:      General: No focal deficit present.      Mental Status: She is  alert and oriented to person, place, and time.   Psychiatric:         Mood and Affect: Mood normal.         Behavior: Behavior normal.         Thought Content: Thought content normal.         Data reviewed:  Results for orders placed or performed during the hospital encounter of 06/02/25 (from the past 24 hours)   Glucose by meter   Result Value Ref Range    GLUCOSE BY METER POCT 148 (H) 70 - 99 mg/dL   Glucose by meter   Result Value Ref Range    GLUCOSE BY METER POCT 111 (H) 70 - 99 mg/dL   CBC with Platelets & Differential    Narrative    The following orders were created for panel order CBC with Platelets & Differential.  Procedure                               Abnormality         Status                     ---------                               -----------         ------                     CBC with platelets and ...[[id number]]  Abnormal            Final result               RBC and Platelet Morpho...[[id number]]                                                   Please view results for these tests on the individual orders.   Basic metabolic panel   Result Value Ref Range    Sodium 139 135 - 145 mmol/L    Potassium 3.9 3.4 - 5.3 mmol/L    Chloride 111 (H) 98 - 107 mmol/L    Carbon Dioxide (CO2) 19 (L) 22 - 29 mmol/L    Anion Gap 9 7 - 15 mmol/L    Urea Nitrogen 35.6 (H) 8.0 - 23.0 mg/dL    Creatinine 2.19 (H) 0.51 - 0.95 mg/dL    GFR Estimate 23 (L) >60 mL/min/1.73m2    Calcium 8.6 (L) 8.8 - 10.4 mg/dL    Glucose 103 (H) 70 - 99 mg/dL   CBC with platelets and differential   Result Value Ref Range    WBC Count 3.0 (L) 4.0 - 11.0 10e3/uL    RBC Count 5.10 3.80 - 5.20 10e6/uL    Hemoglobin 13.5 11.7 - 15.7 g/dL    Hematocrit 42.7 35.0 - 47.0 %    MCV 84 78 - 100 fL    MCH 26.5 26.5 - 33.0 pg    MCHC 31.6 31.5 - 36.5 g/dL    RDW 21.0 (H) 10.0 - 15.0 %    Platelet Count 44 (LL) 150 - 450 10e3/uL    % Neutrophils 77 %    % Lymphocytes 15 %    % Monocytes 5 %    % Eosinophils 2 %    % Basophils 1 %    % Immature  Granulocytes 1 %    NRBCs per 100 WBC 0 <1 /100    Absolute Neutrophils 2.3 1.6 - 8.3 10e3/uL    Absolute Lymphocytes 0.5 (L) 0.8 - 5.3 10e3/uL    Absolute Monocytes 0.2 0.0 - 1.3 10e3/uL    Absolute Eosinophils 0.1 0.0 - 0.7 10e3/uL    Absolute Basophils 0.0 0.0 - 0.2 10e3/uL    Absolute Immature Granulocytes 0.0 <=0.4 10e3/uL    Absolute NRBCs 0.0 10e3/uL   Glucose by meter   Result Value Ref Range    GLUCOSE BY METER POCT 103 (H) 70 - 99 mg/dL   Comprehensive metabolic panel   Result Value Ref Range    Sodium 139 135 - 145 mmol/L    Potassium 4.1 3.4 - 5.3 mmol/L    Carbon Dioxide (CO2) 19 (L) 22 - 29 mmol/L    Anion Gap 10 7 - 15 mmol/L    Urea Nitrogen 34.9 (H) 8.0 - 23.0 mg/dL    Creatinine 2.25 (H) 0.51 - 0.95 mg/dL    GFR Estimate 23 (L) >60 mL/min/1.73m2    Calcium 8.6 (L) 8.8 - 10.4 mg/dL    Chloride 110 (H) 98 - 107 mmol/L    Glucose 102 (H) 70 - 99 mg/dL    Alkaline Phosphatase 257 (H) 40 - 150 U/L     (H) 0 - 45 U/L    ALT 97 (H) 0 - 50 U/L    Protein Total 6.1 (L) 6.4 - 8.3 g/dL    Albumin 2.4 (L) 3.5 - 5.2 g/dL    Bilirubin Total 1.0 <=1.2 mg/dL   CBC with platelets   Result Value Ref Range    WBC Count 3.4 (L) 4.0 - 11.0 10e3/uL    RBC Count 5.03 3.80 - 5.20 10e6/uL    Hemoglobin 13.3 11.7 - 15.7 g/dL    Hematocrit 42.2 35.0 - 47.0 %    MCV 84 78 - 100 fL    MCH 26.4 (L) 26.5 - 33.0 pg    MCHC 31.5 31.5 - 36.5 g/dL    RDW 21.2 (H) 10.0 - 15.0 %    Platelet Count 56 (L) 150 - 450 10e3/uL   Magnesium   Result Value Ref Range    Magnesium 1.7 1.7 - 2.3 mg/dL   Phosphorus   Result Value Ref Range    Phosphorus 2.9 2.5 - 4.5 mg/dL   Ammonia   Result Value Ref Range    Ammonia 42 11 - 51 umol/L   Hepatic function panel   Result Value Ref Range    Protein Total 6.1 (L) 6.4 - 8.3 g/dL    Albumin 2.4 (L) 3.5 - 5.2 g/dL    Bilirubin Total 1.0 <=1.2 mg/dL    Alkaline Phosphatase 257 (H) 40 - 150 U/L     (H) 0 - 45 U/L    ALT 97 (H) 0 - 50 U/L    Bilirubin Direct 0.53 (H) 0.00 - 0.30 mg/dL   Extra  Tube    Narrative    The following orders were created for panel order Extra Tube.  Procedure                               Abnormality         Status                     ---------                               -----------         ------                     Extra Red Top Tube[[id number]]                              Final result                 Please view results for these tests on the individual orders.   Extra Red Top Tube   Result Value Ref Range    Hold Specimen [facility]    Glucose by meter   Result Value Ref Range    GLUCOSE BY METER POCT 124 (H) 70 - 99 mg/dL     70 MINUTES SPENT BY ME on the date of service doing chart review, history, exam, documentation & further activities per the note.

## 2025-06-06 ENCOUNTER — APPOINTMENT (OUTPATIENT)
Dept: PHYSICAL THERAPY | Facility: HOSPITAL | Age: 75
End: 2025-06-06
Attending: INTERNAL MEDICINE
Payer: COMMERCIAL

## 2025-06-06 VITALS
HEART RATE: 64 BPM | TEMPERATURE: 98.8 F | RESPIRATION RATE: 23 BRPM | OXYGEN SATURATION: 98 % | BODY MASS INDEX: 25.49 KG/M2 | HEIGHT: 61 IN | DIASTOLIC BLOOD PRESSURE: 74 MMHG | WEIGHT: 135 LBS | SYSTOLIC BLOOD PRESSURE: 158 MMHG

## 2025-06-06 LAB
ALBUMIN SERPL BCG-MCNC: 2 G/DL (ref 3.5–5.2)
ALP SERPL-CCNC: 227 U/L (ref 40–150)
ALT SERPL W P-5'-P-CCNC: 81 U/L (ref 0–50)
ANION GAP SERPL CALCULATED.3IONS-SCNC: 7 MMOL/L (ref 7–15)
AST SERPL W P-5'-P-CCNC: 197 U/L (ref 0–45)
BILIRUB DIRECT SERPL-MCNC: 0.4 MG/DL (ref 0–0.3)
BILIRUB SERPL-MCNC: 0.8 MG/DL
BUN SERPL-MCNC: 38.4 MG/DL (ref 8–23)
CALCIUM SERPL-MCNC: 8.7 MG/DL (ref 8.8–10.4)
CHLORIDE SERPL-SCNC: 107 MMOL/L (ref 98–107)
CHLORIDE UR-SCNC: 41 MMOL/L
CREAT SERPL-MCNC: 2.16 MG/DL (ref 0.51–0.95)
CYSTATIN C (ROCHE): 1.8 MG/L (ref 0.6–1)
EGFRCR SERPLBLD CKD-EPI 2021: 24 ML/MIN/1.73M2
ERYTHROCYTE [DISTWIDTH] IN BLOOD BY AUTOMATED COUNT: 20.6 % (ref 10–15)
GFR/BSA.PRED SERPLBLD CYS-BASED-ARV: 32 ML/MIN/1.73M2
GLUCOSE BLDC GLUCOMTR-MCNC: 102 MG/DL (ref 70–99)
GLUCOSE BLDC GLUCOMTR-MCNC: 106 MG/DL (ref 70–99)
GLUCOSE BLDC GLUCOMTR-MCNC: 114 MG/DL (ref 70–99)
GLUCOSE BLDC GLUCOMTR-MCNC: 121 MG/DL (ref 70–99)
GLUCOSE BLDC GLUCOMTR-MCNC: 80 MG/DL (ref 70–99)
GLUCOSE SERPL-MCNC: 95 MG/DL (ref 70–99)
HCO3 SERPL-SCNC: 23 MMOL/L (ref 22–29)
HCT VFR BLD AUTO: 36.3 % (ref 35–47)
HGB BLD-MCNC: 12 G/DL (ref 11.7–15.7)
MCH RBC QN AUTO: 27.1 PG (ref 26.5–33)
MCHC RBC AUTO-ENTMCNC: 33.1 G/DL (ref 31.5–36.5)
MCV RBC AUTO: 82 FL (ref 78–100)
PLATELET # BLD AUTO: 56 10E3/UL (ref 150–450)
POTASSIUM SERPL-SCNC: 4.1 MMOL/L (ref 3.4–5.3)
POTASSIUM UR-SCNC: 36.9 MMOL/L
PROT SERPL-MCNC: 5.3 G/DL (ref 6.4–8.3)
RBC # BLD AUTO: 4.43 10E6/UL (ref 3.8–5.2)
SODIUM SERPL-SCNC: 137 MMOL/L (ref 135–145)
WBC # BLD AUTO: 2.6 10E3/UL (ref 4–11)

## 2025-06-06 PROCEDURE — 120N000001 HC R&B MED SURG/OB

## 2025-06-06 PROCEDURE — 99232 SBSQ HOSP IP/OBS MODERATE 35: CPT | Performed by: INTERNAL MEDICINE

## 2025-06-06 PROCEDURE — 36415 COLL VENOUS BLD VENIPUNCTURE: CPT | Performed by: INTERNAL MEDICINE

## 2025-06-06 PROCEDURE — 85014 HEMATOCRIT: CPT | Performed by: INTERNAL MEDICINE

## 2025-06-06 PROCEDURE — 97110 THERAPEUTIC EXERCISES: CPT | Mod: GP

## 2025-06-06 PROCEDURE — 97116 GAIT TRAINING THERAPY: CPT | Mod: GP

## 2025-06-06 PROCEDURE — 99233 SBSQ HOSP IP/OBS HIGH 50: CPT | Performed by: INTERNAL MEDICINE

## 2025-06-06 PROCEDURE — 82248 BILIRUBIN DIRECT: CPT | Performed by: INTERNAL MEDICINE

## 2025-06-06 PROCEDURE — 250N000013 HC RX MED GY IP 250 OP 250 PS 637: Performed by: INTERNAL MEDICINE

## 2025-06-06 PROCEDURE — 250N000013 HC RX MED GY IP 250 OP 250 PS 637: Performed by: HOSPITALIST

## 2025-06-06 PROCEDURE — 99233 SBSQ HOSP IP/OBS HIGH 50: CPT | Performed by: CLINICAL NURSE SPECIALIST

## 2025-06-06 RX ORDER — SPIRONOLACTONE 25 MG/1
50 TABLET ORAL DAILY
Status: DISCONTINUED | OUTPATIENT
Start: 2025-06-06 | End: 2025-06-08

## 2025-06-06 RX ADMIN — CALCIUM CARBONATE (ANTACID) CHEW TAB 500 MG 1000 MG: 500 CHEW TAB at 15:07

## 2025-06-06 RX ADMIN — SPIRONOLACTONE 50 MG: 25 TABLET, FILM COATED ORAL at 12:08

## 2025-06-06 RX ADMIN — LEVOTHYROXINE SODIUM 150 MCG: 0.03 TABLET ORAL at 09:43

## 2025-06-06 RX ADMIN — METOPROLOL SUCCINATE 100 MG: 50 TABLET, EXTENDED RELEASE ORAL at 09:44

## 2025-06-06 RX ADMIN — ASPIRIN 81 MG: 81 TABLET, COATED ORAL at 09:44

## 2025-06-06 RX ADMIN — AMLODIPINE BESYLATE 10 MG: 5 TABLET ORAL at 09:44

## 2025-06-06 ASSESSMENT — ACTIVITIES OF DAILY LIVING (ADL)
ADLS_ACUITY_SCORE: 51
ADLS_ACUITY_SCORE: 47
ADLS_ACUITY_SCORE: 54
ADLS_ACUITY_SCORE: 51
ADLS_ACUITY_SCORE: 51
ADLS_ACUITY_SCORE: 47
ADLS_ACUITY_SCORE: 51
ADLS_ACUITY_SCORE: 54
ADLS_ACUITY_SCORE: 51
ADLS_ACUITY_SCORE: 47

## 2025-06-06 NOTE — PROGRESS NOTES
RENAL PROGRESS NOTE    CC:  Laura Cisneros MD      ASSESSMENT & PLAN:     MITESH with baseline CKD 3: Possible etiology please see consult note.  Cystatin C 1.8 with EGFR 32% when creatinine 2.25 with EGFR 23%.  Clinically not uremic.  Continue conservative management.  Strict I's and O  Daily body weight  Avoid nephrotoxic medications  Renally dosing medications.    Electrolytes: Sodium 137, potassium 4.1.  Monitor.    Acid-base: Bicarb 23.  Recheck normalized from 19 yesterday.  Monitor.    BMD: Calcium 8.7.  Will check Phos tomorrow.    BP/volume: Blood pressure above goal.  Resume metoprolol.  Holding lisinopril and spironolactone.  Resume spironolactone for now.  If blood pressure still elevated, we will need to increase from lactone and hold off lisinopril until kidney function back to her baseline.  Monitor clinically euvolemic.    Anemia: Hemoglobin 12.0.  Monitor.        SUBJECTIVE: Patient seen and examined at bedside.  Feeling better.  No chest pain, shortness of breath.    OBJECTIVE:  Physical Exam   Temp: 97.8  F (36.6  C) Temp src: Oral BP: (!) 161/65 Pulse: 58   Resp: 20 SpO2: 98 % O2 Device: None (Room air) Oxygen Delivery: 1 LPM  Vitals:    06/02/25 1733   Weight: 61.2 kg (135 lb)     Vital Signs with Ranges  Temp:  [97.8  F (36.6  C)-98.8  F (37.1  C)] 97.8  F (36.6  C)  Pulse:  [58-65] 58  Resp:  [18-32] 20  BP: (122-161)/(60-74) 161/65  SpO2:  [97 %-99 %] 98 %  I/O last 3 completed shifts:  In: 1440 [P.O.:1437; I.V.:3]  Out: 700 [Urine:700]    @TMAXR(24)@    No data found.  Intake/Output Summary (Last 24 hours) at 6/6/2025 1043  Last data filed at 6/6/2025 0100  Gross per 24 hour   Intake 837 ml   Output 700 ml   Net 137 ml       PHYSICAL EXAM:  General - Alert and oriented x3, appears comfortable, NAD  Cardiovascular - Regular rate and rhythm, no rub  Respiratory - Clear to auscultation bilaterally, no crackles or wheezes  Abd: BS present, no guarding or pain with palpation, no  ascites  Extremities - No lower extremity edema bilaterally  Skin: dry, intact, no rash, good turgor  Neuro:  Grossly intact, no focal deficits  MSK:  Grossly intact  Psych:  Normal affect    LABORATORY STUDIES:     Recent Labs   Lab 06/06/25  0620 06/05/25  0815 06/05/25  0625 06/04/25  0531 06/03/25  1857 06/02/25  1805   WBC 2.6* 3.4* 3.0* 2.2* 3.0* 3.1*   RBC 4.43 5.03 5.10 4.57 4.96 5.88*   HGB 12.0 13.3 13.5 12.3 13.2 15.7   HCT 36.3 42.2 42.7 38.0 41.1 50.2*   PLT 56* 56* 44* 44* 56* 73*       Basic Metabolic Panel:  Recent Labs   Lab 06/06/25  0953 06/06/25  0620 06/06/25  0208 06/05/25  2101 06/05/25  1733 06/05/25  1216 06/05/25  0815 06/05/25  0809 06/05/25  0625 06/04/25  0820 06/04/25  0531 06/03/25  0801 06/03/25  0725 06/02/25  2258 06/02/25  1805   NA  --  137  --   --   --   --  139  --  139  --  142  --  145  145  --  142   POTASSIUM  --  4.1  --   --   --   --  4.1  --  3.9  --  3.8  --  4.8  4.8  --  4.8   CHLORIDE  --  107  --   --   --   --  110*  --  111*  --  114*  --  114*  114*  --  108*   CO2  --  23  --   --   --   --  19*  --  19*  --  19*  --  19*  19*  --  20*   BUN  --  38.4*  --   --   --   --  34.9*  --  35.6*  --  29.3*  --  33.9*  33.9*  --  33.4*   CR  --  2.16*  --   --   --   --  2.25*  --  2.19*  --  1.94*  --  2.43*  2.43*  --  2.50*   GLC 80 95 106* 152* 133* 124* 102*   < > 103*   < > 105*   < > 102*  102*   < > 116*   COLT  --  8.7*  --   --   --   --  8.6*  --  8.6*  --  7.6*  --  7.6*  7.6*  --  8.4*    < > = values in this interval not displayed.       INR  Recent Labs   Lab 06/02/25  1805   INR 1.12        Recent Labs   Lab Test 06/06/25  0620 06/05/25  0815 06/03/25  1857 06/02/25  1805 02/17/25  1022 02/04/25  0939   INR  --   --   --  1.12  --  1.07   WBC 2.6* 3.4*   < > 3.1*   < >  --    HGB 12.0 13.3   < > 15.7   < >  --    PLT 56* 56*   < > 73*   < >  --     < > = values in this interval not displayed.       Personally reviewed current labs      Teo Flor,  MD  [facility]  [phone]
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
## 2025-06-06 NOTE — PROGRESS NOTES
CLINICAL NUTRITION - BRIEF  Checked in w/pt RE supplement plan. Really likes Glucerna-Sb (scheduled 10am+2pm).  Scheduled Ensure Max as HS snack but dietary entered erroneously: fixed this today. OK to continue these TID supplements (offering pt 590 kcal and 50g protein /d) into weekend.

## 2025-06-06 NOTE — PLAN OF CARE
Problem: Adult Inpatient Plan of Care  Goal: Plan of Care Review  Description: The Plan of Care Review/Shift note should be completed every shift.  The Outcome Evaluation is a brief statement about your assessment that the patient is improving, declining, or no change.  This information will be displayed automatically on your shift  note.  Outcome: Progressing  Flowsheets (Taken 6/6/2025 0221)  Plan of Care Reviewed With: patient  Overall Patient Progress: no change     Problem: Delirium  Goal: Improved Behavioral Control  Outcome: Progressing  Intervention: Minimize Safety Risk  Recent Flowsheet Documentation  Taken 6/5/2025 2102 by Fabiano Hanna RN  Enhanced Safety Measures:   pain management   room near unit station  Trust Relationship/Rapport:   care explained   questions answered  Goal: Improved Attention and Thought Clarity  Outcome: Progressing     Problem: Comorbidity Management  Goal: Blood Pressure in Desired Range  Intervention: Maintain Blood Pressure Management  Recent Flowsheet Documentation  Taken 6/5/2025 2102 by Fabiano Hanna RN  Medication Review/Management: medications reviewed   Goal Outcome Evaluation:      Plan of Care Reviewed With: patient    Overall Patient Progress: no changeOverall Patient Progress: no change    Pt is AO, pt is forgetful and disoriented to situation. Pt denied pain, nausea or vomiting. Vss, 1L NC 02 given for SOB. New mepilex over buttock abrasion. Fall precaution in placed. No other concern overnight.    Yasmine Hanna RN.

## 2025-06-06 NOTE — PROGRESS NOTES
PALLIATIVE CARE PROGRESS NOTE  [facility] [place]       Patient Name: Liz Pollard  Date of Admission: 6/2/2025   Today the patient was seen for: follow up on goals of care     Recommendations & Counseling     GOALS OF CARE:   Life-prolonging without limits   Patient also wishes to discuss goals of care further with her , Barry and sister-in-law, Destini, especially CODE STATUS.  Attempted to reach patient's CONCEPCION Georges several times today via phone.  Recommend that weekend provider continue to have ongoing discussions re: importance of completing health care directive and discuss goals of care.  I met her  this afternoon and I have significant concern that he would not be able to act as surrogate decision maker for Liz, based on his cognitive status.   Reviewed role of outpatient palliative care clinic for ongoing goals of care discussion and support.  She would be interested in this.  Referral placed 6/5.  Recommend patient following up with MYRNA Salas,  with [facility] oncology.  Last saw patient 2/20/2025.  Referral placed 6/5.     ADVANCE CARE PLANNING:  No health care directive on file. Per system policy, Surrogate Decision-makers for Patients With Diminished Decision-making Capacity offers guidance on possible decision-makers.  Barry and sister in law, Destini has been identified as a surrogate decision maker.   Reviewed role of honoring choices document to document medical decision makers and wishes regarding medical care.  She will think about this and may benefit from a blank copy for review and completion.  Will discuss further with her tomorrow.  There is no POLST form on file, defer to patient and/or next of kin for decisions   Code status: Full Code     MEDICAL MANAGEMENT:   We are not actively managing symptoms at this time.     PSYCHOSOCIAL/SPIRITUAL SUPPORT:  Family , Holley, and Destini JAVIER  Teresa community: No Tenriism  "- declined support. Has support from family.    Palliative Care will continue to follow. Thank you for the consult and allowing us to aid in the care of Liz Pollard.    These recommendations have been discussed with Dr. Cisneros.    Liz Mack, CNS  [facility], Palliative Care  Securely message with the [employer] Web Console (learn more here) or  Text page via [employer] Paging/Directory         Interval History:     Multidisciplinary collaboration:  Overnight notes reviewed. Creatinine decreased from 2.25 to 2.16.    Patient/family narrative  Met briefly with patient.  She states she would like to have me call her sister Gustabo to see if she would be available to discuss advance directive.  Patient denies pain.  States she feels \"tired\".  Somewhat confused, mixing up her phone and call light.  Did not remember discussing code status yesterday.     Left message for Gustabo to call back.    1440: Have attempted to reach gustabo X 3 and call goes straight to voicemail.  Gustabo has returned call X2.      Assessment          Liz Pollard is a 74 year old female with a past medical history of DM2, thyroid disease, CKD, HLD, GERD, HTN, hepatocellular carcinoma with metastatic disease to adrenal glands (on palliative treatment -s/p 3 cycles of Avastin and Atizoelizumab; had disease progression and now treating with selpercatinib) who presented on 6/2/2025 from home where she lives with her  when he found her with altered mental status dehydration.   has Alzheimer's disease and she is his primary caregiver.  , Barry, called her sister-in-law/his sister and stated that patient had been unresponsive.    Patient also with elevated liver function likely secondary to hepatocellular carcinoma and potential side effect of selpercatinib.  Creatinine remains elevated at 2.25.       Review of Systems:     Besides above, ROS was reviewed and is unremarkable  ESAS ([place] Symptom Assessment Scale 0 none -10 " severe)  Pain: 0/10  Tiredness: 5/10  Drowsiness: 5/10  Nausea: 0/10          Physical Exam:   Temp:  [97.8  F (36.6  C)-98.8  F (37.1  C)] 98  F (36.7  C)  Pulse:  [58-64] 62  Resp:  [14-32] 14  BP: (144-184)/(65-77) 162/67  SpO2:  [97 %-98 %] 98 %  135 lbs 0 oz     Physical Exam  Vitals and nursing note reviewed.   Constitutional:       General: She is not in acute distress.  Pulmonary:      Effort: Pulmonary effort is normal. No respiratory distress.   Skin:     General: Skin is warm.   Neurological:      Oriented X 3, forgetful  Psychiatric:         Mood and Affect:  flat                Data Reviewed:     Results for orders placed or performed during the hospital encounter of 06/02/25 (from the past 24 hours)   US Renal Complete w Arterial Duplex    Narrative    EXAM:  1. RENAL ULTRASOUND   2. RENAL DUPLEX  LOCATION: [facility]  DATE: 6/5/2025    INDICATION: jakob  COMPARISON: CT chest/abdomen/pelvis, PET/CT 4/29/2025  TECHNIQUE: Duplex imaging is performed utilizing gray-scale, two-dimensional images, and color-flow imaging. Doppler waveform analysis and spectral Doppler imaging is also performed.    FINDINGS:     RIGHT KIDNEY: 9.1 x 4.3 x 3.9 cm. No hydronephrosis. Right adrenal mass measures up to 6.6 cm in diameter, more completely evaluated on prior PET/CT.    LEFT KIDNEY 11.3 x 5.4 x 4.9 cm. Normal without hydronephrosis or masses..     BLADDER: Normal.    RENAL DUPLEX:  Aortic PSV: 66 cm/s, multiphasic  Right Renal Artery PSV: Normal, less than 200 cm/s (Normal considered less than 200 cm/s.)  Right Intrarenal Resistive Index: Borderline, 0.7 to 0.8  Left Renal Artery PSV elevated velocity at the renal artery origin measuring up to 231 cm/s (Normal considered less than 200 cm/s.)  Left Intrarenal Resistive Index: Borderline, 0.7 to 0.8      Impression    IMPRESSION:   1.  Elevated velocities at the left renal artery origin measuring up to 231 cm/s, suggesting underlying stenosis.  Consider dedicated abdominal CTA and IR referral if intervention is desired.  2.  Right adrenal mass again seen.   Glucose by meter   Result Value Ref Range    GLUCOSE BY METER POCT 133 (H) 70 - 99 mg/dL   Glucose by meter   Result Value Ref Range    GLUCOSE BY METER POCT 152 (H) 70 - 99 mg/dL   Glucose by meter   Result Value Ref Range    GLUCOSE BY METER POCT 106 (H) 70 - 99 mg/dL   Basic metabolic panel   Result Value Ref Range    Sodium 137 135 - 145 mmol/L    Potassium 4.1 3.4 - 5.3 mmol/L    Chloride 107 98 - 107 mmol/L    Carbon Dioxide (CO2) 23 22 - 29 mmol/L    Anion Gap 7 7 - 15 mmol/L    Urea Nitrogen 38.4 (H) 8.0 - 23.0 mg/dL    Creatinine 2.16 (H) 0.51 - 0.95 mg/dL    GFR Estimate 24 (L) >60 mL/min/1.73m2    Calcium 8.7 (L) 8.8 - 10.4 mg/dL    Glucose 95 70 - 99 mg/dL   CBC with platelets   Result Value Ref Range    WBC Count 2.6 (L) 4.0 - 11.0 10e3/uL    RBC Count 4.43 3.80 - 5.20 10e6/uL    Hemoglobin 12.0 11.7 - 15.7 g/dL    Hematocrit 36.3 35.0 - 47.0 %    MCV 82 78 - 100 fL    MCH 27.1 26.5 - 33.0 pg    MCHC 33.1 31.5 - 36.5 g/dL    RDW 20.6 (H) 10.0 - 15.0 %    Platelet Count 56 (L) 150 - 450 10e3/uL   Hepatic function panel   Result Value Ref Range    Protein Total 5.3 (L) 6.4 - 8.3 g/dL    Albumin 2.0 (L) 3.5 - 5.2 g/dL    Bilirubin Total 0.8 <=1.2 mg/dL    Alkaline Phosphatase 227 (H) 40 - 150 U/L     (H) 0 - 45 U/L    ALT 81 (H) 0 - 50 U/L    Bilirubin Direct 0.40 (H) 0.00 - 0.30 mg/dL   Glucose by meter   Result Value Ref Range    GLUCOSE BY METER POCT 80 70 - 99 mg/dL   Glucose by meter   Result Value Ref Range    GLUCOSE BY METER POCT 114 (H) 70 - 99 mg/dL           50 MINUTES SPENT BY ME on the date of service doing chart review, history, exam, documentation & further activities per the note.

## 2025-06-06 NOTE — PLAN OF CARE
Problem: Adult Inpatient Plan of Care  Goal: Optimal Comfort and Wellbeing  Outcome: Not Progressing     Problem: Delirium  Goal: Optimal Coping  Outcome: Not Progressing     Problem: Delirium  Goal: Improved Attention and Thought Clarity  Outcome: Not Progressing   Goal Outcome Evaluation:  Patient was somewhat confused this shift. Poor appetite, and refused lunch. Remained in room. Family here to visit. Denies pain. Tele NSR.

## 2025-06-07 ENCOUNTER — APPOINTMENT (OUTPATIENT)
Dept: PHYSICAL THERAPY | Facility: HOSPITAL | Age: 75
End: 2025-06-07
Attending: INTERNAL MEDICINE
Payer: COMMERCIAL

## 2025-06-07 LAB
ALBUMIN SERPL BCG-MCNC: 2.2 G/DL (ref 3.5–5.2)
ALBUMIN SERPL BCG-MCNC: 2.3 G/DL (ref 3.5–5.2)
ALP SERPL-CCNC: 249 U/L (ref 40–150)
ALT SERPL W P-5'-P-CCNC: 86 U/L (ref 0–50)
ANION GAP SERPL CALCULATED.3IONS-SCNC: 9 MMOL/L (ref 7–15)
AST SERPL W P-5'-P-CCNC: 204 U/L (ref 0–45)
BILIRUB DIRECT SERPL-MCNC: 0.43 MG/DL (ref 0–0.3)
BILIRUB SERPL-MCNC: 0.9 MG/DL
BUN SERPL-MCNC: 40.2 MG/DL (ref 8–23)
CALCIUM SERPL-MCNC: 9 MG/DL (ref 8.8–10.4)
CHLORIDE SERPL-SCNC: 107 MMOL/L (ref 98–107)
CREAT SERPL-MCNC: 2.08 MG/DL (ref 0.51–0.95)
EGFRCR SERPLBLD CKD-EPI 2021: 25 ML/MIN/1.73M2
ERYTHROCYTE [DISTWIDTH] IN BLOOD BY AUTOMATED COUNT: 21.2 % (ref 10–15)
GLUCOSE BLDC GLUCOMTR-MCNC: 103 MG/DL (ref 70–99)
GLUCOSE BLDC GLUCOMTR-MCNC: 105 MG/DL (ref 70–99)
GLUCOSE BLDC GLUCOMTR-MCNC: 128 MG/DL (ref 70–99)
GLUCOSE BLDC GLUCOMTR-MCNC: 142 MG/DL (ref 70–99)
GLUCOSE BLDC GLUCOMTR-MCNC: 84 MG/DL (ref 70–99)
GLUCOSE SERPL-MCNC: 80 MG/DL (ref 70–99)
HCO3 SERPL-SCNC: 20 MMOL/L (ref 22–29)
HCT VFR BLD AUTO: 40.9 % (ref 35–47)
HGB BLD-MCNC: 13.1 G/DL (ref 11.7–15.7)
MAGNESIUM SERPL-MCNC: 2 MG/DL (ref 1.7–2.3)
MCH RBC QN AUTO: 26.9 PG (ref 26.5–33)
MCHC RBC AUTO-ENTMCNC: 32 G/DL (ref 31.5–36.5)
MCV RBC AUTO: 84 FL (ref 78–100)
PHOSPHATE SERPL-MCNC: 4.5 MG/DL (ref 2.5–4.5)
PLATELET # BLD AUTO: 83 10E3/UL (ref 150–450)
POTASSIUM SERPL-SCNC: 4.6 MMOL/L (ref 3.4–5.3)
PROT SERPL-MCNC: 5.8 G/DL (ref 6.4–8.3)
RBC # BLD AUTO: 4.87 10E6/UL (ref 3.8–5.2)
SODIUM SERPL-SCNC: 136 MMOL/L (ref 135–145)
WBC # BLD AUTO: 3.6 10E3/UL (ref 4–11)

## 2025-06-07 PROCEDURE — 250N000013 HC RX MED GY IP 250 OP 250 PS 637: Performed by: HOSPITALIST

## 2025-06-07 PROCEDURE — 97530 THERAPEUTIC ACTIVITIES: CPT | Mod: GP | Performed by: PHYSICAL THERAPIST

## 2025-06-07 PROCEDURE — 82310 ASSAY OF CALCIUM: CPT | Performed by: INTERNAL MEDICINE

## 2025-06-07 PROCEDURE — 250N000013 HC RX MED GY IP 250 OP 250 PS 637: Performed by: INTERNAL MEDICINE

## 2025-06-07 PROCEDURE — 36415 COLL VENOUS BLD VENIPUNCTURE: CPT | Performed by: INTERNAL MEDICINE

## 2025-06-07 PROCEDURE — 84155 ASSAY OF PROTEIN SERUM: CPT | Performed by: INTERNAL MEDICINE

## 2025-06-07 PROCEDURE — 99232 SBSQ HOSP IP/OBS MODERATE 35: CPT | Performed by: INTERNAL MEDICINE

## 2025-06-07 PROCEDURE — 83735 ASSAY OF MAGNESIUM: CPT | Performed by: INTERNAL MEDICINE

## 2025-06-07 PROCEDURE — 120N000001 HC R&B MED SURG/OB

## 2025-06-07 PROCEDURE — 250N000011 HC RX IP 250 OP 636: Performed by: INTERNAL MEDICINE

## 2025-06-07 PROCEDURE — 85014 HEMATOCRIT: CPT | Performed by: INTERNAL MEDICINE

## 2025-06-07 PROCEDURE — 99233 SBSQ HOSP IP/OBS HIGH 50: CPT | Performed by: INTERNAL MEDICINE

## 2025-06-07 RX ORDER — MAGNESIUM HYDROXIDE/ALUMINUM HYDROXICE/SIMETHICONE 120; 1200; 1200 MG/30ML; MG/30ML; MG/30ML
15 SUSPENSION ORAL ONCE
Status: COMPLETED | OUTPATIENT
Start: 2025-06-07 | End: 2025-06-07

## 2025-06-07 RX ADMIN — CALCIUM CARBONATE (ANTACID) CHEW TAB 500 MG 1000 MG: 500 CHEW TAB at 09:42

## 2025-06-07 RX ADMIN — METOPROLOL SUCCINATE 100 MG: 50 TABLET, EXTENDED RELEASE ORAL at 07:39

## 2025-06-07 RX ADMIN — PANTOPRAZOLE SODIUM 40 MG: 40 INJECTION, POWDER, FOR SOLUTION INTRAVENOUS at 16:17

## 2025-06-07 RX ADMIN — INSULIN ASPART 1 UNITS: 100 INJECTION, SOLUTION INTRAVENOUS; SUBCUTANEOUS at 16:41

## 2025-06-07 RX ADMIN — AMLODIPINE BESYLATE 10 MG: 5 TABLET ORAL at 08:40

## 2025-06-07 RX ADMIN — ALUMINUM HYDROXIDE, MAGNESIUM HYDROXIDE, AND SIMETHICONE 15 ML: 200; 200; 20 SUSPENSION ORAL at 12:46

## 2025-06-07 RX ADMIN — ASPIRIN 81 MG: 81 TABLET, COATED ORAL at 07:39

## 2025-06-07 RX ADMIN — SPIRONOLACTONE 50 MG: 25 TABLET, FILM COATED ORAL at 08:41

## 2025-06-07 RX ADMIN — FERROUS GLUCONATE 324 MG: 324 TABLET ORAL at 08:41

## 2025-06-07 RX ADMIN — LEVOTHYROXINE SODIUM 150 MCG: 0.03 TABLET ORAL at 07:39

## 2025-06-07 RX ADMIN — CALCIUM CARBONATE (ANTACID) CHEW TAB 500 MG 1000 MG: 500 CHEW TAB at 16:31

## 2025-06-07 ASSESSMENT — ACTIVITIES OF DAILY LIVING (ADL)
ADLS_ACUITY_SCORE: 47
ADLS_ACUITY_SCORE: 51
ADLS_ACUITY_SCORE: 47
ADLS_ACUITY_SCORE: 47
ADLS_ACUITY_SCORE: 51
ADLS_ACUITY_SCORE: 47
ADLS_ACUITY_SCORE: 51
ADLS_ACUITY_SCORE: 47
ADLS_ACUITY_SCORE: 51
ADLS_ACUITY_SCORE: 51
ADLS_ACUITY_SCORE: 47
ADLS_ACUITY_SCORE: 47
ADLS_ACUITY_SCORE: 51
ADLS_ACUITY_SCORE: 47
ADLS_ACUITY_SCORE: 51
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
## 2025-06-07 NOTE — PROGRESS NOTES
RENAL PROGRESS NOTE    CC:  Laura Cisneros MD      ASSESSMENT & PLAN:     MITESH with baseline CKD 3: Possible etiology please see consult note.  Cystatin C 1.8 with EGFR 32% when creatinine 2.25 with EGFR 23%.  Clinically not uremic.  Creatinine 2.08 with EGFR 25%.  Continue conservative management.  There is no objection from nephrology standpoint discharge from the hospital with outpatient lab in 1 week to check renal function.  Will arrange for outpatient follow-up for further recommendation.  Strict I's and O  Daily body weight  Avoid nephrotoxic medications  Renally dosing medications.    Electrolytes: Sodium 136, potassium 4.6.  Monitor.    Acid-base: Bicarb 20 today.  Monitor.      BMD: Calcium 9.0 and phos 4.5.    BP/volume: Blood pressure above goal.  Resume metoprolol.  Holding lisinopril and spironolactone.  Resume spironolactone for now.  If blood pressure still elevated, we will need to increase from spironolactone and hold off lisinopril until kidney function back to her baseline.  Monitor clinically euvolemic.    Anemia: Hemoglobin 13.1.  Monitor.        SUBJECTIVE: Patient seen and examined at bedside.  Feeling better.  No chest pain, shortness of breath.    OBJECTIVE:  Physical Exam   Temp: 98  F (36.7  C) Temp src: Oral BP: (!) 158/70 Pulse: 64   Resp: 16 SpO2: 96 % O2 Device: None (Room air)    Vitals:    06/02/25 1733   Weight: 61.2 kg (135 lb)     Vital Signs with Ranges  Temp:  [98  F (36.7  C)-99.1  F (37.3  C)] 98  F (36.7  C)  Pulse:  [56-64] 64  Resp:  [16-24] 16  BP: (138-158)/(63-70) 158/70  SpO2:  [95 %-98 %] 96 %  I/O last 3 completed shifts:  In: 6 [I.V.:6]  Out: -     @TMAXR(24)@    No data found.  Intake/Output Summary (Last 24 hours) at 6/6/2025 1043  Last data filed at 6/6/2025 0100  Gross per 24 hour   Intake 837 ml   Output 700 ml   Net 137 ml       PHYSICAL EXAM:  General - Alert and oriented x3, appears comfortable, NAD  Cardiovascular - Regular rate and rhythm, no  rub  Respiratory - Clear to auscultation bilaterally, no crackles or wheezes  Abd: BS present, no guarding or pain with palpation, no ascites  Extremities - No lower extremity edema bilaterally  Skin: dry, intact, no rash, good turgor  Neuro:  Grossly intact, no focal deficits  MSK:  Grossly intact  Psych:  Normal affect    LABORATORY STUDIES:     Recent Labs   Lab 06/07/25  0554 06/06/25  0620 06/05/25  0815 06/05/25  0625 06/04/25  0531 06/03/25  1857   WBC 3.6* 2.6* 3.4* 3.0* 2.2* 3.0*   RBC 4.87 4.43 5.03 5.10 4.57 4.96   HGB 13.1 12.0 13.3 13.5 12.3 13.2   HCT 40.9 36.3 42.2 42.7 38.0 41.1   PLT 83* 56* 56* 44* 44* 56*       Basic Metabolic Panel:  Recent Labs   Lab 06/07/25  0723 06/07/25  0553 06/07/25  0206 06/06/25  2054 06/06/25  1657 06/06/25  1346 06/06/25  0953 06/06/25  0620 06/05/25  1216 06/05/25  0815 06/05/25  0809 06/05/25  0625 06/04/25  0820 06/04/25  0531 06/03/25  0801 06/03/25  0725   NA  --  136  --   --   --   --   --  137  --  139  --  139  --  142  --  145  145   POTASSIUM  --  4.6  --   --   --   --   --  4.1  --  4.1  --  3.9  --  3.8  --  4.8  4.8   CHLORIDE  --  107  --   --   --   --   --  107  --  110*  --  111*  --  114*  --  114*  114*   CO2  --  20*  --   --   --   --   --  23  --  19*  --  19*  --  19*  --  19*  19*   BUN  --  40.2*  --   --   --   --   --  38.4*  --  34.9*  --  35.6*  --  29.3*  --  33.9*  33.9*   CR  --  2.08*  --   --   --   --   --  2.16*  --  2.25*  --  2.19*  --  1.94*  --  2.43*  2.43*   * 80 84 102* 121* 114*   < > 95   < > 102*   < > 103*   < > 105*   < > 102*  102*   COLT  --  9.0  --   --   --   --   --  8.7*  --  8.6*  --  8.6*  --  7.6*  --  7.6*  7.6*    < > = values in this interval not displayed.       INR  Recent Labs   Lab 06/02/25  1805   INR 1.12        Recent Labs   Lab Test 06/07/25  0554 06/06/25  0620 06/03/25  1857 06/02/25  1805 02/17/25  1022 02/04/25  0939   INR  --   --   --  1.12  --  1.07   WBC 3.6* 2.6*   < > 3.1*    < >  --    HGB 13.1 12.0   < > 15.7   < >  --    PLT 83* 56*   < > 73*   < >  --     < > = values in this interval not displayed.       Personally reviewed current labs      Teo Flor MD  [facility]  [phone]

## 2025-06-07 NOTE — PLAN OF CARE
"  Problem: Adult Inpatient Plan of Care  Goal: Optimal Comfort and Wellbeing  Outcome: Progressing     Problem: Delirium  Goal: Improved Sleep  Outcome: Progressing     Problem: Comorbidity Management  Goal: Blood Glucose Levels Within Targeted Range  Outcome: Progressing  Goal: Blood Pressure in Desired Range  Outcome: Progressing   Goal Outcome Evaluation:       Pt A&O to self and place. Denies pain, on RA, on tele, NSR.  BG stable at 0200 84. Up with sba, Uneventful shift. /63 (BP Location: Left arm)   Pulse 56   Temp 98.3  F (36.8  C) (Oral)   Resp 22   Ht 1.549 m (5' 1\")   Wt 61.2 kg (135 lb)   SpO2 95%   BMI 25.51 kg/m                         "

## 2025-06-07 NOTE — PLAN OF CARE
Goal Outcome Evaluation:      Plan of Care Reviewed With: patient    Overall Patient Progress: no change    Outcome Evaluation: Patient vitally stable this shift, sleeping between cares, poor appetite, denying pain      Problem: Delirium  Goal: Improved Attention and Thought Clarity  Outcome: Not Progressing     Problem: Oncology Care  Goal: Optimal Oral Intake  Outcome: Not Progressing     Patient vitally stable on room air this shift ex BP elevated in 140s/60s, prn parameters not met. Oriented to self to self and place, lethargic at times. Bed alarm on for safety. Poor appetite, refused offers for evening meal.  and 102. Telemetry reading NSR. Continuing to monitor.     Cassandra Hunt, RN 6/6/2025   [phone]

## 2025-06-07 NOTE — PLAN OF CARE
Problem: Adult Inpatient Plan of Care  Goal: Optimal Comfort and Wellbeing  Intervention: Provide Person-Centered Care  Recent Flowsheet Documentation  Taken 6/7/2025 0900 by Larissa Donovan RN  Trust Relationship/Rapport:   care explained   choices provided     Problem: Adult Inpatient Plan of Care  Goal: Optimal Comfort and Wellbeing  Outcome: Progressing  Intervention: Provide Person-Centered Care  Recent Flowsheet Documentation  Taken 6/7/2025 0900 by Larissa Donovan RN  Trust Relationship/Rapport:   care explained   choices provided   Goal Outcome Evaluation:       Patient continues to be rather lethargic, and has slept most of this shift.  Good appetite. Given Tums for abdominal cramping and gas.

## 2025-06-08 LAB
ALBUMIN SERPL BCG-MCNC: 2 G/DL (ref 3.5–5.2)
ALP SERPL-CCNC: 231 U/L (ref 40–150)
ALT SERPL W P-5'-P-CCNC: 75 U/L (ref 0–50)
ANION GAP SERPL CALCULATED.3IONS-SCNC: 10 MMOL/L (ref 7–15)
AST SERPL W P-5'-P-CCNC: 154 U/L (ref 0–45)
BILIRUB DIRECT SERPL-MCNC: 0.45 MG/DL (ref 0–0.3)
BILIRUB SERPL-MCNC: 0.8 MG/DL
BUN SERPL-MCNC: 42.3 MG/DL (ref 8–23)
CALCIUM SERPL-MCNC: 8.9 MG/DL (ref 8.8–10.4)
CHLORIDE SERPL-SCNC: 110 MMOL/L (ref 98–107)
CREAT SERPL-MCNC: 2.14 MG/DL (ref 0.51–0.95)
EGFRCR SERPLBLD CKD-EPI 2021: 24 ML/MIN/1.73M2
ERYTHROCYTE [DISTWIDTH] IN BLOOD BY AUTOMATED COUNT: 21.2 % (ref 10–15)
GLUCOSE BLDC GLUCOMTR-MCNC: 129 MG/DL (ref 70–99)
GLUCOSE BLDC GLUCOMTR-MCNC: 146 MG/DL (ref 70–99)
GLUCOSE BLDC GLUCOMTR-MCNC: 155 MG/DL (ref 70–99)
GLUCOSE BLDC GLUCOMTR-MCNC: 89 MG/DL (ref 70–99)
GLUCOSE SERPL-MCNC: 95 MG/DL (ref 70–99)
HCO3 SERPL-SCNC: 20 MMOL/L (ref 22–29)
HCT VFR BLD AUTO: 38.1 % (ref 35–47)
HGB BLD-MCNC: 12.3 G/DL (ref 11.7–15.7)
MAGNESIUM SERPL-MCNC: 2.1 MG/DL (ref 1.7–2.3)
MCH RBC QN AUTO: 26.7 PG (ref 26.5–33)
MCHC RBC AUTO-ENTMCNC: 32.3 G/DL (ref 31.5–36.5)
MCV RBC AUTO: 83 FL (ref 78–100)
PHOSPHATE SERPL-MCNC: 4.3 MG/DL (ref 2.5–4.5)
PLATELET # BLD AUTO: 86 10E3/UL (ref 150–450)
POTASSIUM SERPL-SCNC: 4.9 MMOL/L (ref 3.4–5.3)
PROT SERPL-MCNC: 5.6 G/DL (ref 6.4–8.3)
RBC # BLD AUTO: 4.61 10E6/UL (ref 3.8–5.2)
SODIUM SERPL-SCNC: 140 MMOL/L (ref 135–145)
WBC # BLD AUTO: 3.2 10E3/UL (ref 4–11)

## 2025-06-08 PROCEDURE — 250N000013 HC RX MED GY IP 250 OP 250 PS 637: Performed by: HOSPITALIST

## 2025-06-08 PROCEDURE — 250N000013 HC RX MED GY IP 250 OP 250 PS 637: Performed by: INTERNAL MEDICINE

## 2025-06-08 PROCEDURE — 99232 SBSQ HOSP IP/OBS MODERATE 35: CPT | Performed by: INTERNAL MEDICINE

## 2025-06-08 PROCEDURE — 84155 ASSAY OF PROTEIN SERUM: CPT | Performed by: INTERNAL MEDICINE

## 2025-06-08 PROCEDURE — 36415 COLL VENOUS BLD VENIPUNCTURE: CPT | Performed by: INTERNAL MEDICINE

## 2025-06-08 PROCEDURE — 84460 ALANINE AMINO (ALT) (SGPT): CPT | Performed by: INTERNAL MEDICINE

## 2025-06-08 PROCEDURE — 99233 SBSQ HOSP IP/OBS HIGH 50: CPT | Performed by: INTERNAL MEDICINE

## 2025-06-08 PROCEDURE — 250N000011 HC RX IP 250 OP 636: Performed by: INTERNAL MEDICINE

## 2025-06-08 PROCEDURE — 120N000001 HC R&B MED SURG/OB

## 2025-06-08 PROCEDURE — 84450 TRANSFERASE (AST) (SGOT): CPT | Performed by: INTERNAL MEDICINE

## 2025-06-08 PROCEDURE — 84075 ASSAY ALKALINE PHOSPHATASE: CPT | Performed by: INTERNAL MEDICINE

## 2025-06-08 PROCEDURE — 84295 ASSAY OF SERUM SODIUM: CPT | Performed by: INTERNAL MEDICINE

## 2025-06-08 PROCEDURE — 82248 BILIRUBIN DIRECT: CPT | Performed by: INTERNAL MEDICINE

## 2025-06-08 PROCEDURE — 85018 HEMOGLOBIN: CPT | Performed by: INTERNAL MEDICINE

## 2025-06-08 PROCEDURE — 82247 BILIRUBIN TOTAL: CPT | Performed by: INTERNAL MEDICINE

## 2025-06-08 PROCEDURE — 83735 ASSAY OF MAGNESIUM: CPT | Performed by: INTERNAL MEDICINE

## 2025-06-08 RX ORDER — SPIRONOLACTONE 25 MG/1
50 TABLET ORAL
Status: DISCONTINUED | OUTPATIENT
Start: 2025-06-08 | End: 2025-06-11 | Stop reason: HOSPADM

## 2025-06-08 RX ADMIN — LEVOTHYROXINE SODIUM 150 MCG: 0.03 TABLET ORAL at 10:06

## 2025-06-08 RX ADMIN — ASPIRIN 81 MG: 81 TABLET, COATED ORAL at 09:52

## 2025-06-08 RX ADMIN — PANTOPRAZOLE SODIUM 40 MG: 40 INJECTION, POWDER, FOR SOLUTION INTRAVENOUS at 04:34

## 2025-06-08 RX ADMIN — METOPROLOL SUCCINATE 100 MG: 50 TABLET, EXTENDED RELEASE ORAL at 09:52

## 2025-06-08 RX ADMIN — PANTOPRAZOLE SODIUM 40 MG: 40 INJECTION, POWDER, FOR SOLUTION INTRAVENOUS at 16:44

## 2025-06-08 RX ADMIN — AMLODIPINE BESYLATE 10 MG: 5 TABLET ORAL at 09:53

## 2025-06-08 RX ADMIN — SPIRONOLACTONE 50 MG: 25 TABLET, FILM COATED ORAL at 17:00

## 2025-06-08 RX ADMIN — SPIRONOLACTONE 50 MG: 25 TABLET, FILM COATED ORAL at 09:54

## 2025-06-08 RX ADMIN — INSULIN ASPART 1 UNITS: 100 INJECTION, SOLUTION INTRAVENOUS; SUBCUTANEOUS at 16:58

## 2025-06-08 ASSESSMENT — ACTIVITIES OF DAILY LIVING (ADL)
ADLS_ACUITY_SCORE: 47

## 2025-06-08 NOTE — PROGRESS NOTES
RENAL PROGRESS NOTE    CC:  Laura Cisneros MD      ASSESSMENT & PLAN:     MITESH with baseline CKD 3: Possible etiology please see consult note.  Cystatin C 1.8 with EGFR 32% when creatinine 2.25 with EGFR 23%.  Clinically not uremic.  Creatinine 2.08 with EGFR 25% yesterday and creatinine 2.14 with EGFR 24% today consistent with stable kidney function.  Continue conservative management.  Reasonable to follow Cystatin C also because her chemotherapy is affecting creatinine clearance.  There is no objection from nephrology standpoint discharge from the hospital with outpatient lab in 1 week to check renal function.  Will arrange for outpatient follow-up for further recommendation.  Strict I's and O  Daily body weight  Avoid nephrotoxic medications  Renally dosing medications.    Electrolytes: Sodium 140, potassium 4.9.  Monitor.    Acid-base: Bicarb 20 today.  Stable.  Monitor.      BMD: Calcium 8.9 and phos 4.3.    BP/volume: Blood pressure above goal.  Resume metoprolol.  Holding lisinopril and spironolactone.  Resume spironolactone for now.  If blood pressure still elevated, we will  increase to 50 mg twice daily spironolactone and hold off lisinopril until kidney function back to her baseline.  Monitor clinically euvolemic.    Anemia: Hemoglobin 12.2.  Monitor.        SUBJECTIVE: Patient seen and examined at bedside.  Feeling better.  No chest pain, shortness of breath.    OBJECTIVE:  Physical Exam   Temp: 97.9  F (36.6  C) Temp src: Oral BP: (!) 149/66 Pulse: 59   Resp: 18 SpO2: 98 % O2 Device: None (Room air)    Vitals:    06/02/25 1733   Weight: 61.2 kg (135 lb)     Vital Signs with Ranges  Temp:  [97.7  F (36.5  C)-98.8  F (37.1  C)] 97.9  F (36.6  C)  Pulse:  [59-62] 59  Resp:  [17-20] 18  BP: (146-163)/(66-74) 149/66  SpO2:  [95 %-98 %] 98 %  I/O last 3 completed shifts:  In: 780 [P.O.:780]  Out: 1150 [Urine:1150]    @TMAXR(24)@    No data found.  Intake/Output Summary (Last 24 hours) at 6/6/2025  1043  Last data filed at 6/6/2025 0100  Gross per 24 hour   Intake 837 ml   Output 700 ml   Net 137 ml       PHYSICAL EXAM:  General - Alert and oriented x3, appears comfortable, NAD  Cardiovascular - Regular rate and rhythm, no rub  Respiratory - Clear to auscultation bilaterally, no crackles or wheezes  Abd: BS present, no guarding or pain with palpation, no ascites  Extremities - No lower extremity edema bilaterally  Skin: dry, intact, no rash, good turgor  Neuro:  Grossly intact, no focal deficits  MSK:  Grossly intact  Psych:  Normal affect    LABORATORY STUDIES:     Recent Labs   Lab 06/08/25  0536 06/07/25  0554 06/06/25  0620 06/05/25  0815 06/05/25  0625 06/04/25  0531   WBC 3.2* 3.6* 2.6* 3.4* 3.0* 2.2*   RBC 4.61 4.87 4.43 5.03 5.10 4.57   HGB 12.3 13.1 12.0 13.3 13.5 12.3   HCT 38.1 40.9 36.3 42.2 42.7 38.0   PLT 86* 83* 56* 56* 44* 44*       Basic Metabolic Panel:  Recent Labs   Lab 06/08/25  1201 06/08/25  0831 06/08/25  0536 06/07/25 2014 06/07/25  1630 06/07/25  1234 06/07/25  0723 06/07/25  0553 06/06/25  0953 06/06/25  0620 06/05/25  1216 06/05/25  0815 06/05/25  0809 06/05/25  0625 06/04/25  0820 06/04/25  0531   NA  --   --  140  --   --   --   --  136  --  137  --  139  --  139  --  142   POTASSIUM  --   --  4.9  --   --   --   --  4.6  --  4.1  --  4.1  --  3.9  --  3.8   CHLORIDE  --   --  110*  --   --   --   --  107  --  107  --  110*  --  111*  --  114*   CO2  --   --  20*  --   --   --   --  20*  --  23  --  19*  --  19*  --  19*   BUN  --   --  42.3*  --   --   --   --  40.2*  --  38.4*  --  34.9*  --  35.6*  --  29.3*   CR  --   --  2.14*  --   --   --   --  2.08*  --  2.16*  --  2.25*  --  2.19*  --  1.94*   * 89 95 128* 142* 105*   < > 80   < > 95   < > 102*   < > 103*   < > 105*   COLT  --   --  8.9  --   --   --   --  9.0  --  8.7*  --  8.6*  --  8.6*  --  7.6*    < > = values in this interval not displayed.       INR  Recent Labs   Lab 06/02/25  7830   INR 1.12        Recent  Labs   Lab Test 06/08/25  0536 06/07/25  0554 06/03/25  1857 06/02/25  1805 02/17/25  1022 02/04/25  0939   INR  --   --   --  1.12  --  1.07   WBC 3.2* 3.6*   < > 3.1*   < >  --    HGB 12.3 13.1   < > 15.7   < >  --    PLT 86* 83*   < > 73*   < >  --     < > = values in this interval not displayed.       Personally reviewed current labs      Teo Flor MD  [facility]  [phone]

## 2025-06-08 NOTE — PLAN OF CARE
Goal Outcome Evaluation:      Plan of Care Reviewed With: patient, family    Overall Patient Progress: improving    Outcome Evaluation: Hypertensive but otherwise vitally stable this shift, confusion noted, difficulty following commands, appetite improving      Problem: Delirium  Goal: Improved Attention and Thought Clarity  Outcome: Not Progressing     Problem: Oncology Care  Goal: Optimal Oral Intake  Outcome: Progressing     Problem: Oncology Care  Goal: Optimal Pain Control and Function  Outcome: Progressing     Patient hypertensive this shift, parameters not met for prn hydralazine, otherwise vitally stable on room air. Telemetry reading sinus david to sinus rhythm. Oriented to self and place, difficulty following commands at times. Denies pain. Appetite improved from yesterday, consumed 50% of evening meal along with Glucerna shake.  and 128. Tums given per request for heartburn. Up to bathroom this afternoon, voided 200ml clear verona urine, no stool.     Cassandra Hunt RN 6/7/2025   [phone]

## 2025-06-08 NOTE — PLAN OF CARE
Problem: Adult Inpatient Plan of Care  Goal: Absence of Hospital-Acquired Illness or Injury  Intervention: Identify and Manage Fall Risk  Recent Flowsheet Documentation  Taken 6/8/2025 1007 by Larissa Donovan RN  Safety Promotion/Fall Prevention:   activity supervised   clutter free environment maintained   nonskid shoes/slippers when out of bed     Problem: Adult Inpatient Plan of Care  Goal: Absence of Hospital-Acquired Illness or Injury  Intervention: Prevent Skin Injury  Recent Flowsheet Documentation  Taken 6/8/2025 1007 by Larisas Donovan RN  Body Position: position changed independently     Problem: Adult Inpatient Plan of Care  Goal: Optimal Comfort and Wellbeing  Outcome: Progressing     Problem: Oncology Care  Goal: Optimal Oral Intake  Outcome: Progressing   Goal Outcome Evaluation:  Patient has been lethargic today. She is sometimes hard to understand and confused. Poor appetite. Denies pain.
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
## 2025-06-08 NOTE — PLAN OF CARE
Goal Outcome Evaluation:      Uneventful shift, VSS, NSR, patient voided 100 ml/ ambulated to the bathroom, patient still weak, patient is oriented to self and place, patient is on RA, no SOB,                         Problem: Adult Inpatient Plan of Care  Goal: Absence of Hospital-Acquired Illness or Injury  Intervention: Identify and Manage Fall Risk  Recent Flowsheet Documentation  Taken 6/8/2025 0000 by Brent Garvin RN  Safety Promotion/Fall Prevention:   activity supervised   assistive device/personal items within reach   clutter free environment maintained   increased rounding and observation   nonskid shoes/slippers when out of bed   patient and family education   room near nurse's station   room organization consistent   safety round/check completed   supervised activity  Intervention: Prevent Skin Injury  Recent Flowsheet Documentation  Taken 6/8/2025 0000 by Brent Garvin RN  Body Position:   position changed independently   supine, head elevated  Intervention: Prevent and Manage VTE (Venous Thromboembolism) Risk  Recent Flowsheet Documentation  Taken 6/8/2025 0000 by Brent Garvin RN  VTE Prevention/Management:   SCDs off (sequential compression devices)   patient refused intervention  Intervention: Prevent Infection  Recent Flowsheet Documentation  Taken 6/8/2025 0000 by Brent Garvin RN  Infection Prevention:   hand hygiene promoted   rest/sleep promoted   single patient room provided  Goal: Optimal Comfort and Wellbeing  Intervention: Provide Person-Centered Care  Recent Flowsheet Documentation  Taken 6/8/2025 0000 by Brent Garvin RN  Trust Relationship/Rapport:   care explained   choices provided   thoughts/feelings acknowledged   emotional support provided   empathic listening provided   questions answered   questions encouraged   reassurance provided

## 2025-06-08 NOTE — PROGRESS NOTES
"Care Management Follow Up  Late note from 06/07/2025    Length of Stay (days): 6    Expected Discharge Date: 06/08/2025     Concerns to be Addressed:  discharge planing    Patient plan of care discussed at interdisciplinary rounds: Yes    Anticipated Discharge Disposition:  TBD     Anticipated Discharge Services:  TBD  Anticipated Discharge DME:  TBD    Handoff Completed: Yes, [facility] PCP: Internal handoff referral completed    Additional Information:  Met with patient briefly in her room with spouse and sister in law. Patient complains of heart burn. States she talked to the nurse about this and was given \"something\" to help with her symptoms.  Sister in law who assist with patient and her spouse affairs wasn't present at this time. Spouse was sitting in a chair next to bed, due to his cognitive deficits he wasn't much participating in the conversation. SW attempted to explain some discharge ideas and asked patient if she has medical assistance? Patient reports that they both have medical assistance, but then the sister in law thought they just have medicare and supplemental insurance.   SW will try to verify with Destini, sister in law that has more information about patient's and her spouse.  Next Steps: CM will continue to monitor progression of care, review team recommendations and provide discharge planning assist as needed.     MARY Barrios     "

## 2025-06-09 ENCOUNTER — APPOINTMENT (OUTPATIENT)
Dept: RADIOLOGY | Facility: HOSPITAL | Age: 75
End: 2025-06-09
Attending: INTERNAL MEDICINE
Payer: COMMERCIAL

## 2025-06-09 ENCOUNTER — DOCUMENTATION ONLY (OUTPATIENT)
Dept: OTHER | Facility: CLINIC | Age: 75
End: 2025-06-09
Payer: COMMERCIAL

## 2025-06-09 LAB
ALBUMIN SERPL BCG-MCNC: 2.2 G/DL (ref 3.5–5.2)
ALBUMIN UR-MCNC: 70 MG/DL
ALP SERPL-CCNC: 228 U/L (ref 40–150)
ALT SERPL W P-5'-P-CCNC: 75 U/L (ref 0–50)
AMMONIA PLAS-SCNC: 87 UMOL/L (ref 11–51)
ANION GAP SERPL CALCULATED.3IONS-SCNC: 10 MMOL/L (ref 7–15)
APPEARANCE UR: CLEAR
AST SERPL W P-5'-P-CCNC: 130 U/L (ref 0–45)
ATRIAL RATE - MUSE: 90 BPM
BASOPHILS # BLD AUTO: 0 10E3/UL (ref 0–0.2)
BASOPHILS NFR BLD AUTO: 1 %
BILIRUB DIRECT SERPL-MCNC: 0.44 MG/DL (ref 0–0.3)
BILIRUB SERPL-MCNC: 0.8 MG/DL
BILIRUB UR QL STRIP: NEGATIVE
BUN SERPL-MCNC: 45.4 MG/DL (ref 8–23)
CALCIUM SERPL-MCNC: 9.2 MG/DL (ref 8.8–10.4)
CHLORIDE SERPL-SCNC: 110 MMOL/L (ref 98–107)
COLOR UR AUTO: YELLOW
CREAT SERPL-MCNC: 2.08 MG/DL (ref 0.51–0.95)
DIASTOLIC BLOOD PRESSURE - MUSE: NORMAL MMHG
EGFRCR SERPLBLD CKD-EPI 2021: 25 ML/MIN/1.73M2
EOSINOPHIL # BLD AUTO: 0.1 10E3/UL (ref 0–0.7)
EOSINOPHIL NFR BLD AUTO: 2 %
ERYTHROCYTE [DISTWIDTH] IN BLOOD BY AUTOMATED COUNT: 21 % (ref 10–15)
ERYTHROCYTE [DISTWIDTH] IN BLOOD BY AUTOMATED COUNT: 21.1 % (ref 10–15)
GLUCOSE BLDC GLUCOMTR-MCNC: 101 MG/DL (ref 70–99)
GLUCOSE BLDC GLUCOMTR-MCNC: 153 MG/DL (ref 70–99)
GLUCOSE BLDC GLUCOMTR-MCNC: 156 MG/DL (ref 70–99)
GLUCOSE BLDC GLUCOMTR-MCNC: 161 MG/DL (ref 70–99)
GLUCOSE SERPL-MCNC: 106 MG/DL (ref 70–99)
GLUCOSE UR STRIP-MCNC: NEGATIVE MG/DL
HCO3 SERPL-SCNC: 21 MMOL/L (ref 22–29)
HCT VFR BLD AUTO: 36.4 % (ref 35–47)
HCT VFR BLD AUTO: 36.9 % (ref 35–47)
HGB BLD-MCNC: 12.3 G/DL (ref 11.7–15.7)
HGB BLD-MCNC: 12.3 G/DL (ref 11.7–15.7)
HGB UR QL STRIP: ABNORMAL
HOLD SPECIMEN: NORMAL
HOLD SPECIMEN: NORMAL
HYALINE CASTS: 3 /LPF
IMM GRANULOCYTES # BLD: 0 10E3/UL
IMM GRANULOCYTES NFR BLD: 1 %
INTERPRETATION ECG - MUSE: NORMAL
KETONES UR STRIP-MCNC: NEGATIVE MG/DL
LEUKOCYTE ESTERASE UR QL STRIP: NEGATIVE
LYMPHOCYTES # BLD AUTO: 0.6 10E3/UL (ref 0.8–5.3)
LYMPHOCYTES NFR BLD AUTO: 19 %
MAGNESIUM SERPL-MCNC: 2 MG/DL (ref 1.7–2.3)
MCH RBC QN AUTO: 27.3 PG (ref 26.5–33)
MCH RBC QN AUTO: 27.6 PG (ref 26.5–33)
MCHC RBC AUTO-ENTMCNC: 33.3 G/DL (ref 31.5–36.5)
MCHC RBC AUTO-ENTMCNC: 33.8 G/DL (ref 31.5–36.5)
MCV RBC AUTO: 82 FL (ref 78–100)
MCV RBC AUTO: 82 FL (ref 78–100)
MONOCYTES # BLD AUTO: 0.4 10E3/UL (ref 0–1.3)
MONOCYTES NFR BLD AUTO: 11 %
NEUTROPHILS # BLD AUTO: 2.2 10E3/UL (ref 1.6–8.3)
NEUTROPHILS NFR BLD AUTO: 67 %
NITRATE UR QL: NEGATIVE
NRBC # BLD AUTO: 0 10E3/UL
NRBC BLD AUTO-RTO: 0 /100
P AXIS - MUSE: 67 DEGREES
PH UR STRIP: 5.5 [PH] (ref 5–7)
PHOSPHATE SERPL-MCNC: 4.6 MG/DL (ref 2.5–4.5)
PLATELET # BLD AUTO: 97 10E3/UL (ref 150–450)
PLATELET # BLD AUTO: 99 10E3/UL (ref 150–450)
POTASSIUM SERPL-SCNC: 4.9 MMOL/L (ref 3.4–5.3)
PR INTERVAL - MUSE: 192 MS
PROCALCITONIN SERPL IA-MCNC: 1.29 NG/ML
PROT SERPL-MCNC: 5.7 G/DL (ref 6.4–8.3)
QRS DURATION - MUSE: 68 MS
QT - MUSE: 388 MS
QTC - MUSE: 474 MS
R AXIS - MUSE: 5 DEGREES
RBC # BLD AUTO: 4.46 10E6/UL (ref 3.8–5.2)
RBC # BLD AUTO: 4.51 10E6/UL (ref 3.8–5.2)
RBC URINE: 3 /HPF
SODIUM SERPL-SCNC: 141 MMOL/L (ref 135–145)
SP GR UR STRIP: 1.01 (ref 1–1.03)
SQUAMOUS EPITHELIAL: 2 /HPF
SYSTOLIC BLOOD PRESSURE - MUSE: NORMAL MMHG
T AXIS - MUSE: 73 DEGREES
UROBILINOGEN UR STRIP-MCNC: NORMAL MG/DL
VENTRICULAR RATE- MUSE: 90 BPM
WBC # BLD AUTO: 3.2 10E3/UL (ref 4–11)
WBC # BLD AUTO: 3.3 10E3/UL (ref 4–11)
WBC URINE: 1 /HPF

## 2025-06-09 PROCEDURE — 87040 BLOOD CULTURE FOR BACTERIA: CPT | Performed by: INTERNAL MEDICINE

## 2025-06-09 PROCEDURE — 83735 ASSAY OF MAGNESIUM: CPT | Performed by: INTERNAL MEDICINE

## 2025-06-09 PROCEDURE — 99232 SBSQ HOSP IP/OBS MODERATE 35: CPT | Performed by: INTERNAL MEDICINE

## 2025-06-09 PROCEDURE — 82248 BILIRUBIN DIRECT: CPT | Performed by: INTERNAL MEDICINE

## 2025-06-09 PROCEDURE — 36415 COLL VENOUS BLD VENIPUNCTURE: CPT | Performed by: INTERNAL MEDICINE

## 2025-06-09 PROCEDURE — 250N000013 HC RX MED GY IP 250 OP 250 PS 637: Performed by: INTERNAL MEDICINE

## 2025-06-09 PROCEDURE — 99233 SBSQ HOSP IP/OBS HIGH 50: CPT | Performed by: CLINICAL NURSE SPECIALIST

## 2025-06-09 PROCEDURE — 84145 PROCALCITONIN (PCT): CPT | Performed by: INTERNAL MEDICINE

## 2025-06-09 PROCEDURE — 82140 ASSAY OF AMMONIA: CPT | Performed by: INTERNAL MEDICINE

## 2025-06-09 PROCEDURE — 99418 PROLNG IP/OBS E/M EA 15 MIN: CPT | Performed by: CLINICAL NURSE SPECIALIST

## 2025-06-09 PROCEDURE — 80069 RENAL FUNCTION PANEL: CPT | Performed by: INTERNAL MEDICINE

## 2025-06-09 PROCEDURE — 85027 COMPLETE CBC AUTOMATED: CPT | Performed by: INTERNAL MEDICINE

## 2025-06-09 PROCEDURE — 120N000001 HC R&B MED SURG/OB

## 2025-06-09 PROCEDURE — 85014 HEMATOCRIT: CPT | Performed by: INTERNAL MEDICINE

## 2025-06-09 PROCEDURE — 81001 URINALYSIS AUTO W/SCOPE: CPT | Performed by: INTERNAL MEDICINE

## 2025-06-09 PROCEDURE — 250N000013 HC RX MED GY IP 250 OP 250 PS 637: Performed by: HOSPITALIST

## 2025-06-09 PROCEDURE — 250N000011 HC RX IP 250 OP 636: Performed by: INTERNAL MEDICINE

## 2025-06-09 PROCEDURE — 84155 ASSAY OF PROTEIN SERUM: CPT | Performed by: INTERNAL MEDICINE

## 2025-06-09 PROCEDURE — 71045 X-RAY EXAM CHEST 1 VIEW: CPT

## 2025-06-09 PROCEDURE — 99233 SBSQ HOSP IP/OBS HIGH 50: CPT | Performed by: INTERNAL MEDICINE

## 2025-06-09 RX ORDER — LACTULOSE 10 G/15ML
10 SOLUTION ORAL 2 TIMES DAILY
Status: DISCONTINUED | OUTPATIENT
Start: 2025-06-09 | End: 2025-06-10

## 2025-06-09 RX ADMIN — SPIRONOLACTONE 50 MG: 25 TABLET, FILM COATED ORAL at 17:05

## 2025-06-09 RX ADMIN — LACTULOSE SOLUTION USP, 10 G/15 ML 10 G: 10 SOLUTION ORAL; RECTAL at 22:23

## 2025-06-09 RX ADMIN — LACTULOSE SOLUTION USP, 10 G/15 ML 10 G: 10 SOLUTION ORAL; RECTAL at 08:32

## 2025-06-09 RX ADMIN — PANTOPRAZOLE SODIUM 40 MG: 40 INJECTION, POWDER, FOR SOLUTION INTRAVENOUS at 04:02

## 2025-06-09 RX ADMIN — AMLODIPINE BESYLATE 10 MG: 5 TABLET ORAL at 08:32

## 2025-06-09 RX ADMIN — PANTOPRAZOLE SODIUM 40 MG: 40 INJECTION, POWDER, FOR SOLUTION INTRAVENOUS at 15:57

## 2025-06-09 RX ADMIN — METOPROLOL SUCCINATE 100 MG: 50 TABLET, EXTENDED RELEASE ORAL at 08:32

## 2025-06-09 RX ADMIN — ASPIRIN 81 MG: 81 TABLET, COATED ORAL at 08:32

## 2025-06-09 RX ADMIN — LEVOTHYROXINE SODIUM 150 MCG: 0.03 TABLET ORAL at 08:31

## 2025-06-09 RX ADMIN — INSULIN ASPART 1 UNITS: 100 INJECTION, SOLUTION INTRAVENOUS; SUBCUTANEOUS at 12:48

## 2025-06-09 RX ADMIN — SPIRONOLACTONE 50 MG: 25 TABLET, FILM COATED ORAL at 08:32

## 2025-06-09 RX ADMIN — FERROUS GLUCONATE 324 MG: 324 TABLET ORAL at 08:32

## 2025-06-09 RX ADMIN — INSULIN ASPART 1 UNITS: 100 INJECTION, SOLUTION INTRAVENOUS; SUBCUTANEOUS at 17:05

## 2025-06-09 RX ADMIN — ENOXAPARIN SODIUM 30 MG: 30 INJECTION SUBCUTANEOUS at 22:24

## 2025-06-09 ASSESSMENT — ACTIVITIES OF DAILY LIVING (ADL)
ADLS_ACUITY_SCORE: 51
ADLS_ACUITY_SCORE: 47
ADLS_ACUITY_SCORE: 48
ADLS_ACUITY_SCORE: 47
ADLS_ACUITY_SCORE: 47
ADLS_ACUITY_SCORE: 48
ADLS_ACUITY_SCORE: 47
ADLS_ACUITY_SCORE: 47
ADLS_ACUITY_SCORE: 48
ADLS_ACUITY_SCORE: 48
ADLS_ACUITY_SCORE: 47
ADLS_ACUITY_SCORE: 47
ADLS_ACUITY_SCORE: 48
ADLS_ACUITY_SCORE: 47
ADLS_ACUITY_SCORE: 48
ADLS_ACUITY_SCORE: 47
ADLS_ACUITY_SCORE: 48

## 2025-06-09 NOTE — PROGRESS NOTES
"PALLIATIVE CARE PROGRESS NOTE  [facility]       Patient Name: Liz Pollard  Date of Admission: 6/2/2025   Today the patient was seen for: follow up on goals of care     Recommendations & Counseling     GOALS OF CARE:   Goals are comfort focused-continuing with current cares and treatments while in hospital.  No CPR or intubation.  Order placed  Likely will sign on with hospice at discharge.  They are open to meeting with hospice.  Awaiting input from Dr. Holland, oncologist.  Family understands that patient would not continue with chemotherapy if enrolling with hospice.    Will need 24 hour supervision due to fluctuating cognitive status.  Complicating factor is that her  is physically well, but has dementia and also requires 24 hour supervision.  No children and no family that can provide 24 hour supervision. Referral placed to care management.   Medical decision making: Patient has fluctuating cognitive status.  Today, she is able to tell me she has cancer and verbalized understanding that cancer is progressing.  She can state her preferences such as that she wants to remain in her home with  Barry.  She was able to state she would want Destini as her health care agent.  Also that she doesn't want \"tubes or pounding on my chest.\"  At times, confused/forgetful and unable to recall details of our conversation.  She has questionable decision making capacity, therefore would recommend having surrogate present for support for any discussions regarding goals of care.   Reviewed role of outpatient palliative care clinic for ongoing goals of care discussion and support.  She would be interested in this.  Referral placed 6/5. If she enrolls in hospice, then cancel referral.   Recommend patient following up with MYRNA Salas,  with [facility] oncology.  Last saw patient 2/20/2025.  Referral placed 6/5.     ADVANCE CARE PLANNING:  Completed HCD with " patient and surrogate decision maker, Destini Pollard.  Patient states her surrogate decision maker would be CONCEPCION Georges.  Document left in room to be signed/notarized  POLST completed today indicating her wish for no CPR and comfort focused treatments.  Original given to patient and copy sent to [facility]  Code status: DNR/DNI after discussing with patient and surrogate today.  Order placed.      MEDICAL MANAGEMENT:   We are not actively managing symptoms at this time.     PSYCHOSOCIAL/SPIRITUAL SUPPORT:  Family , Holley, and Destini JAVIER  Family requesting spiritual care support as patient is Yazidism.  Referral placed.     Palliative Care will continue to follow. Thank you for the consult and allowing us to aid in the care of Liz Pollard.    These recommendations have been discussed with Nandini Nieves RN.    Liz Mack, CNS  [facility], Palliative Care  Securely message with the [employer] Web Console (learn more here) or  Text page via [facility] Paging/Directory         Interval History:     Multidisciplinary collaboration:  -Chart reviewed  -Fever overnight-CXR, UA and BC obtained  -Ammonia elevated-lactulose started     Patient/family narrative  Weak.  No pain or shortness of breath.  No nausea.  Appetite fair, asking for grapes and egg salad sandwich.  No BM today.   See above counseling and recommendations for summary of discussion/goals of care.     Assessment          Liz Pollard is a 74 year old female with a past medical history of DM2, thyroid disease, CKD, HLD, GERD, HTN, hepatocellular carcinoma with metastatic disease to adrenal glands (on palliative treatment -s/p 3 cycles of Avastin and Atizoelizumab; had disease progression and now treating with selpercatinib) who presented on 6/2/2025 from home where she lives with her  when he found her with altered mental status dehydration.   has Alzheimer's disease and she is his primary caregiver.  , Barry, called her  sister-in-law/his sister and stated that patient had been unresponsive.    Patient also with elevated liver function likely secondary to hepatocellular carcinoma and potential side effect of selpercatinib.  Creatinine remains elevated at 2.25.       Review of Systems:     Besides above, ROS was reviewed and is unremarkable  ESAS ([place] Symptom Assessment Scale 0 none -10 severe)  Pain: 0/10  Tiredness: 5/10  Drowsiness: 3/10  Nausea: 0/10          Physical Exam:   Temp:  [97.3  F (36.3  C)-100.1  F (37.8  C)] 98.3  F (36.8  C)  Pulse:  [59-66] 63  Resp:  [18-20] 18  BP: (143-151)/(62-74) 146/66  SpO2:  [96 %-98 %] 98 %  135 lbs 0 oz     Physical Exam  Vitals and nursing note reviewed.   Constitutional:       General: She is not in acute distress.  Pulmonary:      Effort: Pulmonary effort is normal. No respiratory distress.   Skin:     General: Skin is warm.   Neurological:      Oriented X 3, forgetful  Psychiatric:         Mood and Affect:  flat                Data Reviewed:     Results for orders placed or performed during the hospital encounter of 06/02/25 (from the past 24 hours)   Glucose by meter   Result Value Ref Range    GLUCOSE BY METER POCT 129 (H) 70 - 99 mg/dL   Glucose by meter   Result Value Ref Range    GLUCOSE BY METER POCT 146 (H) 70 - 99 mg/dL   Glucose by meter   Result Value Ref Range    GLUCOSE BY METER POCT 155 (H) 70 - 99 mg/dL   Renal panel   Result Value Ref Range    Sodium 141 135 - 145 mmol/L    Potassium 4.9 3.4 - 5.3 mmol/L    Chloride 110 (H) 98 - 107 mmol/L    Carbon Dioxide (CO2) 21 (L) 22 - 29 mmol/L    Anion Gap 10 7 - 15 mmol/L    Glucose 106 (H) 70 - 99 mg/dL    Urea Nitrogen 45.4 (H) 8.0 - 23.0 mg/dL    Creatinine 2.08 (H) 0.51 - 0.95 mg/dL    GFR Estimate 25 (L) >60 mL/min/1.73m2    Calcium 9.2 8.8 - 10.4 mg/dL    Albumin 2.2 (L) 3.5 - 5.2 g/dL    Phosphorus 4.6 (H) 2.5 - 4.5 mg/dL   Magnesium   Result Value Ref Range    Magnesium 2.0 1.7 - 2.3 mg/dL   CBC with platelets    Result Value Ref Range    WBC Count 3.2 (L) 4.0 - 11.0 10e3/uL    RBC Count 4.46 3.80 - 5.20 10e6/uL    Hemoglobin 12.3 11.7 - 15.7 g/dL    Hematocrit 36.4 35.0 - 47.0 %    MCV 82 78 - 100 fL    MCH 27.6 26.5 - 33.0 pg    MCHC 33.8 31.5 - 36.5 g/dL    RDW 21.0 (H) 10.0 - 15.0 %    Platelet Count 97 (L) 150 - 450 10e3/uL   ALT   Result Value Ref Range    ALT 75 (H) 0 - 50 U/L   AST   Result Value Ref Range     (H) 0 - 45 U/L   Alkaline phosphatase   Result Value Ref Range    Alkaline Phosphatase 228 (H) 40 - 150 U/L   Bilirubin direct   Result Value Ref Range    Bilirubin Direct 0.44 (H) 0.00 - 0.30 mg/dL   Bilirubin  total   Result Value Ref Range    Bilirubin Total 0.8 <=1.2 mg/dL   Protein total   Result Value Ref Range    Protein Total 5.7 (L) 6.4 - 8.3 g/dL   XR Chest Port 1 View    Narrative    EXAM: XR CHEST PORT 1 VIEW  LOCATION: [facility]  DATE: 6/9/2025    INDICATION: fever  COMPARISON: 6/3/2025 and older studies      Impression    IMPRESSION: Minimal linear atelectasis in the right upper lobe. Lungs are otherwise clear. Heart and pulmonary vascularity are normal.    No signs of pneumonia or failure.   Extra Red Top Tube (LAB USE ONLY)   Result Value Ref Range    Hold Specimen JI    CBC with Platelets & Differential    Narrative    The following orders were created for panel order CBC with Platelets & Differential.  Procedure                               Abnormality         Status                     ---------                               -----------         ------                     CBC with platelets and ...[[id number]]  Abnormal            Final result                 Please view results for these tests on the individual orders.   CBC with platelets and differential   Result Value Ref Range    WBC Count 3.3 (L) 4.0 - 11.0 10e3/uL    RBC Count 4.51 3.80 - 5.20 10e6/uL    Hemoglobin 12.3 11.7 - 15.7 g/dL    Hematocrit 36.9 35.0 - 47.0 %    MCV 82 78 - 100 fL    MCH  27.3 26.5 - 33.0 pg    MCHC 33.3 31.5 - 36.5 g/dL    RDW 21.1 (H) 10.0 - 15.0 %    Platelet Count 99 (L) 150 - 450 10e3/uL    % Neutrophils 67 %    % Lymphocytes 19 %    % Monocytes 11 %    % Eosinophils 2 %    % Basophils 1 %    % Immature Granulocytes 1 %    NRBCs per 100 WBC 0 <1 /100    Absolute Neutrophils 2.2 1.6 - 8.3 10e3/uL    Absolute Lymphocytes 0.6 (L) 0.8 - 5.3 10e3/uL    Absolute Monocytes 0.4 0.0 - 1.3 10e3/uL    Absolute Eosinophils 0.1 0.0 - 0.7 10e3/uL    Absolute Basophils 0.0 0.0 - 0.2 10e3/uL    Absolute Immature Granulocytes 0.0 <=0.4 10e3/uL    Absolute NRBCs 0.0 10e3/uL   Extra Tube ([place] Draw)    Narrative    The following orders were created for panel order Extra Tube ([place] Draw).  Procedure                               Abnormality         Status                     ---------                               -----------         ------                     Extra Red Top Tube[[id number]]                              Final result                 Please view results for these tests on the individual orders.   Extra Red Top Tube   Result Value Ref Range    Hold Specimen JI    Procalcitonin   Result Value Ref Range    Procalcitonin 1.29 (H) <0.50 ng/mL   Ammonia   Result Value Ref Range    Ammonia 87 (H) 11 - 51 umol/L   UA with Microscopic reflex to Culture    Specimen: Urine, Midstream   Result Value Ref Range    Color Urine Yellow Colorless, Straw, Light Yellow, Yellow    Appearance Urine Clear Clear    Glucose Urine Negative Negative mg/dL    Bilirubin Urine Negative Negative    Ketones Urine Negative Negative mg/dL    Specific Gravity Urine 1.015 1.001 - 1.030    Blood Urine 0.06 mg/dL (A) Negative    pH Urine 5.5 5.0 - 7.0    Protein Albumin Urine 70 (A) Negative mg/dL    Urobilinogen Urine Normal Normal mg/dL    Nitrite Urine Negative Negative    Leukocyte Esterase Urine Negative Negative    RBC Urine 3 (H) <=2 /HPF    WBC Urine 1 <=5 /HPF    Squamous Epithelials Urine 2 (H) <=1 /HPF     Hyaline Casts Urine 3 (H) <=2 /LPF    Narrative    Urine Culture not indicated   Glucose by meter   Result Value Ref Range    GLUCOSE BY METER POCT 101 (H) 70 - 99 mg/dL         95 MINUTES SPENT BY ME on the date of service doing chart review, history, exam, documentation & further activities per the note.

## 2025-06-09 NOTE — PLAN OF CARE
Goal Outcome Evaluation:      Plan of Care Reviewed With: patient, family    Overall Patient Progress: no change    Outcome Evaluation: Patient vitally stable on room air this shift, lethargic at times with some confusion noted, fair appetite      Problem: Skin Injury Risk Increased  Goal: Skin Health and Integrity  Outcome: Progressing     Problem: Comorbidity Management  Goal: Blood Glucose Levels Within Targeted Range  Outcome: Progressing     Problem: Oncology Care  Goal: Optimal Oral Intake  Outcome: Not Progressing     Patient hypertensive but otherwise vitally stable on room air, parameters for prn hydralazine not met. Telemetry reading sinus rhythm. Patient lethargic and disoriented to situation at times, otherwise able to make needs known, bed alarm placed for safety. Denying pain. Consumed around 50% of evening meal, no supplemental shakes consumed this shift. Voided total of 700 ml this evening, up to bathroom with standby assistance.    Cassandra Hunt RN 6/8/2025   [id number]
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
## 2025-06-09 NOTE — PLAN OF CARE
"  Problem: Adult Inpatient Plan of Care  Goal: Plan of Care Review  Description: The Plan of Care Review/Shift note should be completed every shift.  The Outcome Evaluation is a brief statement about your assessment that the patient is improving, declining, or no change.  This information will be displayed automatically on your shift  note.  Outcome: Progressing  Goal: Patient-Specific Goal (Individualized)  Description: You can add care plan individualizations to a care plan. Examples of Individualization might be:  \"Parent requests to be called daily at 9am for status\", \"I have a hard time hearing out of my right ear\", or \"Do not touch me to wake me up as it startles  me\".  Outcome: Progressing  Goal: Absence of Hospital-Acquired Illness or Injury  Outcome: Progressing  Intervention: Identify and Manage Fall Risk  Recent Flowsheet Documentation  Taken 6/9/2025 0900 by Nandini Ciu RN  Safety Promotion/Fall Prevention:   activity supervised   chemotherapeutic precautions   clutter free environment maintained   increased rounding and observation   nonskid shoes/slippers when out of bed  Intervention: Prevent Skin Injury  Recent Flowsheet Documentation  Taken 6/9/2025 0900 by Nandini Cui RN  Body Position: position changed independently  Goal: Optimal Comfort and Wellbeing  Outcome: Progressing  Intervention: Provide Person-Centered Care  Recent Flowsheet Documentation  Taken 6/9/2025 0900 by Nandini Cui RN  Trust Relationship/Rapport:   care explained   choices provided  Goal: Readiness for Transition of Care  Outcome: Progressing   Goal Outcome Evaluation:       Patient intermittently lethargic.  Good appetite today.  Needs some tray set-up.  Denies pain.  Up to bathroom with SBA.  No stool this shift. Temp: 98.5  F (36.9  C) Temp src: Oral BP: 139/63 Pulse: 63   Resp: 16 SpO2: 97 % O2 Device: None (Room air)                        "

## 2025-06-09 NOTE — PROGRESS NOTES
[facility] Hematology and Oncology Inpatient Progress Note    Patient: Liz Pollard  MRN: [id number]  Date of Service: 06/09/2025         Reason for Visit    Advanced parasellar carcinoma  Hepatic insufficiency causing encephalopathy.    Assessment  and Plan    Advanced hepatocellular carcinoma which has failed first-line therapy with atezolizumab and Avastin.  Will started on selpercatinib but unable to tolerate the drug.  Declining performance status with altered mental status secondary to hepatic insufficiency.  She is requiring repeated lactulose dosing to keep her ammonia level down.  Possible demyelinating disease noted on the MRI of the brain.  Very difficult social situation with  with advanced dementia and requires care for himself.  Only 1 sister-in-law who is available to help both of them.  She also has a job.  Concern for safety at home.  Discussed with the patient that more than likely she may end up going to a nursing home.  Her  may also have to go.  We also talked about hospice care comfort care.  Although the patient says yes I do not think she understands.    Medical decision Making    Patient has advanced hepatocellular carcinoma with advanced complications.  Reviewed her labs including CBC CMP ammonia etc.  Personally reviewed the MRI images.  Reviewed notes from Dr. Cisneros and Liz Mack.  Also reviewed notes from nephrology.  Discussed the plan of action with Dr. Cisneros about comfort care etc.      Cancer Staging   No matching staging information was found for the patient.      History of Present Illness    Ms. Liz Pollard is a very pleasant 74-year-old woman with locally advanced as well as metastatic hepatocellular carcinoma.  She was initially treated with Tecentriq and bevacizumab.  However she started to progress on that in April 2025.  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  However the patient has  "continued to deteriorate.  She had a virtual visit on 2 June.  She was looking very encephalopathic therefore was advised to come to the emergency room.    She has been admitted in the emergency room.  Has been moved to the floor.  Has not really shown a whole lot of improvement although she looks better than when I did the virtual visit with her.  Her ammonia levels are still elevated.    She also has a very difficult social situation.  Her  has dementia.  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  She is now not in a situation treatment care for herself let alone care for her .  Her sister-in-law has been instrumental in helping her but she also has a job and is not available to care for them 24/7.  Because of her declining performance status and no improvement in her liver functions the hospitalist team had consulted with palliative care and hospice care.  They have discussed with them.  I am not sure if Liz understands all of it.  She is still somewhat confused.    Review of Systems    Pertinent findings noted in history.    Physical Exam    /60 (BP Location: Left arm)   Pulse 64   Temp 97.5  F (36.4  C) (Oral)   Resp 16   Ht 1.549 m (5' 1\")   Wt 62.5 kg (137 lb 12.8 oz)   SpO2 97%   BMI 26.04 kg/m      GENERAL: no acute distress. Cooperative in conversation.   HEENT: pupils are equal, round and reactive. Oral mucosa is moist and intact.  RESP:Chest symmetric. Regular respiratory rate. No stridor.  ABD: Nondistended, soft.  EXTREMITIES: No lower extremity edema.   NEURO: non focal. Alert and oriented x3.   PSYCH: within normal limits. No depression or anxiety.  SKIN: warm dry intact     Lab Results Reviewed    Recent Results (from the past 24 hours)   Glucose by meter    Collection Time: 06/08/25  9:17 PM   Result Value Ref Range    GLUCOSE BY METER POCT 155 (H) 70 - 99 mg/dL   Renal panel    Collection Time: 06/09/25  6:14 AM   Result Value Ref Range    Sodium 141 135 - 145 mmol/L    " Potassium 4.9 3.4 - 5.3 mmol/L    Chloride 110 (H) 98 - 107 mmol/L    Carbon Dioxide (CO2) 21 (L) 22 - 29 mmol/L    Anion Gap 10 7 - 15 mmol/L    Glucose 106 (H) 70 - 99 mg/dL    Urea Nitrogen 45.4 (H) 8.0 - 23.0 mg/dL    Creatinine 2.08 (H) 0.51 - 0.95 mg/dL    GFR Estimate 25 (L) >60 mL/min/1.73m2    Calcium 9.2 8.8 - 10.4 mg/dL    Albumin 2.2 (L) 3.5 - 5.2 g/dL    Phosphorus 4.6 (H) 2.5 - 4.5 mg/dL   Magnesium    Collection Time: 06/09/25  6:14 AM   Result Value Ref Range    Magnesium 2.0 1.7 - 2.3 mg/dL   CBC with platelets    Collection Time: 06/09/25  6:14 AM   Result Value Ref Range    WBC Count 3.2 (L) 4.0 - 11.0 10e3/uL    RBC Count 4.46 3.80 - 5.20 10e6/uL    Hemoglobin 12.3 11.7 - 15.7 g/dL    Hematocrit 36.4 35.0 - 47.0 %    MCV 82 78 - 100 fL    MCH 27.6 26.5 - 33.0 pg    MCHC 33.8 31.5 - 36.5 g/dL    RDW 21.0 (H) 10.0 - 15.0 %    Platelet Count 97 (L) 150 - 450 10e3/uL   ALT    Collection Time: 06/09/25  6:14 AM   Result Value Ref Range    ALT 75 (H) 0 - 50 U/L   AST    Collection Time: 06/09/25  6:14 AM   Result Value Ref Range     (H) 0 - 45 U/L   Alkaline phosphatase    Collection Time: 06/09/25  6:14 AM   Result Value Ref Range    Alkaline Phosphatase 228 (H) 40 - 150 U/L   Bilirubin direct    Collection Time: 06/09/25  6:14 AM   Result Value Ref Range    Bilirubin Direct 0.44 (H) 0.00 - 0.30 mg/dL   Bilirubin  total    Collection Time: 06/09/25  6:14 AM   Result Value Ref Range    Bilirubin Total 0.8 <=1.2 mg/dL   Protein total    Collection Time: 06/09/25  6:14 AM   Result Value Ref Range    Protein Total 5.7 (L) 6.4 - 8.3 g/dL   Extra Red Top Tube (LAB USE ONLY)    Collection Time: 06/09/25  6:46 AM   Result Value Ref Range    Hold Specimen [facility]    CBC with platelets and differential    Collection Time: 06/09/25  6:54 AM   Result Value Ref Range    WBC Count 3.3 (L) 4.0 - 11.0 10e3/uL    RBC Count 4.51 3.80 - 5.20 10e6/uL    Hemoglobin 12.3 11.7 - 15.7 g/dL    Hematocrit 36.9 35.0 - 47.0 %     MCV 82 78 - 100 fL    MCH 27.3 26.5 - 33.0 pg    MCHC 33.3 31.5 - 36.5 g/dL    RDW 21.1 (H) 10.0 - 15.0 %    Platelet Count 99 (L) 150 - 450 10e3/uL    % Neutrophils 67 %    % Lymphocytes 19 %    % Monocytes 11 %    % Eosinophils 2 %    % Basophils 1 %    % Immature Granulocytes 1 %    NRBCs per 100 WBC 0 <1 /100    Absolute Neutrophils 2.2 1.6 - 8.3 10e3/uL    Absolute Lymphocytes 0.6 (L) 0.8 - 5.3 10e3/uL    Absolute Monocytes 0.4 0.0 - 1.3 10e3/uL    Absolute Eosinophils 0.1 0.0 - 0.7 10e3/uL    Absolute Basophils 0.0 0.0 - 0.2 10e3/uL    Absolute Immature Granulocytes 0.0 <=0.4 10e3/uL    Absolute NRBCs 0.0 10e3/uL   Extra Red Top Tube    Collection Time: 06/09/25  6:54 AM   Result Value Ref Range    Hold Specimen [facility]    Procalcitonin    Collection Time: 06/09/25  6:55 AM   Result Value Ref Range    Procalcitonin 1.29 (H) <0.50 ng/mL   Ammonia    Collection Time: 06/09/25  6:55 AM   Result Value Ref Range    Ammonia 87 (H) 11 - 51 umol/L   UA with Microscopic reflex to Culture    Collection Time: 06/09/25  8:02 AM    Specimen: Urine, Midstream   Result Value Ref Range    Color Urine Yellow Colorless, Straw, Light Yellow, Yellow    Appearance Urine Clear Clear    Glucose Urine Negative Negative mg/dL    Bilirubin Urine Negative Negative    Ketones Urine Negative Negative mg/dL    Specific Gravity Urine 1.015 1.001 - 1.030    Blood Urine 0.06 mg/dL (A) Negative    pH Urine 5.5 5.0 - 7.0    Protein Albumin Urine 70 (A) Negative mg/dL    Urobilinogen Urine Normal Normal mg/dL    Nitrite Urine Negative Negative    Leukocyte Esterase Urine Negative Negative    RBC Urine 3 (H) <=2 /HPF    WBC Urine 1 <=5 /HPF    Squamous Epithelials Urine 2 (H) <=1 /HPF    Hyaline Casts Urine 3 (H) <=2 /LPF   Glucose by meter    Collection Time: 06/09/25  8:17 AM   Result Value Ref Range    GLUCOSE BY METER POCT 101 (H) 70 - 99 mg/dL   Glucose by meter    Collection Time: 06/09/25 12:36 PM   Result Value Ref Range    GLUCOSE BY  METER POCT 156 (H) 70 - 99 mg/dL   Glucose by meter    Collection Time: 06/09/25  4:56 PM   Result Value Ref Range    GLUCOSE BY METER POCT 153 (H) 70 - 99 mg/dL        Imaging Reviewed    XR Chest Port 1 View  Result Date: 6/9/2025  EXAM: XR CHEST PORT 1 VIEW LOCATION: [facility] DATE: 6/9/2025 INDICATION: fever COMPARISON: 6/3/2025 and older studies     IMPRESSION: Minimal linear atelectasis in the right upper lobe. Lungs are otherwise clear. Heart and pulmonary vascularity are normal. No signs of pneumonia or failure.       Signed by: Bautista Holland MD    This note has been dictated using voice recognition software. Any grammatical or context distortions are unintentional and inherent to the software

## 2025-06-09 NOTE — PROGRESS NOTES
RENAL PROGRESS NOTE    CC:  Laura Cisneros MD      ASSESSMENT & PLAN:   Pt with metastatic liver cancer on palliative chemo, admitted with AMS    MITESH with baseline CKD 3: MITESH suspect due to hemodynamic more likely than chemo / HRS. Creat peak 2.5 and now 2.1, rec following cystatin C creat/ GFR due to setting chemo, Cystatin C prob more accurate.    Baseline Creat 1.1-1.7 range previously  There is no objection from nephrology standpoint discharge from the hospital with outpatient lab in 1 week to check renal function.  Will arrange for outpatient follow-up as needed.     Electrolytes and acid / base stable. .    BMD: Calcium 8.9 and phos 4.3.    BP/volume: Blood pressure above goal.  Resume metoprolol and spironolactone.  Holding lisinopril   If blood pressure still elevated, we will  increase to 50 mg twice daily spironolactone and hold off lisinopril until kidney function back to her baseline.  Monitor clinically euvolemic.    Anemia: Hemoglobin 12's with low WBC and plts due to cancer/ chemo.        SUBJECTIVE: Patient seen and examined at bedside. Reportedly better, some mental clearing. Minimal po and no sob.   Friend at bedside and Palliative care here.     OBJECTIVE:  Physical Exam   Temp: 98.5  F (36.9  C) Temp src: Oral BP: 139/63 Pulse: 63   Resp: 16 SpO2: 97 % O2 Device: None (Room air)    Vitals:    06/02/25 1733 06/09/25 1128   Weight: 61.2 kg (135 lb) 62.5 kg (137 lb 12.8 oz)     Vital Signs with Ranges  Temp:  [97.3  F (36.3  C)-100.1  F (37.8  C)] 98.5  F (36.9  C)  Pulse:  [63-66] 63  Resp:  [16-20] 16  BP: (139-151)/(62-74) 139/63  SpO2:  [96 %-98 %] 97 %  I/O last 3 completed shifts:  In: 480 [P.O.:480]  Out: 1650 [Urine:1650]    @TMAXR(24)@    Patient Vitals for the past 72 hrs:   Weight   06/09/25 1128 62.5 kg (137 lb 12.8 oz)     Intake/Output Summary (Last 24 hours) at 6/6/2025 1043  Last data filed at 6/6/2025 0100  Gross per 24 hour   Intake 837 ml   Output 700 ml   Net 137 ml        PHYSICAL EXAM:  General - Alert and oriented appears comfortable, NAD  Normal resp effort on RA.   Abd: soft.   Extremities - No lower extremity edema bilaterally  Skin: dry, intact, no rash, good turgor  Neuro:  Grossly intact, no focal deficits  MSK:  Grossly intact  Psych:  flat affect    LABORATORY STUDIES:     Recent Labs   Lab 06/09/25  0654 06/09/25  0614 06/08/25  0536 06/07/25  0554 06/06/25  0620 06/05/25  0815   WBC 3.3* 3.2* 3.2* 3.6* 2.6* 3.4*   RBC 4.51 4.46 4.61 4.87 4.43 5.03   HGB 12.3 12.3 12.3 13.1 12.0 13.3   HCT 36.9 36.4 38.1 40.9 36.3 42.2   PLT 99* 97* 86* 83* 56* 56*       Basic Metabolic Panel:  Recent Labs   Lab 06/09/25  0817 06/09/25  0614 06/08/25  2117 06/08/25  1645 06/08/25  1201 06/08/25  0831 06/08/25  0536 06/07/25  0723 06/07/25  0553 06/06/25  0953 06/06/25  0620 06/05/25  1216 06/05/25  0815 06/05/25  0809 06/05/25  0625   NA  --  141  --   --   --   --  140  --  136  --  137  --  139  --  139   POTASSIUM  --  4.9  --   --   --   --  4.9  --  4.6  --  4.1  --  4.1  --  3.9   CHLORIDE  --  110*  --   --   --   --  110*  --  107  --  107  --  110*  --  111*   CO2  --  21*  --   --   --   --  20*  --  20*  --  23  --  19*  --  19*   BUN  --  45.4*  --   --   --   --  42.3*  --  40.2*  --  38.4*  --  34.9*  --  35.6*   CR  --  2.08*  --   --   --   --  2.14*  --  2.08*  --  2.16*  --  2.25*  --  2.19*   * 106* 155* 146* 129* 89 95   < > 80   < > 95   < > 102*   < > 103*   COLT  --  9.2  --   --   --   --  8.9  --  9.0  --  8.7*  --  8.6*  --  8.6*    < > = values in this interval not displayed.       INR  Recent Labs   Lab 06/02/25  1805   INR 1.12        Recent Labs   Lab Test 06/09/25  0654 06/09/25  0614 06/03/25  1857 06/02/25  1805 02/17/25  1022 02/04/25  0939   INR  --   --   --  1.12  --  1.07   WBC 3.3* 3.2*   < > 3.1*   < >  --    HGB 12.3 12.3   < > 15.7   < >  --    PLT 99* 97*   < > 73*   < >  --     < > = values in this interval not displayed.        Personally reviewed current labs      Bridget Lau MD  [facility]  [phone]

## 2025-06-09 NOTE — PLAN OF CARE
Problem: Adult Inpatient Plan of Care  Goal: Optimal Comfort and Wellbeing  Outcome: Progressing  Intervention: Provide Person-Centered Care  Recent Flowsheet Documentation  Taken 6/9/2025 0032 by Altagracia Atkins RN  Trust Relationship/Rapport:   care explained   choices provided     Problem: Delirium  Goal: Optimal Coping  Outcome: Progressing  Intervention: Optimize Psychosocial Adjustment to Delirium  Recent Flowsheet Documentation  Taken 6/9/2025 0032 by Altagracia Atkins RN  Supportive Measures:   active listening utilized   verbalization of feelings encouraged     Problem: Skin Injury Risk Increased  Goal: Skin Health and Integrity  Outcome: Progressing  Intervention: Plan: Nurse Driven Intervention: Moisture Management  Recent Flowsheet Documentation  Taken 6/9/2025 0032 by Altagracia Atkins RN  Moisture Interventions:   Encourage regular toileting   No brief in bed   Incontinence pad  Intervention: Plan: Nurse Driven Intervention: Friction and Shear  Recent Flowsheet Documentation  Taken 6/9/2025 0032 by Altagracia Atkins RN  Friction/Shear Interventions: HOB 30 degrees or less  Intervention: Optimize Skin Protection  Recent Flowsheet Documentation  Taken 6/9/2025 0032 by Altagracia Atkins RN  Pressure Reduction Techniques:   weight shift assistance provided   frequent weight shift encouraged   positioned off wounds  Head of Bed (HOB) Positioning: HOB at 20-30 degrees     Problem: Comorbidity Management  Goal: Blood Pressure in Desired Range  Outcome: Progressing  Intervention: Maintain Blood Pressure Management  Recent Flowsheet Documentation  Taken 6/9/2025 0032 by Altagracia Atkins RN  Medication Review/Management: medications reviewed   Goal Outcome Evaluation:       Pt is alert to self with confusion. Denies pain and no sing of pain noted. UA and chest xray was order for Temp 100.1. VSS continue to monitor. Altagracia Atkins RN

## 2025-06-09 NOTE — PROGRESS NOTES
CLINICAL NUTRITION SERVICES - BRIEF NOTE     INFORMATION OBTAINED  Assessed patient in room.  Pt reports okay po intakes and appetite, consuming most of her nutrition supplements. X1 unopened Glucerna at bedside. Pt denies any nausea vomiting or abd pain at this time. Pt agreeable to continue nutrition supplements.     CURRENT NUTRITION ORDERS  Diet: Regular  Snacks/Supplements: Ensure Max BID and Glucerna daily      CURRENT INTAKE/TOLERANCE  5/7-8: Pt consuming % x2-3 meals   Pt consuming ~1918-7529 kcal and 72-83 g protein   Meets % nutrition needs      NEW FINDINGS  GI symptoms:     BM: x1 6/7  Skin/wounds:     Buttocks shear injury   Nutrition-relevant labs: BUN 45.4(H), Creat 2.08(H), Phos 4.6(H), Alk Phos 228(H), ALT 75(H), (H), Ammonia 87(H), Bili D 0.44(H), BG  last 24 hrs  Nutrition-relevant medications: Scheduled ferrous gluconate, novolog, lactulose, synthroid, toprol xl, protonix, aldactone   Weight: No new, predict admit wt is stated     MALNUTRITION  % Intake: </=75% for >/= 1 month (severe)  % Weight Loss: Weight loss does not meet criteria   Subcutaneous Fat Loss: Triceps moderate  Muscle Loss: Thigh (quadriceps): Severe  Fluid Accumulation/Edema: None noted  Malnutrition Diagnosis: Severe malnutrition in the context of chronic illness  Malnutrition Present on Admission: Yes     INTERVENTIONS  Medical food supplement therapy- Continue   New weight   Encouraged adequate oral intakes to meet nutrition needs, small-frequent meals    MONITORING/EVALUATION  Progress toward goals will be monitored and evaluated per policy.

## 2025-06-09 NOTE — CONSULTS
Care Management Follow Up    Length of Stay (days): 7    Expected Discharge Date: 06/12/2025    Anticipated Discharge Plan:       Transportation: TBD    PT Recommendations: Transitional Care Facility, Per plan established by the PT  OT Recommendations:  Transitional Care Facility     Barriers to Discharge: medical stability, placement    Prior Living Situation: house with spouse    Discussed  Partnership in Safe Discharge Planning  document with patient/family: No     Handoff Completed: Yes, [facility] PCP: Internal handoff referral completed    Patient/Spokesperson Updated: Yes. Who? Pt, spouse, sister in law Destini    Additional Information:  CM met with pt, spouse, and sister in law Destini in room. They confirm that pt is interested in hospice. They state if pt were to discharge home she would need 24 hour care. CM provided private pay home care list. They state pt will likely need to go to LTC facility and they will need to apply for MA. CM provided LTC list and sent referral to financial counselor. Pt and family state their preferred hospice agency is [facility], [facility] to [facility].     CM also provided information on applying for EW and [facility] assessment for pt's spouse.    2:13 PM  [facility] meeting with pt and family tomorrow at 10am.     Next Steps: placement, KELLY Villagran, ERICKA

## 2025-06-10 ENCOUNTER — TELEPHONE (OUTPATIENT)
Dept: PHARMACY | Facility: HOSPITAL | Age: 75
End: 2025-06-10
Payer: COMMERCIAL

## 2025-06-10 LAB
ALBUMIN SERPL BCG-MCNC: 2.2 G/DL (ref 3.5–5.2)
ALP SERPL-CCNC: 286 U/L (ref 40–150)
ALT SERPL W P-5'-P-CCNC: 116 U/L (ref 0–50)
ANION GAP SERPL CALCULATED.3IONS-SCNC: 11 MMOL/L (ref 7–15)
AST SERPL W P-5'-P-CCNC: 234 U/L (ref 0–45)
BILIRUB DIRECT SERPL-MCNC: 0.46 MG/DL (ref 0–0.3)
BILIRUB SERPL-MCNC: 0.7 MG/DL
BUN SERPL-MCNC: 39.1 MG/DL (ref 8–23)
CALCIUM SERPL-MCNC: 8.7 MG/DL (ref 8.8–10.4)
CHLORIDE SERPL-SCNC: 112 MMOL/L (ref 98–107)
CREAT SERPL-MCNC: 1.89 MG/DL (ref 0.51–0.95)
EGFRCR SERPLBLD CKD-EPI 2021: 28 ML/MIN/1.73M2
ERYTHROCYTE [DISTWIDTH] IN BLOOD BY AUTOMATED COUNT: 20.9 % (ref 10–15)
GLUCOSE BLDC GLUCOMTR-MCNC: 118 MG/DL (ref 70–99)
GLUCOSE BLDC GLUCOMTR-MCNC: 142 MG/DL (ref 70–99)
GLUCOSE BLDC GLUCOMTR-MCNC: 158 MG/DL (ref 70–99)
GLUCOSE BLDC GLUCOMTR-MCNC: 224 MG/DL (ref 70–99)
GLUCOSE SERPL-MCNC: 125 MG/DL (ref 70–99)
HCO3 SERPL-SCNC: 20 MMOL/L (ref 22–29)
HCT VFR BLD AUTO: 36.8 % (ref 35–47)
HGB BLD-MCNC: 12.2 G/DL (ref 11.7–15.7)
MAGNESIUM SERPL-MCNC: 1.9 MG/DL (ref 1.7–2.3)
MCH RBC QN AUTO: 27.3 PG (ref 26.5–33)
MCHC RBC AUTO-ENTMCNC: 33.2 G/DL (ref 31.5–36.5)
MCV RBC AUTO: 82 FL (ref 78–100)
PHOSPHATE SERPL-MCNC: 4.4 MG/DL (ref 2.5–4.5)
PLATELET # BLD AUTO: 115 10E3/UL (ref 150–450)
POTASSIUM SERPL-SCNC: 4.6 MMOL/L (ref 3.4–5.3)
PROT SERPL-MCNC: 5.8 G/DL (ref 6.4–8.3)
RBC # BLD AUTO: 4.47 10E6/UL (ref 3.8–5.2)
SODIUM SERPL-SCNC: 143 MMOL/L (ref 135–145)
WBC # BLD AUTO: 3.3 10E3/UL (ref 4–11)

## 2025-06-10 PROCEDURE — 85027 COMPLETE CBC AUTOMATED: CPT | Performed by: INTERNAL MEDICINE

## 2025-06-10 PROCEDURE — 84450 TRANSFERASE (AST) (SGOT): CPT | Performed by: INTERNAL MEDICINE

## 2025-06-10 PROCEDURE — 36415 COLL VENOUS BLD VENIPUNCTURE: CPT | Performed by: INTERNAL MEDICINE

## 2025-06-10 PROCEDURE — 250N000013 HC RX MED GY IP 250 OP 250 PS 637: Performed by: HOSPITALIST

## 2025-06-10 PROCEDURE — 250N000011 HC RX IP 250 OP 636: Performed by: INTERNAL MEDICINE

## 2025-06-10 PROCEDURE — 80069 RENAL FUNCTION PANEL: CPT | Performed by: INTERNAL MEDICINE

## 2025-06-10 PROCEDURE — 99232 SBSQ HOSP IP/OBS MODERATE 35: CPT | Performed by: PHYSICIAN ASSISTANT

## 2025-06-10 PROCEDURE — 120N000001 HC R&B MED SURG/OB

## 2025-06-10 PROCEDURE — 250N000013 HC RX MED GY IP 250 OP 250 PS 637: Performed by: INTERNAL MEDICINE

## 2025-06-10 PROCEDURE — 82248 BILIRUBIN DIRECT: CPT | Performed by: INTERNAL MEDICINE

## 2025-06-10 PROCEDURE — 99232 SBSQ HOSP IP/OBS MODERATE 35: CPT | Performed by: CLINICAL NURSE SPECIALIST

## 2025-06-10 PROCEDURE — 99233 SBSQ HOSP IP/OBS HIGH 50: CPT | Performed by: INTERNAL MEDICINE

## 2025-06-10 PROCEDURE — 83735 ASSAY OF MAGNESIUM: CPT | Performed by: INTERNAL MEDICINE

## 2025-06-10 PROCEDURE — 84460 ALANINE AMINO (ALT) (SGPT): CPT | Performed by: INTERNAL MEDICINE

## 2025-06-10 PROCEDURE — 84075 ASSAY ALKALINE PHOSPHATASE: CPT | Performed by: INTERNAL MEDICINE

## 2025-06-10 PROCEDURE — 84155 ASSAY OF PROTEIN SERUM: CPT | Performed by: INTERNAL MEDICINE

## 2025-06-10 RX ORDER — SPIRONOLACTONE 50 MG/1
50 TABLET, FILM COATED ORAL
Qty: 60 TABLET | Refills: 1 | Status: SHIPPED | OUTPATIENT
Start: 2025-06-10

## 2025-06-10 RX ORDER — ATROPINE SULFATE 10 MG/ML
1-2 SOLUTION/ DROPS OPHTHALMIC EVERY 4 HOURS PRN
Qty: 5 ML | Refills: 0 | Status: SHIPPED | OUTPATIENT
Start: 2025-06-10

## 2025-06-10 RX ORDER — ONDANSETRON 4 MG/1
4 TABLET, ORALLY DISINTEGRATING ORAL EVERY 6 HOURS PRN
Qty: 20 TABLET | Refills: 0 | Status: SHIPPED | OUTPATIENT
Start: 2025-06-10

## 2025-06-10 RX ORDER — LORAZEPAM 0.5 MG/1
.25-.5 TABLET ORAL EVERY 4 HOURS PRN
Qty: 20 TABLET | Refills: 0 | Status: SHIPPED | OUTPATIENT
Start: 2025-06-10

## 2025-06-10 RX ORDER — HYDROMORPHONE HYDROCHLORIDE 2 MG/1
1-2 TABLET ORAL
Qty: 20 TABLET | Refills: 0 | Status: SHIPPED | OUTPATIENT
Start: 2025-06-10

## 2025-06-10 RX ORDER — ACETAMINOPHEN 650 MG/1
650 SUPPOSITORY RECTAL EVERY 4 HOURS PRN
Qty: 4 SUPPOSITORY | Refills: 0 | Status: SHIPPED | OUTPATIENT
Start: 2025-06-10

## 2025-06-10 RX ORDER — METOPROLOL SUCCINATE 100 MG/1
100 TABLET, EXTENDED RELEASE ORAL DAILY
Qty: 30 TABLET | Refills: 1 | Status: SHIPPED | OUTPATIENT
Start: 2025-06-11

## 2025-06-10 RX ORDER — ONDANSETRON 4 MG/1
4 TABLET, ORALLY DISINTEGRATING ORAL EVERY 6 HOURS PRN
Status: DISCONTINUED | OUTPATIENT
Start: 2025-06-10 | End: 2025-06-10

## 2025-06-10 RX ORDER — LACTULOSE 10 G/15ML
10 SOLUTION ORAL DAILY
Qty: 473 ML | Refills: 1 | Status: SHIPPED | OUTPATIENT
Start: 2025-06-11

## 2025-06-10 RX ORDER — PANTOPRAZOLE SODIUM 40 MG/1
40 TABLET, DELAYED RELEASE ORAL
Status: DISCONTINUED | OUTPATIENT
Start: 2025-06-10 | End: 2025-06-11 | Stop reason: HOSPADM

## 2025-06-10 RX ORDER — LEVOTHYROXINE SODIUM 150 UG/1
150 TABLET ORAL DAILY
Qty: 30 TABLET | Refills: 1 | Status: SHIPPED | OUTPATIENT
Start: 2025-06-11

## 2025-06-10 RX ORDER — AMLODIPINE BESYLATE 10 MG/1
10 TABLET ORAL DAILY
Qty: 30 TABLET | Refills: 1 | Status: SHIPPED | OUTPATIENT
Start: 2025-06-11

## 2025-06-10 RX ORDER — LACTULOSE 10 G/15ML
10 SOLUTION ORAL DAILY
Status: DISCONTINUED | OUTPATIENT
Start: 2025-06-11 | End: 2025-06-11 | Stop reason: HOSPADM

## 2025-06-10 RX ORDER — AMOXICILLIN 250 MG
1 CAPSULE ORAL 2 TIMES DAILY PRN
Qty: 10 TABLET | Refills: 0 | Status: SHIPPED | OUTPATIENT
Start: 2025-06-10

## 2025-06-10 RX ORDER — PANTOPRAZOLE SODIUM 40 MG/1
40 TABLET, DELAYED RELEASE ORAL
Qty: 60 TABLET | Refills: 1 | Status: SHIPPED | OUTPATIENT
Start: 2025-06-10

## 2025-06-10 RX ADMIN — SPIRONOLACTONE 50 MG: 25 TABLET, FILM COATED ORAL at 17:35

## 2025-06-10 RX ADMIN — INSULIN ASPART 2 UNITS: 100 INJECTION, SOLUTION INTRAVENOUS; SUBCUTANEOUS at 12:42

## 2025-06-10 RX ADMIN — PANTOPRAZOLE SODIUM 40 MG: 40 TABLET, DELAYED RELEASE ORAL at 16:39

## 2025-06-10 RX ADMIN — METOPROLOL SUCCINATE 100 MG: 50 TABLET, EXTENDED RELEASE ORAL at 08:25

## 2025-06-10 RX ADMIN — PANTOPRAZOLE SODIUM 40 MG: 40 INJECTION, POWDER, FOR SOLUTION INTRAVENOUS at 04:52

## 2025-06-10 RX ADMIN — LACTULOSE SOLUTION USP, 10 G/15 ML 10 G: 10 SOLUTION ORAL; RECTAL at 08:25

## 2025-06-10 RX ADMIN — INSULIN ASPART 1 UNITS: 100 INJECTION, SOLUTION INTRAVENOUS; SUBCUTANEOUS at 17:36

## 2025-06-10 RX ADMIN — ENOXAPARIN SODIUM 30 MG: 30 INJECTION SUBCUTANEOUS at 20:30

## 2025-06-10 RX ADMIN — AMLODIPINE BESYLATE 10 MG: 5 TABLET ORAL at 08:25

## 2025-06-10 RX ADMIN — ASPIRIN 81 MG: 81 TABLET, COATED ORAL at 08:25

## 2025-06-10 RX ADMIN — SPIRONOLACTONE 50 MG: 25 TABLET, FILM COATED ORAL at 08:25

## 2025-06-10 RX ADMIN — LEVOTHYROXINE SODIUM 150 MCG: 0.03 TABLET ORAL at 08:25

## 2025-06-10 ASSESSMENT — ACTIVITIES OF DAILY LIVING (ADL)
ADLS_ACUITY_SCORE: 55
ADLS_ACUITY_SCORE: 51
ADLS_ACUITY_SCORE: 55
ADLS_ACUITY_SCORE: 51
ADLS_ACUITY_SCORE: 55
ADLS_ACUITY_SCORE: 51
ADLS_ACUITY_SCORE: 55
ADLS_ACUITY_SCORE: 51
ADLS_ACUITY_SCORE: 55
ADLS_ACUITY_SCORE: 55
ADLS_ACUITY_SCORE: 51

## 2025-06-10 NOTE — PLAN OF CARE
Problem: Adult Inpatient Plan of Care  Goal: Optimal Comfort and Wellbeing  Outcome: Progressing  Intervention: Provide Person-Centered Care  Recent Flowsheet Documentation  Taken 6/10/2025 0835 by Frederick Berry RN  Trust Relationship/Rapport:   care explained   choices provided     Problem: Delirium  Goal: Improved Behavioral Control  Outcome: Progressing  Intervention: Minimize Safety Risk  Recent Flowsheet Documentation  Taken 6/10/2025 0835 by Frederick Berry RN  Enhanced Safety Measures: patient/family teach back on injury risk  Trust Relationship/Rapport:   care explained   choices provided     Problem: Delirium  Goal: Improved Attention and Thought Clarity  Intervention: Maximize Cognitive Function  Recent Flowsheet Documentation  Taken 6/10/2025 0835 by Frederick Berry RN  Reorientation Measures: clock in view     Problem: Oncology Care  Goal: Optimal Oral Intake  Outcome: Progressing    Patient alert.  Disoriented to date.  Forgetful at times.   No complaints of pain.  Up in chair with stand by assist.   Eating without difficulty.  Patient has had two loose stools this day shift.

## 2025-06-10 NOTE — TELEPHONE ENCOUNTER
PA approved.  Effective date: 06/10/2025  PA reference #: [id number]  Pt. notified:   Yes   Pt. informed directly.    Medication: LORAZEPAM 0.5 MG PO TABS  Insurance Company: Medicare Blue - Phone [phone] Fax [phone]  Pharmacy Filling the Rx: [place], [place] - [facility]  Filling Pharmacy Phone: [phone]  Filling Pharmacy Fax: [phone]  Start Date: 6/10/2025              PA Initiation            Thank you for allowing me to help with your patient  Jayleen Yung [facility]  Pharmacy Discharge Liaison   [place] locations  Available on teams and [employer]  Phone [phone] Fax [phone]    Disclaimer: Pharmacy test claims are estimates and may not reflect final costs. Suggested alternatives aim to be cost-effective but may not be therapeutically equivalent as this consult is informational and does not constitute medical advice. Clinical decisions should be made by qualified healthcare providers.

## 2025-06-10 NOTE — PROGRESS NOTES
RENAL PROGRESS NOTE    CC:  Laura Cisneros MD      ASSESSMENT & PLAN:   Pt with metastatic liver cancer on palliative chemo, admitted with AMS    MITESH with baseline CKD 3: MITESH suspect due to hemodynamic more likely than chemo / HRS. Creat peak 2.5 and now 2.1, rec following cystatin C creat/ GFR due to setting chemo, Cystatin C prob more accurate.    Baseline Creat 1.1-1.7 range previously  Now plans to transition to hospice at discharge, likely tomorrow   Nephrology will sign off, please re-consult if needed     Electrolytes and acid / base stable. .    BMD: Calcium 8.9 and phos 4.3.    BP/volume: Blood pressure above goal; have adjusted meds this admission but with plans to transition to hospice at discharge will not make further adjustments     Anemia: Hemoglobin 12's with low WBC and plts due to cancer/ chemo.        SUBJECTIVE: Cr better today again. Good UO. Family and hospice coordinator outside the room, discussed plan to hopefully discharge home with hospice tomorrow. Dirk seen up in chair with  present. Reviewed improved renal function.     OBJECTIVE:  Physical Exam   Temp: 98.3  F (36.8  C) Temp src: Oral BP: (!) 147/67 Pulse: 66   Resp: 18 SpO2: 100 % O2 Device: None (Room air)    Vitals:    06/02/25 1733 06/09/25 1128   Weight: 61.2 kg (135 lb) 62.5 kg (137 lb 12.8 oz)     Vital Signs with Ranges  Temp:  [97.4  F (36.3  C)-98.5  F (36.9  C)] 98.3  F (36.8  C)  Pulse:  [63-67] 66  Resp:  [16-22] 18  BP: (126-154)/(60-69) 147/67  SpO2:  [96 %-100 %] 100 %  I/O last 3 completed shifts:  In: 960 [P.O.:960]  Out: 975 [Urine:975]    @TMAXR(24)@    Patient Vitals for the past 72 hrs:   Weight   06/09/25 1128 62.5 kg (137 lb 12.8 oz)     Intake/Output Summary (Last 24 hours) at 6/6/2025 1043  Last data filed at 6/6/2025 0100  Gross per 24 hour   Intake 837 ml   Output 700 ml   Net 137 ml       PHYSICAL EXAM:  General - Alert and oriented appears comfortable, NAD  Normal resp effort on RA.   Abd:  soft.   Extremities - No lower extremity edema bilaterally  Skin: dry, intact, no rash, good turgor  Neuro:  Grossly intact, no focal deficits  MSK:  Grossly intact  Psych:  flat affect    LABORATORY STUDIES:     Recent Labs   Lab 06/10/25  0726 06/09/25  0654 06/09/25  0614 06/08/25  0536 06/07/25  0554 06/06/25  0620   WBC 3.3* 3.3* 3.2* 3.2* 3.6* 2.6*   RBC 4.47 4.51 4.46 4.61 4.87 4.43   HGB 12.2 12.3 12.3 12.3 13.1 12.0   HCT 36.8 36.9 36.4 38.1 40.9 36.3   * 99* 97* 86* 83* 56*       Basic Metabolic Panel:  Recent Labs   Lab 06/10/25  0726 06/10/25  0720 06/09/25  2113 06/09/25  1656 06/09/25  1236 06/09/25  0817 06/09/25  0614 06/08/25  0831 06/08/25  0536 06/07/25  0723 06/07/25  0553 06/06/25  0953 06/06/25  0620 06/05/25  1216 06/05/25  0815     --   --   --   --   --  141  --  140  --  136  --  137  --  139   POTASSIUM 4.6  --   --   --   --   --  4.9  --  4.9  --  4.6  --  4.1  --  4.1   CHLORIDE 112*  --   --   --   --   --  110*  --  110*  --  107  --  107  --  110*   CO2 20*  --   --   --   --   --  21*  --  20*  --  20*  --  23  --  19*   BUN 39.1*  --   --   --   --   --  45.4*  --  42.3*  --  40.2*  --  38.4*  --  34.9*   CR 1.89*  --   --   --   --   --  2.08*  --  2.14*  --  2.08*  --  2.16*  --  2.25*   * 118* 161* 153* 156* 101* 106*   < > 95   < > 80   < > 95   < > 102*   COLT 8.7*  --   --   --   --   --  9.2  --  8.9  --  9.0  --  8.7*  --  8.6*    < > = values in this interval not displayed.       INR  No lab results found in last 7 days.       Recent Labs   Lab Test 06/10/25  0726 06/09/25  0654 06/03/25  1857 06/02/25  1805 02/17/25  1022 02/04/25  0939   INR  --   --   --  1.12  --  1.07   WBC 3.3* 3.3*   < > 3.1*   < >  --    HGB 12.2 12.3   < > 15.7   < >  --    * 99*   < > 73*   < >  --     < > = values in this interval not displayed.       Personally reviewed current labs      Tatiana Ritchie PA-C   [facility]  [phone]

## 2025-06-10 NOTE — PROGRESS NOTES
"SPIRITUAL HEALTH SERVICES CONSULT NOTE  [facility]; P2    Saw pt Liz Pollard per Spiritual Care Consult    Patient/Family Understanding of Illness and Goals of Care - Met with Dirk, her  Barry, and her CONCEPCION Destini. Dirk had just finished speaking with Tracee/Palliative. She also had a conversation with hospice this morning and plans on going home tomorrow with hospice care. When asked how she feels about this Dirk says \"I just want to get home and be in my own bed.\" She denies any concerns about this plan. Destini is coordinating the different needs and resources and notes that it is a lot to take in, but feels that it is a good plan.     Distress and Loss - Not addressed during this visit. Dirk was not especially reflective, but when asked about any other concerns, Dirk talked about \"all the things\" that people have given her throughout her life that have meaning.     Strengths, Coping, and Resources - Destini says that Dirk and Barry have planned for their EOL needs and will use their skilled nursing savings for whatever costs Medicare doesn't cover. Dirk is looking forward to being in the comfort of her own home.     Meaning, Beliefs, and Spirituality - Dirk shares that prayer is important to her. She says that she feels very comfortable talking with God. She has some connection to [employer] and says that she will reach out to them herself to update them on her situation. She welcomed a prayer. No other concerns noted at this time.     Plan of Care: Spiritual care staff will remain available for further follow-up as requested by patient, family or staff.      Nohelia Pollard M.Div.      Office: [phone] (for non-urgent requests)  Please Vocera or page through [facility] for time-sensitive requests    "

## 2025-06-10 NOTE — PROGRESS NOTES
Update  Will get orders done today for discharge tomorrow   [facility] will set up private home care which will start tomorrow to help Dirk and   Meds will be ordered to fill here today so ready for tomorrow

## 2025-06-10 NOTE — PLAN OF CARE
Problem: Adult Inpatient Plan of Care  Goal: Absence of Hospital-Acquired Illness or Injury  Outcome: Progressing  Intervention: Identify and Manage Fall Risk  Recent Flowsheet Documentation  Taken 6/10/2025 0015 by Eleonora Ingram RN  Safety Promotion/Fall Prevention:   activity supervised   chemotherapeutic precautions   clutter free environment maintained   increased rounding and observation   nonskid shoes/slippers when out of bed  Intervention: Prevent Skin Injury  Recent Flowsheet Documentation  Taken 6/10/2025 0015 by Eleonora Ingram RN  Body Position: position changed independently  Skin Protection: tubing/devices free from skin contact  Goal: Optimal Comfort and Wellbeing  Outcome: Progressing  Intervention: Provide Person-Centered Care  Recent Flowsheet Documentation  Taken 6/10/2025 0015 by Eleonora Ingram RN  Trust Relationship/Rapport:   care explained   choices provided     Problem: Delirium  Goal: Improved Sleep  Outcome: Progressing     Problem: Comorbidity Management  Goal: Blood Glucose Levels Within Targeted Range  Outcome: Progressing  Intervention: Monitor and Manage Glycemia  Recent Flowsheet Documentation  Taken 6/10/2025 0015 by Eleonora Ingram RN  Medication Review/Management: medications reviewed  Goal: Blood Pressure in Desired Range  Outcome: Progressing  Intervention: Maintain Blood Pressure Management  Recent Flowsheet Documentation  Taken 6/10/2025 0015 by Eleonora Ingram RN  Medication Review/Management: medications reviewed   Goal Outcome Evaluation:     Pt is A&Ox2,/3 and is confused. VSS on RA, denies pain. Tele : SR w/1st AV  block. Reg diet with Glucerna supplement. SBA. Needs LTC placement on discharge, due to pt needing 24/7 full care. Consult for hospice and palliative care. GI consult
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
## 2025-06-10 NOTE — PLAN OF CARE
Goal Outcome Evaluation:      Plan of Care Reviewed With: patient    Denies pain this evening. Visited with family and ate 50% of her dinner. Had one bowel movement this shift.    Reviewed plan of care.   Rachele Bustos RN  [phone]

## 2025-06-10 NOTE — PROGRESS NOTES
PALLIATIVE CARE PROGRESS NOTE  [facility]       Patient Name: Liz Pollard  Date of Admission: 6/2/2025   Today the patient was seen for: follow up on goals of care     Recommendations & Counseling     GOALS OF CARE:   Goals are comfort focused-continuing with current cares and treatments while in hospital.  No CPR or intubation.    Plan is to discharge to home with [facility] and additional private pay  home care.   Palliative care will sign off as goals of care are established and we are not actively  managing symptoms.      ADVANCE CARE PLANNING:  Completed HCD with patient and surrogate decision maker, Destini Pollard.  Patient states her surrogate decision maker would be CONCEPCION Georges.   POLST completed today indicating her wish for no CPR and comfort focused treatments.  Original given to patient and copy sent to [facility]  Code status: DNR/DNI     MEDICAL MANAGEMENT:   We are not actively managing symptoms at this time.     PSYCHOSOCIAL/SPIRITUAL SUPPORT:  Family , Holley, and Destini JAVIER  Family requesting spiritual care support as patient is Mormon.  Referral placed.     Palliative Care will continue to follow. Thank you for the consult and allowing us to aid in the care of Liz Pollard.    These recommendations have been discussed with Dr. Amelia Mack, CNS  [facility], Palliative Care  Securely message with the [employer] Web Console (learn more here) or  Text page via [facility] Paging/Directory         Interval History:     Multidisciplinary collaboration:  -Chart reviewed  -No significant overnight events.   -discussed with Johana from [facility].  Signing on with hospice  with private pay home care.     Patient/family narrative  Sitting up in chair No pain or shortness of breath.  No nausea.  Appetite good. BM today.   See above counseling and recommendations for summary of discussion/goals of care.     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   has Alzheimer's disease and she is his primary caregiver.  , Barry, called her sister-in-law/his sister and stated that patient had been unresponsive.    Patient also with elevated liver function likely secondary to hepatocellular carcinoma and potential side effect of selpercatinib.  Creatinine remains elevated at 2.25.       Review of Systems:     Besides above, ROS was reviewed and is unremarkable  ESAS (Graham Symptom Assessment Scale 0 none -10 severe)  Pain: 0/10  Tiredness: 5/10  Drowsiness: 3/10  Nausea: 0/10          Physical Exam:   Temp:  [97.4  F (36.3  C)-98.5  F (36.9  C)] 98.3  F (36.8  C)  Pulse:  [63-67] 66  Resp:  [16-22] 18  BP: (126-154)/(60-69) 147/67  SpO2:  [96 %-100 %] 100 %  137 lbs 12.8 oz     Physical Exam  Vitals and nursing note reviewed.   Constitutional:       General: She is not in acute distress.  Pulmonary:      Effort: Pulmonary effort is normal. No respiratory distress.   Skin:     General: Skin is warm.   Neurological:      Oriented X 3, forgetful  Psychiatric:         Mood and Affect:  flat                Data Reviewed:     Results for orders placed or performed during the hospital encounter of 06/02/25 (from the past 24 hours)   Glucose by meter   Result Value Ref Range    GLUCOSE BY METER POCT 156 (H) 70 - 99 mg/dL   Glucose by meter   Result Value Ref Range    GLUCOSE BY METER POCT 153 (H) 70 - 99 mg/dL   Glucose by meter   Result Value Ref Range    GLUCOSE BY METER POCT 161 (H) 70 - 99 mg/dL   Glucose by meter   Result Value Ref Range    GLUCOSE BY  METER POCT 118 (H) 70 - 99 mg/dL   Renal panel   Result Value Ref Range    Sodium 143 135 - 145 mmol/L    Potassium 4.6 3.4 - 5.3 mmol/L    Chloride 112 (H) 98 - 107 mmol/L    Carbon Dioxide (CO2) 20 (L) 22 - 29 mmol/L    Anion Gap 11 7 - 15 mmol/L    Glucose 125 (H) 70 - 99 mg/dL    Urea Nitrogen 39.1 (H) 8.0 - 23.0 mg/dL    Creatinine 1.89 (H) 0.51 - 0.95 mg/dL    GFR Estimate 28 (L) >60 mL/min/1.73m2    Calcium 8.7 (L) 8.8 - 10.4 mg/dL    Albumin 2.2 (L) 3.5 - 5.2 g/dL    Phosphorus 4.4 2.5 - 4.5 mg/dL   Magnesium   Result Value Ref Range    Magnesium 1.9 1.7 - 2.3 mg/dL   CBC with platelets   Result Value Ref Range    WBC Count 3.3 (L) 4.0 - 11.0 10e3/uL    RBC Count 4.47 3.80 - 5.20 10e6/uL    Hemoglobin 12.2 11.7 - 15.7 g/dL    Hematocrit 36.8 35.0 - 47.0 %    MCV 82 78 - 100 fL    MCH 27.3 26.5 - 33.0 pg    MCHC 33.2 31.5 - 36.5 g/dL    RDW 20.9 (H) 10.0 - 15.0 %    Platelet Count 115 (L) 150 - 450 10e3/uL   ALT   Result Value Ref Range     (H) 0 - 50 U/L   AST   Result Value Ref Range     (H) 0 - 45 U/L   Alkaline phosphatase   Result Value Ref Range    Alkaline Phosphatase 286 (H) 40 - 150 U/L   Bilirubin direct   Result Value Ref Range    Bilirubin Direct 0.46 (H) 0.00 - 0.30 mg/dL   Bilirubin  total   Result Value Ref Range    Bilirubin Total 0.7 <=1.2 mg/dL   Protein total   Result Value Ref Range    Protein Total 5.8 (L) 6.4 - 8.3 g/dL         35 MINUTES SPENT BY ME on the date of service doing chart review, history, exam, documentation & further activities per the note.

## 2025-06-10 NOTE — PROGRESS NOTES
Care Management Follow Up    Length of Stay (days): 8    Expected Discharge Date: 06/12/2025    Anticipated Discharge Plan:       Transportation: TBD    PT Recommendations: Transitional Care Facility, Per plan established by the PT  OT Recommendations:  Transitional Care Facility     Barriers to Discharge: hospice coordination     Prior Living Situation: house with spouse    Discussed  Partnership in Safe Discharge Planning  document with patient/family: No     Handoff Completed: Yes, [facility] PCP: Internal handoff referral completed    Patient/Spokesperson Updated: Yes. Who? Sister in law Gustabo    Additional Information:  [facility] met with pt and family. Plan is for pt to discharge home with private pay home care. [facility] assisted family with reaching out to and setting up the private pay home care.     Spenceriarhiannone pt will be ready to dischagre home with ospice tomorrow. MD ordered 3 days of comfort meds to be filled and sent with at [facility].     CM called and left VM for Gustabo asking for call back to discuss transportation options.     CM left VM for Johana ([phone]) with [facility] asking what time they will be able to sign pt onto services tomorrow.     Next Steps: Confirm transportation with gustabo and updated [facility] of ride time     Sri Villagran, PAWELSW

## 2025-06-11 ENCOUNTER — PATIENT OUTREACH (OUTPATIENT)
Dept: ONCOLOGY | Facility: HOSPITAL | Age: 75
End: 2025-06-11
Payer: COMMERCIAL

## 2025-06-11 VITALS
HEART RATE: 75 BPM | RESPIRATION RATE: 18 BRPM | SYSTOLIC BLOOD PRESSURE: 138 MMHG | BODY MASS INDEX: 26.01 KG/M2 | OXYGEN SATURATION: 95 % | WEIGHT: 137.8 LBS | HEIGHT: 61 IN | DIASTOLIC BLOOD PRESSURE: 63 MMHG | TEMPERATURE: 98 F

## 2025-06-11 LAB
CREAT SERPL-MCNC: 1.94 MG/DL (ref 0.51–0.95)
EGFRCR SERPLBLD CKD-EPI 2021: 27 ML/MIN/1.73M2
GLUCOSE BLDC GLUCOMTR-MCNC: 129 MG/DL (ref 70–99)
GLUCOSE BLDC GLUCOMTR-MCNC: 98 MG/DL (ref 70–99)
MCV RBC AUTO: 83 FL (ref 78–100)
PLATELET # BLD AUTO: 121 10E3/UL (ref 150–450)

## 2025-06-11 PROCEDURE — 250N000013 HC RX MED GY IP 250 OP 250 PS 637: Performed by: HOSPITALIST

## 2025-06-11 PROCEDURE — 250N000013 HC RX MED GY IP 250 OP 250 PS 637: Performed by: INTERNAL MEDICINE

## 2025-06-11 PROCEDURE — 99239 HOSP IP/OBS DSCHRG MGMT >30: CPT | Mod: GC | Performed by: INTERNAL MEDICINE

## 2025-06-11 PROCEDURE — 36415 COLL VENOUS BLD VENIPUNCTURE: CPT | Performed by: INTERNAL MEDICINE

## 2025-06-11 PROCEDURE — 82565 ASSAY OF CREATININE: CPT | Performed by: INTERNAL MEDICINE

## 2025-06-11 PROCEDURE — 85049 AUTOMATED PLATELET COUNT: CPT | Performed by: INTERNAL MEDICINE

## 2025-06-11 RX ADMIN — SPIRONOLACTONE 50 MG: 25 TABLET, FILM COATED ORAL at 08:02

## 2025-06-11 RX ADMIN — ASPIRIN 81 MG: 81 TABLET, COATED ORAL at 08:02

## 2025-06-11 RX ADMIN — AMLODIPINE BESYLATE 10 MG: 5 TABLET ORAL at 08:02

## 2025-06-11 RX ADMIN — PANTOPRAZOLE SODIUM 40 MG: 40 TABLET, DELAYED RELEASE ORAL at 06:56

## 2025-06-11 RX ADMIN — METOPROLOL SUCCINATE 100 MG: 50 TABLET, EXTENDED RELEASE ORAL at 08:01

## 2025-06-11 RX ADMIN — LEVOTHYROXINE SODIUM 150 MCG: 0.03 TABLET ORAL at 08:02

## 2025-06-11 RX ADMIN — FERROUS GLUCONATE 324 MG: 324 TABLET ORAL at 08:01

## 2025-06-11 RX ADMIN — LACTULOSE SOLUTION USP, 10 G/15 ML 10 G: 10 SOLUTION ORAL; RECTAL at 08:01

## 2025-06-11 ASSESSMENT — ACTIVITIES OF DAILY LIVING (ADL)
ADLS_ACUITY_SCORE: 55

## 2025-06-11 NOTE — PLAN OF CARE
Physical Therapy Discharge Summary    Reason for therapy discharge:    Discharged to home with hospice services     Progress towards therapy goal(s). See goals on Care Plan in [facility] electronic health record for goal details.  Goals not met.  Barriers to achieving goals:   limited tolerance for therapy and discharge from facility.    Therapy recommendation(s):    No further therapy is recommended.

## 2025-06-11 NOTE — DISCHARGE SUMMARY
"[facility]  Hospitalist Discharge Summary      Date of Admission:  6/2/2025  Date of Discharge:  6/11/2025  Discharging Provider: Maren Mera MD  Discharge Service: Hospitalist Service    Discharge Diagnoses   ***    Clinically Significant Risk Factors     # Overweight: Estimated body mass index is 26.04 kg/m  as calculated from the following:    Height as of this encounter: 1.549 m (5' 1\").    Weight as of this encounter: 62.5 kg (137 lb 12.8 oz).  # Severe Malnutrition: based on nutrition assessment and treatment provided per dietitian's recommendations.      Follow-ups Needed After Discharge   Follow-up Appointments       Hospital Follow-up with Existing Primary Care Provider (PCP)          Schedule Primary Care visit within: 7 Days   Recommended labs and Imaging (to be ordered by Primary Care Provider): tsh in 4-6 weeks with PCP to adjust thyroid med           {Additional follow-up instructions/to-do's for PCP    :***}    Unresulted Labs Ordered in the Past 30 Days of this Admission       Date and Time Order Name Status Description    6/9/2025  6:34 AM Blood Culture Peripheral blood (BC) Arm, Right Preliminary     6/9/2025  6:34 AM Blood Culture Peripheral blood (BC) Arm, Left Preliminary         These results will be followed up by ***    Discharge Disposition   {Discharge to:3879445::\"Discharged to home\"}  Condition at discharge: {CONDITION:653716::\"Stable\"}    Hospital Course   {OPTIONAL -- use to select A&P section   :715854}    Consultations This Hospital Stay   NEUROLOGY IP CONSULT  PHYSICAL THERAPY ADULT IP CONSULT  OCCUPATIONAL THERAPY ADULT IP CONSULT  HEMATOLOGY & ONCOLOGY IP CONSULT  CARE MANAGEMENT / SOCIAL WORK IP CONSULT  OCCUPATIONAL THERAPY ADULT IP CONSULT  NEPHROLOGY IP CONSULT  GASTROENTEROLOGY IP CONSULT  PALLIATIVE CARE ADULT IP CONSULT  CARE MANAGEMENT / SOCIAL WORK IP CONSULT  SPIRITUAL HEALTH SERVICES IP CONSULT    Code Status   No CPR- Do NOT " Intubate    Time Spent on this Encounter   {How much time did you spend on discharge?:83903843}       Maren Mera MD  [address] [address]  Phone: [phone]  Fax: [phone]  ______________________________________________________________________    Physical Exam   Vital Signs: Temp: 98  F (36.7  C) Temp src: Oral BP: 138/63 Pulse: 75   Resp: 18 SpO2: 95 % O2 Device: None (Room air)    Weight: 137 lbs 12.8 oz  {Recommend personal SmartPhrase or Notewriter for exam (OPTIONAL)   :874344}       Primary Care Physician   Alejandro Jones    Discharge Orders      Primary Care - Care Coordination Referral      Hospice Referral      Reason for your hospital stay    Confusion, liver cancer     Activity    Your activity upon discharge: home assist     Walker Order for DME - ONLY FOR DME     Diet    Follow this diet upon discharge: Current Diet:Orders Placed This Encounter      Snacks/Supplements Adult: Glucerna; Between Meals      Snacks/Supplements Adult: Ensure Max Protein (bariatric); Between Meals      Combination Diet Regular Diet Adult     Hospital Follow-up with Existing Primary Care Provider (PCP)            Significant Results and Procedures   {Data for Discharge Summary:960249}    Discharge Medications   Current Discharge Medication List        START taking these medications    Details   acetaminophen (TYLENOL) 650 MG suppository Place 1 suppository (650 mg) rectally every 4 hours as needed for fever.  Qty: 4 suppository, Refills: 0    Associated Diagnoses: Pain      amLODIPine (NORVASC) 10 MG tablet Take 1 tablet (10 mg) by mouth daily.  Qty: 30 tablet, Refills: 1    Associated Diagnoses: Essential hypertension      atropine 1 % ophthalmic solution Take 1-2 drops by mouth, place under tongue or place inside cheek every 4 hours as needed for secretions.  Qty: 5 mL, Refills: 0    Associated Diagnoses: Disturbance of salivary secretion       HYDROmorphone (DILAUDID) 2 MG tablet Take 0.5-1 tablets (1-2 mg) by mouth or place under tongue every 2 hours as needed for pain or shortness of breath.  Qty: 20 tablet, Refills: 0    Comments: Hospice patient. Dispense in quantities of 30 tablets.  Associated Diagnoses: Pain      lactulose (CHRONULAC) 10 GM/15ML solution Take 15 mLs (10 g) by mouth daily. Hold if more than 2 stools  Qty: 473 mL, Refills: 1    Associated Diagnoses: Encephalopathy, unspecified type      LORazepam (ATIVAN) 0.5 MG tablet Take 0.5-1 tablets (0.25-0.5 mg) by mouth or place under tongue every 4 hours as needed for anxiety (restlessness).  Qty: 20 tablet, Refills: 0    Associated Diagnoses: Anxiety      ondansetron (ZOFRAN ODT) 4 MG ODT tab Take 1 tablet (4 mg) by mouth every 6 hours as needed for nausea or vomiting.  Qty: 20 tablet, Refills: 0    Associated Diagnoses: Nausea      pantoprazole (PROTONIX) 40 MG EC tablet Take 1 tablet (40 mg) by mouth 2 times daily (before meals).  Qty: 60 tablet, Refills: 1    Associated Diagnoses: Gastroesophageal reflux disease with esophagitis, unspecified whether hemorrhage      senna-docusate (SENOKOT-S/PERICOLACE) 8.6-50 MG tablet Take 1 tablet by mouth 2 times daily as needed for constipation.  Qty: 10 tablet, Refills: 0    Associated Diagnoses: Constipation, unspecified constipation type           CONTINUE these medications which have CHANGED    Details   levothyroxine (SYNTHROID/LEVOTHROID) 150 MCG tablet Take 1 tablet (150 mcg) by mouth daily.  Qty: 30 tablet, Refills: 1    Associated Diagnoses: Hypothyroidism, unspecified type      metoprolol succinate ER (TOPROL XL) 100 MG 24 hr tablet Take 1 tablet (100 mg) by mouth daily.  Qty: 30 tablet, Refills: 1    Associated Diagnoses: Essential hypertension      spironolactone (ALDACTONE) 50 MG tablet Take 1 tablet (50 mg) by mouth 2 times daily.  Qty: 60 tablet, Refills: 1    Associated Diagnoses: Essential hypertension           CONTINUE these  medications which have NOT CHANGED    Details   aspirin 81 MG EC tablet Take 81 mg by mouth daily.      ferrous gluconate (FERGON) 324 (38 Fe) MG tablet Take 1 tablet (324 mg) by mouth every other day.      !! blood glucose (CONTOUR NEXT TEST) test strip USE 1 STRIP EVERY DAY  Qty: 100 strip, Refills: 2    Associated Diagnoses: Type 2 diabetes mellitus with diabetic nephropathy, without long-term current use of insulin (H)      !! blood glucose (NO BRAND SPECIFIED) test strip Use to test blood sugar 1 times daily or as directed. To accompany: Blood Glucose Monitor Brands: per insurance.  Qty: 100 strip, Refills: 3    Associated Diagnoses: Type 2 diabetes mellitus with diabetic nephropathy, without long-term current use of insulin (H)      blood glucose monitoring (NO BRAND SPECIFIED) meter device kit Use to test blood sugar 1 times daily or as directed. Preferred blood glucose meter OR supplies to accompany: Blood Glucose Monitor Brands: per insurance.  Qty: 1 kit, Refills: 0    Associated Diagnoses: Type 2 diabetes mellitus with diabetic nephropathy, without long-term current use of insulin (H)      Blood Glucose Monitoring Suppl (CONTOUR NEXT ONE) SAMEERA PLEASE SEE ATTACHED FOR DETAILED DIRECTIONS      !! blood-glucose meter Misc [BLOOD-GLUCOSE METER MISC] 1 glucometer device  - any brand covered by insurance (contour covered by insurance? )  Qty: 1 each, Refills: 0    Associated Diagnoses: Controlled type 2 diabetes mellitus without complication, without long-term current use of insulin (H)      thin (NO BRAND SPECIFIED) lancets Use with lanceting device. To accompany: Blood Glucose Monitor Brands: per insurance.  Qty: 100 each, Refills: 3    Associated Diagnoses: Type 2 diabetes mellitus with diabetic nephropathy, without long-term current use of insulin (H)       !! - Potential duplicate medications found. Please discuss with provider.        STOP taking these medications       hydrochlorothiazide (HYDRODIURIL)  25 MG tablet Comments:   Reason for Stopping:         lisinopril (ZESTRIL) 40 MG tablet Comments:   Reason for Stopping:         selpercatinib (RETEVMO) 120 MG tablet Comments:   Reason for Stopping:             Allergies   Allergies   Allergen Reactions    Amlodipine Unknown     Gums swelled  Tolerating as of 6/2025    Hydralazine Itching     Pt states that she is not allergic to anything      hyperintensity involving the cortex and subcortical   white matter in the medial left occipital parietal region (image 18 series 17). At least 3 additional small focal areas of T2/FLAIR hyperintensity involving the anterior lateral right occipital lobe (image 13 series 17), medial right occipital parietal   region (image 19 series 17), and superior left frontal lobe along the superior frontal gyrus (image 20 series 17). Normal ventricles and sulci. Normal position of the cerebellar tonsils. No pathologic contrast enhancement.  SELLA: No abnormality accounting for technique.  OSSEOUS STRUCTURES/SOFT TISSUES: Normal marrow signal. The major intracranial vascular flow voids are maintained.   ORBITS: Prior bilateral cataract surgery. Visualized portions of the orbits are otherwise unremarkable.   SINUSES/MASTOIDS: No paranasal sinus mucosal disease. No middle ear or mastoid effusion.       Impression    IMPRESSION:  1.  No acute intracranial process.  2.  Scattered nonspecific foci of T2/FLAIR hyperintensity in the cerebral white matter. The linear area of T2/FLAIR hyperintensity in the anterior right centrum semiovale oriented perpendicular to the ventricular system could be seen with demyelinating   disease. No evidence of abnormal enhancement to suggest active demyelination.  3.  Small rounded focus of T2/FLAIR hyperintensity involving the cortex and subcortical white matter in the medial left occipital parietal region could be related to old infarct or demyelinating lesion. Low-grade glioma is not excluded. Recommend 3 month   follow-up MRI of the brain without and with contrast to document stability.  4.  Additional small focal areas of T2/FLAIR hyperintensity in the right occipital lobe, right occipital parietal region, and left frontal lobe are nonspecific and may be due to small vessel ischemic disease or old infarcts.     XR Chest Port 1 View    Narrative    EXAM: XR CHEST PORT 1 VIEW  LOCATION: [facility]  DATE: 6/3/2025  INDICATION: tachypnea, crackles on exam  COMPARISON: 4/29/2025      Impression    IMPRESSION: Negative chest.   US Renal Complete w Arterial Duplex    Narrative    EXAM:  1. RENAL ULTRASOUND   2. RENAL DUPLEX  LOCATION: [facility]  DATE: 6/5/2025  INDICATION: jakob  COMPARISON: CT chest/abdomen/pelvis, PET/CT 4/29/2025  TECHNIQUE: Duplex imaging is performed utilizing gray-scale, two-dimensional images, and color-flow imaging. Doppler waveform analysis and spectral Doppler imaging is also performed.  FINDINGS:   RIGHT KIDNEY: 9.1 x 4.3 x 3.9 cm. No hydronephrosis. Right adrenal mass measures up to 6.6 cm in diameter, more completely evaluated on prior PET/CT.  LEFT KIDNEY 11.3 x 5.4 x 4.9 cm. Normal without hydronephrosis or masses..   BLADDER: Normal.  RENAL DUPLEX:  Aortic PSV: 66 cm/s, multiphasic  Right Renal Artery PSV: Normal, less than 200 cm/s (Normal considered less than 200 cm/s.)  Right Intrarenal Resistive Index: Borderline, 0.7 to 0.8  Left Renal Artery PSV elevated velocity at the renal artery origin measuring up to 231 cm/s (Normal considered less than 200 cm/s.)  Left Intrarenal Resistive Index: Borderline, 0.7 to 0.8      Impression    IMPRESSION:   1.  Elevated velocities at the left renal artery origin measuring up to 231 cm/s, suggesting underlying stenosis. Consider dedicated abdominal CTA and IR referral if intervention is desired.  2.  Right adrenal mass again seen.   XR Chest Port 1 View    Narrative    EXAM: XR CHEST PORT 1 VIEW  LOCATION: [facility]  DATE: 6/9/2025  INDICATION: fever  COMPARISON: 6/3/2025 and older studies      Impression    IMPRESSION: Minimal linear atelectasis in the right upper lobe. Lungs are otherwise clear. Heart and pulmonary vascularity are normal.  No signs of pneumonia or failure.     Discharge Medications   Current Discharge Medication List        START taking these  medications    Details   acetaminophen (TYLENOL) 650 MG suppository Place 1 suppository (650 mg) rectally every 4 hours as needed for fever.  Qty: 4 suppository, Refills: 0    Associated Diagnoses: Pain      amLODIPine (NORVASC) 10 MG tablet Take 1 tablet (10 mg) by mouth daily.  Qty: 30 tablet, Refills: 1    Associated Diagnoses: Essential hypertension      atropine 1 % ophthalmic solution Take 1-2 drops by mouth, place under tongue or place inside cheek every 4 hours as needed for secretions.  Qty: 5 mL, Refills: 0    Associated Diagnoses: Disturbance of salivary secretion      HYDROmorphone (DILAUDID) 2 MG tablet Take 0.5-1 tablets (1-2 mg) by mouth or place under tongue every 2 hours as needed for pain or shortness of breath.  Qty: 20 tablet, Refills: 0    Comments: Hospice patient. Dispense in quantities of 30 tablets.  Associated Diagnoses: Pain      lactulose (CHRONULAC) 10 GM/15ML solution Take 15 mLs (10 g) by mouth daily. Hold if more than 2 stools  Qty: 473 mL, Refills: 1    Associated Diagnoses: Encephalopathy, unspecified type      LORazepam (ATIVAN) 0.5 MG tablet Take 0.5-1 tablets (0.25-0.5 mg) by mouth or place under tongue every 4 hours as needed for anxiety (restlessness).  Qty: 20 tablet, Refills: 0    Associated Diagnoses: Anxiety      ondansetron (ZOFRAN ODT) 4 MG ODT tab Take 1 tablet (4 mg) by mouth every 6 hours as needed for nausea or vomiting.  Qty: 20 tablet, Refills: 0    Associated Diagnoses: Nausea      pantoprazole (PROTONIX) 40 MG EC tablet Take 1 tablet (40 mg) by mouth 2 times daily (before meals).  Qty: 60 tablet, Refills: 1    Associated Diagnoses: Gastroesophageal reflux disease with esophagitis, unspecified whether hemorrhage      senna-docusate (SENOKOT-S/PERICOLACE) 8.6-50 MG tablet Take 1 tablet by mouth 2 times daily as needed for constipation.  Qty: 10 tablet, Refills: 0    Associated Diagnoses: Constipation, unspecified constipation type           CONTINUE these  medications which have CHANGED    Details   levothyroxine (SYNTHROID/LEVOTHROID) 150 MCG tablet Take 1 tablet (150 mcg) by mouth daily.  Qty: 30 tablet, Refills: 1    Associated Diagnoses: Hypothyroidism, unspecified type      metoprolol succinate ER (TOPROL XL) 100 MG 24 hr tablet Take 1 tablet (100 mg) by mouth daily.  Qty: 30 tablet, Refills: 1    Associated Diagnoses: Essential hypertension      spironolactone (ALDACTONE) 50 MG tablet Take 1 tablet (50 mg) by mouth 2 times daily.  Qty: 60 tablet, Refills: 1    Associated Diagnoses: Essential hypertension           CONTINUE these medications which have NOT CHANGED    Details   aspirin 81 MG EC tablet Take 81 mg by mouth daily.      ferrous gluconate (FERGON) 324 (38 Fe) MG tablet Take 1 tablet (324 mg) by mouth every other day.      !! blood glucose (CONTOUR NEXT TEST) test strip USE 1 STRIP EVERY DAY  Qty: 100 strip, Refills: 2    Associated Diagnoses: Type 2 diabetes mellitus with diabetic nephropathy, without long-term current use of insulin (H)      !! blood glucose (NO BRAND SPECIFIED) test strip Use to test blood sugar 1 times daily or as directed. To accompany: Blood Glucose Monitor Brands: per insurance.  Qty: 100 strip, Refills: 3    Associated Diagnoses: Type 2 diabetes mellitus with diabetic nephropathy, without long-term current use of insulin (H)      blood glucose monitoring (NO BRAND SPECIFIED) meter device kit Use to test blood sugar 1 times daily or as directed. Preferred blood glucose meter OR supplies to accompany: Blood Glucose Monitor Brands: per insurance.  Qty: 1 kit, Refills: 0    Associated Diagnoses: Type 2 diabetes mellitus with diabetic nephropathy, without long-term current use of insulin (H)      Blood Glucose Monitoring Suppl (CONTOUR NEXT ONE) SAMEERA PLEASE SEE ATTACHED FOR DETAILED DIRECTIONS      !! blood-glucose meter Misc [BLOOD-GLUCOSE METER MISC] 1 glucometer device  - any brand covered by insurance (contour covered by  insurance? )  Qty: 1 each, Refills: 0    Associated Diagnoses: Controlled type 2 diabetes mellitus without complication, without long-term current use of insulin (H)      thin (NO BRAND SPECIFIED) lancets Use with lanceting device. To accompany: Blood Glucose Monitor Brands: per insurance.  Qty: 100 each, Refills: 3    Associated Diagnoses: Type 2 diabetes mellitus with diabetic nephropathy, without long-term current use of insulin (H)       !! - Potential duplicate medications found. Please discuss with provider.        STOP taking these medications       hydrochlorothiazide (HYDRODIURIL) 25 MG tablet Comments:   Reason for Stopping:         lisinopril (ZESTRIL) 40 MG tablet Comments:   Reason for Stopping:         selpercatinib (RETEVMO) 120 MG tablet Comments:   Reason for Stopping:             Allergies   Allergies   Allergen Reactions    Amlodipine Unknown     Gums swelled  Tolerating as of 6/2025    Hydralazine Itching     Pt states that she is not allergic to anything   This document is created with the help of Dragon dictation system. All grammatical errors are unintentional.

## 2025-06-11 NOTE — PLAN OF CARE
Problem: Adult Inpatient Plan of Care  Goal: Plan of Care Review  Outcome: Progressing    Problem: Hypertension Acute  Goal: Blood Pressure Within Desired Range  Outcome: Progressing    Problem: Glycemic Control Impaired  Goal: Blood Glucose Level Within Targeted Range  Outcome: Progressing    Pt sleepy this shift. Denies pain. Declined supper. . Sliding scale given per orders. BP: 150/67. SBA. Plan is to discharge to home tomorrow.

## 2025-06-11 NOTE — PLAN OF CARE
Problem: Adult Inpatient Plan of Care  Goal: Absence of Hospital-Acquired Illness or Injury  6/11/2025 0502 by Tai Galo RN  Outcome: Progressing  6/11/2025 0501 by Tai Galo RN  Outcome: Not Progressing  Intervention: Identify and Manage Fall Risk  Recent Flowsheet Documentation  Taken 6/11/2025 0000 by Tai Galo RN  Safety Promotion/Fall Prevention:   activity supervised   assistive device/personal items within reach   chemotherapeutic precautions  Taken 6/10/2025 2032 by Tai Galo RN  Safety Promotion/Fall Prevention:   activity supervised   assistive device/personal items within reach   chemotherapeutic precautions  Intervention: Prevent Skin Injury  Recent Flowsheet Documentation  Taken 6/11/2025 0000 by Tai Galo RN  Body Position: position changed independently  Taken 6/10/2025 2032 by Tai Galo RN  Body Position: position changed independently  Intervention: Prevent Infection  Recent Flowsheet Documentation  Taken 6/11/2025 0000 by Tai Galo RN  Infection Prevention:   hand hygiene promoted   rest/sleep promoted   single patient room provided  Taken 6/10/2025 2032 by Tai Galo RN  Infection Prevention:   hand hygiene promoted   rest/sleep promoted   single patient room provided     Problem: Delirium  Goal: Improved Sleep  6/11/2025 0502 by Tai Galo RN  Outcome: Progressing  6/11/2025 0501 by Tai Galo RN  Outcome: Not Progressing     Problem: Skin Injury Risk Increased  Goal: Skin Health and Integrity  Intervention: Plan: Nurse Driven Intervention: Moisture Management  Recent Flowsheet Documentation  Taken 6/11/2025 0000 by Tai Galo RN  Moisture Interventions: Encourage regular toileting  Taken 6/10/2025 2032 by Tai Galo RN  Moisture Interventions: Encourage regular toileting  Intervention: Plan: Nurse Driven Intervention: Friction and Shear  Recent Flowsheet Documentation  Taken 6/11/2025 0000 by Tai Galo RN  Friction/Shear  "Interventions:   HOB 30 degrees or less   Other (comment)  Taken 6/10/2025 2032 by Tai Galo, RN  Friction/Shear Interventions:   HOB 30 degrees or less   Other (comment)  Intervention: Optimize Skin Protection  Recent Flowsheet Documentation  Taken 6/11/2025 0000 by Tai Galo, RN  Activity Management: activity adjusted per tolerance  Head of Bed (HOB) Positioning: HOB at 20-30 degrees  Taken 6/10/2025 2032 by Tai Galo, RN  Activity Management: activity adjusted per tolerance  Head of Bed (HOB) Positioning: HOB at 20-30 degrees   Goal Outcome Evaluation:  BP (!) 127/91 (BP Location: Right arm)   Pulse 72   Temp 98.1  F (36.7  C) (Oral)   Resp 18   Ht 1.549 m (5' 1\")   Wt 62.5 kg (137 lb 12.8 oz)   SpO2 96%   BMI 26.04 kg/m    A&Ox4, denies pain.  and 98. Up SBA to BRP. Will continue with plan of care.                          "

## 2025-06-11 NOTE — PROGRESS NOTES
Care Management Follow Up    Length of Stay (days): 9    Expected Discharge Date: 06/11/2025     Concerns to be Addressed:    discharge planing   Patient plan of care discussed at interdisciplinary rounds: Yes    Anticipated Discharge Disposition:  home with hospice      Anticipated Discharge Services:  TBD  Anticipated Discharge DME:  TBD    Patient/family educated on Medicare website which has current facility and service quality ratings:  yes  Education Provided on the Discharge Plan:  yes  Patient/Family in Agreement with the Plan:  yes    Handoff Completed: Yes, [facility] PCP: Internal handoff referral completed    Additional Information:  MARICEL spoke with patient's sister in law regarding discharging planing, time and transportation. Destini was on her way to the hospital and claims she will transport patient back home. She would like patient to get walker to assist with her mobility at home. Destini is not aware what DME was discussed during hospice meeting yesterday.  MARICEL called [facility] and they will meet patient at home at 1PM today. Asked to add to the DME list walker for patient.   Sent message to provider with an update on patient's meeting time with hospice.    Next Steps: CM will continue to follow patient's discharge needs and provide assist as needed.       Kalpana Tejada, LSW      No

## 2025-06-11 NOTE — PLAN OF CARE
Problem: Adult Inpatient Plan of Care  Goal: Plan of Care Review  Description: The Plan of Care Review/Shift note should be completed every shift.  The Outcome Evaluation is a brief statement about your assessment that the patient is improving, declining, or no change.  This information will be displayed automatically on your shift  note.  Outcome: Adequate for Care Transition     Discharge orders written and reviewed with patient and Destini.  Stated they understood and have all belongings.  Home medications sent with patient.

## 2025-06-11 NOTE — PROGRESS NOTES
Occupational Therapy Discharge Summary    Reason for therapy discharge:    Discharged to home with hospice services    Progress towards therapy goal(s). See goals on Care Plan in [facility] electronic health record for goal details.  Goals not met.  Barriers to achieving goals:   limited tolerance for therapy.    Therapy recommendation(s):    No further therapy is recommended.

## 2025-06-12 LAB
BACTERIA SPEC CULT: NORMAL
BACTERIA SPEC CULT: NORMAL

## 2025-06-14 LAB
BACTERIA SPEC CULT: NO GROWTH
BACTERIA SPEC CULT: NO GROWTH

## 2025-06-25 ENCOUNTER — DOCUMENTATION ONLY (OUTPATIENT)
Dept: OTHER | Facility: CLINIC | Age: 75
End: 2025-06-25
Payer: COMMERCIAL

## 2025-08-25 ENCOUNTER — PATIENT OUTREACH (OUTPATIENT)
Dept: CARE COORDINATION | Facility: CLINIC | Age: 75
End: 2025-08-25
Payer: COMMERCIAL

## (undated) RX ORDER — FLUMAZENIL 0.1 MG/ML
INJECTION, SOLUTION INTRAVENOUS
Status: DISPENSED
Start: 2025-02-04

## (undated) RX ORDER — NALOXONE HYDROCHLORIDE 0.4 MG/ML
INJECTION, SOLUTION INTRAMUSCULAR; INTRAVENOUS; SUBCUTANEOUS
Status: DISPENSED
Start: 2025-02-04

## (undated) RX ORDER — FENTANYL CITRATE 50 UG/ML
INJECTION, SOLUTION INTRAMUSCULAR; INTRAVENOUS
Status: DISPENSED
Start: 2025-02-04

## (undated) RX ORDER — ACETAMINOPHEN 325 MG/1
TABLET ORAL
Status: DISPENSED
Start: 2025-02-04